# Patient Record
Sex: FEMALE | Race: WHITE | NOT HISPANIC OR LATINO | Employment: OTHER | URBAN - NONMETROPOLITAN AREA
[De-identification: names, ages, dates, MRNs, and addresses within clinical notes are randomized per-mention and may not be internally consistent; named-entity substitution may affect disease eponyms.]

---

## 2018-06-20 ENCOUNTER — OFFICE VISIT (OUTPATIENT)
Dept: OTOLARYNGOLOGY | Facility: CLINIC | Age: 78
End: 2018-06-20

## 2018-06-20 VITALS — WEIGHT: 230 LBS | HEIGHT: 68 IN | BODY MASS INDEX: 34.86 KG/M2 | HEART RATE: 66 BPM

## 2018-06-20 DIAGNOSIS — H60.63 CHRONIC NON-INFECTIVE OTITIS EXTERNA OF BOTH EARS, UNSPECIFIED TYPE: Primary | ICD-10-CM

## 2018-06-20 PROCEDURE — 99203 OFFICE O/P NEW LOW 30 MIN: CPT | Performed by: OTOLARYNGOLOGY

## 2018-06-20 RX ORDER — LISINOPRIL 10 MG/1
10 TABLET ORAL DAILY
COMMUNITY
End: 2022-03-29

## 2018-06-20 RX ORDER — SPIRONOLACTONE 25 MG/1
1 TABLET ORAL DAILY
COMMUNITY
Start: 2022-10-08 | End: 2023-03-22 | Stop reason: HOSPADM

## 2018-06-20 RX ORDER — GUAIFENESIN 600 MG/1
1200 TABLET, EXTENDED RELEASE ORAL 2 TIMES DAILY
COMMUNITY
End: 2023-02-24 | Stop reason: HOSPADM

## 2018-06-20 RX ORDER — ACETAMINOPHEN 500 MG
500 TABLET ORAL 2 TIMES DAILY
COMMUNITY
End: 2023-02-24 | Stop reason: HOSPADM

## 2018-06-20 RX ORDER — CARVEDILOL 3.12 MG/1
3.12 TABLET ORAL 2 TIMES DAILY WITH MEALS
COMMUNITY
End: 2023-01-06

## 2018-06-20 RX ORDER — BUDESONIDE AND FORMOTEROL FUMARATE DIHYDRATE 160; 4.5 UG/1; UG/1
2 AEROSOL RESPIRATORY (INHALATION)
COMMUNITY
Start: 2017-09-06 | End: 2023-02-09 | Stop reason: DRUGHIGH

## 2018-06-20 RX ORDER — LEVOCETIRIZINE DIHYDROCHLORIDE 5 MG/1
1 TABLET, FILM COATED ORAL EVERY EVENING
COMMUNITY

## 2018-06-20 RX ORDER — BUMETANIDE 2 MG/1
2 TABLET ORAL DAILY
COMMUNITY
End: 2022-09-23 | Stop reason: SDUPTHER

## 2018-06-20 RX ORDER — ALBUTEROL SULFATE 90 UG/1
2 AEROSOL, METERED RESPIRATORY (INHALATION) EVERY 4 HOURS PRN
COMMUNITY

## 2018-06-20 RX ORDER — MELATONIN 10 MG
1 CAPSULE ORAL NIGHTLY
Status: ON HOLD | COMMUNITY
End: 2023-02-22

## 2018-06-22 NOTE — PROGRESS NOTES
"Subjective   Alda Constantino is a 77 y.o. female.     History of Present Illness     Patient presents complaining that her left ear or so that her right ear has been itchy and feeling dry.  She says at times it feels like something is \"crawling\" inside her ear like an insect.  She has used over-the-counter swimmer's ear medicine and occludes her ear with a cottonball neither of which seemed to help all that months.  Symptoms been present for 2 months.  Nothing in particular brought this on.  No previous otologic surgery or history of significant ear problems.  Doesn't appear to have subjectively affected her hearing    The following portions of the patient's history were reviewed and updated as appropriate: allergies, current medications, past family history, past medical history, past social history, past surgical history and problem list.      Alda Constantino reports that she has quit smoking. She has never used smokeless tobacco.  Patient is not a tobacco user and has not been counseled for use of tobacco products    Family History   Problem Relation Age of Onset   • Diabetes Mother    • Diabetes Father    • Heart disease Father    • Cancer Sister    • Heart disease Sister    • Thyroid disease Sister    • Diabetes Brother    • Heart disease Brother        Allergies   Allergen Reactions   • Dilaudid [Hydromorphone Hcl] Anaphylaxis   • Keflex [Cephalexin] Shortness Of Breath   • Lomotil [Diphenoxylate] Shortness Of Breath   • Beef-Derived Products GI Intolerance   • Ciprofloxacin Nausea And Vomiting   • Contrast Dye Hives   • Levaquin [Levofloxacin] GI Intolerance   • Milk-Related Compounds GI Intolerance   • Nsaids Hives   • Percocet [Oxycodone-Acetaminophen] Itching         Current Outpatient Prescriptions:   •  acetaminophen (TYLENOL) 500 MG tablet, Take 500 mg by mouth Every 6 (Six) Hours As Needed for Mild Pain ., Disp: , Rfl:   •  albuterol (PROVENTIL HFA;VENTOLIN HFA) 108 (90 Base) MCG/ACT inhaler, " Inhale 2 puffs Every 4 (Four) Hours As Needed for Wheezing., Disp: , Rfl:   •  budesonide-formoterol (SYMBICORT) 160-4.5 MCG/ACT inhaler, Inhale 2 puffs., Disp: , Rfl:   •  bumetanide (BUMEX) 2 MG tablet, Take 2 mg by mouth Daily., Disp: , Rfl:   •  carvedilol (COREG) 3.125 MG tablet, Take 3.125 mg by mouth 2 (Two) Times a Day With Meals., Disp: , Rfl:   •  EPINEPHrine (EPIPEN IJ), Inject  as directed., Disp: , Rfl:   •  Glucose Blood (PRECISION XTRA TEST VI), by In Vitro route., Disp: , Rfl:   •  guaiFENesin (MUCINEX) 600 MG 12 hr tablet, Take 1,200 mg by mouth 2 (Two) Times a Day., Disp: , Rfl:   •  IPRATROPIUM BROMIDE IN, Inhale., Disp: , Rfl:   •  levocetirizine (XYZAL) 5 MG tablet, Take 5 mg by mouth Every Evening., Disp: , Rfl:   •  lisinopril (PRINIVIL,ZESTRIL) 10 MG tablet, Take 10 mg by mouth Daily., Disp: , Rfl:   •  Melatonin 10 MG capsule, Take  by mouth., Disp: , Rfl:   •  METFORMIN HCL PO, Take  by mouth., Disp: , Rfl:   •  spironolactone (ALDACTONE) 25 MG tablet, Take 25 mg by mouth Daily., Disp: , Rfl:     Past Medical History:   Diagnosis Date   • Congenital abnormalities     colon behind liver         Review of Systems   Eyes:        Vision change   Cardiovascular: Positive for palpitations.   Musculoskeletal: Positive for arthralgias.        Leg Pain   Neurological: Positive for numbness.   All other systems reviewed and are negative.          Objective   Physical Exam  General: Well-developed well-nourished female in no acute distress.  Alert and oriented ×3. Head: Normocephalic. Face: Symmetrical strength and appearance. PERRL. EOMI. Voice:Strong. Speech:Fluent  Ears: External ears no deformity, canals show dry skin with mild flaky squamous debris bilaterally, tympanic membranes intact clear and mobile bilaterally.  Nose: Nares show no discharge mass polyp or purulence.  Boggy mucosa is present.  No gross external deformity.  Septum: Midline  Oral cavity: Lips and gums without lesions.  Tongue  and floor of mouth without lesions.  Parotid and submandibular ducts unobstructed.  No mucosal lesions on the buccal mucosa or vestibule of the mouth.  Pharynx: No erythema exudate mass or ulcer  Neck: No lymphadenopathy.  No thyromegaly.  Trachea and larynx midline.  No masses in the parotid or submandibular glands.  TMJs tender to palpation bilaterally        Assessment/Plan   Alda was seen today for ear problem.    Diagnoses and all orders for this visit:    Chronic non-infective otitis externa of both ears, unspecified type        Plan: I suspect the majority of her symptoms are due to chronic noninfectious otitis externa.  I've advised using hydrocortisone liquid 3 drops to each ear twice a day ×7 days then when necessary.  May also use baby oil at bedtime.  No manipulation with Q-tips.  Avoid occluding with cotton.  Return if symptoms have not improved within a month.

## 2019-09-03 ENCOUNTER — HOSPITAL ENCOUNTER (INPATIENT)
Facility: HOSPITAL | Age: 79
LOS: 5 days | Discharge: HOME | End: 2019-09-09
Attending: EMERGENCY MEDICINE | Admitting: INTERNAL MEDICINE

## 2019-09-03 DIAGNOSIS — J42 CHRONIC BRONCHITIS, UNSPECIFIED CHRONIC BRONCHITIS TYPE (HCC): ICD-10-CM

## 2019-09-03 DIAGNOSIS — R50.9 ACUTE FEBRILE ILLNESS: Primary | ICD-10-CM

## 2019-09-03 DIAGNOSIS — Z74.09 IMPAIRED MOBILITY AND ACTIVITIES OF DAILY LIVING: ICD-10-CM

## 2019-09-03 DIAGNOSIS — Z74.09 IMPAIRED FUNCTIONAL MOBILITY, BALANCE, GAIT, AND ENDURANCE: ICD-10-CM

## 2019-09-03 DIAGNOSIS — Z78.9 IMPAIRED MOBILITY AND ACTIVITIES OF DAILY LIVING: ICD-10-CM

## 2019-09-03 PROCEDURE — 99285 EMERGENCY DEPT VISIT HI MDM: CPT

## 2019-09-03 PROCEDURE — 93005 ELECTROCARDIOGRAM TRACING: CPT | Performed by: EMERGENCY MEDICINE

## 2019-09-03 PROCEDURE — 93010 ELECTROCARDIOGRAM REPORT: CPT | Performed by: INTERNAL MEDICINE

## 2019-09-03 PROCEDURE — 93005 ELECTROCARDIOGRAM TRACING: CPT

## 2019-09-04 ENCOUNTER — APPOINTMENT (OUTPATIENT)
Dept: GENERAL RADIOLOGY | Facility: HOSPITAL | Age: 79
End: 2019-09-04

## 2019-09-04 ENCOUNTER — APPOINTMENT (OUTPATIENT)
Dept: CT IMAGING | Facility: HOSPITAL | Age: 79
End: 2019-09-04

## 2019-09-04 PROBLEM — R50.9 ACUTE FEBRILE ILLNESS: Status: ACTIVE | Noted: 2019-09-04

## 2019-09-04 PROBLEM — R11.10 VOMITING: Status: ACTIVE | Noted: 2019-09-04

## 2019-09-04 PROBLEM — J44.9 COPD (CHRONIC OBSTRUCTIVE PULMONARY DISEASE) (HCC): Status: ACTIVE | Noted: 2019-09-04

## 2019-09-04 PROBLEM — A41.9 SEPSIS (HCC): Status: ACTIVE | Noted: 2019-09-04

## 2019-09-04 PROBLEM — D72.829 LEUKOCYTOSIS: Status: ACTIVE | Noted: 2019-09-04

## 2019-09-04 PROBLEM — I10 HYPERTENSION: Status: ACTIVE | Noted: 2019-09-04

## 2019-09-04 LAB
ALBUMIN SERPL-MCNC: 4.5 G/DL (ref 3.5–5.2)
ALBUMIN/GLOB SERPL: 1.5 G/DL
ALP SERPL-CCNC: 69 U/L (ref 39–117)
ALT SERPL W P-5'-P-CCNC: 18 U/L (ref 1–33)
ANION GAP SERPL CALCULATED.3IONS-SCNC: 12 MMOL/L (ref 5–15)
AST SERPL-CCNC: 20 U/L (ref 1–32)
BASOPHILS # BLD AUTO: 0.05 10*3/MM3 (ref 0–0.2)
BASOPHILS NFR BLD AUTO: 0.3 % (ref 0–1.5)
BILIRUB SERPL-MCNC: 0.4 MG/DL (ref 0.2–1.2)
BILIRUB UR QL STRIP: NEGATIVE
BILIRUB UR QL STRIP: NEGATIVE
BUN BLD-MCNC: 25 MG/DL (ref 8–23)
BUN/CREAT SERPL: 20.7 (ref 7–25)
CALCIUM SPEC-SCNC: 9.5 MG/DL (ref 8.6–10.5)
CHLORIDE SERPL-SCNC: 99 MMOL/L (ref 98–107)
CLARITY UR: CLEAR
CLARITY UR: CLEAR
CO2 SERPL-SCNC: 27 MMOL/L (ref 22–29)
COLOR UR: YELLOW
COLOR UR: YELLOW
CREAT BLD-MCNC: 1.21 MG/DL (ref 0.57–1)
D-LACTATE SERPL-SCNC: 0.9 MMOL/L (ref 0.5–2)
D-LACTATE SERPL-SCNC: 1.7 MMOL/L (ref 0.5–2)
DEPRECATED RDW RBC AUTO: 49.8 FL (ref 37–54)
EOSINOPHIL # BLD AUTO: 0.02 10*3/MM3 (ref 0–0.4)
EOSINOPHIL NFR BLD AUTO: 0.1 % (ref 0.3–6.2)
ERYTHROCYTE [DISTWIDTH] IN BLOOD BY AUTOMATED COUNT: 14.2 % (ref 12.3–15.4)
FLUAV AG NPH QL: NEGATIVE
FLUBV AG NPH QL IA: NEGATIVE
GFR SERPL CREATININE-BSD FRML MDRD: 43 ML/MIN/1.73
GLOBULIN UR ELPH-MCNC: 3 GM/DL
GLUCOSE BLD-MCNC: 181 MG/DL (ref 65–99)
GLUCOSE UR STRIP-MCNC: NEGATIVE MG/DL
GLUCOSE UR STRIP-MCNC: NEGATIVE MG/DL
HCT VFR BLD AUTO: 35.6 % (ref 34–46.6)
HGB BLD-MCNC: 11.7 G/DL (ref 12–15.9)
HGB UR QL STRIP.AUTO: NEGATIVE
HGB UR QL STRIP.AUTO: NEGATIVE
HOLD SPECIMEN: NORMAL
IMM GRANULOCYTES # BLD AUTO: 0.1 10*3/MM3 (ref 0–0.05)
IMM GRANULOCYTES NFR BLD AUTO: 0.5 % (ref 0–0.5)
KETONES UR QL STRIP: NEGATIVE
KETONES UR QL STRIP: NEGATIVE
LEUKOCYTE ESTERASE UR QL STRIP.AUTO: NEGATIVE
LEUKOCYTE ESTERASE UR QL STRIP.AUTO: NEGATIVE
LYMPHOCYTES # BLD AUTO: 0.98 10*3/MM3 (ref 0.7–3.1)
LYMPHOCYTES NFR BLD AUTO: 5.2 % (ref 19.6–45.3)
MCH RBC QN AUTO: 31.7 PG (ref 26.6–33)
MCHC RBC AUTO-ENTMCNC: 32.9 G/DL (ref 31.5–35.7)
MCV RBC AUTO: 96.5 FL (ref 79–97)
MONOCYTES # BLD AUTO: 0.74 10*3/MM3 (ref 0.1–0.9)
MONOCYTES NFR BLD AUTO: 3.9 % (ref 5–12)
NEUTROPHILS # BLD AUTO: 16.96 10*3/MM3 (ref 1.7–7)
NEUTROPHILS NFR BLD AUTO: 90 % (ref 42.7–76)
NITRITE UR QL STRIP: NEGATIVE
NITRITE UR QL STRIP: NEGATIVE
NRBC BLD AUTO-RTO: 0 /100 WBC (ref 0–0.2)
PH UR STRIP.AUTO: <=5 [PH] (ref 5–9)
PH UR STRIP.AUTO: <=5 [PH] (ref 5–9)
PLATELET # BLD AUTO: 195 10*3/MM3 (ref 140–450)
PMV BLD AUTO: 11.9 FL (ref 6–12)
POTASSIUM BLD-SCNC: 4.7 MMOL/L (ref 3.5–5.2)
PROT SERPL-MCNC: 7.5 G/DL (ref 6–8.5)
PROT UR QL STRIP: NEGATIVE
PROT UR QL STRIP: NEGATIVE
RBC # BLD AUTO: 3.69 10*6/MM3 (ref 3.77–5.28)
SODIUM BLD-SCNC: 138 MMOL/L (ref 136–145)
SP GR UR STRIP: 1.01 (ref 1–1.03)
SP GR UR STRIP: 1.02 (ref 1–1.03)
TROPONIN T SERPL-MCNC: <0.01 NG/ML (ref 0–0.03)
UROBILINOGEN UR QL STRIP: NORMAL
UROBILINOGEN UR QL STRIP: NORMAL
WBC NRBC COR # BLD: 18.85 10*3/MM3 (ref 3.4–10.8)
WHOLE BLOOD HOLD SPECIMEN: NORMAL

## 2019-09-04 PROCEDURE — 94640 AIRWAY INHALATION TREATMENT: CPT

## 2019-09-04 PROCEDURE — 81003 URINALYSIS AUTO W/O SCOPE: CPT | Performed by: NURSE PRACTITIONER

## 2019-09-04 PROCEDURE — 80053 COMPREHEN METABOLIC PANEL: CPT

## 2019-09-04 PROCEDURE — 83605 ASSAY OF LACTIC ACID: CPT | Performed by: INTERNAL MEDICINE

## 2019-09-04 PROCEDURE — 87040 BLOOD CULTURE FOR BACTERIA: CPT | Performed by: INTERNAL MEDICINE

## 2019-09-04 PROCEDURE — 94799 UNLISTED PULMONARY SVC/PX: CPT

## 2019-09-04 PROCEDURE — 25010000002 LEVOFLOXACIN PER 250 MG: Performed by: EMERGENCY MEDICINE

## 2019-09-04 PROCEDURE — 83605 ASSAY OF LACTIC ACID: CPT

## 2019-09-04 PROCEDURE — 74176 CT ABD & PELVIS W/O CONTRAST: CPT

## 2019-09-04 PROCEDURE — 94760 N-INVAS EAR/PLS OXIMETRY 1: CPT

## 2019-09-04 PROCEDURE — 25010000002 ONDANSETRON PER 1 MG: Performed by: EMERGENCY MEDICINE

## 2019-09-04 PROCEDURE — 87804 INFLUENZA ASSAY W/OPTIC: CPT | Performed by: EMERGENCY MEDICINE

## 2019-09-04 PROCEDURE — 85025 COMPLETE CBC W/AUTO DIFF WBC: CPT

## 2019-09-04 PROCEDURE — 84484 ASSAY OF TROPONIN QUANT: CPT | Performed by: EMERGENCY MEDICINE

## 2019-09-04 PROCEDURE — 71045 X-RAY EXAM CHEST 1 VIEW: CPT

## 2019-09-04 PROCEDURE — 25010000002 VANCOMYCIN 5 G RECONSTITUTED SOLUTION: Performed by: INTERNAL MEDICINE

## 2019-09-04 PROCEDURE — 81003 URINALYSIS AUTO W/O SCOPE: CPT | Performed by: EMERGENCY MEDICINE

## 2019-09-04 RX ORDER — BUDESONIDE AND FORMOTEROL FUMARATE DIHYDRATE 160; 4.5 UG/1; UG/1
2 AEROSOL RESPIRATORY (INHALATION)
Status: DISCONTINUED | OUTPATIENT
Start: 2019-09-04 | End: 2019-09-09 | Stop reason: HOSPADM

## 2019-09-04 RX ORDER — DOCUSATE SODIUM 100 MG/1
100 CAPSULE, LIQUID FILLED ORAL 2 TIMES DAILY
Status: DISCONTINUED | OUTPATIENT
Start: 2019-09-04 | End: 2019-09-09 | Stop reason: HOSPADM

## 2019-09-04 RX ORDER — ONDANSETRON 2 MG/ML
4 INJECTION INTRAMUSCULAR; INTRAVENOUS ONCE
Status: COMPLETED | OUTPATIENT
Start: 2019-09-04 | End: 2019-09-04

## 2019-09-04 RX ORDER — SODIUM CHLORIDE 0.9 % (FLUSH) 0.9 %
10 SYRINGE (ML) INJECTION AS NEEDED
Status: DISCONTINUED | OUTPATIENT
Start: 2019-09-04 | End: 2019-09-09 | Stop reason: HOSPADM

## 2019-09-04 RX ORDER — ACETAMINOPHEN 500 MG
1000 TABLET ORAL ONCE
Status: COMPLETED | OUTPATIENT
Start: 2019-09-04 | End: 2019-09-04

## 2019-09-04 RX ORDER — GUAIFENESIN 600 MG/1
1200 TABLET, EXTENDED RELEASE ORAL EVERY 12 HOURS SCHEDULED
Status: DISCONTINUED | OUTPATIENT
Start: 2019-09-04 | End: 2019-09-09 | Stop reason: HOSPADM

## 2019-09-04 RX ORDER — IPRATROPIUM BROMIDE AND ALBUTEROL SULFATE 2.5; .5 MG/3ML; MG/3ML
3 SOLUTION RESPIRATORY (INHALATION) ONCE
Status: COMPLETED | OUTPATIENT
Start: 2019-09-04 | End: 2019-09-04

## 2019-09-04 RX ORDER — ACETAMINOPHEN 160 MG/5ML
650 SOLUTION ORAL EVERY 4 HOURS PRN
Status: DISCONTINUED | OUTPATIENT
Start: 2019-09-04 | End: 2019-09-09 | Stop reason: HOSPADM

## 2019-09-04 RX ORDER — ACETAMINOPHEN 325 MG/1
650 TABLET ORAL EVERY 4 HOURS PRN
Status: DISCONTINUED | OUTPATIENT
Start: 2019-09-04 | End: 2019-09-09 | Stop reason: HOSPADM

## 2019-09-04 RX ORDER — LEVOFLOXACIN 5 MG/ML
750 INJECTION, SOLUTION INTRAVENOUS ONCE
Status: COMPLETED | OUTPATIENT
Start: 2019-09-04 | End: 2019-09-04

## 2019-09-04 RX ORDER — ACETAMINOPHEN 650 MG/1
650 SUPPOSITORY RECTAL EVERY 4 HOURS PRN
Status: DISCONTINUED | OUTPATIENT
Start: 2019-09-04 | End: 2019-09-09 | Stop reason: HOSPADM

## 2019-09-04 RX ORDER — CARVEDILOL 3.12 MG/1
3.12 TABLET ORAL 2 TIMES DAILY WITH MEALS
Status: DISCONTINUED | OUTPATIENT
Start: 2019-09-04 | End: 2019-09-09 | Stop reason: HOSPADM

## 2019-09-04 RX ORDER — SODIUM CHLORIDE 9 MG/ML
50 INJECTION, SOLUTION INTRAVENOUS CONTINUOUS
Status: DISCONTINUED | OUTPATIENT
Start: 2019-09-04 | End: 2019-09-06

## 2019-09-04 RX ORDER — LISINOPRIL 10 MG/1
10 TABLET ORAL DAILY
Status: DISCONTINUED | OUTPATIENT
Start: 2019-09-04 | End: 2019-09-04

## 2019-09-04 RX ORDER — SODIUM CHLORIDE 0.9 % (FLUSH) 0.9 %
10 SYRINGE (ML) INJECTION EVERY 12 HOURS SCHEDULED
Status: DISCONTINUED | OUTPATIENT
Start: 2019-09-04 | End: 2019-09-09 | Stop reason: HOSPADM

## 2019-09-04 RX ORDER — LANOLIN ALCOHOL/MO/W.PET/CERES
10 CREAM (GRAM) TOPICAL NIGHTLY
Status: DISCONTINUED | OUTPATIENT
Start: 2019-09-04 | End: 2019-09-09 | Stop reason: HOSPADM

## 2019-09-04 RX ORDER — ONDANSETRON 2 MG/ML
4 INJECTION INTRAMUSCULAR; INTRAVENOUS EVERY 6 HOURS PRN
Status: DISCONTINUED | OUTPATIENT
Start: 2019-09-04 | End: 2019-09-09 | Stop reason: HOSPADM

## 2019-09-04 RX ORDER — FAMOTIDINE 40 MG/1
40 TABLET, FILM COATED ORAL DAILY
Status: DISCONTINUED | OUTPATIENT
Start: 2019-09-04 | End: 2019-09-09 | Stop reason: HOSPADM

## 2019-09-04 RX ORDER — BUMETANIDE 1 MG/1
2 TABLET ORAL DAILY
Status: DISCONTINUED | OUTPATIENT
Start: 2019-09-04 | End: 2019-09-04

## 2019-09-04 RX ORDER — DOCUSATE SODIUM 100 MG/1
100 CAPSULE, LIQUID FILLED ORAL 2 TIMES DAILY
Status: DISCONTINUED | OUTPATIENT
Start: 2019-09-04 | End: 2019-09-04

## 2019-09-04 RX ORDER — CETIRIZINE HYDROCHLORIDE 5 MG/1
5 TABLET ORAL DAILY
Status: DISCONTINUED | OUTPATIENT
Start: 2019-09-04 | End: 2019-09-09 | Stop reason: HOSPADM

## 2019-09-04 RX ORDER — IPRATROPIUM BROMIDE AND ALBUTEROL SULFATE 2.5; .5 MG/3ML; MG/3ML
1.5 SOLUTION RESPIRATORY (INHALATION) EVERY 4 HOURS PRN
COMMUNITY

## 2019-09-04 RX ORDER — SPIRONOLACTONE 25 MG/1
25 TABLET ORAL DAILY
Status: DISCONTINUED | OUTPATIENT
Start: 2019-09-04 | End: 2019-09-09 | Stop reason: HOSPADM

## 2019-09-04 RX ADMIN — AZTREONAM 2 G: 2 INJECTION, POWDER, LYOPHILIZED, FOR SOLUTION INTRAMUSCULAR; INTRAVENOUS at 20:35

## 2019-09-04 RX ADMIN — SODIUM CHLORIDE, PRESERVATIVE FREE 10 ML: 5 INJECTION INTRAVENOUS at 12:03

## 2019-09-04 RX ADMIN — BUMETANIDE 2 MG: 1 TABLET ORAL at 12:06

## 2019-09-04 RX ADMIN — DOCUSATE SODIUM 100 MG: 100 CAPSULE, LIQUID FILLED ORAL at 20:32

## 2019-09-04 RX ADMIN — DOCUSATE SODIUM 100 MG: 100 CAPSULE, LIQUID FILLED ORAL at 06:43

## 2019-09-04 RX ADMIN — IPRATROPIUM BROMIDE AND ALBUTEROL SULFATE 3 ML: 2.5; .5 SOLUTION RESPIRATORY (INHALATION) at 01:30

## 2019-09-04 RX ADMIN — SODIUM CHLORIDE 50 ML/HR: 9 INJECTION, SOLUTION INTRAVENOUS at 12:01

## 2019-09-04 RX ADMIN — GUAIFENESIN 1200 MG: 600 TABLET, EXTENDED RELEASE ORAL at 12:06

## 2019-09-04 RX ADMIN — ONDANSETRON 4 MG: 2 INJECTION INTRAMUSCULAR; INTRAVENOUS at 01:38

## 2019-09-04 RX ADMIN — VANCOMYCIN HYDROCHLORIDE 2000 MG: 5 INJECTION, POWDER, LYOPHILIZED, FOR SOLUTION INTRAVENOUS at 10:18

## 2019-09-04 RX ADMIN — CARVEDILOL 3.12 MG: 3.12 TABLET, FILM COATED ORAL at 12:06

## 2019-09-04 RX ADMIN — BUDESONIDE AND FORMOTEROL FUMARATE DIHYDRATE 2 PUFF: 160; 4.5 AEROSOL RESPIRATORY (INHALATION) at 12:05

## 2019-09-04 RX ADMIN — LEVOFLOXACIN 750 MG: 5 INJECTION, SOLUTION INTRAVENOUS at 01:39

## 2019-09-04 RX ADMIN — BUDESONIDE AND FORMOTEROL FUMARATE DIHYDRATE 2 PUFF: 160; 4.5 AEROSOL RESPIRATORY (INHALATION) at 20:04

## 2019-09-04 RX ADMIN — FAMOTIDINE 40 MG: 40 TABLET ORAL at 12:06

## 2019-09-04 RX ADMIN — ACETAMINOPHEN 1000 MG: 500 TABLET ORAL at 01:25

## 2019-09-04 RX ADMIN — SPIRONOLACTONE 25 MG: 25 TABLET ORAL at 12:06

## 2019-09-04 RX ADMIN — LISINOPRIL 10 MG: 10 TABLET ORAL at 12:06

## 2019-09-04 RX ADMIN — CARVEDILOL 3.12 MG: 3.12 TABLET, FILM COATED ORAL at 17:42

## 2019-09-04 RX ADMIN — GUAIFENESIN 1200 MG: 600 TABLET, EXTENDED RELEASE ORAL at 20:32

## 2019-09-04 RX ADMIN — SODIUM CHLORIDE, PRESERVATIVE FREE 10 ML: 5 INJECTION INTRAVENOUS at 20:32

## 2019-09-04 RX ADMIN — CETIRIZINE HYDROCHLORIDE 5 MG: 5 TABLET ORAL at 12:06

## 2019-09-04 RX ADMIN — MELATONIN 9.75 MG: at 21:42

## 2019-09-04 RX ADMIN — AZTREONAM 2 G: 2 INJECTION, POWDER, LYOPHILIZED, FOR SOLUTION INTRAMUSCULAR; INTRAVENOUS at 12:00

## 2019-09-04 NOTE — PROGRESS NOTES
"Pharmacokinetics by Pharmacy - Vancomycin Initial Consult    Alda Constantino is a 78 y.o. female being initiated on vancomycin for sepsis. Patient is also receiving aztreonam.    Objective:     [Ht: 172.7 cm (68\"); Wt: 102 kg (224 lb 9.6 oz)]     Lab Results   Component Value Date    WBC 18.85 (H) 09/04/2019    WBC 9.2 04/30/2019    WBC 8.5 10/29/2018    WBC 9.2 06/25/2018      Lab Results   Component Value Date    LACTATE 0.9 09/04/2019    LACTATE 1.7 09/04/2019      Temp Readings from Last 1 Encounters:   09/04/19 97.9 °F (36.6 °C) (Oral)     Estimated Creatinine Clearance: 48.6 mL/min (A) (by C-G formula based on SCr of 1.21 mg/dL (H)).   Lab Results   Component Value Date    CREATININE 1.21 (H) 09/04/2019    CREATININE 0.9 02/21/2018    CREATININE 0.6 03/08/2016    CREATININE 0.7 09/27/2014    CREATININE 0.7 09/26/2014       Baseline culture results:  Microbiology Results (last 10 days)       Procedure Component Value - Date/Time    Influenza Antigen, Rapid - Swab, Nasopharynx [[id number]]  (Normal) Collected:  09/04/19 0229    Lab Status:  Final result Specimen:  Swab from Nasopharynx Updated:  09/04/19 0246     Influenza A Ag, EIA Negative     Influenza B Ag, EIA Negative               Assessment  WBC above normal limits  SCr above normal limits  Febrile upon admission    Labs in progress:  9/4 BCx2 IP  9/4 influenza negative    One time vanc ordered before before shift started, not given yet, will most likely need to adjust times.    Utilizing AUC dosing   Goal AUC for sepsis: 400 - 600    Plan  1. Give vancomycin 2000mg IV x 1 followed by vancomycin 1500mg IV Q24H  2. Will order vancomycin peak 9/6 1630 and vancomycin trough 9/7 1330  3. Pharmacy will monitor renal function and adjust dose accordingly.    Francisco Javier Ambrocio, [facility]  09/04/19 11:21 AM     "

## 2019-09-04 NOTE — H&P
[facility] Medicine Admission      Date of Admission: 9/3/2019      Primary Care Physician: Provider, No Known      Chief Complaint: vomiting, fever    HPI: 78 year old  female with past medical history of COPD, asthma, HTN who presented on 9-3-19 with multiple complaints including nausea, vomiting, intermittent diarrhea, fever, chills.  She denies chest pain, dyspnea, syncope, palpitations, headache.  She reports having 4-5 episodes of vomiting since 8 pm last night.  She presented to the ED and was found to be febrile with temperature of 101.2 and leukocytosis noted with WBC of 18.85.  She is admitted for further work up to identify source of infection. During today's visit, she denies any vomiting since waking up but nausea continues.     Concurrent Medical History:  has a past medical history of Asthma, Cancer (CMS/HCC), Congenital abnormalities, COPD (chronic obstructive pulmonary disease) (CMS/HCC), and Hypertension.    Past Surgical History:  has a past surgical history that includes Hernia repair; Eye surgery; Skin cancer excision; Tonsillectomy; Adenoidectomy; Adrenal gland surgery (Right); Hysterectomy; Varicose vein surgery; Lung lobectomy (Left); Bladder surgery; Colonoscopy w/ biopsies and polypectomy; Rectal surgery; and Breast biopsy.    Family History: family history includes Cancer in her sister; Diabetes in her brother, father, and mother; Heart disease in her brother, father, and sister; Thyroid disease in her sister.    Social History:  reports that she has quit smoking. She has never used smokeless tobacco.    Allergies:   Allergies   Allergen Reactions   • Dilaudid [Hydromorphone Hcl] Anaphylaxis   • Keflex [Cephalexin] Shortness Of Breath   • Lomotil [Diphenoxylate] Shortness Of Breath   • Beef-Derived Products GI Intolerance   • Ciprofloxacin Nausea And Vomiting   • Contrast Dye Hives   • Levaquin [Levofloxacin] GI Intolerance   •  Milk-Related Compounds GI Intolerance   • Nsaids Hives   • Percocet [Oxycodone-Acetaminophen] Itching   • Pork-Derived Products GI Intolerance       Medications:   Prior to Admission medications    Medication Sig Start Date End Date Taking? Authorizing Provider   acetaminophen (TYLENOL) 500 MG tablet Take 500 mg by mouth Every 6 (Six) Hours As Needed for Mild Pain .   Yes Nicole Cline MD   albuterol (PROVENTIL HFA;VENTOLIN HFA) 108 (90 Base) MCG/ACT inhaler Inhale 2 puffs Every 4 (Four) Hours As Needed for Wheezing.   Yes Nicole Cline MD   budesonide-formoterol (SYMBICORT) 160-4.5 MCG/ACT inhaler Inhale 2 puffs. 9/6/17  Yes Nicole Cline MD   bumetanide (BUMEX) 2 MG tablet Take 2 mg by mouth Daily.   Yes Nicole Cline MD   carvedilol (COREG) 3.125 MG tablet Take 3.125 mg by mouth 2 (Two) Times a Day With Meals.   Yes Nicole Cline MD   EPINEPHrine (EPIPEN IJ) Inject  as directed.   Yes Nicole Cline MD   Glucose Blood (PRECISION XTRA TEST VI) by In Vitro route.   Yes Nicole Cline MD   guaiFENesin (MUCINEX) 600 MG 12 hr tablet Take 1,200 mg by mouth 2 (Two) Times a Day.   Yes Nicole Cline MD   ipratropium-albuterol (DUO-NEB) 0.5-2.5 mg/3 ml nebulizer Take 1.5 mL by nebulization Every 4 (Four) Hours As Needed for Wheezing.   Yes Nicole Cline MD   levocetirizine (XYZAL) 5 MG tablet Take 5 mg by mouth Every Evening.   Yes Nicole Cline MD   lisinopril (PRINIVIL,ZESTRIL) 10 MG tablet Take 10 mg by mouth Daily.   Yes Nicole Cline MD   Melatonin 10 MG capsule Take  by mouth.   Yes Nicole Cline MD   METFORMIN HCL PO Take  by mouth.   Yes Nicole Cline MD   spironolactone (ALDACTONE) 25 MG tablet Take 25 mg by mouth Daily.   Yes Nicole Cline MD   IPRATROPIUM BROMIDE IN Inhale.    ProviderNicole MD       Review of Systems:  Review of Systems   Constitutional: Positive for appetite change, chills and  fever.   HENT: Negative for congestion and sore throat.    Respiratory: Negative for cough, chest tightness, shortness of breath and wheezing.    Cardiovascular: Negative for chest pain, palpitations and leg swelling.   Gastrointestinal: Positive for diarrhea, nausea and vomiting. Negative for abdominal pain.   Musculoskeletal: Negative for back pain and neck pain.   Skin: Negative for pallor.   Neurological: Negative for dizziness, syncope, weakness and headaches.   Psychiatric/Behavioral: Negative for confusion. The patient is not nervous/anxious.             Physical Exam:   Temp:  [97.9 °F (36.6 °C)-101.2 °F (38.4 °C)] 97.9 °F (36.6 °C)  Heart Rate:  [70-96] 71  Resp:  [18-20] 18  BP: (100-128)/(54-95) 122/58  Physical Exam   Constitutional: She is oriented to person, place, and time. She appears well-developed and well-nourished. No distress.   HENT:   Head: Normocephalic and atraumatic.   Right Ear: External ear normal.   Left Ear: External ear normal.   Eyes: Conjunctivae and lids are normal.   Neck: Normal range of motion. Neck supple. No JVD present.   Cardiovascular: Normal rate, regular rhythm, normal heart sounds and intact distal pulses. Exam reveals no gallop and no friction rub.   No murmur heard.  Pulmonary/Chest: Effort normal and breath sounds normal. No stridor. No respiratory distress. She has no wheezes. She has no rales.   Abdominal: Soft. Bowel sounds are normal. She exhibits no distension. There is no tenderness. There is no guarding. No hernia.   Musculoskeletal: Normal range of motion. She exhibits no edema or deformity.   Neurological: She is alert and oriented to person, place, and time.   Skin: Skin is warm and dry. She is not diaphoretic. No erythema. No pallor.   Psychiatric: She has a normal mood and affect. Her behavior is normal.   Nursing note and vitals reviewed.        Results Reviewed:  I have personally reviewed current lab, radiology, and data and agree with results.  Lab  Results (last 24 hours)     Procedure Component Value Units Date/Time    Urinalysis With Culture If Indicated - Urine, Random Void [[id number]] Collected:  09/04/19 1216    Specimen:  Urine, Random Void Updated:  09/04/19 1224    Blood Culture - Blood, Arm, Left [[id number]] Collected:  09/04/19 0949    Specimen:  Blood from Arm, Left Updated:  09/04/19 0959    Lactic Acid, Plasma [[id number]]  (Normal) Collected:  09/04/19 0919    Specimen:  Blood Updated:  09/04/19 0941     Lactate 0.9 mmol/L     Blood Culture - Blood, Hand, Left [[id number]] Collected:  09/04/19 0919    Specimen:  Blood from Hand, Left Updated:  09/04/19 0926    [place] Draw [[id number]] Collected:  09/04/19 0029    Specimen:  Blood Updated:  09/04/19 0622    Narrative:       The following orders were created for panel order [place] Draw.  Procedure                               Abnormality         Status                     ---------                               -----------         ------                     Light Blue Top[[id number]]                                                              Green Top (Gel)[[id number]]                                  Final result               Lavender Top[[id number]]                                     Final result               Gold Top - SST[[id number]]                                   Final result                 Please view results for these tests on the individual orders.    Extra Tubes [[id number]] Collected:  09/04/19 0149    Specimen:  Blood, Venous Line Updated:  09/04/19 0300    Narrative:       The following orders were created for panel order Extra Tubes.  Procedure                               Abnormality         Status                     ---------                               -----------         ------                     [place] Blood Culture Rosalio...[[id number]]                      Final result                 Please view results for these tests on the individual orders.    [place] Blood Culture  Bottle Set [[id number]] Collected:  09/04/19 0149    Specimen:  Blood from Arm, Right Updated:  09/04/19 0300     Extra Tube Hold for add-ons.     Comment: Auto resulted.       Influenza Antigen, Rapid - Swab, Nasopharynx [[id number]]  (Normal) Collected:  09/04/19 0229    Specimen:  Swab from Nasopharynx Updated:  09/04/19 0246     Influenza A Ag, EIA Negative     Influenza B Ag, EIA Negative    Urinalysis With Microscopic If Indicated (No Culture) - Urine, Clean Catch [[id number]]  (Normal) Collected:  09/04/19 0112    Specimen:  Urine, Clean Catch Updated:  09/04/19 0152     Color, UA Yellow     Appearance, UA Clear     pH, UA <=5.0     Specific Gravity, UA 1.013     Glucose, UA Negative     Ketones, UA Negative     Bilirubin, UA Negative     Blood, UA Negative     Protein, UA Negative     Leuk Esterase, UA Negative     Nitrite, UA Negative     Urobilinogen, UA 0.2 E.U./dL    Narrative:       Urine microscopic not indicated.    Troponin [[id number]]  (Normal) Collected:  09/04/19 0029    Specimen:  Blood Updated:  09/04/19 0139     Troponin T <0.010 ng/mL     Narrative:       Troponin T Reference Range:  <= 0.03 ng/mL-   Negative for AMI  >0.03 ng/mL-     Abnormal for myocardial necrosis.  Clinicians would have to utilize clinical acumen, EKG, Troponin and serial changes to determine if it is an Acute Myocardial Infarction or myocardial injury due to an underlying chronic condition.     Green Top (Gel) [[id number]] Collected:  09/04/19 0029    Specimen:  Blood Updated:  09/04/19 0130     Extra Tube Hold for add-ons.     Comment: Auto resulted.       Lavender Top [[id number]] Collected:  09/04/19 0030    Specimen:  Blood Updated:  09/04/19 0130     Extra Tube hold for add-on     Comment: Auto resulted       Gold Top - SST [[id number]] Collected:  09/04/19 0030    Specimen:  Blood Updated:  09/04/19 0130     Extra Tube Hold for add-ons.     Comment: Auto resulted.       CBC & Differential [[id number]] Collected:  09/04/19  0030    Specimen:  Blood Updated:  09/04/19 0052    Narrative:       The following orders were created for panel order CBC & Differential.  Procedure                               Abnormality         Status                     ---------                               -----------         ------                     CBC Auto Differential[[id number]]        Abnormal            Final result                 Please view results for these tests on the individual orders.    CBC Auto Differential [[id number]]  (Abnormal) Collected:  09/04/19 0030    Specimen:  Blood Updated:  09/04/19 0052     WBC 18.85 10*3/mm3      RBC 3.69 10*6/mm3      Hemoglobin 11.7 g/dL      Hematocrit 35.6 %      MCV 96.5 fL      MCH 31.7 pg      MCHC 32.9 g/dL      RDW 14.2 %      RDW-SD 49.8 fl      MPV 11.9 fL      Platelets 195 10*3/mm3      Neutrophil % 90.0 %      Lymphocyte % 5.2 %      Monocyte % 3.9 %      Eosinophil % 0.1 %      Basophil % 0.3 %      Immature Grans % 0.5 %      Neutrophils, Absolute 16.96 10*3/mm3      Lymphocytes, Absolute 0.98 10*3/mm3      Monocytes, Absolute 0.74 10*3/mm3      Eosinophils, Absolute 0.02 10*3/mm3      Basophils, Absolute 0.05 10*3/mm3      Immature Grans, Absolute 0.10 10*3/mm3      nRBC 0.0 /100 WBC     Comprehensive Metabolic Panel [[id number]]  (Abnormal) Collected:  09/04/19 0029    Specimen:  Blood Updated:  09/04/19 0046     Glucose 181 mg/dL      BUN 25 mg/dL      Creatinine 1.21 mg/dL      Sodium 138 mmol/L      Potassium 4.7 mmol/L      Chloride 99 mmol/L      CO2 27.0 mmol/L      Calcium 9.5 mg/dL      Total Protein 7.5 g/dL      Albumin 4.50 g/dL      ALT (SGPT) 18 U/L      AST (SGOT) 20 U/L      Alkaline Phosphatase 69 U/L      Total Bilirubin 0.4 mg/dL      eGFR Non African Amer 43 mL/min/1.73      Globulin 3.0 gm/dL      A/G Ratio 1.5 g/dL      BUN/Creatinine Ratio 20.7     Anion Gap 12.0 mmol/L     Narrative:       GFR Normal >60  Chronic Kidney Disease <60  Kidney Failure <15    Lactic Acid,  Plasma [[id number]]  (Normal) Collected:  09/04/19 0030    Specimen:  Blood Updated:  09/04/19 0041     Lactate 1.7 mmol/L         Imaging Results (last 24 hours)     Procedure Component Value Units Date/Time    XR Chest 1 View [[id number]] Collected:  09/04/19 0150     Updated:  09/04/19 0205    Narrative:       Exam: AP portable chest    INDICATION: Fever, cough    COMPARISON: 9/24/2014    FINDINGS: AP portable chest. The structures are intact. Mild  cardiac enlargement. Lungs are hyperinflated compatible COPD.  Stable mild interstitial prominence. No focal consolidation,  pneumothorax or pleural effusion.      Impression:       No obvious acute cardiopulmonary abnormality    Electronically signed by:  Maurice Banda MD  9/4/2019 2:03 AM CDT  Workstation: [id number]            Assessment:    Active Hospital Problems    Diagnosis   • Acute febrile illness   • Hypertension   • COPD (chronic obstructive pulmonary disease) (CMS/HCC)   • Vomiting   • Leukocytosis   • Sepsis (CMS/HCC)           Plan:  1. Sepsis: Vancomycin, Aztreonam.  CXR negative.  UA negative.  Blood cultures pending.    2. Nausea/vomiting with leukocytosis: awaiting CT of abd/pelvis.    3. Hx HTN: Lisinopril and Bumex held r/t elevated creatinine.  Coreg continued.    4. Hx COPD: no acute exacerbation.  Continue Symbicort.    Further orders will depend upon hospital course.  Plan of care discussed with patient.  Code status confirmed and patient wishes for DNR code status.           This document has been electronically signed by AARTI Martínez on September 4, 2019 12:26 PM

## 2019-09-04 NOTE — PLAN OF CARE
Problem: Patient Care Overview  Goal: Plan of Care Review  Outcome: Ongoing (interventions implemented as appropriate)   09/04/19 0517   Coping/Psychosocial   Plan of Care Reviewed With patient   Plan of Care Review   Progress no change   OTHER   Outcome Summary new admission this AM. No complaints voiced. Will continue to monitor      09/04/19 0517   Coping/Psychosocial   Plan of Care Reviewed With patient   Plan of Care Review   Progress no change   OTHER   Outcome Summary new admission this AM. No complaints voiced. Will continue to monitor       Problem: Fall Risk (Adult)  Goal: Identify Related Risk Factors and Signs and Symptoms  Outcome: Ongoing (interventions implemented as appropriate)    Goal: Absence of Fall  Outcome: Ongoing (interventions implemented as appropriate)      Problem: Pain, Chronic (Adult)  Goal: Identify Related Risk Factors and Signs and Symptoms  Outcome: Ongoing (interventions implemented as appropriate)    Goal: Acceptable Pain/Comfort Level and Functional Ability  Outcome: Ongoing (interventions implemented as appropriate)      Problem: Infection, Risk/Actual (Adult)  Goal: Identify Related Risk Factors and Signs and Symptoms  Outcome: Ongoing (interventions implemented as appropriate)    Goal: Infection Prevention/Resolution  Outcome: Ongoing (interventions implemented as appropriate)

## 2019-09-04 NOTE — ED NOTES
Patient presents to ED with complaint of chest pain, back pain, bladder pain, and cough.      Janet Vasquez RN  09/04/19 0016

## 2019-09-04 NOTE — ED PROVIDER NOTES
Subjective   78-year-old white female presents to the emergency department with multiple somatic complaints.  Patient complains of fever and chills, cough, chest and back pain, suprapubic pressure and vomiting.            Review of Systems   Constitutional: Positive for chills and fever. Negative for diaphoresis.   Respiratory: Positive for cough. Negative for shortness of breath.    Cardiovascular: Positive for chest pain.   Gastrointestinal: Positive for abdominal pain, nausea and vomiting.   Genitourinary: Negative for dysuria and hematuria.   Musculoskeletal: Negative for back pain and neck stiffness.   Skin: Negative for rash.   Neurological: Positive for weakness and light-headedness. Negative for syncope and headaches.   All other systems reviewed and are negative.      Past Medical History:   Diagnosis Date   • Congenital abnormalities     colon behind liver       Allergies   Allergen Reactions   • Dilaudid [Hydromorphone Hcl] Anaphylaxis   • Keflex [Cephalexin] Shortness Of Breath   • Lomotil [Diphenoxylate] Shortness Of Breath   • Beef-Derived Products GI Intolerance   • Ciprofloxacin Nausea And Vomiting   • Contrast Dye Hives   • Levaquin [Levofloxacin] GI Intolerance   • Milk-Related Compounds GI Intolerance   • Nsaids Hives   • Percocet [Oxycodone-Acetaminophen] Itching       Past Surgical History:   Procedure Laterality Date   • ADENOIDECTOMY     • ADRENAL GLAND SURGERY Right    • BLADDER SURGERY     • BREAST BIOPSY     • COLONOSCOPY W/ BIOPSIES AND POLYPECTOMY     • EYE SURGERY     • HERNIA REPAIR     • HYSTERECTOMY     • LUNG LOBECTOMY Left    • RECTAL SURGERY      cyst   • SKIN CANCER EXCISION      legs   • TONSILLECTOMY     • VARICOSE VEIN SURGERY         Family History   Problem Relation Age of Onset   • Diabetes Mother    • Diabetes Father    • Heart disease Father    • Cancer Sister    • Heart disease Sister    • Thyroid disease Sister    • Diabetes Brother    • Heart disease Brother         Social History     Socioeconomic History   • Marital status:      Spouse name: Not on file   • Number of children: Not on file   • Years of education: Not on file   • Highest education level: Not on file   Tobacco Use   • Smoking status: Former Smoker   • Smokeless tobacco: Never Used           Objective   Physical Exam   Constitutional: She is oriented to person, place, and time. She appears well-developed and well-nourished. No distress.   HENT:   Head: Normocephalic and atraumatic.   Right Ear: External ear normal.   Left Ear: External ear normal.   Nose: Nose normal.   Mouth/Throat: Oropharynx is clear and moist.   Eyes: Conjunctivae and EOM are normal. Pupils are equal, round, and reactive to light.   Neck: Normal range of motion. Neck supple.   Cardiovascular: Normal rate, regular rhythm, normal heart sounds and intact distal pulses.   Pulmonary/Chest: Effort normal. No respiratory distress. She has wheezes. She has rales.   Abdominal: Soft. Bowel sounds are normal. She exhibits no distension and no mass. There is no tenderness. There is no guarding.   Musculoskeletal: Normal range of motion. She exhibits no edema or tenderness.   Neurological: She is alert and oriented to person, place, and time. No cranial nerve deficit. She exhibits normal muscle tone.   Skin: Skin is warm and dry. No rash noted. She is not diaphoretic.   Psychiatric: She has a normal mood and affect. Her behavior is normal.   Nursing note and vitals reviewed.      ECG 12 Lead    Date/Time: 9/3/2019 11:56 PM  Performed by: Latrell Morales MD  Authorized by: Latrell Morales MD   Interpreted by physician  Clinical impression: abnormal ECG  Comments: Normal sinus rhythm rate of 91.  Poor R wave progression.  No ST elevation.  Nonspecific findings.                 ED Course  ED Course as of Sep 04 0221   Wed Sep 04, 2019   0217 She relates she feels better.  She is alert and resting comfortably in no acute distress.  Her abdomen is  soft and nontender to palpation in all quadrants.  I reviewed the results of her evaluation with her and recommended admission to the hospital.  Case discussed with Dr. Guerra and he agrees to admit to the medical floor.  [DR]      ED Course User Index  [DR] Latrell Morales MD         Final Diagnosis: as of Sep 04 0221   Acute febrile illness      Labs Reviewed   COMPREHENSIVE METABOLIC PANEL - Abnormal; Notable for the following components:       Result Value    Glucose 181 (*)     BUN 25 (*)     Creatinine 1.21 (*)     eGFR Non  Amer 43 (*)     All other components within normal limits    Narrative:     GFR Normal >60  Chronic Kidney Disease <60  Kidney Failure <15   CBC WITH AUTO DIFFERENTIAL - Abnormal; Notable for the following components:    WBC 18.85 (*)     RBC 3.69 (*)     Hemoglobin 11.7 (*)     Neutrophil % 90.0 (*)     Lymphocyte % 5.2 (*)     Monocyte % 3.9 (*)     Eosinophil % 0.1 (*)     Neutrophils, Absolute 16.96 (*)     Immature Grans, Absolute 0.10 (*)     All other components within normal limits   LACTIC ACID, PLASMA - Normal   URINALYSIS W/ MICROSCOPIC IF INDICATED (NO CULTURE) - Normal    Narrative:     Urine microscopic not indicated.   TROPONIN (IN-HOUSE) - Normal    Narrative:     Troponin T Reference Range:  <= 0.03 ng/mL-   Negative for AMI  >0.03 ng/mL-     Abnormal for myocardial necrosis.  Clinicians would have to utilize clinical acumen, EKG, Troponin and serial changes to determine if it is an Acute Myocardial Infarction or myocardial injury due to an underlying chronic condition.    RAINBOW DRAW    Narrative:     The following orders were created for panel order [place] Draw.  Procedure                               Abnormality         Status                     ---------                               -----------         ------                     Light Blue Top[[id number]]                                                              Green Top (Gel)[[id number]]                                   Final result               Lavender Top[[id number]]                                     Final result               Gold Top - SST[[id number]]                                   Final result                 Please view results for these tests on the individual orders.   CBC AND DIFFERENTIAL    Narrative:     The following orders were created for panel order CBC & Differential.  Procedure                               Abnormality         Status                     ---------                               -----------         ------                     CBC Auto Differential[[id number]]        Abnormal            Final result                 Please view results for these tests on the individual orders.   GREEN TOP   LAVENDER TOP   GOLD TOP - SST   LIGHT BLUE TOP   EXTRA TUBES    Narrative:     The following orders were created for panel order Extra Tubes.  Procedure                               Abnormality         Status                     ---------                               -----------         ------                     [place] Blood Culture Rosalio...[[id number]]                      In process                   Please view results for these tests on the individual orders.   RAINBOW BLOOD CULTURE BOTTLES - 1 SET     Xr Chest 1 View    Result Date: 9/4/2019  Narrative: Exam: AP portable chest INDICATION: Fever, cough COMPARISON: 9/24/2014 FINDINGS: AP portable chest. The structures are intact. Mild cardiac enlargement. Lungs are hyperinflated compatible COPD. Stable mild interstitial prominence. No focal consolidation, pneumothorax or pleural effusion.     Impression: No obvious acute cardiopulmonary abnormality Electronically signed by:  Maurice Banda MD  9/4/2019 2:03 AM CDT Workstation: [id number]              Latrell Sanchez MD  09/04/19 0227

## 2019-09-04 NOTE — PLAN OF CARE
Problem: Patient Care Overview  Goal: Plan of Care Review  Outcome: Ongoing (interventions implemented as appropriate)   09/04/19 2420   Coping/Psychosocial   Plan of Care Reviewed With patient   Plan of Care Review   Progress no change   OTHER   Outcome Summary Patient was started on IV antibiotics. Urine sample sent. Vitals stable. Has been up in the chair most of the day. Will continue to monitor.       Problem: Fall Risk (Adult)  Goal: Absence of Fall  Outcome: Ongoing (interventions implemented as appropriate)      Problem: Pain, Chronic (Adult)  Goal: Acceptable Pain/Comfort Level and Functional Ability  Outcome: Ongoing (interventions implemented as appropriate)      Problem: Infection, Risk/Actual (Adult)  Goal: Infection Prevention/Resolution  Outcome: Ongoing (interventions implemented as appropriate)

## 2019-09-04 NOTE — NURSING NOTE
Notified Dr. Guerra that patient wanted colace to have bowel movement.  Also let Dr. Guerra know that he has not put in any orders

## 2019-09-05 LAB
ANION GAP SERPL CALCULATED.3IONS-SCNC: 11 MMOL/L (ref 5–15)
BASOPHILS # BLD AUTO: 0.03 10*3/MM3 (ref 0–0.2)
BASOPHILS NFR BLD AUTO: 0.4 % (ref 0–1.5)
BUN BLD-MCNC: 36 MG/DL (ref 8–23)
BUN/CREAT SERPL: 25.2 (ref 7–25)
CALCIUM SPEC-SCNC: 8.8 MG/DL (ref 8.6–10.5)
CHLORIDE SERPL-SCNC: 102 MMOL/L (ref 98–107)
CO2 SERPL-SCNC: 25 MMOL/L (ref 22–29)
CREAT BLD-MCNC: 1.43 MG/DL (ref 0.57–1)
D-LACTATE SERPL-SCNC: 0.8 MMOL/L (ref 0.5–2)
DEPRECATED RDW RBC AUTO: 51.2 FL (ref 37–54)
EOSINOPHIL # BLD AUTO: 0.14 10*3/MM3 (ref 0–0.4)
EOSINOPHIL NFR BLD AUTO: 1.7 % (ref 0.3–6.2)
ERYTHROCYTE [DISTWIDTH] IN BLOOD BY AUTOMATED COUNT: 14.5 % (ref 12.3–15.4)
GFR SERPL CREATININE-BSD FRML MDRD: 36 ML/MIN/1.73
GLUCOSE BLD-MCNC: 128 MG/DL (ref 65–99)
HCT VFR BLD AUTO: 30.2 % (ref 34–46.6)
HGB BLD-MCNC: 9.9 G/DL (ref 12–15.9)
IMM GRANULOCYTES # BLD AUTO: 0.04 10*3/MM3 (ref 0–0.05)
IMM GRANULOCYTES NFR BLD AUTO: 0.5 % (ref 0–0.5)
LYMPHOCYTES # BLD AUTO: 1.78 10*3/MM3 (ref 0.7–3.1)
LYMPHOCYTES NFR BLD AUTO: 21.5 % (ref 19.6–45.3)
MCH RBC QN AUTO: 31.8 PG (ref 26.6–33)
MCHC RBC AUTO-ENTMCNC: 32.8 G/DL (ref 31.5–35.7)
MCV RBC AUTO: 97.1 FL (ref 79–97)
MONOCYTES # BLD AUTO: 0.66 10*3/MM3 (ref 0.1–0.9)
MONOCYTES NFR BLD AUTO: 8 % (ref 5–12)
NEUTROPHILS # BLD AUTO: 5.62 10*3/MM3 (ref 1.7–7)
NEUTROPHILS NFR BLD AUTO: 67.9 % (ref 42.7–76)
NRBC BLD AUTO-RTO: 0 /100 WBC (ref 0–0.2)
PHOSPHATE SERPL-MCNC: 3.7 MG/DL (ref 2.5–4.5)
PLATELET # BLD AUTO: 160 10*3/MM3 (ref 140–450)
PMV BLD AUTO: 11.6 FL (ref 6–12)
POTASSIUM BLD-SCNC: 4.4 MMOL/L (ref 3.5–5.2)
RBC # BLD AUTO: 3.11 10*6/MM3 (ref 3.77–5.28)
SODIUM BLD-SCNC: 138 MMOL/L (ref 136–145)
WBC NRBC COR # BLD: 8.27 10*3/MM3 (ref 3.4–10.8)

## 2019-09-05 PROCEDURE — 83605 ASSAY OF LACTIC ACID: CPT | Performed by: INTERNAL MEDICINE

## 2019-09-05 PROCEDURE — 94799 UNLISTED PULMONARY SVC/PX: CPT

## 2019-09-05 PROCEDURE — 80048 BASIC METABOLIC PNL TOTAL CA: CPT | Performed by: NURSE PRACTITIONER

## 2019-09-05 PROCEDURE — 84100 ASSAY OF PHOSPHORUS: CPT | Performed by: INTERNAL MEDICINE

## 2019-09-05 PROCEDURE — 85025 COMPLETE CBC W/AUTO DIFF WBC: CPT | Performed by: NURSE PRACTITIONER

## 2019-09-05 PROCEDURE — 94760 N-INVAS EAR/PLS OXIMETRY 1: CPT

## 2019-09-05 RX ORDER — LACTULOSE 10 G/15ML
20 SOLUTION ORAL DAILY
Status: DISCONTINUED | OUTPATIENT
Start: 2019-09-05 | End: 2019-09-06

## 2019-09-05 RX ADMIN — AZTREONAM 2 G: 2 INJECTION, POWDER, LYOPHILIZED, FOR SOLUTION INTRAMUSCULAR; INTRAVENOUS at 21:00

## 2019-09-05 RX ADMIN — CARVEDILOL 3.12 MG: 3.12 TABLET, FILM COATED ORAL at 08:52

## 2019-09-05 RX ADMIN — AZTREONAM 2 G: 2 INJECTION, POWDER, LYOPHILIZED, FOR SOLUTION INTRAMUSCULAR; INTRAVENOUS at 04:31

## 2019-09-05 RX ADMIN — BUDESONIDE AND FORMOTEROL FUMARATE DIHYDRATE 2 PUFF: 160; 4.5 AEROSOL RESPIRATORY (INHALATION) at 07:46

## 2019-09-05 RX ADMIN — AZTREONAM 2 G: 2 INJECTION, POWDER, LYOPHILIZED, FOR SOLUTION INTRAMUSCULAR; INTRAVENOUS at 12:53

## 2019-09-05 RX ADMIN — GUAIFENESIN 1200 MG: 600 TABLET, EXTENDED RELEASE ORAL at 08:52

## 2019-09-05 RX ADMIN — CETIRIZINE HYDROCHLORIDE 5 MG: 5 TABLET ORAL at 08:51

## 2019-09-05 RX ADMIN — BUDESONIDE AND FORMOTEROL FUMARATE DIHYDRATE 2 PUFF: 160; 4.5 AEROSOL RESPIRATORY (INHALATION) at 21:41

## 2019-09-05 RX ADMIN — DOXYCYCLINE 100 MG: 100 INJECTION, POWDER, LYOPHILIZED, FOR SOLUTION INTRAVENOUS at 14:49

## 2019-09-05 RX ADMIN — SODIUM CHLORIDE, PRESERVATIVE FREE 10 ML: 5 INJECTION INTRAVENOUS at 22:00

## 2019-09-05 RX ADMIN — SPIRONOLACTONE 25 MG: 25 TABLET ORAL at 08:54

## 2019-09-05 RX ADMIN — DOCUSATE SODIUM 100 MG: 100 CAPSULE, LIQUID FILLED ORAL at 08:51

## 2019-09-05 RX ADMIN — GUAIFENESIN 1200 MG: 600 TABLET, EXTENDED RELEASE ORAL at 22:00

## 2019-09-05 RX ADMIN — FAMOTIDINE 40 MG: 40 TABLET ORAL at 08:51

## 2019-09-05 RX ADMIN — SODIUM CHLORIDE 50 ML/HR: 9 INJECTION, SOLUTION INTRAVENOUS at 22:33

## 2019-09-05 RX ADMIN — MELATONIN 9.75 MG: at 22:00

## 2019-09-05 RX ADMIN — CARVEDILOL 3.12 MG: 3.12 TABLET, FILM COATED ORAL at 17:04

## 2019-09-05 NOTE — PLAN OF CARE
Problem: Patient Care Overview  Goal: Plan of Care Review  Outcome: Ongoing (interventions implemented as appropriate)   09/05/19 0725   Coping/Psychosocial   Plan of Care Reviewed With patient   Plan of Care Review   Progress no change   OTHER   Outcome Summary Patient has been sitting up in chair most of the day. Patient is still receiving IV antibiotics. Vitals stable. Will continue to monitor.       Problem: Pain, Chronic (Adult)  Goal: Acceptable Pain/Comfort Level and Functional Ability  Outcome: Ongoing (interventions implemented as appropriate)      Problem: Infection, Risk/Actual (Adult)  Goal: Infection Prevention/Resolution  Outcome: Ongoing (interventions implemented as appropriate)      Problem: Sepsis/Septic Shock (Adult)  Goal: Signs and Symptoms of Listed Potential Problems Will be Absent, Minimized or Managed (Sepsis/Septic Shock)  Outcome: Ongoing (interventions implemented as appropriate)      Problem: Nausea/Vomiting (Adult)  Goal: Symptom Relief  Outcome: Ongoing (interventions implemented as appropriate)    Goal: Adequate Hydration  Outcome: Ongoing (interventions implemented as appropriate)

## 2019-09-05 NOTE — PROGRESS NOTES
"Pharmacokinetics by Pharmacy - Vancomycin    Alda Constantino is a 78 y.o. female receiving vancomycin 1500mg IV Q24H day 2 for sepsis  Patient is also receiving aztreonam    Objective:  [Ht: 172.7 cm (68\"); Wt: 95.7 kg (211 lb)]     Lab Results   Component Value Date    WBC 8.27 09/05/2019    WBC 18.85 (H) 09/04/2019    WBC 9.2 04/30/2019    WBC 8.5 10/29/2018    WBC 9.2 06/25/2018      Lab Results   Component Value Date    LACTATE 0.8 09/05/2019    LACTATE 0.9 09/04/2019    LACTATE 1.7 09/04/2019      Temp Readings from Last 1 Encounters:   09/05/19 97.5 °F (36.4 °C) (Oral)     Estimated Creatinine Clearance: 39.8 mL/min (A) (by C-G formula based on SCr of 1.43 mg/dL (H)).   Lab Results   Component Value Date    CREATININE 1.43 (H) 09/05/2019    CREATININE 1.21 (H) 09/04/2019    CREATININE 0.9 02/21/2018    CREATININE 0.6 03/08/2016    CREATININE 0.7 09/27/2014    CREATININE 0.7 09/26/2014       No results found for: VANCOPEAK, VANCOKSANAOUGH, VANCOSITOOM    Culture Results:  Microbiology Results (last 10 days)       Procedure Component Value - Date/Time    Blood Culture - Blood, Arm, Left [[id number]] Collected:  09/04/19 0949    Lab Status:  Preliminary result Specimen:  Blood from Arm, Left Updated:  09/05/19 1000     Blood Culture No growth at 24 hours    Blood Culture - Blood, Hand, Left [[id number]] Collected:  09/04/19 0919    Lab Status:  Preliminary result Specimen:  Blood from Hand, Left Updated:  09/05/19 0931     Blood Culture No growth at 24 hours    Influenza Antigen, Rapid - Swab, Nasopharynx [[id number]]  (Normal) Collected:  09/04/19 0229    Lab Status:  Final result Specimen:  Swab from Nasopharynx Updated:  09/04/19 0246     Influenza A Ag, EIA Negative     Influenza B Ag, EIA Negative                 Assessment:  WBC trended into normal limits today  SCr trending upwards by 0.2, will monitor, above normal limits  Afebrile today (febrile 9/4)    Labs in progress:  9/4 BCx2 NG24H  9/4 influenza " negative    Most likely keep vancomycin for 48 hours due to empiric febrile illness.  Will monitor notes and SCr.    Utilizing AUC dosing  Goal AUC for sepsis: 400-600    Plan:  1. Continue vancomycin 1500mg IV Q24H  2. Will order vancomycin peak 9/6 1630 and vancomycin trough 9/7 1330  3. Pharmacy will monitor renal function and adjust dose accordingly.      Francisco Javier Ambrocio RPH   09/05/19 12:18 PM

## 2019-09-05 NOTE — NURSING NOTE
Notified Dr. Guerra that patient takes melatonin at home for sleep and he said to resume order as taken at home

## 2019-09-05 NOTE — PROGRESS NOTES
[facility] Medicine Services  INPATIENT PROGRESS NOTE    Length of Stay: 1  Date of Admission: 9/3/2019  Primary Care Physician: Provider, No Known    Subjective   Chief Complaint/HPI: This 78-year-old  female reported to the emergency department with complaints of vomiting and fever.  Patient also had intermittent fever, chills.  Demonstrated a fever of 101.2 as well as leukocytosis with a white blood cell count of 18.85.  CT the abdomen and pelvis revealed a right lower lobe infiltrate as well as a nodular patchy opacity measuring 3.07 x 1.84 x 1.42.  Patient will need a follow-up with pulmonology and a follow-up imaging session, however at this time she will be treated empirically as pneumonia.  Her leukocytosis has resolved.  She states she feels much better other than having complaints of constipation.    Review of Systems   Constitutional: Negative for chills and fever.   Respiratory: Negative for shortness of breath.    Gastrointestinal: Positive for constipation. Negative for abdominal pain, diarrhea, nausea and vomiting.   Genitourinary: Negative for dysuria.   Neurological: Negative for dizziness and light-headedness.      All pertinent negatives and positives are as above. All other systems have been reviewed and are negative unless otherwise stated.     Objective    Temp:  [97.1 °F (36.2 °C)-97.9 °F (36.6 °C)] 97.5 °F (36.4 °C)  Heart Rate:  [59-80] 59  Resp:  [18] 18  BP: (100-128)/(51-64) 128/64    Physical Exam   Constitutional: She is oriented to person, place, and time. She appears well-developed and well-nourished. No distress.   HENT:   Head: Normocephalic and atraumatic.   Cardiovascular: Normal rate and regular rhythm.   Pulmonary/Chest: Effort normal. No respiratory distress. She has no wheezes.   Coarse lung sounds at bases   Abdominal: Soft. Bowel sounds are normal. She exhibits no distension. There is no tenderness.   Neurological: She is  alert and oriented to person, place, and time.   Skin: Skin is warm and dry. She is not diaphoretic.     Results Review:  I have reviewed the labs, radiology results, and diagnostic studies.    Laboratory Data:   Results from last 7 days   Lab Units 09/05/19  0645 09/04/19  0029   SODIUM mmol/L 138 138   POTASSIUM mmol/L 4.4 4.7   CHLORIDE mmol/L 102 99   CO2 mmol/L 25.0 27.0   BUN mg/dL 36* 25*   CREATININE mg/dL 1.43* 1.21*   GLUCOSE mg/dL 128* 181*   CALCIUM mg/dL 8.8 9.5   BILIRUBIN mg/dL  --  0.4   ALK PHOS U/L  --  69   ALT (SGPT) U/L  --  18   AST (SGOT) U/L  --  20   ANION GAP mmol/L 11.0 12.0     Estimated Creatinine Clearance: 39.8 mL/min (A) (by C-G formula based on SCr of 1.43 mg/dL (H)).  Results from last 7 days   Lab Units 09/05/19  0645   PHOSPHORUS mg/dL 3.7         Results from last 7 days   Lab Units 09/05/19  0645 09/04/19  0030   WBC 10*3/mm3 8.27 18.85*   HEMOGLOBIN g/dL 9.9* 11.7*   HEMATOCRIT % 30.2* 35.6   PLATELETS 10*3/mm3 160 195           Culture Data:   Blood Culture   Date Value Ref Range Status   09/04/2019 No growth at 24 hours  Preliminary   09/04/2019 No growth at 24 hours  Preliminary     No results found for: URINECX  No results found for: RESPCX  No results found for: WOUNDCX  No results found for: STOOLCX  No components found for: BODYFLD    Radiology Data:   Imaging Results (last 24 hours)     Procedure Component Value Units Date/Time    CT Abdomen Pelvis Without Contrast [[id number]] Collected:  09/04/19 1309     Updated:  09/04/19 1404    Narrative:         PROCEDURE: Ct abdomen and pelvis without contrast    REASON FOR EXAM: n/v/d, R50.9 Fever, unspecified    FINDINGS: No comparison. Axial computer tomography sequential  imaging was performed from the diaphragms through the symphysis  pubis without IV contrast administration. Sagittal and coronal  reformates was performed. This exam was performed according to  our departmental dose optimization program, which  includes  automated exposure control, adjustment of the mA and/or KV  according to patient size and/or use of iterative reconstruction  technique.     Right lower lobe posterior basilar segment medial inferior  nodular patchy opacity which measures 3.07 x 1.84 x 1.42 cm. Lung  bases are otherwise unremarkable.    The liver is normal. Very small single gallstone is seen within  the gallbladder dependently. The gallbladder is otherwise  unremarkable. The biliary system is normal. Diffuse fatty  infiltrative changes of the pancreas. The pancreas is otherwise  unremarkable. The spleen is normal. The right adrenal gland is  surgically absent. Left adrenal gland hypodense 1.58 cm nodule  which has Hounsfield units of -1.57. The left adrenal gland is  otherwise unremarkable. The right kidney and ureter are normal.  The left kidney and ureter are normal. The bladder is normal. The  uterus is surgically absent. Multiple sigmoid colon diverticula.  Multiple descending colon diverticula. The hollow viscera is  otherwise unremarkable. The appendix is identified appears  normal. No lymphadenopathy in the abdomen or the pelvis.  Atherosclerotic vascular calcifications of the abdominal aorta.  No acute osseous abnormality.      Impression:       1.Right lower lobe posterior basilar segment medial inferior  nodular patchy opacity which measures 3.07 x 1.84 x 1.42 cm.   This may represent a focus of pneumonia versus subsegmental  atelectasis versus neoplastic involvement in this region.  Recommend follow-up CT in 3 months to further evaluate.  2.Cholelithiasis.  3. Diffuse fatty infiltrative changes of the pancreas..  4.Left adrenal gland hypodense 1.58 cm nodule which has  Hounsfield units of -1.57.  This has imaging characteristics most  consistent with benign adrenal adenoma. Recommend clinical  correlation and if clinically indicated follow-up CT in 3-6  months to further evaluate.  5. Colonic diverticulosis.    Electronically  signed by:  Js Arthur MD  9/4/2019 2:03 PM CDT  Workstation: [id number]          I have reviewed the patient's current medications.     Assessment/Plan     Active Hospital Problems    Diagnosis   • **Sepsis (CMS/HCC)   • Acute febrile illness   • Hypertension   • COPD (chronic obstructive pulmonary disease) (CMS/HCC)   • Vomiting   • Leukocytosis   Right lower lobe pneumonia    Plan: Continue aztreonam, discontinue vancomycin given low suspicion for MRSA, patient will need follow-up with pulmonology and follow-up imaging, physical therapy and occupational therapy, bowel regimen, continue as hospital course dictates.              This document has been electronically signed by NOEL Amador on September 5, 2019 1:10 PM

## 2019-09-05 NOTE — PLAN OF CARE
Problem: Patient Care Overview  Goal: Plan of Care Review  Outcome: Ongoing (interventions implemented as appropriate)   09/04/19 1620 09/04/19 1882   Coping/Psychosocial   Plan of Care Reviewed With --  patient   Plan of Care Review   Progress no change --    OTHER   Outcome Summary --  pt resting with no complaints       Problem: Fall Risk (Adult)  Goal: Absence of Fall  Outcome: Outcome(s) achieved Date Met: 09/04/19      Problem: Pain, Chronic (Adult)  Goal: Acceptable Pain/Comfort Level and Functional Ability  Outcome: Ongoing (interventions implemented as appropriate)      Problem: Infection, Risk/Actual (Adult)  Goal: Infection Prevention/Resolution  Outcome: Ongoing (interventions implemented as appropriate)

## 2019-09-06 LAB
ANION GAP SERPL CALCULATED.3IONS-SCNC: 10 MMOL/L (ref 5–15)
BASOPHILS # BLD AUTO: 0.04 10*3/MM3 (ref 0–0.2)
BASOPHILS NFR BLD AUTO: 0.6 % (ref 0–1.5)
BUN BLD-MCNC: 31 MG/DL (ref 8–23)
BUN/CREAT SERPL: 25 (ref 7–25)
CALCIUM SPEC-SCNC: 9.3 MG/DL (ref 8.6–10.5)
CHLORIDE SERPL-SCNC: 106 MMOL/L (ref 98–107)
CO2 SERPL-SCNC: 24 MMOL/L (ref 22–29)
CREAT BLD-MCNC: 1.24 MG/DL (ref 0.57–1)
DEPRECATED RDW RBC AUTO: 51.9 FL (ref 37–54)
EOSINOPHIL # BLD AUTO: 0.21 10*3/MM3 (ref 0–0.4)
EOSINOPHIL NFR BLD AUTO: 3 % (ref 0.3–6.2)
ERYTHROCYTE [DISTWIDTH] IN BLOOD BY AUTOMATED COUNT: 14.4 % (ref 12.3–15.4)
GFR SERPL CREATININE-BSD FRML MDRD: 42 ML/MIN/1.73
GLUCOSE BLD-MCNC: 107 MG/DL (ref 65–99)
HCT VFR BLD AUTO: 29.7 % (ref 34–46.6)
HGB BLD-MCNC: 9.7 G/DL (ref 12–15.9)
IMM GRANULOCYTES # BLD AUTO: 0.03 10*3/MM3 (ref 0–0.05)
IMM GRANULOCYTES NFR BLD AUTO: 0.4 % (ref 0–0.5)
LYMPHOCYTES # BLD AUTO: 1.94 10*3/MM3 (ref 0.7–3.1)
LYMPHOCYTES NFR BLD AUTO: 27.4 % (ref 19.6–45.3)
MCH RBC QN AUTO: 32.1 PG (ref 26.6–33)
MCHC RBC AUTO-ENTMCNC: 32.7 G/DL (ref 31.5–35.7)
MCV RBC AUTO: 98.3 FL (ref 79–97)
MONOCYTES # BLD AUTO: 0.55 10*3/MM3 (ref 0.1–0.9)
MONOCYTES NFR BLD AUTO: 7.8 % (ref 5–12)
NEUTROPHILS # BLD AUTO: 4.31 10*3/MM3 (ref 1.7–7)
NEUTROPHILS NFR BLD AUTO: 60.8 % (ref 42.7–76)
NRBC BLD AUTO-RTO: 0 /100 WBC (ref 0–0.2)
PLATELET # BLD AUTO: 163 10*3/MM3 (ref 140–450)
PMV BLD AUTO: 12 FL (ref 6–12)
POTASSIUM BLD-SCNC: 4.4 MMOL/L (ref 3.5–5.2)
RBC # BLD AUTO: 3.02 10*6/MM3 (ref 3.77–5.28)
SODIUM BLD-SCNC: 140 MMOL/L (ref 136–145)
WBC NRBC COR # BLD: 7.08 10*3/MM3 (ref 3.4–10.8)

## 2019-09-06 PROCEDURE — 97116 GAIT TRAINING THERAPY: CPT

## 2019-09-06 PROCEDURE — 97530 THERAPEUTIC ACTIVITIES: CPT

## 2019-09-06 PROCEDURE — 85025 COMPLETE CBC W/AUTO DIFF WBC: CPT | Performed by: NURSE PRACTITIONER

## 2019-09-06 PROCEDURE — 94799 UNLISTED PULMONARY SVC/PX: CPT

## 2019-09-06 PROCEDURE — 80048 BASIC METABOLIC PNL TOTAL CA: CPT | Performed by: NURSE PRACTITIONER

## 2019-09-06 PROCEDURE — 97162 PT EVAL MOD COMPLEX 30 MIN: CPT

## 2019-09-06 PROCEDURE — 94760 N-INVAS EAR/PLS OXIMETRY 1: CPT

## 2019-09-06 RX ORDER — LACTULOSE 10 G/15ML
20 SOLUTION ORAL DAILY PRN
Status: DISCONTINUED | OUTPATIENT
Start: 2019-09-06 | End: 2019-09-09 | Stop reason: HOSPADM

## 2019-09-06 RX ADMIN — AZTREONAM 2 G: 2 INJECTION, POWDER, LYOPHILIZED, FOR SOLUTION INTRAMUSCULAR; INTRAVENOUS at 11:28

## 2019-09-06 RX ADMIN — MELATONIN 9.75 MG: at 20:39

## 2019-09-06 RX ADMIN — DOCUSATE SODIUM 100 MG: 100 CAPSULE, LIQUID FILLED ORAL at 20:39

## 2019-09-06 RX ADMIN — SPIRONOLACTONE 25 MG: 25 TABLET ORAL at 09:50

## 2019-09-06 RX ADMIN — SODIUM CHLORIDE, PRESERVATIVE FREE 10 ML: 5 INJECTION INTRAVENOUS at 09:51

## 2019-09-06 RX ADMIN — BUDESONIDE AND FORMOTEROL FUMARATE DIHYDRATE 2 PUFF: 160; 4.5 AEROSOL RESPIRATORY (INHALATION) at 08:00

## 2019-09-06 RX ADMIN — FAMOTIDINE 40 MG: 40 TABLET ORAL at 09:51

## 2019-09-06 RX ADMIN — DOXYCYCLINE 100 MG: 100 INJECTION, POWDER, LYOPHILIZED, FOR SOLUTION INTRAVENOUS at 13:14

## 2019-09-06 RX ADMIN — AZTREONAM 2 G: 2 INJECTION, POWDER, LYOPHILIZED, FOR SOLUTION INTRAMUSCULAR; INTRAVENOUS at 20:39

## 2019-09-06 RX ADMIN — DOXYCYCLINE 100 MG: 100 INJECTION, POWDER, LYOPHILIZED, FOR SOLUTION INTRAVENOUS at 03:00

## 2019-09-06 RX ADMIN — CARVEDILOL 3.12 MG: 3.12 TABLET, FILM COATED ORAL at 18:14

## 2019-09-06 RX ADMIN — BUDESONIDE AND FORMOTEROL FUMARATE DIHYDRATE 2 PUFF: 160; 4.5 AEROSOL RESPIRATORY (INHALATION) at 19:18

## 2019-09-06 RX ADMIN — DOCUSATE SODIUM 100 MG: 100 CAPSULE, LIQUID FILLED ORAL at 09:50

## 2019-09-06 RX ADMIN — GUAIFENESIN 1200 MG: 600 TABLET, EXTENDED RELEASE ORAL at 09:50

## 2019-09-06 RX ADMIN — CETIRIZINE HYDROCHLORIDE 5 MG: 5 TABLET ORAL at 09:50

## 2019-09-06 RX ADMIN — GUAIFENESIN 1200 MG: 600 TABLET, EXTENDED RELEASE ORAL at 20:39

## 2019-09-06 RX ADMIN — AZTREONAM 2 G: 2 INJECTION, POWDER, LYOPHILIZED, FOR SOLUTION INTRAMUSCULAR; INTRAVENOUS at 05:00

## 2019-09-06 RX ADMIN — SODIUM CHLORIDE, PRESERVATIVE FREE 10 ML: 5 INJECTION INTRAVENOUS at 20:39

## 2019-09-06 RX ADMIN — CARVEDILOL 3.12 MG: 3.12 TABLET, FILM COATED ORAL at 09:50

## 2019-09-06 NOTE — PROGRESS NOTES
[facility] Medicine Services  INPATIENT PROGRESS NOTE    Length of Stay: 2  Date of Admission: 9/3/2019  Primary Care Physician: Provider, No Known    Subjective   Please note that all previous progress notes, lab findings, radiographical findings, medication changes, and physical exam findings have been noted and updated as appropriate.    9/6/2019: Patient feels much better today.  She has had 3 large bowel movements.  No nausea, abdominal pain, vomiting.  No shortness of breath, chest pain, dizziness, syncope.    Chief Complaint/HPI: This 78-year-old  female reported to the emergency department with complaints of vomiting and fever.  Patient also had intermittent fever, chills.  Demonstrated a fever of 101.2 as well as leukocytosis with a white blood cell count of 18.85.  CT the abdomen and pelvis revealed a right lower lobe infiltrate as well as a nodular patchy opacity measuring 3.07 x 1.84 x 1.42.  Patient will need a follow-up with pulmonology and a follow-up imaging session, however at this time she will be treated empirically as pneumonia.  Her leukocytosis has resolved.  She states she feels much better other than having complaints of constipation.    Review of Systems   Constitutional: Negative for chills and fever.   Respiratory: Negative for shortness of breath.    Gastrointestinal: Positive for constipation. Negative for abdominal pain, diarrhea, nausea and vomiting.   Genitourinary: Negative for dysuria.   Neurological: Negative for dizziness and light-headedness.      All pertinent negatives and positives are as above. All other systems have been reviewed and are negative unless otherwise stated.     Objective    Temp:  [96.3 °F (35.7 °C)-98.6 °F (37 °C)] 96.3 °F (35.7 °C)  Heart Rate:  [61-76] 63  Resp:  [18-20] 18  BP: (108-150)/(56-70) 145/65    Physical Exam   Constitutional: She is oriented to person, place, and time. She appears well-developed  and well-nourished. No distress.   HENT:   Head: Normocephalic and atraumatic.   Cardiovascular: Normal rate and regular rhythm.   Pulmonary/Chest: Effort normal. No respiratory distress. She has no wheezes.   Coarse lung sounds at bases   Abdominal: Soft. Bowel sounds are normal. She exhibits no distension. There is no tenderness.   Neurological: She is alert and oriented to person, place, and time.   Skin: Skin is warm and dry. She is not diaphoretic.     Results Review:  I have reviewed the labs, radiology results, and diagnostic studies.    Laboratory Data:   Results from last 7 days   Lab Units 09/06/19 0517 09/05/19  0645 09/04/19  0029   SODIUM mmol/L 140 138 138   POTASSIUM mmol/L 4.4 4.4 4.7   CHLORIDE mmol/L 106 102 99   CO2 mmol/L 24.0 25.0 27.0   BUN mg/dL 31* 36* 25*   CREATININE mg/dL 1.24* 1.43* 1.21*   GLUCOSE mg/dL 107* 128* 181*   CALCIUM mg/dL 9.3 8.8 9.5   BILIRUBIN mg/dL  --   --  0.4   ALK PHOS U/L  --   --  69   ALT (SGPT) U/L  --   --  18   AST (SGOT) U/L  --   --  20   ANION GAP mmol/L 10.0 11.0 12.0     Estimated Creatinine Clearance: 48.4 mL/min (A) (by C-G formula based on SCr of 1.24 mg/dL (H)).  Results from last 7 days   Lab Units 09/05/19  0645   PHOSPHORUS mg/dL 3.7         Results from last 7 days   Lab Units 09/06/19  0517 09/05/19  0645 09/04/19  0030   WBC 10*3/mm3 7.08 8.27 18.85*   HEMOGLOBIN g/dL 9.7* 9.9* 11.7*   HEMATOCRIT % 29.7* 30.2* 35.6   PLATELETS 10*3/mm3 163 160 195           Culture Data:   Blood Culture   Date Value Ref Range Status   09/04/2019 No growth at 2 days  Preliminary   09/04/2019 No growth at 2 days  Preliminary     No results found for: URINECX  No results found for: RESPCX  No results found for: WOUNDCX  No results found for: STOOLCX  No components found for: BODYFLD    Radiology Data:   Imaging Results (last 24 hours)     ** No results found for the last 24 hours. **          I have reviewed the patient's current medications.     Assessment/Plan      Active Hospital Problems    Diagnosis   • **Sepsis (CMS/HCC)   • Acute febrile illness   • Hypertension   • COPD (chronic obstructive pulmonary disease) (CMS/HCC)   • Vomiting   • Leukocytosis   Right lower lobe pneumonia    Plan: Continue aztreonam and doxycycline, will complete a 5-day course of antibiotics prior to discharge.  Continue physical therapy and Occupational Therapy.              This document has been electronically signed by NOEL Amador on September 6, 2019 1:47 PM

## 2019-09-06 NOTE — PLAN OF CARE
Problem: Patient Care Overview  Goal: Plan of Care Review   09/06/19 2850   Coping/Psychosocial   Plan of Care Reviewed With patient   OTHER   Outcome Summary PT evaluation completed. Pt independent with sit to stand to sit transfers. Pt ambulated 60ft on room air with SBA. O2 sats dropped to 89% and recovered to 94% in 30 seconds. Pt denied lightheadedness or dizziness. Function limited by decreased strength and tolerance for functional mobility and activities. Pt will benefit from PT to regain lost function. Anticipate home with assistance as needed at discharge.

## 2019-09-06 NOTE — THERAPY EVALUATION
Patient Name: Alda Constantino  : 1940    MRN: [id number]                              Today's Date: 2019       Admit Date: 9/3/2019    Visit Dx:     ICD-10-CM ICD-9-CM   1. Acute febrile illness R50.9 780.60   2. Impaired functional mobility, balance, gait, and endurance Z74.09 V49.89     Patient Active Problem List   Diagnosis   • Acute febrile illness   • Hypertension   • COPD (chronic obstructive pulmonary disease) (CMS/HCC)   • Vomiting   • Leukocytosis   • Sepsis (CMS/HCC)     Past Medical History:   Diagnosis Date   • Asthma    • Cancer (CMS/HCC)    • Congenital abnormalities     colon behind liver   • COPD (chronic obstructive pulmonary disease) (CMS/HCC)    • Hypertension      Past Surgical History:   Procedure Laterality Date   • ADENOIDECTOMY     • ADRENAL GLAND SURGERY Right    • BLADDER SURGERY     • BREAST BIOPSY     • COLONOSCOPY W/ BIOPSIES AND POLYPECTOMY     • EYE SURGERY     • HERNIA REPAIR     • HYSTERECTOMY     • LUNG LOBECTOMY Left    • RECTAL SURGERY      cyst   • SKIN CANCER EXCISION      legs   • TONSILLECTOMY     • VARICOSE VEIN SURGERY       General Information     Row Name 19 1040          PT Evaluation Time/Intention    Document Type  evaluation  -BARBARA     Mode of Treatment  individual therapy;physical therapy  -BARBARA     Row Name 19 1040          General Information    Patient Profile Reviewed?  yes  -BARBARA     Prior Level of Function  independent:;gait;transfer;all household mobility;community mobility;ADL's;home management sister lives on same property and assists patient as needed with IADL  -BARBARA     Existing Precautions/Restrictions  fall;oxygen therapy device and L/min  -BARBARA     Barriers to Rehab  none identified  -BARBARA     Row Name 19 1040          Relationship/Environment    Lives With  alone sister lives on same property  -BARBARA     Row Name 19 1040          Resource/Environmental Concerns    Current Living Arrangements  home/apartment/condo 1 level  house  -[facility] Name 09/06/19 1040          Home Main Entrance    Number of Stairs, Main Entrance  none has ramps with rails  -[facility] Name 09/06/19 1040          Cognitive Assessment/Intervention- PT/OT    Orientation Status (Cognition)  oriented x 4  -[facility] Name 09/06/19 1040          Safety Issues, Functional Mobility    Safety Issues Affecting Function (Mobility)  -- good safety awareness  -     Impairments Affecting Function (Mobility)  endurance/activity tolerance;strength  -       User Key  (r) = Recorded By, (t) = Taken By, (c) = Cosigned By    Initials Name Provider Type    Janette Crawford PT Physical Therapist        Mobility     Row Name 09/06/19 1040          Bed Mobility Assessment/Treatment    Comment (Bed Mobility)  not observed;pt sitting in recliner   -[facility] Name 09/06/19 1040          Sit-Stand Transfer    Sit-Stand [facility] (Transfers)  independent  -[facility] Name 09/06/19 1040          Gait/Stairs Assessment/Training    Gait/Stairs Assessment/Training  gait/ambulation independence  -     [facility] Level (Gait)  stand by assist  -     Distance in Feet (Gait)  60ft  -     Pattern (Gait)  step-through  -     Comment (Gait/Stairs)  Pt ambulated without O2. O2 sats dropped to 89% and recovered to 94% in 30 seconds at rest. Pt denied lightheadedness or dizziness  -       User Key  (r) = Recorded By, (t) = Taken By, (c) = Cosigned By    Initials Name Provider Type    Janette Crawford PT Physical Therapist        Obj/Interventions     Row Name 09/06/19 1040          General ROM    GENERAL ROM COMMENTS  AROM WFL all extremities  -[facility] Name 09/06/19 1040          MMT (Manual Muscle Testing)    General MMT Comments  BUE: 4/5 hands, wrists, elbows, shoulders;BLE: 4/5 ankles/feet, knees, hips  -     Row Name 09/06/19 1040          Static Sitting Balance    Level of [facility] (Unsupported Sitting, Static Balance)  independent  -     Sitting Position  (Unsupported Sitting, Static Balance)  sitting in chair  -     Time Able to Maintain Position (Unsupported Sitting, Static Balance)  more than 5 minutes  -[facility] Name 09/06/19 1040          Dynamic Sitting Balance    Level of [place], Reaches Outside Midline (Sitting, Dynamic Balance)  independent  -BARBARA     Sitting Position, Reaches Outside Midline (Sitting, Dynamic Balance)  sitting in chair  -[facility] Name 09/06/19 1040          Static Standing Balance    Level of [place] (Supported Standing, Static Balance)  independent  -     Time Able to Maintain Position (Supported Standing, Static Balance)  more than 5 minutes  -[facility] Name 09/06/19 1040          Dynamic Standing Balance    Level of [place], Reaches Outside Midline (Standing, Dynamic Balance)  standby assist  -     Time Able to Maintain Position, Reaches Outside Midline (Standing, Dynamic Balance)  1 to 2 minutes  -[facility] Name 09/06/19 1040          Sensory Assessment/Intervention    Sensory General Assessment  no sensation deficits identified to light touch  -       User Key  (r) = Recorded By, (t) = Taken By, (c) = Cosigned By    Initials Name Provider Type    BARBARA Janette Serrano, PT Physical Therapist        Goals/Plan     Row Name 09/06/19 1040          Transfer Goal 1 (PT)    Activity/Assistive Device (Transfer Goal 1, PT)  sit-to-stand/stand-to-sit;bed-to-chair/chair-to-bed;toilet  -     [place] Level/Cues Needed (Transfer Goal 1, PT)  independent  -BARBARA     Time Frame (Transfer Goal 1, PT)  by discharge  -     Progress/Outcome (Transfer Goal 1, PT)  goal not met  -BARBARA     Row Name 09/06/19 1040          Gait Training Goal 1 (PT)    Activity/Assistive Device (Gait Training Goal 1, PT)  gait (walking locomotion);increase endurance/gait distance  -BARBARA     [place] Level (Gait Training Goal 1, PT)  independent O2 sats will not drop below 92%  -BARBARA     Distance (Gait Goal 1, PT)  [id number]  -BARBARA     Time Frame (Gait  Training Goal 1, PT)  by discharge  -BARBARA     Progress/Outcome (Gait Training Goal 1, PT)  goal not met  -     Row Name 09/06/19 1040          Stairs Goal 1 (PT)    Activity/Assistive Device (Stairs Goal 1, PT)  ascending stairs;descending stairs;using handrail, right;using handrail, left  -BARBARA     [place] Level/Cues Needed (Stairs Goal 1, PT)  conditional independence  -     Number of Stairs (Stairs Goal 1, PT)  3  -BARBARA     Time Frame (Stairs Goal 1, PT)  by discharge  -BARBARA     Progress/Outcome (Stairs Goal 1, PT)  goal not met  -       User Key  (r) = Recorded By, (t) = Taken By, (c) = Cosigned By    Initials Name Provider Type    Janette Crawford, PT Physical Therapist        Clinical Impression     Row Name 09/06/19 1040          Pain Assessment    Additional Documentation  Pain Scale: Numbers Pre/Post-Treatment (Group)  -BARBARA     Row Name 09/06/19 1040          Pain Scale: Numbers Pre/Post-Treatment    Pain Scale: Numbers, Pretreatment  0/10 - no pain  -     Pain Scale: Numbers, Post-Treatment  0/10 - no pain  -[facility] Name 09/06/19 1040          Plan of Care Review    Plan of Care Reviewed With  patient  -[facility] Name 09/06/19 1040          Physical Therapy Clinical Impression    Patient/Family Goals Statement (PT Clinical Impression)  return home to [facility] [address]  -     Criteria for Skilled Interventions Met (PT Clinical Impression)  yes  -     Rehab Potential (PT Clinical Summary)  good, to achieve stated therapy goals  -     Predicted Duration of Therapy (PT)  until discharge or goals met  -[facility] Name 09/06/19 1040          Vital Signs    Pre Systolic BP Rehab  132  -BARBARA     Pre Treatment Diastolic BP  70  -BARBARA     Post Systolic BP Rehab  140  -BARBARA     Post Treatment Diastolic BP  80  -BARBARA     Pretreatment Heart Rate (beats/min)  60  -BARBARA     Posttreatment Heart Rate (beats/min)  67  -BARBARA     Pre SpO2 (%)  94 2.5lpm  -     O2 Delivery Pre Treatment  supplemental O2  -     Intra SpO2 (%)  89  reported to RN  -BARBARA     O2 Delivery Intra Treatment  room air  -BARBARA     Post SpO2 (%)  94  -BARBARA     O2 Delivery Post Treatment  supplemental O2  -BARBARA     Pre Patient Position  Sitting  -BARBARA     Intra Patient Position  Sitting  -BARBARA     Post Patient Position  Sitting  -BARBARA     Recovery Time  30seconds  -BARBARA     Row Name 09/06/19 1040          Positioning and Restraints    Pre-Treatment Position  sitting in chair/recliner  -BARBARA     Post Treatment Position  chair  -BARBARA     In Chair  call light within reach;encouraged to call for assist;with nsg  -BARBARA       User Key  (r) = Recorded By, (t) = Taken By, (c) = Cosigned By    Initials Name Provider Type    Janette Crawford PT Physical Therapist        Outcome Measures     Row Name 09/06/19 1040          How much help from another person do you currently need...    Moving from lying on back to sitting on the side of a flat bed without bedrails?  4  -BARBARA     Moving to and from a bed to a chair (including a wheelchair)?  4  -BARBARA     Standing up from a chair using your arms (e.g., wheelchair, bedside chair)?  3  -BARBARA     Climbing 3-5 steps with a railing?  3  -BARBARA     To walk in hospital room?  3  -BARBARA     Row Name 09/06/19 1040          Functional Assessment    Outcome Measure Options  AM-PAC 6 Clicks Basic Mobility (PT)  -BARBARA       User Key  (r) = Recorded By, (t) = Taken By, (c) = Cosigned By    Initials Name Provider Type    Janette Crawford PT Physical Therapist        Physical Therapy Education     Title: PT OT SLP Therapies (In Progress)     Topic: Physical Therapy (In Progress)     Point: Mobility training (Done)     Learning Progress Summary           Patient Acceptance, KERRIE MARTINEZ VU by BARBARA at 9/6/2019  1:30 PM                   Point: Body mechanics (Done)     Learning Progress Summary           Patient Acceptance, KERRIE MARTINEZ VU by BARBARA at 9/6/2019  1:30 PM                   Point: Precautions (Done)     Learning Progress Summary           Patient Acceptance, KERRIE MARTINEZ VU by BARBARA at 9/6/2019  1:30  PM                               User Key     Initials Effective Dates Name Provider Type Discipline     04/03/18 -  Janette Serrano PT Physical Therapist PT              PT Recommendation and Plan  Planned Therapy Interventions (PT Eval): balance training, bed mobility training, gait training, home exercise program, patient/family education, stair training, strengthening, transfer training  Outcome Summary/Treatment Plan (PT)  Anticipated Discharge Disposition (PT): home with assist  Plan of Care Reviewed With: patient  Outcome Summary: PT evaluation completed. Pt independent with sit to stand to sit transfers. Pt ambulated 60ft on room air with SBA. O2 sats dropped to 89% and recovered to 94% in 30 seconds. Pt denied lightheadedness or dizziness. Function limited by decreased strength and tolerance for functional mobility and activities. Pt will benefit from PT to regain lost function. Anticipate home with assistance as needed at discharge.     Time Calculation:   PT Charges     Row Name 09/06/19 1337             Time Calculation    Start Time  1040  -      Stop Time  1124  -      Time Calculation (min)  44 min  -      PT Received On  09/06/19  -      PT Goal Re-Cert Due Date  09/19/19  -         Time Calculation- PT    Total Timed Code Minutes- PT  25 minute(s)  -        User Key  (r) = Recorded By, (t) = Taken By, (c) = Cosigned By    Initials Name Provider Type     Janette Serrano PT Physical Therapist        Therapy Charges for Today     Code Description Service Date Service Provider Modifiers Qty    35436583358 HC PT EVAL MOD COMPLEXITY 1 9/6/2019 Janette Serrano, PT GP 1    92782693422 HC GAIT TRAINING EA 15 MIN 9/6/2019 Janette Serraon, PT GP 1    26123466680 HC PT THERAPEUTIC ACT EA 15 MIN 9/6/2019 Janette Serrano, PT GP 1          PT G-Codes  Outcome Measure Options: AM-PAC 6 Clicks Basic Mobility (PT)    Janette Serrano PT  9/6/2019

## 2019-09-06 NOTE — PLAN OF CARE
Problem: Patient Care Overview  Goal: Plan of Care Review  Outcome: Ongoing (interventions implemented as appropriate)   09/05/19 8393   Coping/Psychosocial   Plan of Care Reviewed With patient   Plan of Care Review   Progress no change   OTHER   Outcome Summary Pt resting with no complaints at this time       Problem: Fall Risk (Adult)  Goal: Absence of Fall  Outcome: Outcome(s) achieved Date Met: 09/05/19      Problem: Pain, Chronic (Adult)  Goal: Acceptable Pain/Comfort Level and Functional Ability  Outcome: Ongoing (interventions implemented as appropriate)      Problem: Infection, Risk/Actual (Adult)  Goal: Infection Prevention/Resolution  Outcome: Ongoing (interventions implemented as appropriate)      Problem: Sepsis/Septic Shock (Adult)  Goal: Signs and Symptoms of Listed Potential Problems Will be Absent, Minimized or Managed (Sepsis/Septic Shock)  Outcome: Ongoing (interventions implemented as appropriate)      Problem: Nausea/Vomiting (Adult)  Goal: Symptom Relief  Outcome: Ongoing (interventions implemented as appropriate)

## 2019-09-07 LAB
ANION GAP SERPL CALCULATED.3IONS-SCNC: 12 MMOL/L (ref 5–15)
BASOPHILS # BLD AUTO: 0.04 10*3/MM3 (ref 0–0.2)
BASOPHILS NFR BLD AUTO: 0.7 % (ref 0–1.5)
BUN BLD-MCNC: 23 MG/DL (ref 8–23)
BUN/CREAT SERPL: 24.2 (ref 7–25)
CALCIUM SPEC-SCNC: 9 MG/DL (ref 8.6–10.5)
CHLORIDE SERPL-SCNC: 109 MMOL/L (ref 98–107)
CO2 SERPL-SCNC: 22 MMOL/L (ref 22–29)
CREAT BLD-MCNC: 0.95 MG/DL (ref 0.57–1)
DEPRECATED RDW RBC AUTO: 51.2 FL (ref 37–54)
EOSINOPHIL # BLD AUTO: 0.21 10*3/MM3 (ref 0–0.4)
EOSINOPHIL NFR BLD AUTO: 3.5 % (ref 0.3–6.2)
ERYTHROCYTE [DISTWIDTH] IN BLOOD BY AUTOMATED COUNT: 14.2 % (ref 12.3–15.4)
GFR SERPL CREATININE-BSD FRML MDRD: 57 ML/MIN/1.73
GLUCOSE BLD-MCNC: 107 MG/DL (ref 65–99)
HCT VFR BLD AUTO: 29.8 % (ref 34–46.6)
HGB BLD-MCNC: 9.7 G/DL (ref 12–15.9)
IMM GRANULOCYTES # BLD AUTO: 0.04 10*3/MM3 (ref 0–0.05)
IMM GRANULOCYTES NFR BLD AUTO: 0.7 % (ref 0–0.5)
LYMPHOCYTES # BLD AUTO: 1.93 10*3/MM3 (ref 0.7–3.1)
LYMPHOCYTES NFR BLD AUTO: 32.3 % (ref 19.6–45.3)
MCH RBC QN AUTO: 32.1 PG (ref 26.6–33)
MCHC RBC AUTO-ENTMCNC: 32.6 G/DL (ref 31.5–35.7)
MCV RBC AUTO: 98.7 FL (ref 79–97)
MONOCYTES # BLD AUTO: 0.44 10*3/MM3 (ref 0.1–0.9)
MONOCYTES NFR BLD AUTO: 7.4 % (ref 5–12)
NEUTROPHILS # BLD AUTO: 3.31 10*3/MM3 (ref 1.7–7)
NEUTROPHILS NFR BLD AUTO: 55.4 % (ref 42.7–76)
NRBC BLD AUTO-RTO: 0 /100 WBC (ref 0–0.2)
PLATELET # BLD AUTO: 166 10*3/MM3 (ref 140–450)
PMV BLD AUTO: 11.9 FL (ref 6–12)
POTASSIUM BLD-SCNC: 4.8 MMOL/L (ref 3.5–5.2)
RBC # BLD AUTO: 3.02 10*6/MM3 (ref 3.77–5.28)
SODIUM BLD-SCNC: 143 MMOL/L (ref 136–145)
WBC NRBC COR # BLD: 5.97 10*3/MM3 (ref 3.4–10.8)

## 2019-09-07 PROCEDURE — 94760 N-INVAS EAR/PLS OXIMETRY 1: CPT

## 2019-09-07 PROCEDURE — 85025 COMPLETE CBC W/AUTO DIFF WBC: CPT | Performed by: NURSE PRACTITIONER

## 2019-09-07 PROCEDURE — 94799 UNLISTED PULMONARY SVC/PX: CPT

## 2019-09-07 PROCEDURE — 97165 OT EVAL LOW COMPLEX 30 MIN: CPT

## 2019-09-07 PROCEDURE — 80048 BASIC METABOLIC PNL TOTAL CA: CPT | Performed by: NURSE PRACTITIONER

## 2019-09-07 RX ORDER — METFORMIN HYDROCHLORIDE 750 MG/1
750 TABLET, EXTENDED RELEASE ORAL
COMMUNITY
End: 2022-02-23 | Stop reason: SDUPTHER

## 2019-09-07 RX ADMIN — LACTULOSE 20 G: 20 SOLUTION ORAL at 17:55

## 2019-09-07 RX ADMIN — AZTREONAM 2 G: 2 INJECTION, POWDER, LYOPHILIZED, FOR SOLUTION INTRAMUSCULAR; INTRAVENOUS at 20:55

## 2019-09-07 RX ADMIN — DOXYCYCLINE 100 MG: 100 INJECTION, POWDER, LYOPHILIZED, FOR SOLUTION INTRAVENOUS at 02:10

## 2019-09-07 RX ADMIN — GUAIFENESIN 1200 MG: 600 TABLET, EXTENDED RELEASE ORAL at 20:51

## 2019-09-07 RX ADMIN — SODIUM CHLORIDE, PRESERVATIVE FREE 10 ML: 5 INJECTION INTRAVENOUS at 09:17

## 2019-09-07 RX ADMIN — FAMOTIDINE 40 MG: 40 TABLET ORAL at 09:21

## 2019-09-07 RX ADMIN — SODIUM CHLORIDE, PRESERVATIVE FREE 10 ML: 5 INJECTION INTRAVENOUS at 20:55

## 2019-09-07 RX ADMIN — BUDESONIDE AND FORMOTEROL FUMARATE DIHYDRATE 2 PUFF: 160; 4.5 AEROSOL RESPIRATORY (INHALATION) at 08:26

## 2019-09-07 RX ADMIN — CARVEDILOL 3.12 MG: 3.12 TABLET, FILM COATED ORAL at 17:55

## 2019-09-07 RX ADMIN — MELATONIN 9.75 MG: at 20:51

## 2019-09-07 RX ADMIN — SPIRONOLACTONE 25 MG: 25 TABLET ORAL at 09:18

## 2019-09-07 RX ADMIN — GUAIFENESIN 1200 MG: 600 TABLET, EXTENDED RELEASE ORAL at 09:17

## 2019-09-07 RX ADMIN — DOCUSATE SODIUM 100 MG: 100 CAPSULE, LIQUID FILLED ORAL at 20:54

## 2019-09-07 RX ADMIN — DOCUSATE SODIUM 100 MG: 100 CAPSULE, LIQUID FILLED ORAL at 09:19

## 2019-09-07 RX ADMIN — CETIRIZINE HYDROCHLORIDE 5 MG: 5 TABLET ORAL at 09:20

## 2019-09-07 RX ADMIN — AZTREONAM 2 G: 2 INJECTION, POWDER, LYOPHILIZED, FOR SOLUTION INTRAMUSCULAR; INTRAVENOUS at 04:31

## 2019-09-07 RX ADMIN — AZTREONAM 2 G: 2 INJECTION, POWDER, LYOPHILIZED, FOR SOLUTION INTRAMUSCULAR; INTRAVENOUS at 11:41

## 2019-09-07 RX ADMIN — CARVEDILOL 3.12 MG: 3.12 TABLET, FILM COATED ORAL at 09:00

## 2019-09-07 RX ADMIN — BUDESONIDE AND FORMOTEROL FUMARATE DIHYDRATE 2 PUFF: 160; 4.5 AEROSOL RESPIRATORY (INHALATION) at 19:13

## 2019-09-07 RX ADMIN — DOXYCYCLINE 100 MG: 100 INJECTION, POWDER, LYOPHILIZED, FOR SOLUTION INTRAVENOUS at 14:11

## 2019-09-07 NOTE — THERAPY DISCHARGE NOTE
Acute Care - Occupational Therapy Initial Eval/Discharge  [facility]     Patient Name: Alda Constantino  : 1940  MRN: [id number]  Today's Date: 2019  Onset of Illness/Injury or Date of Surgery: 19  Date of Referral to OT: 19  Referring Physician: TRINA COHEN      Admit Date: 9/3/2019       ICD-10-CM ICD-9-CM   1. Acute febrile illness R50.9 780.60   2. Impaired functional mobility, balance, gait, and endurance Z74.09 V49.89   3. Impaired mobility and activities of daily living Z74.09 799.89     Patient Active Problem List   Diagnosis   • Acute febrile illness   • Hypertension   • COPD (chronic obstructive pulmonary disease) (CMS/HCC)   • Vomiting   • Leukocytosis   • Sepsis (CMS/HCC)     Past Medical History:   Diagnosis Date   • Asthma    • Cancer (CMS/HCC)    • Congenital abnormalities     colon behind liver   • COPD (chronic obstructive pulmonary disease) (CMS/HCC)    • Hypertension      Past Surgical History:   Procedure Laterality Date   • ADENOIDECTOMY     • ADRENAL GLAND SURGERY Right    • BLADDER SURGERY     • BREAST BIOPSY     • COLONOSCOPY W/ BIOPSIES AND POLYPECTOMY     • EYE SURGERY     • HERNIA REPAIR     • HYSTERECTOMY     • LUNG LOBECTOMY Left    • RECTAL SURGERY      cyst   • SKIN CANCER EXCISION      legs   • TONSILLECTOMY     • VARICOSE VEIN SURGERY            OT ASSESSMENT FLOWSHEET (last 12 hours)      Occupational Therapy Evaluation     Row Name 19 1038                   OT Evaluation Time/Intention    Subjective Information  no complaints  -RB        Document Type  evaluation  -RB        Mode of Treatment  individual therapy;occupational therapy  -RB        Patient Effort  excellent  -RB           General Information    Patient Profile Reviewed?  yes  -RB        Onset of Illness/Injury or Date of Surgery  19  -RB        Referring Physician  TRINA COHEN  -RB        Patient Observations  alert;cooperative;agree to therapy  -RB        General  Observations of Patient  Pt walking in room pushing IV pole with room air.    -RB        Prior Level of Function  independent:;all household mobility;community mobility;gait;transfer;ADL's;home management;driving  -RB        Equipment Currently Used at Home  oxygen;wheelchair, motorized;shower chair;raised toilet 02 is PRN.  -RB        Pertinent History of Current Functional Problem  Hospitalized with pneu.  -RB        Existing Precautions/Restrictions  fall;oxygen therapy device and L/min  -RB        Risks Reviewed  patient:;LOB  -RB           Relationship/Environment    Lives With  alone  -RB           Resource/Environmental Concerns    Current Living Arrangements  home/apartment/condo  -RB           Home Main Entrance    Number of Stairs, Main Entrance  none Ramp with rails.  -RB           Cognitive Assessment/Intervention- PT/OT    Orientation Status (Cognition)  oriented x 4  -RB           Bed Mobility Assessment/Treatment    Bed Mobility Assessment/Treatment  supine-sit;sit-supine  -RB        Supine-Sit Sully (Bed Mobility)  conditional independence  -RB        Comment (Bed Mobility)  Pt has adjustable bed at home.  -RB           Functional Mobility    Functional Mobility- Ind. Level  independent  -RB           Sit-Stand Transfer    Sit-Stand Sully (Transfers)  independent  -RB           ADL Assessment/Intervention    BADL Assessment/Intervention  lower body dressing;toileting  -RB           Lower Body Dressing Assessment/Training    Lower Body Dressing Sully Level  lower body dressing skills;don;shoes/slippers;independent  -RB        Lower Body Dressing Position  other (see comments) Sitting in recliner.  -RB           Toileting Assessment/Training    Sully Level (Toileting)  toileting skills;independent;other (see comments) With transfer.  -RB        Assistive Devices (Toileting)  grab bar/safety frame  -RB        Toileting Position  unsupported sitting;unsupported standing  -RB            General ROM    GENERAL ROM COMMENTS  B UE AROM was WNLs.  -RB           MMT (Manual Muscle Testing)    General MMT Comments  B UE strength was 4+/5 grossly.  -RB           Sensory Assessment/Intervention    Sensory General Assessment  no sensation deficits identified to light touch  -RB           Positioning and Restraints    Pre-Treatment Position  sitting in chair/recliner  -RB        Post Treatment Position  chair  -RB        In Chair  call light within reach  -RB           Pain Scale: Numbers Pre/Post-Treatment    Pain Scale: Numbers, Pretreatment  0/10 - no pain  -RB        Pain Scale: Numbers, Post-Treatment  0/10 - no pain  -RB           Plan of Care Review    Plan of Care Reviewed With  patient  -RB           Clinical Impression (OT)    Date of Referral to OT  09/05/19  -RB        OT Diagnosis  ? impaired mob and ADLs.  -RB        Functional Level at Time of Evaluation (OT Eval)  Pt demo independence with LB dress, bed mobility and in room ambulation.  Conditional independence with toilet transfer  -RB        Criteria for Skilled Therapeutic Interventions Met (OT Eval)  no problems identified which require skilled intervention  -RB        Anticipated Discharge Disposition (OT)  home  -RB           Vital Signs    Pre Systolic BP Rehab  138  -RB        Pre Treatment Diastolic BP  75  -RB        Post Systolic BP Rehab  148  -RB        Post Treatment Diastolic BP  68  -RB        Pretreatment Heart Rate (beats/min)  68  -RB        Posttreatment Heart Rate (beats/min)  70  -RB        Pre SpO2 (%)  92  -RB        O2 Delivery Pre Treatment  room air 02 off due to pt walking to bathroom without assist.  -RB        Intra SpO2 (%)  93  -RB        O2 Delivery Intra Treatment  supplemental O2  -RB        Post SpO2 (%)  95  -RB        O2 Delivery Post Treatment  supplemental O2  -RB        Pre Patient Position  Standing  -RB        Intra Patient Position  Standing  -RB        Post Patient Position  Sitting  -RB           User Key  (r) = Recorded By, (t) = Taken By, (c) = Cosigned By    Initials Name Effective Dates    RB Maurizio Garcia OT 07/24/19 -           Occupational Therapy Education     Title: PT OT SLP Therapies (In Progress)     Topic: Occupational Therapy (In Progress)     Point: Precautions (Done)     Description: Instruct learner(s) on prescribed precautions during self-care and functional transfers.    Learning Progress Summary           Patient Acceptance, E, VU by RB at 9/7/2019  1:27 PM    Comment:  Edu pt on use of non skid shoes when OOB.                               User Key     Initials Effective Dates Name Provider Type Discipline     07/24/19 -  Maurizio Garcia OT Occupational Therapist OT                OT Recommendation and Plan  Outcome Summary/Treatment Plan (OT)  Anticipated Discharge Disposition (OT): home  Plan of Care Review  Plan of Care Reviewed With: patient  Plan of Care Reviewed With: patient  Outcome Summary: OT eval on this date.  Pt demo modified independence with bed mob and toilet transfer.  She was independent with ambulation in room.  Pt did not feel she needed OT services at this time.  Mo problems identified which require skilled OT intervention.  Will D/C OT.           Outcome Measures     Row Name 09/07/19 1038             How much help from another is currently needed...    Putting on and taking off regular lower body clothing?  4  -RB      Bathing (including washing, rinsing, and drying)  4  -RB      Toileting (which includes using toilet bed pan or urinal)  4  -RB      Putting on and taking off regular upper body clothing  4  -RB      Taking care of personal grooming (such as brushing teeth)  4  -RB      Eating meals  4  -RB      AM-PAC 6 Clicks Score (OT)  24  -RB         Functional Assessment    Outcome Measure Options  AM-PAC 6 Clicks Daily Activity (OT)  -RB        User Key  (r) = Recorded By, (t) = Taken By, (c) = Cosigned By    Initials Name Provider Type    RB Jose  SENA Steven Occupational Therapist          Time Calculation:   Time Calculation- OT     Row Name 09/07/19 1334             Time Calculation- OT    OT Start Time  1038  -RB      OT Stop Time  1101  -RB      OT Time Calculation (min)  23 min  -RB      OT Received On  09/07/19  -RB        User Key  (r) = Recorded By, (t) = Taken By, (c) = Cosigned By    Initials Name Provider Type    RB Maurizio Garcia OT Occupational Therapist        Therapy Suggested Charges     Code   Minutes Charges    None           Therapy Charges for Today     Code Description Service Date Service Provider Modifiers Qty    [id number]  OT EVAL LOW COMPLEXITY 2 9/7/2019 Maurizio Garcia OT GO 1               OT Discharge Summary  Anticipated Discharge Disposition (OT): home    Maurizio Garcia OT  9/7/2019

## 2019-09-07 NOTE — PLAN OF CARE
Problem: Patient Care Overview  Goal: Individualization and Mutuality  Outcome: Ongoing (interventions implemented as appropriate)  Patient creatinine levels better today; urinary cath removed.  Goal: Discharge Needs Assessment  Outcome: Ongoing (interventions implemented as appropriate)    Goal: Interprofessional Rounds/Family Conf  Outcome: Ongoing (interventions implemented as appropriate)      Problem: Fall Risk (Adult)  Goal: Identify Related Risk Factors and Signs and Symptoms  Outcome: Ongoing (interventions implemented as appropriate)      Problem: Pain, Chronic (Adult)  Goal: Identify Related Risk Factors and Signs and Symptoms  Outcome: Ongoing (interventions implemented as appropriate)    Goal: Acceptable Pain/Comfort Level and Functional Ability  Outcome: Ongoing (interventions implemented as appropriate)      Problem: Infection, Risk/Actual (Adult)  Goal: Identify Related Risk Factors and Signs and Symptoms  Outcome: Ongoing (interventions implemented as appropriate)    Goal: Infection Prevention/Resolution  Outcome: Ongoing (interventions implemented as appropriate)      Problem: Sepsis/Septic Shock (Adult)  Goal: Signs and Symptoms of Listed Potential Problems Will be Absent, Minimized or Managed (Sepsis/Septic Shock)  Outcome: Ongoing (interventions implemented as appropriate)      Problem: Nausea/Vomiting (Adult)  Goal: Identify Related Risk Factors and Signs and Symptoms  Outcome: Ongoing (interventions implemented as appropriate)    Goal: Symptom Relief  Outcome: Ongoing (interventions implemented as appropriate)    Goal: Adequate Hydration  Outcome: Ongoing (interventions implemented as appropriate)      Problem: Pneumonia (Adult)  Goal: Signs and Symptoms of Listed Potential Problems Will be Absent, Minimized or Managed (Pneumonia)  Outcome: Ongoing (interventions implemented as appropriate)      Problem: Skin Injury Risk (Adult)  Goal: Identify Related Risk Factors and Signs and  Symptoms  Outcome: Ongoing (interventions implemented as appropriate)    Goal: Skin Health and Integrity  Outcome: Ongoing (interventions implemented as appropriate)

## 2019-09-07 NOTE — PLAN OF CARE
Problem: Patient Care Overview  Goal: Plan of Care Review  Outcome: Ongoing (interventions implemented as appropriate)   09/07/19 3456   Coping/Psychosocial   Plan of Care Reviewed With patient   OTHER   Outcome Summary OT eval on this date. Pt demo modified independence with bed mob and toilet transfer. She was independent with ambulation in room. Pt did not feel she needed OT services at this time. Mo problems identified which require skilled OT intervention. Will D/C OT.

## 2019-09-07 NOTE — PLAN OF CARE
Problem: Patient Care Overview  Goal: Plan of Care Review  Outcome: Ongoing (interventions implemented as appropriate)   09/07/19 0212   Coping/Psychosocial   Plan of Care Reviewed With patient   Plan of Care Review   Progress improving   OTHER   Outcome Summary pt vs stable , no new events overnight will continue to monitor.     Goal: Individualization and Mutuality  Outcome: Ongoing (interventions implemented as appropriate)    Goal: Discharge Needs Assessment  Outcome: Ongoing (interventions implemented as appropriate)    Goal: Interprofessional Rounds/Family Conf  Outcome: Ongoing (interventions implemented as appropriate)      Problem: Fall Risk (Adult)  Goal: Identify Related Risk Factors and Signs and Symptoms  Outcome: Ongoing (interventions implemented as appropriate)      Problem: Pain, Chronic (Adult)  Goal: Identify Related Risk Factors and Signs and Symptoms  Outcome: Ongoing (interventions implemented as appropriate)    Goal: Acceptable Pain/Comfort Level and Functional Ability  Outcome: Ongoing (interventions implemented as appropriate)      Problem: Infection, Risk/Actual (Adult)  Goal: Identify Related Risk Factors and Signs and Symptoms  Outcome: Ongoing (interventions implemented as appropriate)    Goal: Infection Prevention/Resolution  Outcome: Ongoing (interventions implemented as appropriate)      Problem: Sepsis/Septic Shock (Adult)  Goal: Signs and Symptoms of Listed Potential Problems Will be Absent, Minimized or Managed (Sepsis/Septic Shock)  Outcome: Ongoing (interventions implemented as appropriate)      Problem: Nausea/Vomiting (Adult)  Goal: Identify Related Risk Factors and Signs and Symptoms  Outcome: Ongoing (interventions implemented as appropriate)    Goal: Symptom Relief  Outcome: Ongoing (interventions implemented as appropriate)    Goal: Adequate Hydration  Outcome: Ongoing (interventions implemented as appropriate)      Problem: Pneumonia (Adult)  Goal: Signs and Symptoms of  Listed Potential Problems Will be Absent, Minimized or Managed (Pneumonia)  Outcome: Ongoing (interventions implemented as appropriate)      Problem: Skin Injury Risk (Adult)  Goal: Identify Related Risk Factors and Signs and Symptoms  Outcome: Ongoing (interventions implemented as appropriate)    Goal: Skin Health and Integrity  Outcome: Ongoing (interventions implemented as appropriate)

## 2019-09-07 NOTE — PROGRESS NOTES
[facility] Medicine Services  INPATIENT PROGRESS NOTE    Length of Stay: 3  Date of Admission: 9/3/2019  Primary Care Physician: Provider, No Known    Subjective   Please note that all previous progress notes, lab findings, radiographical findings, medication changes, and physical exam findings have been noted and updated as appropriate.    9/7/2019: Patient continues to improve.  She has no complaints.  Shortness of air, fever, chills, nausea, vomiting, chest pain.  Has been active with therapy.  Will continue for an additional 24 hours of antibiotics and plan for discharge on 9/9/2019.  Patient is not a good candidate for outpatient antibiotic therapy given her multiple allergies and alpha gal allergy.  Please note that during therapy evaluation on 9/6/2019 she did drop to 89% on oxygen with ambulation, however recovered quickly to 94%.    9/6/2019: Patient feels much better today.  She has had 3 large bowel movements.  No nausea, abdominal pain, vomiting.  No shortness of breath, chest pain, dizziness, syncope.    Chief Complaint/HPI: This 78-year-old  female reported to the emergency department with complaints of vomiting and fever.  Patient also had intermittent fever, chills.  Demonstrated a fever of 101.2 as well as leukocytosis with a white blood cell count of 18.85.  CT the abdomen and pelvis revealed a right lower lobe infiltrate as well as a nodular patchy opacity measuring 3.07 x 1.84 x 1.42.  Patient will need a follow-up with pulmonology and a follow-up imaging session, however at this time she will be treated empirically as pneumonia.  Her leukocytosis has resolved.  She states she feels much better other than having complaints of constipation.    Review of Systems   Constitutional: Negative for chills and fever.   Respiratory: Negative for shortness of breath.    Gastrointestinal: Negative for abdominal pain, constipation, diarrhea, nausea and  vomiting.   Genitourinary: Negative for dysuria.   Neurological: Negative for dizziness and light-headedness.      All pertinent negatives and positives are as above. All other systems have been reviewed and are negative unless otherwise stated.     Objective    Temp:  [96.3 °F (35.7 °C)-98.5 °F (36.9 °C)] 96.4 °F (35.8 °C)  Heart Rate:  [58-78] 70  Resp:  [16-18] 18  BP: (130-160)/(60-70) 133/65    Physical Exam   Constitutional: She is oriented to person, place, and time. She appears well-developed and well-nourished. No distress.   HENT:   Head: Normocephalic and atraumatic.   Cardiovascular: Normal rate and regular rhythm.   Pulmonary/Chest: Effort normal. No stridor. No respiratory distress. She has no wheezes. She has no rales.   Abdominal: Soft. Bowel sounds are normal. She exhibits no distension. There is no tenderness.   Neurological: She is alert and oriented to person, place, and time.   Skin: Skin is warm and dry. She is not diaphoretic.     Results Review:  I have reviewed the labs, radiology results, and diagnostic studies.    Laboratory Data:   Results from last 7 days   Lab Units 09/07/19  0546 09/06/19  0517 09/05/19  0645 09/04/19  0029   SODIUM mmol/L 143 140 138 138   POTASSIUM mmol/L 4.8 4.4 4.4 4.7   CHLORIDE mmol/L 109* 106 102 99   CO2 mmol/L 22.0 24.0 25.0 27.0   BUN mg/dL 23 31* 36* 25*   CREATININE mg/dL 0.95 1.24* 1.43* 1.21*   GLUCOSE mg/dL 107* 107* 128* 181*   CALCIUM mg/dL 9.0 9.3 8.8 9.5   BILIRUBIN mg/dL  --   --   --  0.4   ALK PHOS U/L  --   --   --  69   ALT (SGPT) U/L  --   --   --  18   AST (SGOT) U/L  --   --   --  20   ANION GAP mmol/L 12.0 10.0 11.0 12.0     Estimated Creatinine Clearance: 63.2 mL/min (by C-G formula based on SCr of 0.95 mg/dL).  Results from last 7 days   Lab Units 09/05/19  0645   PHOSPHORUS mg/dL 3.7         Results from last 7 days   Lab Units 09/07/19  0546 09/06/19  0517 09/05/19  0645 09/04/19  0030   WBC 10*3/mm3 5.97 7.08 8.27 18.85*   HEMOGLOBIN  g/dL 9.7* 9.7* 9.9* 11.7*   HEMATOCRIT % 29.8* 29.7* 30.2* 35.6   PLATELETS 10*3/mm3 166 163 160 195           Culture Data:   No results found for: BLOODCX  No results found for: URINECX  No results found for: RESPCX  No results found for: WOUNDCX  No results found for: STOOLCX  No components found for: BODYFLD    Radiology Data:   Imaging Results (last 24 hours)     ** No results found for the last 24 hours. **          I have reviewed the patient's current medications.     Assessment/Plan     Active Hospital Problems    Diagnosis   • **Sepsis (CMS/HCC)   • Acute febrile illness   • Hypertension   • COPD (chronic obstructive pulmonary disease) (CMS/HCC)   • Vomiting   • Leukocytosis   Right lower lobe pneumonia    Plan: Continue aztreonam and doxycycline, will complete a 5-day course of antibiotics prior to discharge.  Continue physical therapy and Occupational Therapy.              This document has been electronically signed by NOEL Amador on September 7, 2019 10:03 AM

## 2019-09-07 NOTE — PLAN OF CARE
Problem: Patient Care Overview  Goal: Plan of Care Review  Outcome: Ongoing (interventions implemented as appropriate)    Goal: Individualization and Mutuality  Outcome: Ongoing (interventions implemented as appropriate)    Goal: Discharge Needs Assessment  Outcome: Ongoing (interventions implemented as appropriate)    Goal: Interprofessional Rounds/Family Conf  Outcome: Ongoing (interventions implemented as appropriate)      Problem: Fall Risk (Adult)  Goal: Identify Related Risk Factors and Signs and Symptoms  Outcome: Ongoing (interventions implemented as appropriate)      Problem: Pain, Chronic (Adult)  Goal: Identify Related Risk Factors and Signs and Symptoms  Outcome: Ongoing (interventions implemented as appropriate)    Goal: Acceptable Pain/Comfort Level and Functional Ability  Outcome: Ongoing (interventions implemented as appropriate)      Problem: Infection, Risk/Actual (Adult)  Goal: Identify Related Risk Factors and Signs and Symptoms  Outcome: Ongoing (interventions implemented as appropriate)    Goal: Infection Prevention/Resolution  Outcome: Ongoing (interventions implemented as appropriate)      Problem: Sepsis/Septic Shock (Adult)  Goal: Signs and Symptoms of Listed Potential Problems Will be Absent, Minimized or Managed (Sepsis/Septic Shock)  Outcome: Ongoing (interventions implemented as appropriate)      Problem: Nausea/Vomiting (Adult)  Goal: Identify Related Risk Factors and Signs and Symptoms  Outcome: Ongoing (interventions implemented as appropriate)    Goal: Symptom Relief  Outcome: Ongoing (interventions implemented as appropriate)    Goal: Adequate Hydration  Outcome: Ongoing (interventions implemented as appropriate)      Problem: Pneumonia (Adult)  Goal: Signs and Symptoms of Listed Potential Problems Will be Absent, Minimized or Managed (Pneumonia)  Outcome: Ongoing (interventions implemented as appropriate)      Problem: Skin Injury Risk (Adult)  Goal: Identify Related Risk Factors  and Signs and Symptoms  Outcome: Ongoing (interventions implemented as appropriate)    Goal: Skin Health and Integrity  Outcome: Ongoing (interventions implemented as appropriate)

## 2019-09-08 LAB
ANION GAP SERPL CALCULATED.3IONS-SCNC: 11 MMOL/L (ref 5–15)
BASOPHILS # BLD AUTO: 0.03 10*3/MM3 (ref 0–0.2)
BASOPHILS NFR BLD AUTO: 0.5 % (ref 0–1.5)
BUN BLD-MCNC: 21 MG/DL (ref 8–23)
BUN/CREAT SERPL: 23.9 (ref 7–25)
CALCIUM SPEC-SCNC: 9 MG/DL (ref 8.6–10.5)
CHLORIDE SERPL-SCNC: 106 MMOL/L (ref 98–107)
CO2 SERPL-SCNC: 23 MMOL/L (ref 22–29)
CREAT BLD-MCNC: 0.88 MG/DL (ref 0.57–1)
DEPRECATED RDW RBC AUTO: 50.2 FL (ref 37–54)
EOSINOPHIL # BLD AUTO: 0.21 10*3/MM3 (ref 0–0.4)
EOSINOPHIL NFR BLD AUTO: 3.5 % (ref 0.3–6.2)
ERYTHROCYTE [DISTWIDTH] IN BLOOD BY AUTOMATED COUNT: 13.9 % (ref 12.3–15.4)
GFR SERPL CREATININE-BSD FRML MDRD: 62 ML/MIN/1.73
GLUCOSE BLD-MCNC: 102 MG/DL (ref 65–99)
HCT VFR BLD AUTO: 31.2 % (ref 34–46.6)
HGB BLD-MCNC: 10.1 G/DL (ref 12–15.9)
IMM GRANULOCYTES # BLD AUTO: 0.03 10*3/MM3 (ref 0–0.05)
IMM GRANULOCYTES NFR BLD AUTO: 0.5 % (ref 0–0.5)
LYMPHOCYTES # BLD AUTO: 1.76 10*3/MM3 (ref 0.7–3.1)
LYMPHOCYTES NFR BLD AUTO: 29.7 % (ref 19.6–45.3)
MCH RBC QN AUTO: 31.8 PG (ref 26.6–33)
MCHC RBC AUTO-ENTMCNC: 32.4 G/DL (ref 31.5–35.7)
MCV RBC AUTO: 98.1 FL (ref 79–97)
MONOCYTES # BLD AUTO: 0.41 10*3/MM3 (ref 0.1–0.9)
MONOCYTES NFR BLD AUTO: 6.9 % (ref 5–12)
NEUTROPHILS # BLD AUTO: 3.48 10*3/MM3 (ref 1.7–7)
NEUTROPHILS NFR BLD AUTO: 58.9 % (ref 42.7–76)
NRBC BLD AUTO-RTO: 0 /100 WBC (ref 0–0.2)
PLATELET # BLD AUTO: 193 10*3/MM3 (ref 140–450)
PMV BLD AUTO: 11.6 FL (ref 6–12)
POTASSIUM BLD-SCNC: 4.5 MMOL/L (ref 3.5–5.2)
RBC # BLD AUTO: 3.18 10*6/MM3 (ref 3.77–5.28)
SODIUM BLD-SCNC: 140 MMOL/L (ref 136–145)
WBC NRBC COR # BLD: 5.92 10*3/MM3 (ref 3.4–10.8)

## 2019-09-08 PROCEDURE — 85025 COMPLETE CBC W/AUTO DIFF WBC: CPT | Performed by: NURSE PRACTITIONER

## 2019-09-08 PROCEDURE — 94760 N-INVAS EAR/PLS OXIMETRY 1: CPT

## 2019-09-08 PROCEDURE — 94799 UNLISTED PULMONARY SVC/PX: CPT

## 2019-09-08 PROCEDURE — 97110 THERAPEUTIC EXERCISES: CPT

## 2019-09-08 PROCEDURE — 80048 BASIC METABOLIC PNL TOTAL CA: CPT | Performed by: NURSE PRACTITIONER

## 2019-09-08 PROCEDURE — 97116 GAIT TRAINING THERAPY: CPT

## 2019-09-08 RX ADMIN — SPIRONOLACTONE 25 MG: 25 TABLET ORAL at 08:40

## 2019-09-08 RX ADMIN — GUAIFENESIN 1200 MG: 600 TABLET, EXTENDED RELEASE ORAL at 08:41

## 2019-09-08 RX ADMIN — FAMOTIDINE 40 MG: 40 TABLET ORAL at 08:40

## 2019-09-08 RX ADMIN — CARVEDILOL 3.12 MG: 3.12 TABLET, FILM COATED ORAL at 08:41

## 2019-09-08 RX ADMIN — AZTREONAM 2 G: 2 INJECTION, POWDER, LYOPHILIZED, FOR SOLUTION INTRAMUSCULAR; INTRAVENOUS at 05:33

## 2019-09-08 RX ADMIN — MELATONIN 9.75 MG: at 21:28

## 2019-09-08 RX ADMIN — DOXYCYCLINE 100 MG: 100 INJECTION, POWDER, LYOPHILIZED, FOR SOLUTION INTRAVENOUS at 13:08

## 2019-09-08 RX ADMIN — CETIRIZINE HYDROCHLORIDE 5 MG: 5 TABLET ORAL at 08:40

## 2019-09-08 RX ADMIN — AZTREONAM 2 G: 2 INJECTION, POWDER, LYOPHILIZED, FOR SOLUTION INTRAMUSCULAR; INTRAVENOUS at 21:27

## 2019-09-08 RX ADMIN — DOXYCYCLINE 100 MG: 100 INJECTION, POWDER, LYOPHILIZED, FOR SOLUTION INTRAVENOUS at 01:33

## 2019-09-08 RX ADMIN — SODIUM CHLORIDE, PRESERVATIVE FREE 10 ML: 5 INJECTION INTRAVENOUS at 08:40

## 2019-09-08 RX ADMIN — SODIUM CHLORIDE, PRESERVATIVE FREE 10 ML: 5 INJECTION INTRAVENOUS at 21:28

## 2019-09-08 RX ADMIN — BUDESONIDE AND FORMOTEROL FUMARATE DIHYDRATE 2 PUFF: 160; 4.5 AEROSOL RESPIRATORY (INHALATION) at 07:52

## 2019-09-08 RX ADMIN — GUAIFENESIN 1200 MG: 600 TABLET, EXTENDED RELEASE ORAL at 21:28

## 2019-09-08 RX ADMIN — CARVEDILOL 3.12 MG: 3.12 TABLET, FILM COATED ORAL at 17:01

## 2019-09-08 RX ADMIN — AZTREONAM 2 G: 2 INJECTION, POWDER, LYOPHILIZED, FOR SOLUTION INTRAMUSCULAR; INTRAVENOUS at 11:08

## 2019-09-08 RX ADMIN — DOCUSATE SODIUM 100 MG: 100 CAPSULE, LIQUID FILLED ORAL at 08:40

## 2019-09-08 RX ADMIN — BUDESONIDE AND FORMOTEROL FUMARATE DIHYDRATE 2 PUFF: 160; 4.5 AEROSOL RESPIRATORY (INHALATION) at 19:19

## 2019-09-08 NOTE — PLAN OF CARE
Problem: Patient Care Overview  Goal: Plan of Care Review  Outcome: Ongoing (interventions implemented as appropriate)   09/08/19 0059 09/08/19 1334   Coping/Psychosocial   Plan of Care Reviewed With patient --    Plan of Care Review   Progress improving --    OTHER   Outcome Summary --  Pt david tx well with good participation. When amb with no AD pt O2 sats dropped to 89%. When O2 donned pt remained at 96% with gt. Pt t/f sit-stand-sit with SBAx1. Pt amb 192', 348', and 80' with no AD with SBA/CGAx1 due to unsteadiness noted with no LOB. Pt would benefit of gt with RW vs rollator due to unsteadiness. Pt performed B LE seated therex. No goals met this tx,

## 2019-09-08 NOTE — PLAN OF CARE
Problem: Patient Care Overview  Goal: Plan of Care Review  Outcome: Ongoing (interventions implemented as appropriate)   09/08/19 0059   Coping/Psychosocial   Plan of Care Reviewed With patient   Plan of Care Review   Progress improving   OTHER   Outcome Summary Pt resting. no acute events overnight. v/s stable. will continue to monitor pt      Goal: Discharge Needs Assessment  Outcome: Ongoing (interventions implemented as appropriate)    Goal: Interprofessional Rounds/Family Conf  Outcome: Ongoing (interventions implemented as appropriate)      Problem: Fall Risk (Adult)  Goal: Identify Related Risk Factors and Signs and Symptoms  Outcome: Outcome(s) achieved Date Met: 09/08/19      Problem: Pain, Chronic (Adult)  Goal: Identify Related Risk Factors and Signs and Symptoms  Outcome: Outcome(s) achieved Date Met: 09/08/19    Goal: Acceptable Pain/Comfort Level and Functional Ability  Outcome: Ongoing (interventions implemented as appropriate)      Problem: Infection, Risk/Actual (Adult)  Goal: Identify Related Risk Factors and Signs and Symptoms  Outcome: Outcome(s) achieved Date Met: 09/08/19    Goal: Infection Prevention/Resolution  Outcome: Ongoing (interventions implemented as appropriate)      Problem: Nausea/Vomiting (Adult)  Goal: Identify Related Risk Factors and Signs and Symptoms  Outcome: Outcome(s) achieved Date Met: 09/08/19    Goal: Symptom Relief  Outcome: Ongoing (interventions implemented as appropriate)    Goal: Adequate Hydration  Outcome: Ongoing (interventions implemented as appropriate)      Problem: Pneumonia (Adult)  Goal: Signs and Symptoms of Listed Potential Problems Will be Absent, Minimized or Managed (Pneumonia)  Outcome: Ongoing (interventions implemented as appropriate)      Problem: Skin Injury Risk (Adult)  Goal: Identify Related Risk Factors and Signs and Symptoms  Outcome: Outcome(s) achieved Date Met: 09/08/19    Goal: Skin Health and Integrity  Outcome: Ongoing (interventions  implemented as appropriate)

## 2019-09-08 NOTE — THERAPY TREATMENT NOTE
Acute Care - Physical Therapy Treatment Note  [facility]     Patient Name: Alda Constantino  : 1940  MRN: [id number]  Today's Date: 2019  Onset of Illness/Injury or Date of Surgery: 19     Referring Physician: TRINA COHEN    Admit Date: 9/3/2019    Visit Dx:    ICD-10-CM ICD-9-CM   1. Acute febrile illness R50.9 780.60   2. Impaired functional mobility, balance, gait, and endurance Z74.09 V49.89   3. Impaired mobility and activities of daily living Z74.09 799.89     Patient Active Problem List   Diagnosis   • Acute febrile illness   • Hypertension   • COPD (chronic obstructive pulmonary disease) (CMS/HCC)   • Vomiting   • Leukocytosis   • Sepsis (CMS/HCC)       Therapy Treatment    Rehabilitation Treatment Summary     Row Name 19 1022             Treatment Time/Intention    Discipline  physical therapy assistant  -EM      Document Type  progress note/recertification  -EM      Subjective Information  no complaints  -EM      Mode of Treatment  physical therapy;individual therapy  -EM      Patient Effort  good  -EM      Recorded by [EM] Lonnie Grady, PTA 19 1333      Row Name 19 1022             Vital Signs    Pre Systolic BP Rehab  111  -EM      Pre Treatment Diastolic BP  67  -EM      Post Systolic BP Rehab  159  -EM      Post Treatment Diastolic BP  72  -EM      Pretreatment Heart Rate (beats/min)  75  -EM      Intratreatment Heart Rate (beats/min)  103  -EM      Posttreatment Heart Rate (beats/min)  84  -EM      Pre SpO2 (%)  95  -EM      O2 Delivery Pre Treatment  room air  -EM      Intra SpO2 (%)  89  -EM      O2 Delivery Intra Treatment  room air  -EM      Post SpO2 (%)  96  -EM      O2 Delivery Post Treatment  supplemental O2  -EM      Pre Patient Position  Sitting  -EM      Intra Patient Position  Standing  -EM      Post Patient Position  Sitting  -EM      Recorded by [EM] Lonnie Grady, PTA 19 1333      Row Name 19 1022             Cognitive  Assessment/Intervention- PT/OT    Affect/Mental Status (Cognitive)  WFL  -EM      Orientation Status (Cognition)  oriented x 4  -EM      Follows Commands (Cognition)  WFL  -EM      Personal Safety Interventions  fall prevention program maintained;gait belt;nonskid shoes/slippers when out of bed;supervised activity  -EM      Recorded by [EM] Lonnie Grady, [facility] 09/08/19 1333      Row Name 09/08/19 1022             Sit-Stand Transfer    Sit-Stand [place] (Transfers)  supervision  -EM      Recorded by [EM] Lonnie Grady, [facility] 09/08/19 1333      Row Name 09/08/19 1022             Stand-Sit Transfer    Stand-Sit [place] (Transfers)  supervision  -EM      Recorded by [EM] Lonnie Grady, [facility] 09/08/19 1333      Row Name 09/08/19 1022             Gait/Stairs Assessment/Training    [place] Level (Gait)  supervision;contact guard  -EM      Assistive Device (Gait)  other (see comments) no AD  -EM      Distance in Feet (Gait)  192', 348', 80'  -EM      Pattern (Gait)  step-through  -EM      Deviations/Abnormal Patterns (Gait)  base of support, narrow;gait speed decreased;kae decreased;stride length decreased  -EM      Comment (Gait/Stairs)  Pt unsteady with gt with no LOB noted. PTA recommend gt with RW/rollator  -EM      Recorded by [EM] Lonnie Grady, [facility] 09/08/19 1333      Row Name 09/08/19 1022             Therapeutic Exercise    Lower Extremity (Therapeutic Exercise)  gluteal sets;LAQ (long arc quad), bilateral;marching while seated;other (see comments) AP  -EM      Exercise Type (Therapeutic Exercise)  AROM (active range of motion)  -EM      Position (Therapeutic Exercise)  seated  -EM      Sets/Reps (Therapeutic Exercise)  1x20  -EM      Recorded by [EM] Lonnie Grady, [facility] 09/08/19 1333      Row Name 09/08/19 1022             Positioning and Restraints    Pre-Treatment Position  sitting in chair/recliner  -EM      Post Treatment Position  chair  -EM      In Chair  sitting;call light within  reach;encouraged to call for assist;exit alarm on  -EM      Recorded by [EM] Lonnie Grady, PTA 09/08/19 1333      Row Name 09/08/19 1022             Outcome Summary/Treatment Plan (PT)    Plan for Continued Treatment (PT)  Cont current POC. Gt with FWRW vs rollator due to unsteadiness.  -EM      Anticipated Discharge Disposition (PT)  anticipate therapy at next level of care  -EM      Recorded by [EM] GradyRoderickmadhu MUSA, PTA 09/08/19 1333        User Key  (r) = Recorded By, (t) = Taken By, (c) = Cosigned By    Initials Name Effective Dates Discipline    EM Lonnie Grady, PTA 08/11/15 -  PT               Rehab Goal Summary     Row Name 09/08/19 1022             Transfer Goal 1 (PT)    Activity/Assistive Device (Transfer Goal 1, PT)  sit-to-stand/stand-to-sit;bed-to-chair/chair-to-bed;toilet  -EM      [place] Level/Cues Needed (Transfer Goal 1, PT)  independent  -EM      Time Frame (Transfer Goal 1, PT)  by discharge  -EM      Progress/Outcome (Transfer Goal 1, PT)  goal not met  -EM         Gait Training Goal 1 (PT)    Activity/Assistive Device (Gait Training Goal 1, PT)  gait (walking locomotion);increase endurance/gait distance  -EM      [place] Level (Gait Training Goal 1, PT)  independent O2 sats will not drop below 92%  -EM      Distance (Gait Goal 1, PT)  [id number]  -EM      Time Frame (Gait Training Goal 1, PT)  by discharge  -EM      Progress/Outcome (Gait Training Goal 1, PT)  goal not met  -EM         Stairs Goal 1 (PT)    Activity/Assistive Device (Stairs Goal 1, PT)  ascending stairs;descending stairs;using handrail, right;using handrail, left  -EM      [place] Level/Cues Needed (Stairs Goal 1, PT)  conditional independence  -EM      Number of Stairs (Stairs Goal 1, PT)  3  -EM      Time Frame (Stairs Goal 1, PT)  by discharge  -EM      Progress/Outcome (Stairs Goal 1, PT)  goal not met  -EM        User Key  (r) = Recorded By, (t) = Taken By, (c) = Cosigned By    Initials Name Provider Type  Discipline    EM Lonnie Grady, PTA Physical Therapy Assistant PT          Physical Therapy Education     Title: PT OT SLP Therapies (In Progress)     Topic: Physical Therapy (In Progress)     Point: Mobility training (Done)     Learning Progress Summary           Patient Acceptance, E, DU,VU by  at 9/6/2019  1:30 PM                   Point: Body mechanics (Done)     Learning Progress Summary           Patient Acceptance, E, DU,VU by BARBARA at 9/6/2019  1:30 PM                   Point: Precautions (Resolved)     Learning Progress Summary           Patient Acceptance, E, DU,VU by  at 9/6/2019  1:30 PM                               User Key     Initials Effective Dates Name Provider Type Discipline     04/03/18 -  Janette Serrano, PT Physical Therapist PT                PT Recommendation and Plan  Anticipated Discharge Disposition (PT): anticipate therapy at next level of care  Outcome Summary/Treatment Plan (PT)  Plan for Continued Treatment (PT): Cont current POC. Gt with FWRW vs rollator due to unsteadiness.  Anticipated Discharge Disposition (PT): anticipate therapy at next level of care  Outcome Summary: Pt david tx well with good participation. When amb with no AD pt O2 sats dropped to 89%. When O2 donned pt remained at 96% with gt. Pt t/f sit-stand-sit with SBAx1. Pt amb 192', 348', and 80' with no AD with SBA/CGAx1 due to unsteadiness noted with no LOB. Pt would benefit of gt with RW vs rollator due to unsteadiness. Pt performed B LE seated therex. No goals met this tx,  Outcome Measures     Row Name 09/08/19 1300 09/07/19 1038          How much help from another person do you currently need...    Moving from lying on back to sitting on the side of a flat bed without bedrails?  4  -EM  --     Moving to and from a bed to a chair (including a wheelchair)?  4  -EM  --     Standing up from a chair using your arms (e.g., wheelchair, bedside chair)?  4  -EM  --     Climbing 3-5 steps with a railing?  3  -EM  --      To walk in hospital room?  3  -EM  --        How much help from another is currently needed...    Putting on and taking off regular lower body clothing?  --  4  -RB     Bathing (including washing, rinsing, and drying)  --  4  -RB     Toileting (which includes using toilet bed pan or urinal)  --  4  -RB     Putting on and taking off regular upper body clothing  --  4  -RB     Taking care of personal grooming (such as brushing teeth)  --  4  -RB     Eating meals  --  4  -RB     AM-PAC 6 Clicks Score (OT)  --  24  -RB        Functional Assessment    Outcome Measure Options  AM-PAC 6 Clicks Daily Activity (OT)  -EM  AM-PAC 6 Clicks Daily Activity (OT)  -RB       User Key  (r) = Recorded By, (t) = Taken By, (c) = Cosigned By    Initials Name Provider Type    Lonnie Locke PTA Physical Therapy Assistant    RB Maurizio Garcia, OT Occupational Therapist         Time Calculation:   PT Charges     Row Name 09/08/19 1336             Time Calculation    Start Time  1022  -EM      Stop Time  1056  -EM      Time Calculation (min)  34 min  -EM         Time Calculation- PT    Total Timed Code Minutes- PT  34 minute(s)  -EM        User Key  (r) = Recorded By, (t) = Taken By, (c) = Cosigned By    Initials Name Provider Type    Lonnie Locke PTA Physical Therapy Assistant        Therapy Charges for Today     Code Description Service Date Service Provider Modifiers Qty    07197093806 HC GAIT TRAINING EA 15 MIN 9/8/2019 Lonnie Grady PTA GP 1    56343754034 HC PT THER PROC EA 15 MIN 9/8/2019 Lonnie Grady PTA GP 1          PT G-Codes  Outcome Measure Options: AM-PAC 6 Clicks Daily Activity (OT)  AM-PAC 6 Clicks Score (OT): 24    Lonnie Grady PTA  9/8/2019

## 2019-09-08 NOTE — PROGRESS NOTES
[facility] Medicine Services  INPATIENT PROGRESS NOTE    Length of Stay: 4  Date of Admission: 9/3/2019  Primary Care Physician: Provider, No Known    Subjective   Please note that all previous progress notes, lab findings, radiographical findings, medication changes, and physical exam findings have been noted and updated as appropriate.    9/8/2019: No complaints this a.m.  Will discharge tomorrow morning after full 5-day course of IV antibiotics.    9/7/2019: Patient continues to improve.  She has no complaints.  Shortness of air, fever, chills, nausea, vomiting, chest pain.  Has been active with therapy.  Will continue for an additional 24 hours of antibiotics and plan for discharge on 9/9/2019.  Patient is not a good candidate for outpatient antibiotic therapy given her multiple allergies and alpha gal allergy.  Please note that during therapy evaluation on 9/6/2019 she did drop to 89% on oxygen with ambulation, however recovered quickly to 94%.    9/6/2019: Patient feels much better today.  She has had 3 large bowel movements.  No nausea, abdominal pain, vomiting.  No shortness of breath, chest pain, dizziness, syncope.    Chief Complaint/HPI: This 78-year-old  female reported to the emergency department with complaints of vomiting and fever.  Patient also had intermittent fever, chills.  Demonstrated a fever of 101.2 as well as leukocytosis with a white blood cell count of 18.85.  CT the abdomen and pelvis revealed a right lower lobe infiltrate as well as a nodular patchy opacity measuring 3.07 x 1.84 x 1.42.  Patient will need a follow-up with pulmonology and a follow-up imaging session, however at this time she will be treated empirically as pneumonia.  Her leukocytosis has resolved.  She states she feels much better other than having complaints of constipation.    Review of Systems   Constitutional: Negative for chills and fever.   Respiratory: Negative for  shortness of breath.    Gastrointestinal: Negative for abdominal pain, constipation, diarrhea, nausea and vomiting.   Genitourinary: Negative for dysuria.   Neurological: Negative for dizziness and light-headedness.      All pertinent negatives and positives are as above. All other systems have been reviewed and are negative unless otherwise stated.     Objective    Temp:  [96.6 °F (35.9 °C)-97.8 °F (36.6 °C)] 97.1 °F (36.2 °C)  Heart Rate:  [56-75] 69  Resp:  [18] 18  BP: (138-155)/(47-72) 145/47    Physical Exam   Constitutional: She is oriented to person, place, and time. She appears well-developed and well-nourished. No distress.   HENT:   Head: Normocephalic and atraumatic.   Cardiovascular: Normal rate and regular rhythm.   Pulmonary/Chest: Effort normal. No stridor. No respiratory distress. She has no wheezes. She has no rales.   Abdominal: Soft. Bowel sounds are normal. She exhibits no distension. There is no tenderness.   Neurological: She is alert and oriented to person, place, and time.   Skin: Skin is warm and dry. She is not diaphoretic.     Results Review:  I have reviewed the labs, radiology results, and diagnostic studies.    Laboratory Data:   Results from last 7 days   Lab Units 09/08/19  0553 09/07/19  0546 09/06/19  0517  09/04/19  0029   SODIUM mmol/L 140 143 140   < > 138   POTASSIUM mmol/L 4.5 4.8 4.4   < > 4.7   CHLORIDE mmol/L 106 109* 106   < > 99   CO2 mmol/L 23.0 22.0 24.0   < > 27.0   BUN mg/dL 21 23 31*   < > 25*   CREATININE mg/dL 0.88 0.95 1.24*   < > 1.21*   GLUCOSE mg/dL 102* 107* 107*   < > 181*   CALCIUM mg/dL 9.0 9.0 9.3   < > 9.5   BILIRUBIN mg/dL  --   --   --   --  0.4   ALK PHOS U/L  --   --   --   --  69   ALT (SGPT) U/L  --   --   --   --  18   AST (SGOT) U/L  --   --   --   --  20   ANION GAP mmol/L 11.0 12.0 10.0   < > 12.0    < > = values in this interval not displayed.     Estimated Creatinine Clearance: 68.5 mL/min (by C-G formula based on SCr of 0.88 mg/dL).  Results  from last 7 days   Lab Units 09/05/19  0645   PHOSPHORUS mg/dL 3.7         Results from last 7 days   Lab Units 09/08/19  0553 09/07/19  0546 09/06/19  0517 09/05/19  0645 09/04/19  0030   WBC 10*3/mm3 5.92 5.97 7.08 8.27 18.85*   HEMOGLOBIN g/dL 10.1* 9.7* 9.7* 9.9* 11.7*   HEMATOCRIT % 31.2* 29.8* 29.7* 30.2* 35.6   PLATELETS 10*3/mm3 193 166 163 160 195           Culture Data:   No results found for: BLOODCX  No results found for: URINECX  No results found for: RESPCX  No results found for: WOUNDCX  No results found for: STOOLCX  No components found for: BODYFLD    Radiology Data:   Imaging Results (last 24 hours)     ** No results found for the last 24 hours. **          I have reviewed the patient's current medications.     Assessment/Plan     Active Hospital Problems    Diagnosis   • **Sepsis (CMS/HCC)   • Acute febrile illness   • Hypertension   • COPD (chronic obstructive pulmonary disease) (CMS/HCC)   • Vomiting   • Leukocytosis   Right lower lobe pneumonia    Plan: Continue aztreonam and doxycycline, will complete a 5-day course of antibiotics prior to discharge.  Continue physical therapy and Occupational Therapy.  Discharge tomorrow, 9/9/2019.              This document has been electronically signed by NOEL Amador on September 8, 2019 10:44 AM

## 2019-09-09 VITALS
HEART RATE: 75 BPM | OXYGEN SATURATION: 96 % | BODY MASS INDEX: 35.8 KG/M2 | HEIGHT: 68 IN | TEMPERATURE: 97.2 F | DIASTOLIC BLOOD PRESSURE: 60 MMHG | RESPIRATION RATE: 18 BRPM | SYSTOLIC BLOOD PRESSURE: 128 MMHG | WEIGHT: 236.2 LBS

## 2019-09-09 LAB
ANION GAP SERPL CALCULATED.3IONS-SCNC: 9 MMOL/L (ref 5–15)
BACTERIA SPEC AEROBE CULT: NORMAL
BACTERIA SPEC AEROBE CULT: NORMAL
BASOPHILS # BLD AUTO: 0.04 10*3/MM3 (ref 0–0.2)
BASOPHILS NFR BLD AUTO: 0.6 % (ref 0–1.5)
BUN BLD-MCNC: 21 MG/DL (ref 8–23)
BUN/CREAT SERPL: 23.3 (ref 7–25)
CALCIUM SPEC-SCNC: 9.1 MG/DL (ref 8.6–10.5)
CHLORIDE SERPL-SCNC: 108 MMOL/L (ref 98–107)
CO2 SERPL-SCNC: 23 MMOL/L (ref 22–29)
CREAT BLD-MCNC: 0.9 MG/DL (ref 0.57–1)
DEPRECATED RDW RBC AUTO: 51.6 FL (ref 37–54)
EOSINOPHIL # BLD AUTO: 0.23 10*3/MM3 (ref 0–0.4)
EOSINOPHIL NFR BLD AUTO: 3.7 % (ref 0.3–6.2)
ERYTHROCYTE [DISTWIDTH] IN BLOOD BY AUTOMATED COUNT: 14.2 % (ref 12.3–15.4)
GFR SERPL CREATININE-BSD FRML MDRD: 61 ML/MIN/1.73
GLUCOSE BLD-MCNC: 100 MG/DL (ref 65–99)
HCT VFR BLD AUTO: 30.2 % (ref 34–46.6)
HGB BLD-MCNC: 9.6 G/DL (ref 12–15.9)
IMM GRANULOCYTES # BLD AUTO: 0.03 10*3/MM3 (ref 0–0.05)
IMM GRANULOCYTES NFR BLD AUTO: 0.5 % (ref 0–0.5)
LYMPHOCYTES # BLD AUTO: 1.98 10*3/MM3 (ref 0.7–3.1)
LYMPHOCYTES NFR BLD AUTO: 31.4 % (ref 19.6–45.3)
MCH RBC QN AUTO: 31.3 PG (ref 26.6–33)
MCHC RBC AUTO-ENTMCNC: 31.8 G/DL (ref 31.5–35.7)
MCV RBC AUTO: 98.4 FL (ref 79–97)
MONOCYTES # BLD AUTO: 0.44 10*3/MM3 (ref 0.1–0.9)
MONOCYTES NFR BLD AUTO: 7 % (ref 5–12)
NEUTROPHILS # BLD AUTO: 3.58 10*3/MM3 (ref 1.7–7)
NEUTROPHILS NFR BLD AUTO: 56.8 % (ref 42.7–76)
NRBC BLD AUTO-RTO: 0 /100 WBC (ref 0–0.2)
PLATELET # BLD AUTO: 182 10*3/MM3 (ref 140–450)
PMV BLD AUTO: 11.6 FL (ref 6–12)
POTASSIUM BLD-SCNC: 4.1 MMOL/L (ref 3.5–5.2)
RBC # BLD AUTO: 3.07 10*6/MM3 (ref 3.77–5.28)
SODIUM BLD-SCNC: 140 MMOL/L (ref 136–145)
WBC NRBC COR # BLD: 6.3 10*3/MM3 (ref 3.4–10.8)

## 2019-09-09 PROCEDURE — 85025 COMPLETE CBC W/AUTO DIFF WBC: CPT | Performed by: NURSE PRACTITIONER

## 2019-09-09 PROCEDURE — 94799 UNLISTED PULMONARY SVC/PX: CPT

## 2019-09-09 PROCEDURE — 80048 BASIC METABOLIC PNL TOTAL CA: CPT | Performed by: NURSE PRACTITIONER

## 2019-09-09 RX ORDER — DOXYCYCLINE HYCLATE 100 MG/1
100 TABLET, DELAYED RELEASE ORAL 2 TIMES DAILY
Qty: 8 TABLET | Refills: 0 | OUTPATIENT
Start: 2019-09-09 | End: 2020-02-08 | Stop reason: HOSPADM

## 2019-09-09 RX ADMIN — AZTREONAM 2 G: 2 INJECTION, POWDER, LYOPHILIZED, FOR SOLUTION INTRAMUSCULAR; INTRAVENOUS at 03:58

## 2019-09-09 RX ADMIN — DOXYCYCLINE 100 MG: 100 INJECTION, POWDER, LYOPHILIZED, FOR SOLUTION INTRAVENOUS at 01:01

## 2019-09-09 RX ADMIN — FAMOTIDINE 40 MG: 40 TABLET ORAL at 08:19

## 2019-09-09 RX ADMIN — BUDESONIDE AND FORMOTEROL FUMARATE DIHYDRATE 2 PUFF: 160; 4.5 AEROSOL RESPIRATORY (INHALATION) at 10:23

## 2019-09-09 RX ADMIN — CARVEDILOL 3.12 MG: 3.12 TABLET, FILM COATED ORAL at 08:19

## 2019-09-09 RX ADMIN — GUAIFENESIN 1200 MG: 600 TABLET, EXTENDED RELEASE ORAL at 08:19

## 2019-09-09 NOTE — NURSING NOTE
NEGRA Covarrubias/ CL notified RUFINO Mcclellan about pt's Ipratropium Bromide and ipratropium-albuterol ordered on AVS. No changes was made  to AVS.

## 2019-09-09 NOTE — PLAN OF CARE
Problem: Patient Care Overview  Goal: Plan of Care Review  Outcome: Ongoing (interventions implemented as appropriate)   09/08/19 2495   Coping/Psychosocial   Plan of Care Reviewed With patient   Plan of Care Review   Progress no change       Problem: Pain, Chronic (Adult)  Goal: Acceptable Pain/Comfort Level and Functional Ability  Outcome: Ongoing (interventions implemented as appropriate)      Problem: Infection, Risk/Actual (Adult)  Goal: Infection Prevention/Resolution  Outcome: Ongoing (interventions implemented as appropriate)      Problem: Sepsis/Septic Shock (Adult)  Goal: Signs and Symptoms of Listed Potential Problems Will be Absent, Minimized or Managed (Sepsis/Septic Shock)  Outcome: Ongoing (interventions implemented as appropriate)      Problem: Pneumonia (Adult)  Goal: Signs and Symptoms of Listed Potential Problems Will be Absent, Minimized or Managed (Pneumonia)  Outcome: Ongoing (interventions implemented as appropriate)      Problem: Skin Injury Risk (Adult)  Goal: Skin Health and Integrity  Outcome: Ongoing (interventions implemented as appropriate)

## 2019-09-09 NOTE — PLAN OF CARE
Problem: Patient Care Overview  Goal: Plan of Care Review  Outcome: Ongoing (interventions implemented as appropriate)   09/09/19 0953   Coping/Psychosocial   Plan of Care Reviewed With patient   Plan of Care Review   Progress no change   OTHER   Outcome Summary Pt being dcd per TALHA Mcclellan     Goal: Individualization and Mutuality  Outcome: Ongoing (interventions implemented as appropriate)      Problem: Pain, Chronic (Adult)  Goal: Acceptable Pain/Comfort Level and Functional Ability  Outcome: Ongoing (interventions implemented as appropriate)      Problem: Infection, Risk/Actual (Adult)  Goal: Infection Prevention/Resolution  Outcome: Ongoing (interventions implemented as appropriate)      Problem: Sepsis/Septic Shock (Adult)  Goal: Signs and Symptoms of Listed Potential Problems Will be Absent, Minimized or Managed (Sepsis/Septic Shock)  Outcome: Ongoing (interventions implemented as appropriate)      Problem: Pneumonia (Adult)  Goal: Signs and Symptoms of Listed Potential Problems Will be Absent, Minimized or Managed (Pneumonia)  Outcome: Ongoing (interventions implemented as appropriate)      Problem: Skin Injury Risk (Adult)  Goal: Skin Health and Integrity  Outcome: Ongoing (interventions implemented as appropriate)

## 2019-09-09 NOTE — DISCHARGE SUMMARY
[facility]  DISCHARGE SUMMARY       Date of Admission: 9/3/2019  Date of Discharge:  9/9/2019  Primary Care Physician: Provider, No Known    Presenting Problem/History of Present Illness:  Acute febrile illness [R50.9]  Acute febrile illness [R50.9]     Final Discharge Diagnoses:  Active Hospital Problems    Diagnosis   • **Sepsis (CMS/[facility])   • Acute febrile illness   • Hypertension   • COPD (chronic obstructive pulmonary disease) (CMS/[facility])   • Vomiting   • Leukocytosis     Pertinent Test Results:   Lab Results (most recent)     Procedure Component Value Units Date/Time    Blood Culture - Blood, Arm, Left [[id number]] Collected:  09/04/19 0949    Specimen:  Blood from Arm, Left Updated:  09/09/19 1000     Blood Culture No growth at 5 days    Blood Culture - Blood, Hand, Left [[id number]] Collected:  09/04/19 0919    Specimen:  Blood from Hand, Left Updated:  09/09/19 0930     Blood Culture No growth at 5 days    Basic Metabolic Panel [[id number]]  (Abnormal) Collected:  09/09/19 0525    Specimen:  Blood Updated:  09/09/19 0717     Glucose 100 mg/dL      BUN 21 mg/dL      Creatinine 0.90 mg/dL      Sodium 140 mmol/L      Potassium 4.1 mmol/L      Chloride 108 mmol/L      CO2 23.0 mmol/L      Calcium 9.1 mg/dL      eGFR Non African Amer 61 mL/min/1.73      BUN/Creatinine Ratio 23.3     Anion Gap 9.0 mmol/L     Narrative:       GFR Normal >60  Chronic Kidney Disease <60  Kidney Failure <15    CBC & Differential [[id number]] Collected:  09/09/19 0525    Specimen:  Blood Updated:  09/09/19 0654    Narrative:       The following orders were created for panel order CBC & Differential.  Procedure                               Abnormality         Status                     ---------                               -----------         ------                     CBC Auto Differential[[id number]]        Abnormal            Final result                 Please view results for  these tests on the individual orders.    CBC Auto Differential [[id number]]  (Abnormal) Collected:  09/09/19 0525    Specimen:  Blood Updated:  09/09/19 0654     WBC 6.30 10*3/mm3      RBC 3.07 10*6/mm3      Hemoglobin 9.6 g/dL      Hematocrit 30.2 %      MCV 98.4 fL      MCH 31.3 pg      MCHC 31.8 g/dL      RDW 14.2 %      RDW-SD 51.6 fl      MPV 11.6 fL      Platelets 182 10*3/mm3      Neutrophil % 56.8 %      Lymphocyte % 31.4 %      Monocyte % 7.0 %      Eosinophil % 3.7 %      Basophil % 0.6 %      Immature Grans % 0.5 %      Neutrophils, Absolute 3.58 10*3/mm3      Lymphocytes, Absolute 1.98 10*3/mm3      Monocytes, Absolute 0.44 10*3/mm3      Eosinophils, Absolute 0.23 10*3/mm3      Basophils, Absolute 0.04 10*3/mm3      Immature Grans, Absolute 0.03 10*3/mm3      nRBC 0.0 /100 WBC     Basic Metabolic Panel [[id number]]  (Abnormal) Collected:  09/08/19 0553    Specimen:  Blood Updated:  09/08/19 0657     Glucose 102 mg/dL      BUN 21 mg/dL      Creatinine 0.88 mg/dL      Sodium 140 mmol/L      Potassium 4.5 mmol/L      Chloride 106 mmol/L      CO2 23.0 mmol/L      Calcium 9.0 mg/dL      eGFR Non African Amer 62 mL/min/1.73      BUN/Creatinine Ratio 23.9     Anion Gap 11.0 mmol/L     Narrative:       GFR Normal >60  Chronic Kidney Disease <60  Kidney Failure <15    CBC & Differential [[id number]] Collected:  09/08/19 0553    Specimen:  Blood Updated:  09/08/19 0640    Narrative:       The following orders were created for panel order CBC & Differential.  Procedure                               Abnormality         Status                     ---------                               -----------         ------                     CBC Auto Differential[[id number]]        Abnormal            Final result                 Please view results for these tests on the individual orders.    CBC Auto Differential [[id number]]  (Abnormal) Collected:  09/08/19 0553    Specimen:  Blood Updated:  09/08/19 0640     WBC 5.92 10*3/mm3       RBC 3.18 10*6/mm3      Hemoglobin 10.1 g/dL      Hematocrit 31.2 %      MCV 98.1 fL      MCH 31.8 pg      MCHC 32.4 g/dL      RDW 13.9 %      RDW-SD 50.2 fl      MPV 11.6 fL      Platelets 193 10*3/mm3      Neutrophil % 58.9 %      Lymphocyte % 29.7 %      Monocyte % 6.9 %      Eosinophil % 3.5 %      Basophil % 0.5 %      Immature Grans % 0.5 %      Neutrophils, Absolute 3.48 10*3/mm3      Lymphocytes, Absolute 1.76 10*3/mm3      Monocytes, Absolute 0.41 10*3/mm3      Eosinophils, Absolute 0.21 10*3/mm3      Basophils, Absolute 0.03 10*3/mm3      Immature Grans, Absolute 0.03 10*3/mm3      nRBC 0.0 /100 WBC     Phosphorus [[id number]]  (Normal) Collected:  09/05/19 0645    Specimen:  Blood Updated:  09/05/19 0710     Phosphorus 3.7 mg/dL     Lactic Acid, Plasma [[id number]]  (Normal) Collected:  09/05/19 0645    Specimen:  Blood Updated:  09/05/19 0706     Lactate 0.8 mmol/L     Urinalysis With Culture If Indicated - Urine, Random Void [[id number]]  (Normal) Collected:  09/04/19 1216    Specimen:  Urine, Random Void Updated:  09/04/19 1235     Color, UA Yellow     Appearance, UA Clear     pH, UA <=5.0     Specific Gravity, UA 1.019     Glucose, UA Negative     Ketones, UA Negative     Bilirubin, UA Negative     Blood, UA Negative     Protein, UA Negative     Leuk Esterase, UA Negative     Nitrite, UA Negative     Urobilinogen, UA 0.2 E.U./dL    Narrative:       Urine microscopic not indicated.    Lactic Acid, Plasma [[id number]]  (Normal) Collected:  09/04/19 0919    Specimen:  Blood Updated:  09/04/19 0941     Lactate 0.9 mmol/L     [place] Draw [[id number]] Collected:  09/04/19 0029    Specimen:  Blood Updated:  09/04/19 0622    Narrative:       The following orders were created for panel order [place] Draw.  Procedure                               Abnormality         Status                     ---------                               -----------         ------                     Light Blue Top[[id number]]                                                               Green Top (Gel)[[id number]]                                  Final result               Lavender Top[[id number]]                                     Final result               Gold Top - SST[[id number]]                                   Final result                 Please view results for these tests on the individual orders.    Extra Tubes [[id number]] Collected:  09/04/19 0149    Specimen:  Blood, Venous Line Updated:  09/04/19 0300    Narrative:       The following orders were created for panel order Extra Tubes.  Procedure                               Abnormality         Status                     ---------                               -----------         ------                     [place] Blood Culture Rosalio...[[id number]]                      Final result                 Please view results for these tests on the individual orders.    [facility] Blood Culture Bottle Set [[id number]] Collected:  09/04/19 0149    Specimen:  Blood from Arm, Right Updated:  09/04/19 0300     Extra Tube Hold for add-ons.     Comment: Auto resulted.       Influenza Antigen, Rapid - Swab, Nasopharynx [[id number]]  (Normal) Collected:  09/04/19 0229    Specimen:  Swab from Nasopharynx Updated:  09/04/19 0246     Influenza A Ag, EIA Negative     Influenza B Ag, EIA Negative    Urinalysis With Microscopic If Indicated (No Culture) - Urine, Clean Catch [[id number]]  (Normal) Collected:  09/04/19 0112    Specimen:  Urine, Clean Catch Updated:  09/04/19 0152     Color, UA Yellow     Appearance, UA Clear     pH, UA <=5.0     Specific Gravity, UA 1.013     Glucose, UA Negative     Ketones, UA Negative     Bilirubin, UA Negative     Blood, UA Negative     Protein, UA Negative     Leuk Esterase, UA Negative     Nitrite, UA Negative     Urobilinogen, UA 0.2 E.U./dL    Narrative:       Urine microscopic not indicated.    Troponin [[id number]]  (Normal) Collected:  09/04/19 0029    Specimen:  Blood Updated:   09/04/19 0139     Troponin T <0.010 ng/mL     Narrative:       Troponin T Reference Range:  <= 0.03 ng/mL-   Negative for AMI  >0.03 ng/mL-     Abnormal for myocardial necrosis.  Clinicians would have to utilize clinical acumen, EKG, Troponin and serial changes to determine if it is an Acute Myocardial Infarction or myocardial injury due to an underlying chronic condition.     Green Top (Gel) [[id number]] Collected:  09/04/19 0029    Specimen:  Blood Updated:  09/04/19 0130     Extra Tube Hold for add-ons.     Comment: Auto resulted.       Lavender Top [[id number]] Collected:  09/04/19 0030    Specimen:  Blood Updated:  09/04/19 0130     Extra Tube hold for add-on     Comment: Auto resulted       Gold Top - SST [[id number]] Collected:  09/04/19 0030    Specimen:  Blood Updated:  09/04/19 0130     Extra Tube Hold for add-ons.     Comment: Auto resulted.       Comprehensive Metabolic Panel [[id number]]  (Abnormal) Collected:  09/04/19 0029    Specimen:  Blood Updated:  09/04/19 0046     Glucose 181 mg/dL      BUN 25 mg/dL      Creatinine 1.21 mg/dL      Sodium 138 mmol/L      Potassium 4.7 mmol/L      Chloride 99 mmol/L      CO2 27.0 mmol/L      Calcium 9.5 mg/dL      Total Protein 7.5 g/dL      Albumin 4.50 g/dL      ALT (SGPT) 18 U/L      AST (SGOT) 20 U/L      Alkaline Phosphatase 69 U/L      Total Bilirubin 0.4 mg/dL      eGFR Non African Amer 43 mL/min/1.73      Globulin 3.0 gm/dL      A/G Ratio 1.5 g/dL      BUN/Creatinine Ratio 20.7     Anion Gap 12.0 mmol/L     Narrative:       GFR Normal >60  Chronic Kidney Disease <60  Kidney Failure <15        Imaging Results (most recent)     Procedure Component Value Units Date/Time    CT Abdomen Pelvis Without Contrast [[id number]] Collected:  09/04/19 1309     Updated:  09/04/19 1404    Narrative:         PROCEDURE: Ct abdomen and pelvis without contrast    REASON FOR EXAM: n/v/d, R50.9 Fever, unspecified    FINDINGS: No comparison. Axial computer tomography  sequential  imaging was performed from the diaphragms through the symphysis  pubis without IV contrast administration. Sagittal and coronal  reformates was performed. This exam was performed according to  our departmental dose optimization program, which includes  automated exposure control, adjustment of the mA and/or KV  according to patient size and/or use of iterative reconstruction  technique.     Right lower lobe posterior basilar segment medial inferior  nodular patchy opacity which measures 3.07 x 1.84 x 1.42 cm. Lung  bases are otherwise unremarkable.    The liver is normal. Very small single gallstone is seen within  the gallbladder dependently. The gallbladder is otherwise  unremarkable. The biliary system is normal. Diffuse fatty  infiltrative changes of the pancreas. The pancreas is otherwise  unremarkable. The spleen is normal. The right adrenal gland is  surgically absent. Left adrenal gland hypodense 1.58 cm nodule  which has Hounsfield units of -1.57. The left adrenal gland is  otherwise unremarkable. The right kidney and ureter are normal.  The left kidney and ureter are normal. The bladder is normal. The  uterus is surgically absent. Multiple sigmoid colon diverticula.  Multiple descending colon diverticula. The hollow viscera is  otherwise unremarkable. The appendix is identified appears  normal. No lymphadenopathy in the abdomen or the pelvis.  Atherosclerotic vascular calcifications of the abdominal aorta.  No acute osseous abnormality.      Impression:       1.Right lower lobe posterior basilar segment medial inferior  nodular patchy opacity which measures 3.07 x 1.84 x 1.42 cm.   This may represent a focus of pneumonia versus subsegmental  atelectasis versus neoplastic involvement in this region.  Recommend follow-up CT in 3 months to further evaluate.  2.Cholelithiasis.  3. Diffuse fatty infiltrative changes of the pancreas..  4.Left adrenal gland hypodense 1.58 cm nodule which  has  Hounsfield units of -1.57.  This has imaging characteristics most  consistent with benign adrenal adenoma. Recommend clinical  correlation and if clinically indicated follow-up CT in 3-6  months to further evaluate.  5. Colonic diverticulosis.    Electronically signed by:  Js Arthur MD  9/4/2019 2:03 PM CDT  Workstation: [id number]    XR Chest 1 View [[id number]] Collected:  09/04/19 0150     Updated:  09/04/19 0205    Narrative:       Exam: AP portable chest    INDICATION: Fever, cough    COMPARISON: 9/24/2014    FINDINGS: AP portable chest. The structures are intact. Mild  cardiac enlargement. Lungs are hyperinflated compatible COPD.  Stable mild interstitial prominence. No focal consolidation,  pneumothorax or pleural effusion.      Impression:       No obvious acute cardiopulmonary abnormality    Electronically signed by:  Maurice Banda MD  9/4/2019 2:03 AM CDT  Workstation: [id number]        Hospital Course:  The patient is a 78 y.o. female who presented to [facility] with vomiting and fever.  Patient also had intermittent fever, chills.  Demonstrated a fever of 101.2 as well as leukocytosis with a white blood cell count of 18.85.  CT the abdomen pelvis revealed a right lower lobe infiltrate as well as nodular opacity measuring 3.07 x 1.84 x 1.42.  Should be noted the patient states that she does follow with pulmonology for nodules.  She will be referred back to him for any follow-up imaging or treatment needed.  She was treated empirically for pneumonia.  She was treated with aztreonam and doxycycline given her multiple medication allergies and alpha gal allergy.  Patient received 5 full days of aztreonam and 5 days of doxycycline and will complete a 10-day course of doxycycline as an outpatient.  On the day of discharge she states she had no shortness of breath, feels she is back to her baseline.  No fever chills.  No diarrhea, having regular formed bowel movements.  Patient stated she felt  "safe and ready to go home.  She will follow-up with her pulmonologist, her primary care provider.  She will return with any concerning worsening symptoms.    Condition on Discharge: Stable, improved    Physical Exam on Discharge:  /60 (BP Location: Left arm, Patient Position: Lying)   Pulse 75   Temp 97.2 °F (36.2 °C) (Oral) Comment: Discharge vitals.  Resp 18   Ht 172.7 cm (68\")   Wt 107 kg (236 lb 3.2 oz)   SpO2 96%   BMI 35.91 kg/m²   Physical Exam   Constitutional: She is oriented to person, place, and time. She appears well-developed and well-nourished.   HENT:   Head: Normocephalic and atraumatic.   Cardiovascular: Normal rate and regular rhythm.   Pulmonary/Chest: Effort normal and breath sounds normal. No stridor. No respiratory distress.   Abdominal: Soft. Bowel sounds are normal. She exhibits no distension. There is no tenderness.   Musculoskeletal: She exhibits no edema.   Neurological: She is alert and oriented to person, place, and time.   Skin: Skin is warm and dry. Capillary refill takes less than 2 seconds.   Psychiatric: She has a normal mood and affect. Her behavior is normal.      Discharge Disposition:  Home-Health Care [facility] [place]    Discharge Medications:     Discharge Medications      New Medications      Instructions Start Date   doxycycline 100 MG enteric coated tablet  Commonly known as:  DORYX  Notes to patient:  09/09/2019 at 5PM   100 mg, Oral, 2 Times Daily         Continue These Medications      Instructions Start Date   acetaminophen 500 MG tablet  Commonly known as:  TYLENOL  Notes to patient:  As needed   500 mg, Oral, Every 6 Hours PRN      albuterol sulfate  (90 Base) MCG/ACT inhaler  Commonly known as:  PROVENTIL HFA;VENTOLIN HFA;PROAIR HFA  Notes to patient:  As needed   2 puffs, Inhalation, Every 4 Hours PRN      bumetanide 2 MG tablet  Commonly known as:  BUMEX  Notes to patient:  09/10/2019 at 9AM   2 mg, Oral, Daily      carvedilol 3.125 MG tablet  Commonly known " as:  COREG  Notes to patient:  09/09/2019 at 5PM   3.125 mg, Oral, 2 Times Daily With Meals      EPIPEN IJ   Injection      guaiFENesin 600 MG 12 hr tablet  Commonly known as:  MUCINEX  Notes to patient:  09/09/019 at 9PM   1,200 mg, Oral, 2 Times Daily      IPRATROPIUM BROMIDE IN  Notes to patient:  Don't take until seen by pulmonologist.    Inhalation      ipratropium-albuterol 0.5-2.5 mg/3 ml nebulizer  Commonly known as:  DUO-NEB  Notes to patient:  As needed   1.5 mL, Nebulization, Every 4 Hours PRN      levocetirizine 5 MG tablet  Commonly known as:  XYZAL  Notes to patient:  09/092019 at 4PM   5 mg, Oral, Every Evening      lisinopril 10 MG tablet  Commonly known as:  PRINIVILZESTRIL  Notes to patient:  09/10/2019 at 9AM   10 mg, Oral, Daily      Melatonin 10 MG capsule  Notes to patient:  09/09/2019 at 9PM   Oral      metFORMIN  MG 24 hr tablet  Commonly known as:  GLUCOPHAGE-XR  Notes to patient:  09/09/2019 at 9PM   750 mg, Oral, Every Night at Bedtime      PRECISION XTRA TEST VI   In Vitro      spironolactone 25 MG tablet  Commonly known as:  ALDACTONE  Notes to patient:  09/09/2019 at 9AM   25 mg, Oral, Daily      SYMBICORT 160-4.5 MCG/ACT inhaler  Generic drug:  budesonide-formoterol  Notes to patient:  09/09/2019 at 9AM   2 puffs, Inhalation             Patient's medication reconciliation concerning breathing treatments and inhalers will be deferred to her acting pulmonologist in [place].    Discharge Diet:   Diet Instructions     Diet: Consistent Carbohydrate, Cardiac      Discharge Diet:   Consistent Carbohydrate  Cardiac             Activity at Discharge:   Activity Instructions     Activity as Tolerated            Discharge Care Plan/Instructions: Complete outpatient antibiotic course, follow-up with pulmonology, home health, return with any concerning worsening symptoms.        This document has been electronically signed by NOEL Amador on September 9, 2019 10:49 AM        Time:  Please note the greater than 30 minutes was spent on the discharge planning and summary this patient

## 2019-09-10 ENCOUNTER — READMISSION MANAGEMENT (OUTPATIENT)
Dept: CALL CENTER | Facility: HOSPITAL | Age: 79
End: 2019-09-10

## 2019-09-10 NOTE — OUTREACH NOTE
Prep Survey      Responses   Facility patient discharged from?  [place]   Is patient eligible?  Yes   Discharge diagnosis  **Sepsis    Does the patient have one of the following disease processes/diagnoses(primary or secondary)?  Sepsis   Does the patient have Home health ordered?  No   What is the Home health agency?   declined    Is there a DME ordered?  No   Prep survey completed?  Yes          Erin Cee RN

## 2019-09-11 ENCOUNTER — READMISSION MANAGEMENT (OUTPATIENT)
Dept: CALL CENTER | Facility: HOSPITAL | Age: 79
End: 2019-09-11

## 2019-09-11 NOTE — OUTREACH NOTE
Sepsis Week 1 Survey      Responses   Facility patient discharged from?  [place]   Does the patient have one of the following disease processes/diagnoses(primary or secondary)?  Sepsis   Is there a successful TCM telephone encounter documented?  No   Week 1 attempt successful?  Yes   Call start time  1414   Rescheduled  Rescheduled-pt requested [sister says to call pt]   Call end time  1414   Discharge diagnosis  **Sepsis           Georgina Almonte, RN

## 2019-09-12 ENCOUNTER — READMISSION MANAGEMENT (OUTPATIENT)
Dept: CALL CENTER | Facility: HOSPITAL | Age: 79
End: 2019-09-12

## 2019-09-12 NOTE — OUTREACH NOTE
Sepsis Week 1 Survey      Responses   Facility patient discharged from?  [place]   Does the patient have one of the following disease processes/diagnoses(primary or secondary)?  Sepsis   Is there a successful TCM telephone encounter documented?  No   Week 1 attempt successful?  Yes   Call start time  1046   Call end time  1051   Discharge diagnosis  **Sepsis    Meds reviewed with patient/caregiver?  Yes   Is the patient having any side effects they believe may be caused by any medication additions or changes?  No   Does the patient have all medications related to this admission filled (includes all antibiotics, inhalers, nebulizers,steroids,etc.)  Yes   Is the patient taking all medications as directed (includes completed medication regime)?  Yes   Does the patient have a primary care provider?   Yes   Comments regarding PCP  will see PCP tomorrow 3:30 pm   Does the patient have an appointment with their PCP within 7 days of discharge?  Yes   Has the patient kept scheduled appointments due by today?  N/A   Has home health visited the patient within 72 hours of discharge?  N/A   Psychosocial issues?  No   Did the patient receive a copy of their discharge instructions?  Yes   Nursing interventions  Reviewed instructions with patient   What is the patient's perception of their health status since discharge?  Improving   Nursing interventions  Nurse provided patient education   Is the patient/caregiver able to teach back Sepsis?  S - Shivering,fever or very cold, E - Extreme pain or generalized discomfort (worst ever,especially abdomen), P - Pale or discolored skin, S - Sleepy, difficult to arouse,confused, I -   I feel like I might die-a feeling of hopelessness, S - Short of breath   Is patient/caregiver able to teach back steps to recovery at home?  Set small, achievable goals for return to baseline health, Rest and regain strength, Eat a balanced diet   Is the patient/caregiver able to teach back signs and  symptoms of worsening condition:  Fever, Rapid heart rate (>90), Hyperthermia, Shortness of breath/rapid respiratory rate, Altered mental status(confusion/coma), Edema   Is the patient/caregiver able to teach back the hierarchy of who to call/visit for symptoms/problems? PCP, Specialist, Home health nurse, Urgent Care, ED, 911  Yes   Additional teach back comments  Pt says she is doing really, discussed signs and symptoms of sepsis with patient, no questions or concerns at this time.   Week 1 call completed?  Yes          Germaine Cabrales RN

## 2019-09-19 ENCOUNTER — READMISSION MANAGEMENT (OUTPATIENT)
Dept: CALL CENTER | Facility: HOSPITAL | Age: 79
End: 2019-09-19

## 2019-09-19 NOTE — OUTREACH NOTE
Sepsis Week 2 Survey      Responses   Facility patient discharged from?  [place]   Does the patient have one of the following disease processes/diagnoses(primary or secondary)?  Sepsis   Week 2 attempt successful?  No   Unsuccessful attempts  Attempt 1          April Lloyd RN

## 2019-09-24 ENCOUNTER — READMISSION MANAGEMENT (OUTPATIENT)
Dept: CALL CENTER | Facility: HOSPITAL | Age: 79
End: 2019-09-24

## 2019-09-24 NOTE — OUTREACH NOTE
Sepsis Week 2 Survey      Responses   Facility patient discharged from?  [place]   Does the patient have one of the following disease processes/diagnoses(primary or secondary)?  Sepsis   Week 2 attempt successful?  No   Unsuccessful attempts  Attempt 2          Rosenda Toscano RN

## 2019-09-26 ENCOUNTER — READMISSION MANAGEMENT (OUTPATIENT)
Dept: CALL CENTER | Facility: HOSPITAL | Age: 79
End: 2019-09-26

## 2019-09-26 NOTE — OUTREACH NOTE
Sepsis Week 3 Survey      Responses   Facility patient discharged from?  [place]   Does the patient have one of the following disease processes/diagnoses(primary or secondary)?  Sepsis   Week 3 attempt successful?  No   Unsuccessful attempts  Attempt 1          Raven Adrian RN

## 2019-09-30 ENCOUNTER — READMISSION MANAGEMENT (OUTPATIENT)
Dept: CALL CENTER | Facility: HOSPITAL | Age: 79
End: 2019-09-30

## 2019-09-30 NOTE — OUTREACH NOTE
Sepsis Week 3 Survey      Responses   Facility patient discharged from?  [facility]   Does the patient have one of the following disease processes/diagnoses(primary or secondary)?  Sepsis   Week 3 attempt successful?  Yes   Call start time  1831   Call end time  1836   Discharge diagnosis  **Sepsis    Meds reviewed with patient/caregiver?  Yes   Is the patient having any side effects they believe may be caused by any medication additions or changes?  No   Does the patient have all medications related to this admission filled (includes all antibiotics, inhalers, nebulizers,steroids,etc.)  Yes   Is the patient taking all medications as directed (includes completed medication regime)?  Yes   Does the patient have a primary care provider?   Yes   Does the patient have an appointment with their PCP within 7 days of discharge?  Yes   Has the patient kept scheduled appointments due by today?  Yes   Has home health visited the patient within 72 hours of discharge?  N/A   Psychosocial issues?  No   Did the patient receive a copy of their discharge instructions?  Yes   Nursing interventions  Reviewed instructions with patient   What is the patient's perception of their health status since discharge?  Improving   Nursing interventions  Nurse provided patient education   Is the patient/caregiver able to teach back Sepsis?  S - Shivering,fever or very cold, E - Extreme pain or generalized discomfort (worst ever,especially abdomen), P - Pale or discolored skin, S - Sleepy, difficult to arouse,confused, I -   I feel like I might die-a feeling of hopelessness, S - Short of breath   Nursing interventions  Nurse provided reassurance to patient, Nurse provided patient education   Is patient/caregiver able to teach back steps to recovery at home?  Set small, achievable goals for return to baseline health, Rest and regain strength, Eat a balanced diet   Is the patient/caregiver able to teach back signs and symptoms of worsening  condition:  Fever, Rapid heart rate (>90), Hyperthermia, Shortness of breath/rapid respiratory rate, Altered mental status(confusion/coma), Edema   Is the patient/caregiver able to teach back the hierarchy of who to call/visit for symptoms/problems? PCP, Specialist, Home health nurse, Urgent Care, ED, 911  Yes   Additional teach back comments  Pt says she is doing really, discussed signs and symptoms of sepsis with patient, no questions or concerns at this time.   Week 3 call completed?  Yes          Tramaine Muro RN

## 2020-02-08 ENCOUNTER — HOSPITAL ENCOUNTER (EMERGENCY)
Facility: HOSPITAL | Age: 80
Discharge: HOME | End: 2020-02-08
Attending: EMERGENCY MEDICINE | Admitting: FAMILY MEDICINE

## 2020-02-08 ENCOUNTER — APPOINTMENT (OUTPATIENT)
Dept: GENERAL RADIOLOGY | Facility: HOSPITAL | Age: 80
End: 2020-02-08

## 2020-02-08 VITALS
TEMPERATURE: 99.6 F | HEIGHT: 68 IN | SYSTOLIC BLOOD PRESSURE: 128 MMHG | DIASTOLIC BLOOD PRESSURE: 94 MMHG | HEART RATE: 81 BPM | OXYGEN SATURATION: 91 % | WEIGHT: 225 LBS | BODY MASS INDEX: 34.1 KG/M2 | RESPIRATION RATE: 20 BRPM

## 2020-02-08 DIAGNOSIS — J18.9 PNEUMONIA DUE TO ORGANISM: Primary | ICD-10-CM

## 2020-02-08 LAB
ALBUMIN SERPL-MCNC: 3.8 G/DL (ref 3.5–5.2)
ALBUMIN/GLOB SERPL: 1.2 G/DL
ALP SERPL-CCNC: 78 U/L (ref 39–117)
ALT SERPL W P-5'-P-CCNC: 9 U/L (ref 1–33)
ANION GAP SERPL CALCULATED.3IONS-SCNC: 12 MMOL/L (ref 5–15)
AST SERPL-CCNC: 12 U/L (ref 1–32)
BASOPHILS # BLD AUTO: 0.03 10*3/MM3 (ref 0–0.2)
BASOPHILS NFR BLD AUTO: 0.3 % (ref 0–1.5)
BILIRUB SERPL-MCNC: 0.3 MG/DL (ref 0.2–1.2)
BILIRUB UR QL STRIP: NEGATIVE
BUN BLD-MCNC: 16 MG/DL (ref 8–23)
BUN/CREAT SERPL: 13.9 (ref 7–25)
CALCIUM SPEC-SCNC: 9.1 MG/DL (ref 8.6–10.5)
CHLORIDE SERPL-SCNC: 100 MMOL/L (ref 98–107)
CLARITY UR: CLEAR
CO2 SERPL-SCNC: 26 MMOL/L (ref 22–29)
COLOR UR: YELLOW
CREAT BLD-MCNC: 1.15 MG/DL (ref 0.57–1)
D-LACTATE SERPL-SCNC: 1.5 MMOL/L (ref 0.5–2)
DEPRECATED RDW RBC AUTO: 45.4 FL (ref 37–54)
EOSINOPHIL # BLD AUTO: 0.1 10*3/MM3 (ref 0–0.4)
EOSINOPHIL NFR BLD AUTO: 1 % (ref 0.3–6.2)
ERYTHROCYTE [DISTWIDTH] IN BLOOD BY AUTOMATED COUNT: 13.5 % (ref 12.3–15.4)
FLUAV AG NPH QL: NEGATIVE
FLUBV AG NPH QL IA: NEGATIVE
GFR SERPL CREATININE-BSD FRML MDRD: 46 ML/MIN/1.73
GLOBULIN UR ELPH-MCNC: 3.2 GM/DL
GLUCOSE BLD-MCNC: 147 MG/DL (ref 65–99)
GLUCOSE UR STRIP-MCNC: NEGATIVE MG/DL
HCT VFR BLD AUTO: 32.5 % (ref 34–46.6)
HGB BLD-MCNC: 10.8 G/DL (ref 12–15.9)
HGB UR QL STRIP.AUTO: NEGATIVE
HOLD SPECIMEN: NORMAL
IMM GRANULOCYTES # BLD AUTO: 0.03 10*3/MM3 (ref 0–0.05)
IMM GRANULOCYTES NFR BLD AUTO: 0.3 % (ref 0–0.5)
KETONES UR QL STRIP: NEGATIVE
LEUKOCYTE ESTERASE UR QL STRIP.AUTO: NEGATIVE
LYMPHOCYTES # BLD AUTO: 1.29 10*3/MM3 (ref 0.7–3.1)
LYMPHOCYTES NFR BLD AUTO: 12.5 % (ref 19.6–45.3)
MCH RBC QN AUTO: 30.9 PG (ref 26.6–33)
MCHC RBC AUTO-ENTMCNC: 33.2 G/DL (ref 31.5–35.7)
MCV RBC AUTO: 93.1 FL (ref 79–97)
MONOCYTES # BLD AUTO: 0.62 10*3/MM3 (ref 0.1–0.9)
MONOCYTES NFR BLD AUTO: 6 % (ref 5–12)
NEUTROPHILS # BLD AUTO: 8.21 10*3/MM3 (ref 1.7–7)
NEUTROPHILS NFR BLD AUTO: 79.9 % (ref 42.7–76)
NITRITE UR QL STRIP: NEGATIVE
NRBC BLD AUTO-RTO: 0 /100 WBC (ref 0–0.2)
NT-PROBNP SERPL-MCNC: 407.5 PG/ML (ref 5–1800)
PH UR STRIP.AUTO: 5.5 [PH] (ref 5–9)
PLATELET # BLD AUTO: 191 10*3/MM3 (ref 140–450)
PMV BLD AUTO: 11.5 FL (ref 6–12)
POTASSIUM BLD-SCNC: 4.7 MMOL/L (ref 3.5–5.2)
PROT SERPL-MCNC: 7 G/DL (ref 6–8.5)
PROT UR QL STRIP: NEGATIVE
RBC # BLD AUTO: 3.49 10*6/MM3 (ref 3.77–5.28)
SODIUM BLD-SCNC: 138 MMOL/L (ref 136–145)
SP GR UR STRIP: 1.02 (ref 1–1.03)
TROPONIN T SERPL-MCNC: <0.01 NG/ML (ref 0–0.03)
UROBILINOGEN UR QL STRIP: NORMAL
WBC NRBC COR # BLD: 10.28 10*3/MM3 (ref 3.4–10.8)

## 2020-02-08 PROCEDURE — 93010 ELECTROCARDIOGRAM REPORT: CPT | Performed by: INTERNAL MEDICINE

## 2020-02-08 PROCEDURE — 84484 ASSAY OF TROPONIN QUANT: CPT | Performed by: EMERGENCY MEDICINE

## 2020-02-08 PROCEDURE — 71045 X-RAY EXAM CHEST 1 VIEW: CPT

## 2020-02-08 PROCEDURE — 81003 URINALYSIS AUTO W/O SCOPE: CPT | Performed by: EMERGENCY MEDICINE

## 2020-02-08 PROCEDURE — 83880 ASSAY OF NATRIURETIC PEPTIDE: CPT | Performed by: EMERGENCY MEDICINE

## 2020-02-08 PROCEDURE — 96366 THER/PROPH/DIAG IV INF ADDON: CPT

## 2020-02-08 PROCEDURE — 80053 COMPREHEN METABOLIC PANEL: CPT | Performed by: EMERGENCY MEDICINE

## 2020-02-08 PROCEDURE — 96365 THER/PROPH/DIAG IV INF INIT: CPT

## 2020-02-08 PROCEDURE — 93005 ELECTROCARDIOGRAM TRACING: CPT | Performed by: EMERGENCY MEDICINE

## 2020-02-08 PROCEDURE — 83605 ASSAY OF LACTIC ACID: CPT | Performed by: EMERGENCY MEDICINE

## 2020-02-08 PROCEDURE — 85025 COMPLETE CBC W/AUTO DIFF WBC: CPT | Performed by: EMERGENCY MEDICINE

## 2020-02-08 PROCEDURE — 87040 BLOOD CULTURE FOR BACTERIA: CPT | Performed by: EMERGENCY MEDICINE

## 2020-02-08 PROCEDURE — 87804 INFLUENZA ASSAY W/OPTIC: CPT | Performed by: EMERGENCY MEDICINE

## 2020-02-08 PROCEDURE — 99285 EMERGENCY DEPT VISIT HI MDM: CPT

## 2020-02-08 PROCEDURE — 25010000002 LEVOFLOXACIN PER 250 MG: Performed by: FAMILY MEDICINE

## 2020-02-08 RX ORDER — LEVOFLOXACIN 5 MG/ML
750 INJECTION, SOLUTION INTRAVENOUS ONCE
Status: COMPLETED | OUTPATIENT
Start: 2020-02-08 | End: 2020-02-08

## 2020-02-08 RX ORDER — ACETAMINOPHEN 500 MG
1000 TABLET ORAL ONCE
Status: COMPLETED | OUTPATIENT
Start: 2020-02-08 | End: 2020-02-08

## 2020-02-08 RX ORDER — DOXYCYCLINE HYCLATE 100 MG/1
100 TABLET, DELAYED RELEASE ORAL 2 TIMES DAILY
Qty: 14 TABLET | Refills: 0 | Status: SHIPPED | OUTPATIENT
Start: 2020-02-08 | End: 2022-03-29

## 2020-02-08 RX ORDER — SODIUM CHLORIDE 0.9 % (FLUSH) 0.9 %
10 SYRINGE (ML) INJECTION AS NEEDED
Status: DISCONTINUED | OUTPATIENT
Start: 2020-02-08 | End: 2020-02-08 | Stop reason: HOSPADM

## 2020-02-08 RX ADMIN — LEVOFLOXACIN 750 MG: 5 INJECTION, SOLUTION INTRAVENOUS at 08:17

## 2020-02-08 RX ADMIN — ACETAMINOPHEN 1000 MG: 500 TABLET ORAL at 05:41

## 2020-02-08 RX ADMIN — SODIUM CHLORIDE 1000 ML: 9 INJECTION, SOLUTION INTRAVENOUS at 07:40

## 2020-02-08 NOTE — ED NOTES
Patient assisted to bathroom, offered non-skid socks, patient refused. Steady gate noted. Patient placed on room air for ambulation to bathroom.      Mary Morales RN  02/08/20 0917

## 2020-02-08 NOTE — ED PROVIDER NOTES
Subjective   79-year-old white female presents to the emergency department with chief complaint of chills.  Patient complains of nonproductive cough with chills since last night.  Patient relates she has a headache and generalized body aches.  Patient relates her chest and back hurt when she coughs and she feels short of breath.  Patient relates similar symptoms in the past due to pneumonia.          Review of Systems   Constitutional: Positive for chills and fatigue. Negative for diaphoresis and fever.   Respiratory: Positive for cough and shortness of breath.    Cardiovascular: Positive for chest pain.   Gastrointestinal: Positive for diarrhea and nausea. Negative for abdominal pain and vomiting.   Genitourinary: Negative for dysuria.   Musculoskeletal: Positive for arthralgias, back pain and myalgias. Negative for neck stiffness.   Skin: Negative for rash.   Neurological: Positive for weakness and headaches.   All other systems reviewed and are negative.      Past Medical History:   Diagnosis Date   • Asthma    • Cancer (CMS/HCC)    • Congenital abnormalities     colon behind liver   • COPD (chronic obstructive pulmonary disease) (CMS/HCC)    • Hypertension        Allergies   Allergen Reactions   • Dilaudid [Hydromorphone Hcl] Anaphylaxis   • Diphenoxylate-Atropine Shortness Of Breath     Reaction: shortness of breath     • Keflex [Cephalexin] Shortness Of Breath   • Lomotil [Diphenoxylate] Shortness Of Breath   • Aspirin Hives   • Beef-Derived Products GI Intolerance   • Ciprofloxacin Nausea And Vomiting   • Contrast Dye Hives   • Levaquin [Levofloxacin] GI Intolerance   • Milk-Related Compounds GI Intolerance   • Nsaids Hives   • Percocet [Oxycodone-Acetaminophen] Itching   • Pork-Derived Products GI Intolerance   • Tolmetin Hives       Past Surgical History:   Procedure Laterality Date   • ADENOIDECTOMY     • ADRENAL GLAND SURGERY Right    • BLADDER SURGERY     • BREAST BIOPSY     • COLONOSCOPY W/ BIOPSIES AND  "POLYPECTOMY     • EYE SURGERY     • HERNIA REPAIR     • HYSTERECTOMY     • LUNG LOBECTOMY Left    • RECTAL SURGERY      cyst   • SKIN CANCER EXCISION      legs   • TONSILLECTOMY     • VARICOSE VEIN SURGERY         Family History   Problem Relation Age of Onset   • Diabetes Mother    • Diabetes Father    • Heart disease Father    • Cancer Sister    • Heart disease Sister    • Thyroid disease Sister    • Diabetes Brother    • Heart disease Brother        Social History     Socioeconomic History   • Marital status:      Spouse name: Not on file   • Number of children: Not on file   • Years of education: Not on file   • Highest education level: Not on file   Tobacco Use   • Smoking status: Former Smoker   • Smokeless tobacco: Never Used       /57 (BP Location: Right arm, Patient Position: Lying)   Pulse 74   Temp 99.6 °F (37.6 °C) (Oral)   Resp 19   Ht 172.7 cm (68\")   Wt 102 kg (225 lb)   SpO2 92%   BMI 34.21 kg/m²     Objective   Physical Exam   Constitutional: She is oriented to person, place, and time. She appears well-developed and well-nourished. No distress.   HENT:   Head: Normocephalic and atraumatic.   Right Ear: External ear normal.   Left Ear: External ear normal.   Nose: Nose normal.   Mouth/Throat: Oropharynx is clear and moist.   Eyes: Pupils are equal, round, and reactive to light. Conjunctivae and EOM are normal.   Neck: Normal range of motion. Neck supple.   Cardiovascular: Normal rate, regular rhythm, normal heart sounds and intact distal pulses.   Pulmonary/Chest: Effort normal and breath sounds normal. No respiratory distress.   Abdominal: Soft. Bowel sounds are normal. She exhibits no distension and no mass. There is no tenderness. There is no guarding.   Musculoskeletal: Normal range of motion. She exhibits no edema or tenderness.   Neurological: She is alert and oriented to person, place, and time. She exhibits normal muscle tone.   Skin: Skin is warm and dry. No rash noted. " She is not diaphoretic.   Psychiatric: She has a normal mood and affect. Her behavior is normal.   Nursing note and vitals reviewed.      ECG 12 Lead    Date/Time: 2/8/2020 5:40 AM  Performed by: Latrell Morales MD  Authorized by: Latrell Morales MD   Interpreted by physician  Clinical impression: abnormal ECG  Comments: Normal sinus rhythm rate of 86.  No ST elevation.  Poor R wave progression.  Nonspecific findings.                 ED Course    Patient signed out to Dr. Young at shift change at 7 AM.      Labs Reviewed   COMPREHENSIVE METABOLIC PANEL - Abnormal; Notable for the following components:       Result Value    Glucose 147 (*)     Creatinine 1.15 (*)     eGFR Non  Amer 46 (*)     All other components within normal limits    Narrative:     GFR Normal >60  Chronic Kidney Disease <60  Kidney Failure <15     CBC WITH AUTO DIFFERENTIAL - Abnormal; Notable for the following components:    RBC 3.49 (*)     Hemoglobin 10.8 (*)     Hematocrit 32.5 (*)     Neutrophil % 79.9 (*)     Lymphocyte % 12.5 (*)     Neutrophils, Absolute 8.21 (*)     All other components within normal limits   INFLUENZA ANTIGEN, RAPID - Normal   URINALYSIS W/ MICROSCOPIC IF INDICATED (NO CULTURE) - Normal    Narrative:     Urine microscopic not indicated.   LACTIC ACID, PLASMA - Normal   BNP (IN-HOUSE) - Normal    Narrative:     Among patients with dyspnea, NT-proBNP is highly sensitive for the detection of acute congestive heart failure. In addition NT-proBNP of <300 pg/ml effectively rules out acute congestive heart failure with 99% negative predictive value.    Results may be falsely decreased if patient taking Biotin.     TROPONIN (IN-HOUSE) - Normal    Narrative:     Troponin T Reference Range:  <= 0.03 ng/mL-   Negative for AMI  >0.03 ng/mL-     Abnormal for myocardial necrosis.  Clinicians would have to utilize clinical acumen, EKG, Troponin and serial changes to determine if it is an Acute Myocardial Infarction or myocardial  injury due to an underlying chronic condition.       Results may be falsely decreased if patient taking Biotin.     BLOOD CULTURE   BLOOD CULTURE   CBC AND DIFFERENTIAL    Narrative:     The following orders were created for panel order CBC & Differential.  Procedure                               Abnormality         Status                     ---------                               -----------         ------                     CBC Auto Differential[[id number]]        Abnormal            Final result                 Please view results for these tests on the individual orders.   EXTRA TUBES    Narrative:     The following orders were created for panel order Extra Tubes.  Procedure                               Abnormality         Status                     ---------                               -----------         ------                     Gold Top - SST[[id number]]                                   Final result                 Please view results for these tests on the individual orders.   GOLD TOP - SST     Xr Chest 1 View    Result Date: 2/8/2020  Narrative: Exam: AP portable chest INDICATION: Cough COMPARISON: 9/4/2019 FINDINGS: AP portable chest. Old right surgical neck humerus fracture. Heart is mildly enlarged. Plaque is present in the aorta. Lungs are hyperexpanded compatible with COPD. There is questionable infiltrate atelectasis in the right lower lung. No pneumothorax or pleural effusion.     Impression: 1. Questionable developing infiltrate and/or atelectasis in the right lower lung. Recommend follow-up. 2. COPD Electronically signed by:  Maurice Banda MD  2/8/2020 7:01 AM CST Workstation: [id number]       She was given antibiotic Levaquin in the hospital for possible infiltrates on the right lower lung.  Patient was told to continue the antibiotic on discharge.  Follow-up with primary care in 3 days.  Come back to ER symptoms get worse.                                    MDM    Final diagnoses:    Pneumonia due to organism            Edgard Young MD  02/08/20 0954

## 2020-02-08 NOTE — ED NOTES
Emili with xray stated she would look into the chest xray results, Alma Delia Asencio, RN  02/08/20 0634

## 2020-02-13 LAB
BACTERIA SPEC AEROBE CULT: NORMAL
BACTERIA SPEC AEROBE CULT: NORMAL

## 2020-02-19 NOTE — ED PROVIDER NOTES
Signature for EKG interpretation from ED visit on 2/8/2020     Latrell Morales MD  02/19/20 5846

## 2022-02-21 ENCOUNTER — HOSPITAL ENCOUNTER (EMERGENCY)
Facility: HOSPITAL | Age: 82
Discharge: HOME | End: 2022-02-21
Attending: STUDENT IN AN ORGANIZED HEALTH CARE EDUCATION/TRAINING PROGRAM | Admitting: STUDENT IN AN ORGANIZED HEALTH CARE EDUCATION/TRAINING PROGRAM

## 2022-02-21 ENCOUNTER — APPOINTMENT (OUTPATIENT)
Dept: GENERAL RADIOLOGY | Facility: HOSPITAL | Age: 82
End: 2022-02-21

## 2022-02-21 VITALS
TEMPERATURE: 97.9 F | RESPIRATION RATE: 20 BRPM | SYSTOLIC BLOOD PRESSURE: 152 MMHG | DIASTOLIC BLOOD PRESSURE: 71 MMHG | OXYGEN SATURATION: 94 % | HEART RATE: 79 BPM

## 2022-02-21 DIAGNOSIS — M10.072 ACUTE IDIOPATHIC GOUT OF LEFT FOOT: Primary | ICD-10-CM

## 2022-02-21 LAB
ALBUMIN SERPL-MCNC: 4.2 G/DL (ref 3.5–5.2)
ALBUMIN/GLOB SERPL: 1.5 G/DL
ALP SERPL-CCNC: 90 U/L (ref 39–117)
ALT SERPL W P-5'-P-CCNC: 10 U/L (ref 1–33)
ANION GAP SERPL CALCULATED.3IONS-SCNC: 10 MMOL/L (ref 5–15)
AST SERPL-CCNC: 12 U/L (ref 1–32)
BASOPHILS # BLD AUTO: 0.05 10*3/MM3 (ref 0–0.2)
BASOPHILS NFR BLD AUTO: 0.5 % (ref 0–1.5)
BILIRUB SERPL-MCNC: 0.5 MG/DL (ref 0–1.2)
BUN SERPL-MCNC: 23 MG/DL (ref 8–23)
BUN/CREAT SERPL: 15.6 (ref 7–25)
CALCIUM SPEC-SCNC: 9.5 MG/DL (ref 8.6–10.5)
CHLORIDE SERPL-SCNC: 98 MMOL/L (ref 98–107)
CO2 SERPL-SCNC: 31 MMOL/L (ref 22–29)
CREAT SERPL-MCNC: 1.47 MG/DL (ref 0.57–1)
DEPRECATED RDW RBC AUTO: 50.6 FL (ref 37–54)
EOSINOPHIL # BLD AUTO: 0.14 10*3/MM3 (ref 0–0.4)
EOSINOPHIL NFR BLD AUTO: 1.4 % (ref 0.3–6.2)
ERYTHROCYTE [DISTWIDTH] IN BLOOD BY AUTOMATED COUNT: 14.6 % (ref 12.3–15.4)
GFR SERPL CREATININE-BSD FRML MDRD: 34 ML/MIN/1.73
GLOBULIN UR ELPH-MCNC: 2.8 GM/DL
GLUCOSE SERPL-MCNC: 137 MG/DL (ref 65–99)
HCT VFR BLD AUTO: 37.4 % (ref 34–46.6)
HGB BLD-MCNC: 12.2 G/DL (ref 12–15.9)
IMM GRANULOCYTES # BLD AUTO: 0.08 10*3/MM3 (ref 0–0.05)
IMM GRANULOCYTES NFR BLD AUTO: 0.8 % (ref 0–0.5)
LYMPHOCYTES # BLD AUTO: 1.34 10*3/MM3 (ref 0.7–3.1)
LYMPHOCYTES NFR BLD AUTO: 13.1 % (ref 19.6–45.3)
MCH RBC QN AUTO: 31 PG (ref 26.6–33)
MCHC RBC AUTO-ENTMCNC: 32.6 G/DL (ref 31.5–35.7)
MCV RBC AUTO: 95.2 FL (ref 79–97)
MONOCYTES # BLD AUTO: 0.58 10*3/MM3 (ref 0.1–0.9)
MONOCYTES NFR BLD AUTO: 5.7 % (ref 5–12)
NEUTROPHILS NFR BLD AUTO: 78.5 % (ref 42.7–76)
NEUTROPHILS NFR BLD AUTO: 8.06 10*3/MM3 (ref 1.7–7)
NRBC BLD AUTO-RTO: 0 /100 WBC (ref 0–0.2)
PLATELET # BLD AUTO: 205 10*3/MM3 (ref 140–450)
PMV BLD AUTO: 11.4 FL (ref 6–12)
POTASSIUM SERPL-SCNC: 4.3 MMOL/L (ref 3.5–5.2)
PROT SERPL-MCNC: 7 G/DL (ref 6–8.5)
RBC # BLD AUTO: 3.93 10*6/MM3 (ref 3.77–5.28)
SODIUM SERPL-SCNC: 139 MMOL/L (ref 136–145)
URATE SERPL-MCNC: 10.3 MG/DL (ref 2.4–5.7)
WBC NRBC COR # BLD: 10.25 10*3/MM3 (ref 3.4–10.8)

## 2022-02-21 PROCEDURE — 73630 X-RAY EXAM OF FOOT: CPT

## 2022-02-21 PROCEDURE — 99283 EMERGENCY DEPT VISIT LOW MDM: CPT

## 2022-02-21 PROCEDURE — 80053 COMPREHEN METABOLIC PANEL: CPT | Performed by: STUDENT IN AN ORGANIZED HEALTH CARE EDUCATION/TRAINING PROGRAM

## 2022-02-21 PROCEDURE — 36415 COLL VENOUS BLD VENIPUNCTURE: CPT

## 2022-02-21 PROCEDURE — 85025 COMPLETE CBC W/AUTO DIFF WBC: CPT | Performed by: STUDENT IN AN ORGANIZED HEALTH CARE EDUCATION/TRAINING PROGRAM

## 2022-02-21 PROCEDURE — 63710000001 PREDNISONE PER 1 MG: Performed by: STUDENT IN AN ORGANIZED HEALTH CARE EDUCATION/TRAINING PROGRAM

## 2022-02-21 PROCEDURE — 84550 ASSAY OF BLOOD/URIC ACID: CPT | Performed by: STUDENT IN AN ORGANIZED HEALTH CARE EDUCATION/TRAINING PROGRAM

## 2022-02-21 RX ORDER — ACETAMINOPHEN 500 MG
500 TABLET ORAL ONCE
Status: COMPLETED | OUTPATIENT
Start: 2022-02-21 | End: 2022-02-21

## 2022-02-21 RX ORDER — PREDNISONE 20 MG/1
40 TABLET ORAL ONCE
Status: COMPLETED | OUTPATIENT
Start: 2022-02-21 | End: 2022-02-21

## 2022-02-21 RX ADMIN — ACETAMINOPHEN 500 MG: 500 TABLET, FILM COATED ORAL at 12:42

## 2022-02-21 RX ADMIN — PREDNISONE 40 MG: 20 TABLET ORAL at 13:15

## 2022-02-21 NOTE — ED PROVIDER NOTES
Subjective   81 year old female presents to ED with complaints of pain in left foot. Patient states about two months ago she had similar pain in right foot. She was seen at an urgent care and was diagnosed with gout. States left foot pain is exactly how pain was two months ago. She is unable to put any pressure on her feet and cannot walk on it. Pain started yesterday afternoon and has since been worsening. Rates pain 10/10. No fever. She has not been taking any medicine prophylactically.           Review of Systems   Constitutional: Negative.    Respiratory: Negative.    Cardiovascular: Negative.    Gastrointestinal: Negative.    Endocrine: Negative.    Musculoskeletal: Negative for arthralgias and myalgias.   Skin: Negative.    Neurological: Negative.  Negative for headaches.   Psychiatric/Behavioral: Negative.  Negative for suicidal ideas.       Past Medical History:   Diagnosis Date   • Asthma    • Cancer (HCC)    • Congenital abnormalities     colon behind liver   • COPD (chronic obstructive pulmonary disease) (HCC)    • Hypertension        Allergies   Allergen Reactions   • Dilaudid [Hydromorphone Hcl] Anaphylaxis   • Diphenoxylate-Atropine Shortness Of Breath     Reaction: shortness of breath     • Keflex [Cephalexin] Shortness Of Breath   • Lomotil [Diphenoxylate] Shortness Of Breath   • Aspirin Hives   • Beef-Derived Products GI Intolerance   • Ciprofloxacin Nausea And Vomiting   • Contrast Dye Hives   • Levaquin [Levofloxacin] GI Intolerance   • Milk-Related Compounds GI Intolerance   • Nsaids Hives   • Percocet [Oxycodone-Acetaminophen] Itching   • Pork-Derived Products GI Intolerance   • Tolmetin Hives       Past Surgical History:   Procedure Laterality Date   • ADENOIDECTOMY     • ADRENAL GLAND SURGERY Right    • BLADDER SURGERY     • BREAST BIOPSY     • COLONOSCOPY W/ BIOPSIES AND POLYPECTOMY     • EYE SURGERY     • HERNIA REPAIR     • HYSTERECTOMY     • LUNG LOBECTOMY Left    • RECTAL SURGERY       cyst   • SKIN CANCER EXCISION      legs   • TONSILLECTOMY     • VARICOSE VEIN SURGERY         Family History   Problem Relation Age of Onset   • Diabetes Mother    • Diabetes Father    • Heart disease Father    • Cancer Sister    • Heart disease Sister    • Thyroid disease Sister    • Diabetes Brother    • Heart disease Brother        Social History     Socioeconomic History   • Marital status:    Tobacco Use   • Smoking status: Former Smoker   • Smokeless tobacco: Never Used           Objective   Physical Exam  Constitutional:       Appearance: Normal appearance. She is normal weight.   HENT:      Head: Normocephalic and atraumatic.      Nose: Nose normal.      Mouth/Throat:      Mouth: Mucous membranes are moist.   Cardiovascular:      Rate and Rhythm: Normal rate and regular rhythm.      Pulses: Normal pulses.           Dorsalis pedis pulses are 2+ on the right side and 2+ on the left side.        Posterior tibial pulses are 2+ on the right side and 2+ on the left side.      Heart sounds: Normal heart sounds.   Pulmonary:      Effort: Pulmonary effort is normal.      Breath sounds: Normal breath sounds.   Abdominal:      General: Bowel sounds are normal.      Palpations: Abdomen is soft.   Musculoskeletal:         General: Normal range of motion.      Cervical back: Normal range of motion.   Feet:      Right foot:      Skin integrity: Dry skin present.      Left foot:      Skin integrity: Erythema and dry skin present.   Skin:     General: Skin is warm and dry.      Capillary Refill: Capillary refill takes less than 2 seconds.   Neurological:      General: No focal deficit present.      Mental Status: She is alert.   Psychiatric:         Mood and Affect: Mood normal.         Behavior: Behavior normal.         Procedures           ED Course    XR Foot 3+ View Left    Result Date: 2/21/2022  Impression: Small plantar calcaneal spurs. The bones appear demineralized. No radiographic evidence of an acute  fracture or subluxation. No aggressive appearing osseous lesion. Electronically signed by:  Alphonse Dahl MD  2/21/2022 12:23 PM CST Workstation: [id number]    Lab Results (last 24 hours)     Procedure Component Value Units Date/Time    Comprehensive Metabolic Panel [[id number]]  (Abnormal) Collected: 02/21/22 1220    Specimen: Blood Updated: 02/21/22 1250     Glucose 137 mg/dL      BUN 23 mg/dL      Creatinine 1.47 mg/dL      Sodium 139 mmol/L      Potassium 4.3 mmol/L      Chloride 98 mmol/L      CO2 31.0 mmol/L      Calcium 9.5 mg/dL      Total Protein 7.0 g/dL      Albumin 4.20 g/dL      ALT (SGPT) 10 U/L      AST (SGOT) 12 U/L      Alkaline Phosphatase 90 U/L      Total Bilirubin 0.5 mg/dL      eGFR Non African Amer 34 mL/min/1.73      Globulin 2.8 gm/dL      A/G Ratio 1.5 g/dL      BUN/Creatinine Ratio 15.6     Anion Gap 10.0 mmol/L     Narrative:      GFR Normal >60  Chronic Kidney Disease <60  Kidney Failure <15      Uric Acid [[id number]]  (Abnormal) Collected: 02/21/22 1220    Specimen: Blood Updated: 02/21/22 1250     Uric Acid 10.3 mg/dL     CBC & Differential [[id number]]  (Abnormal) Collected: 02/21/22 1221    Specimen: Blood Updated: 02/21/22 1227    Narrative:      The following orders were created for panel order CBC & Differential.  Procedure                               Abnormality         Status                     ---------                               -----------         ------                     CBC Auto Differential[[id number]]        Abnormal            Final result                 Please view results for these tests on the individual orders.    CBC Auto Differential [[id number]]  (Abnormal) Collected: 02/21/22 1221    Specimen: Blood Updated: 02/21/22 1227     WBC 10.25 10*3/mm3      RBC 3.93 10*6/mm3      Hemoglobin 12.2 g/dL      Hematocrit 37.4 %      MCV 95.2 fL      MCH 31.0 pg      MCHC 32.6 g/dL      RDW 14.6 %      RDW-SD 50.6 fl      MPV 11.4 fL      Platelets 205 10*3/mm3       Neutrophil % 78.5 %      Lymphocyte % 13.1 %      Monocyte % 5.7 %      Eosinophil % 1.4 %      Basophil % 0.5 %      Immature Grans % 0.8 %      Neutrophils, Absolute 8.06 10*3/mm3      Lymphocytes, Absolute 1.34 10*3/mm3      Monocytes, Absolute 0.58 10*3/mm3      Eosinophils, Absolute 0.14 10*3/mm3      Basophils, Absolute 0.05 10*3/mm3      Immature Grans, Absolute 0.08 10*3/mm3      nRBC 0.0 /100 WBC                                                        MDM  Number of Diagnoses or Management Options  Acute idiopathic gout of left foot  Diagnosis management comments: X-ray of foot with small calcaneal spurs and no acute pathology. Uric acid 10.3. No signs of infection. VSS. Patient given  Prednisone 40mg + Tylenol 500mg as she has an allergic reaction to NSAIDS. She was advised to follow up with PCP for prophylactic medicine to prevent gout flares. Advised to use tylenol for pain relief for next 2-3 days.      Final diagnoses:   Acute idiopathic gout of left foot       ED Disposition  ED Disposition     ED Disposition Condition Comment    Discharge Stable           [facility]  [address] [place] [facility] [address] [phone]  In 1 day           Medication List      No changes were made to your prescriptions during this visit.           This document has been electronically signed by Maeve Rae MD on February 21, 2022 13:11 Maeve Church MD  Resident  02/21/22 [id number]

## 2022-02-21 NOTE — DISCHARGE INSTRUCTIONS
Please take tylenol for relief with gout attack  Please follow with PCP in one-two days  Call PCP or return to ED should you develop lightheadedness, confusion, chest pain, shortness of breath or any other concerning symptoms.

## 2022-02-21 NOTE — ED NOTES
"Patient presents to ED with complaint of left foot pain. Patient reports a hx of gout in the right foot and states \"this feels similar to when I had gout\". Patient denies any redness or fever. Patient is unable to put weight on the foot. Patient reports the pain a 10/10 at this time.      Enma Banda LPN  02/21/22 1155    "

## 2022-02-23 ENCOUNTER — OFFICE VISIT (OUTPATIENT)
Dept: FAMILY MEDICINE CLINIC | Facility: CLINIC | Age: 82
End: 2022-02-23

## 2022-02-23 VITALS
HEART RATE: 90 BPM | BODY MASS INDEX: 37.19 KG/M2 | WEIGHT: 245.4 LBS | DIASTOLIC BLOOD PRESSURE: 68 MMHG | OXYGEN SATURATION: 90 % | HEIGHT: 68 IN | SYSTOLIC BLOOD PRESSURE: 102 MMHG | TEMPERATURE: 97.6 F

## 2022-02-23 DIAGNOSIS — F51.01 PRIMARY INSOMNIA: ICD-10-CM

## 2022-02-23 DIAGNOSIS — M10.9 ACUTE GOUT INVOLVING TOE OF LEFT FOOT, UNSPECIFIED CAUSE: Primary | ICD-10-CM

## 2022-02-23 DIAGNOSIS — E11.65 TYPE 2 DIABETES MELLITUS WITH HYPERGLYCEMIA, WITHOUT LONG-TERM CURRENT USE OF INSULIN: ICD-10-CM

## 2022-02-23 PROCEDURE — 99203 OFFICE O/P NEW LOW 30 MIN: CPT | Performed by: CHIROPRACTOR

## 2022-02-23 RX ORDER — PREDNISONE 20 MG/1
20 TABLET ORAL 2 TIMES DAILY
Qty: 12 TABLET | Refills: 0 | Status: SHIPPED | OUTPATIENT
Start: 2022-02-23 | End: 2022-03-29

## 2022-02-23 RX ORDER — PREDNISONE 1 MG/1
5 TABLET ORAL DAILY
Qty: 3 TABLET | Refills: 0 | Status: SHIPPED | OUTPATIENT
Start: 2022-02-23 | End: 2022-03-29

## 2022-02-23 RX ORDER — FEBUXOSTAT 40 MG/1
40 TABLET, FILM COATED ORAL DAILY
Qty: 30 TABLET | Refills: 2 | Status: SHIPPED | OUTPATIENT
Start: 2022-02-23 | End: 2022-02-23

## 2022-02-23 RX ORDER — FEBUXOSTAT 40 MG/1
40 TABLET, FILM COATED ORAL DAILY
Qty: 30 TABLET | Refills: 2 | Status: SHIPPED | OUTPATIENT
Start: 2022-03-05 | End: 2022-03-29

## 2022-02-23 RX ORDER — METFORMIN HYDROCHLORIDE 750 MG/1
750 TABLET, EXTENDED RELEASE ORAL
Qty: 30 TABLET | Refills: 2 | Status: SHIPPED | OUTPATIENT
Start: 2022-02-23 | End: 2022-03-28 | Stop reason: SDUPTHER

## 2022-02-23 RX ORDER — METFORMIN HYDROCHLORIDE 750 MG/1
750 TABLET, EXTENDED RELEASE ORAL
Qty: 30 TABLET | Refills: 2 | Status: SHIPPED | OUTPATIENT
Start: 2022-02-23 | End: 2022-02-23 | Stop reason: SDUPTHER

## 2022-02-23 NOTE — PROGRESS NOTES
Family Medicine Residency  Gus Posada MD    Subjective:     Alda Constantino is a 81 y.o. female who presents for gout, type 2 diabetes, and insomnia.  She comes in today to establish care with a PCP.  She was recently in the ER on 2/21 with a diagnosis of gout in left foot.  Labs at that time were significant for a uric acid 10.3, creatinine of 1.47 and a GFR of 34.  She was treated with prednisone 40 and Tylenol.  About 2 months ago had an episode of gout in the right foot.  At that time she was seen and diagnosed in urgent care facility.  She has a history of type 2 diabetes but admits that she does not take her Metformin on a daily basis.  It seems she has been taking it as she feels she needs it.  Glucose readings at home range from 110-150.  She also suffers with insomnia and in the past was on melatonin however was taken off of it because it became ineffective for her.    The following portions of the patient's history were reviewed and updated as appropriate: allergies, current medications, past family history, past medical history, past social history, past surgical history and problem list.    Past Medical Hx:  Past Medical History:   Diagnosis Date   • Asthma    • Cancer (HCC)    • Congenital abnormalities     colon behind liver   • COPD (chronic obstructive pulmonary disease) (HCC)    • Hypertension        Past Surgical Hx:  Past Surgical History:   Procedure Laterality Date   • ADENOIDECTOMY     • ADRENAL GLAND SURGERY Right    • BLADDER SURGERY     • BREAST BIOPSY     • COLONOSCOPY W/ BIOPSIES AND POLYPECTOMY     • EYE SURGERY     • HERNIA REPAIR     • HYSTERECTOMY     • LUNG LOBECTOMY Left    • RECTAL SURGERY      cyst   • SKIN CANCER EXCISION      legs   • TONSILLECTOMY     • VARICOSE VEIN SURGERY         Current Meds:    Current Outpatient Medications:   •  acetaminophen (TYLENOL) 500 MG tablet, Take 500 mg by mouth Every 6 (Six) Hours As Needed for Mild Pain ., Disp: , Rfl:   •  albuterol  (PROVENTIL HFA;VENTOLIN HFA) 108 (90 Base) MCG/ACT inhaler, Inhale 2 puffs Every 4 (Four) Hours As Needed for Wheezing., Disp: , Rfl:   •  budesonide-formoterol (SYMBICORT) 160-4.5 MCG/ACT inhaler, Inhale 2 puffs., Disp: , Rfl:   •  bumetanide (BUMEX) 2 MG tablet, Take 2 mg by mouth Daily., Disp: , Rfl:   •  carvedilol (COREG) 3.125 MG tablet, Take 3.125 mg by mouth 2 (Two) Times a Day With Meals., Disp: , Rfl:   •  doxycycline (DORYX) 100 MG enteric coated tablet, Take 1 tablet by mouth 2 (Two) Times a Day., Disp: 14 tablet, Rfl: 0  •  EPINEPHrine (EPIPEN IJ), Inject  as directed., Disp: , Rfl:   •  Glucose Blood (PRECISION XTRA TEST VI), by In Vitro route., Disp: , Rfl:   •  guaiFENesin (MUCINEX) 600 MG 12 hr tablet, Take 1,200 mg by mouth 2 (Two) Times a Day., Disp: , Rfl:   •  IPRATROPIUM BROMIDE IN, Inhale., Disp: , Rfl:   •  ipratropium-albuterol (DUO-NEB) 0.5-2.5 mg/3 ml nebulizer, Take 1.5 mL by nebulization Every 4 (Four) Hours As Needed for Wheezing., Disp: , Rfl:   •  levocetirizine (XYZAL) 5 MG tablet, Take 5 mg by mouth Every Evening., Disp: , Rfl:   •  lisinopril (PRINIVIL,ZESTRIL) 10 MG tablet, Take 10 mg by mouth Daily., Disp: , Rfl:   •  spironolactone (ALDACTONE) 25 MG tablet, Take 25 mg by mouth Daily., Disp: , Rfl:   •  [START ON 3/5/2022] febuxostat (Uloric) 40 MG tablet, Take 1 tablet by mouth Daily., Disp: 30 tablet, Rfl: 2  •  Melatonin 10 MG capsule, Take  by mouth., Disp: , Rfl:   •  metFORMIN ER (GLUCOPHAGE-XR) 750 MG 24 hr tablet, Take 1 tablet by mouth every night at bedtime., Disp: 30 tablet, Rfl: 2  •  predniSONE (DELTASONE) 20 MG tablet, Take 1 tablet by mouth 2 (Two) Times a Day. Take the 2 tablets a day for 3 days.  Then take 1 tablet a day for 3 days.  Then take half a tablet a day for 3 days.  After that take the 5 mg tablets for 3 days., Disp: 12 tablet, Rfl: 0  •  predniSONE (DELTASONE) 5 MG tablet, Take 1 tablet by mouth Daily., Disp: 3 tablet, Rfl: 0    Allergies:  Allergies  "  Allergen Reactions   • Dilaudid [Hydromorphone Hcl] Anaphylaxis   • Diphenoxylate-Atropine Shortness Of Breath     Reaction: shortness of breath     • Keflex [Cephalexin] Shortness Of Breath   • Lomotil [Diphenoxylate] Shortness Of Breath   • Aspirin Hives   • Beef-Derived Products GI Intolerance   • Ciprofloxacin Nausea And Vomiting   • Contrast Dye Hives   • Levaquin [Levofloxacin] GI Intolerance   • Milk-Related Compounds GI Intolerance   • Nsaids Hives   • Percocet [Oxycodone-Acetaminophen] Itching   • Pork-Derived Products GI Intolerance   • Tolmetin Hives       Family Hx:  Family History   Problem Relation Age of Onset   • Diabetes Mother    • Diabetes Father    • Heart disease Father    • Cancer Sister    • Heart disease Sister    • Thyroid disease Sister    • Diabetes Brother    • Heart disease Brother         Social History:  Social History     Socioeconomic History   • Marital status:    Tobacco Use   • Smoking status: Former Smoker   • Smokeless tobacco: Never Used   Vaping Use   • Vaping Use: Never used       Review of Systems  Review of Systems   HENT: Negative for congestion, rhinorrhea and trouble swallowing.    Respiratory: Positive for cough and shortness of breath.    Cardiovascular: Positive for leg swelling. Negative for chest pain.   Gastrointestinal: Positive for abdominal pain. Negative for blood in stool.   Genitourinary: Negative for dysuria and hematuria.   Musculoskeletal: Positive for back pain and gait problem.   Skin: Positive for wound.   Psychiatric/Behavioral: Positive for dysphoric mood.       Objective:     /68   Pulse 90   Temp 97.6 °F (36.4 °C)   Ht 172.7 cm (68\")   Wt 111 kg (245 lb 6.4 oz)   SpO2 90%   BMI 37.31 kg/m²   Physical Exam  Vitals reviewed.   Constitutional:       Appearance: She is obese. She is not ill-appearing or diaphoretic.   HENT:      Head: Atraumatic.      Mouth/Throat:      Mouth: Mucous membranes are moist.      Pharynx: Oropharynx is " clear.   Eyes:      General: No scleral icterus.        Right eye: No discharge.         Left eye: No discharge.   Neck:      Vascular: No carotid bruit.   Cardiovascular:      Rate and Rhythm: Normal rate and regular rhythm.      Pulses: Normal pulses.      Heart sounds: No murmur heard.      Pulmonary:      Effort: No respiratory distress.      Breath sounds: No wheezing.      Comments: Diminished breath sounds bilaterally.  Is currently on 2 L of oxygen via oxygen generator.  Abdominal:      General: Bowel sounds are normal.      Palpations: There is no mass.      Tenderness: There is no abdominal tenderness.   Musculoskeletal:      Left lower leg: Edema present.      Left foot: Decreased range of motion.        Feet:    Feet:      Left foot:      Skin integrity: Erythema and warmth present.   Skin:     Capillary Refill: Capillary refill takes less than 2 seconds.      Findings: Lesion present.      Comments: 1 cm in diameter healing lesion.  Superior to the left medial malleolus from cancer biopsy recently.   Neurological:      General: No focal deficit present.      Mental Status: She is oriented to person, place, and time.   Psychiatric:         Mood and Affect: Mood normal.          Assessment/Plan:     Diagnoses and all orders for this visit:    1. Acute gout involving toe of left foot, unspecified cause (Primary)  -Patient in ED on 2/21 with diagnosis of left foot gout.  -2 months ago was in urgent care with diagnosis of right foot gout.  -Arthrocentesis of joints has not been done.  -Uric acid level 10.3 in ED  -Patient most recently treated with 1 dose of prednisone 40 and Tylenol.  -Allergy to NSAIDs.  -Examination positive for swelling and tenderness left foot digit #1.  -Given prescription for prednisone taper.  -Following prednisone taper patient is to begin Uloric 40 mg daily.  -We will follow up with repeat uric acid level in 3 months.  -If no improvement consider arthrocentesis    2. Primary  insomnia  -At some point in her past patient was on melatonin 10 mg nightly.  -Patient endorses difficulty falling and staying asleep most nights.  -Melatonin was discontinued due to reduced effectiveness over time.  -Discussed patient need to take a melatonin holiday consisting of 2 weeks of melatonin use followed by 2 to 3 days of no melatonin before restarting.  -Patient understands concept of melatonin holiday.  -Given that she can get OTC she will require it on her own.    3. Type 2 diabetes mellitus with hyperglycemia, without long-term current use of insulin (HCC)  -Patient previously prescribed Metformin 750 XR.  -She admits that she only takes it as needed and she did not understand she did take it daily.  -Recent blood sugars at home ranging from 110-150  -Patient instructed in need to take Metformin on a daily basis.      · Rx changes: Prednisone taper 40 mg 3 days, 20 mg 3 days, 10 mg 3 days, 5 mg 3 days.  At end of prednisone taper patient is to start Uloric 40 mg daily.  Restart Metformin 750 XR daily.  Restart melatonin 10 mg nightly with melatonin holiday every 2 weeks.  · Patient Education: Need to take medications as prescribed for maximum effectiveness.  · Compliance at present is estimated to be excellent.        Follow-up:     Return in about 4 weeks (around 3/23/2022) for Recheck.    Preventative:  Health Maintenance   Topic Date Due   • DXA SCAN  Never done   • Pneumococcal Vaccine 65+ (1 of 2 - PPSV23) Never done   • ZOSTER VACCINE (1 of 2) Never done   • ANNUAL WELLNESS VISIT  Never done   • DIABETIC EYE EXAM  Never done   • URINE MICROALBUMIN  10/23/2021   • HEMOGLOBIN A1C  04/19/2022   • TDAP/TD VACCINES (2 - Td or Tdap) 08/02/2026   • COVID-19 Vaccine  Completed   • INFLUENZA VACCINE  Completed       Weight  -Class: Obese Class II: 35-39.9kg/m2  -Patient's Body mass index is 37.31 kg/m². indicating that she is morbidly obese (BMI > 40 or > 35 with obesity - related health condition).  Obesity-related health conditions include the following: diabetes mellitus. Obesity is newly identified. BMI is is above average; BMI management plan is completed. We discussed portion control and increasing exercise..   eat more fruits and vegetables, decrease soda or juice intake, increase water intake, increase physical activity and reduce fast food intake    Alcohol use:  has no history on file for alcohol use.  Nicotine status  reports that she has quit smoking. She has never used smokeless tobacco.    Goals    None         RISK SCORE: 3        This document has been electronically signed by Gus Posada MD on February 23, 2022 13:55 CST

## 2022-02-23 NOTE — PROGRESS NOTES
Subjective:     Alda Constantino is a 81 y.o. female who presents for   Chief Complaint   Patient presents with   • Establish Care     Pt with chronic medical conditions including Gout, DM and insomnia presents to establish care. Reports being treated for Gout in ER recently. Still with left great toe warmth and swelling but less pain. Allergies to NSAIDs including hives. Has had more than 1 gout flare despite attempting low purine diet. Not taking DM meds regularly. Not taking anything for insomnia at this time.    For more detailed HPI, see resident note.        Past Medical Hx:  Past Medical History:   Diagnosis Date   • Asthma    • Cancer (HCC)    • Congenital abnormalities     colon behind liver   • COPD (chronic obstructive pulmonary disease) (HCC)    • Hypertension        Past Surgical Hx:  Past Surgical History:   Procedure Laterality Date   • ADENOIDECTOMY     • ADRENAL GLAND SURGERY Right    • BLADDER SURGERY     • BREAST BIOPSY     • COLONOSCOPY W/ BIOPSIES AND POLYPECTOMY     • EYE SURGERY     • HERNIA REPAIR     • HYSTERECTOMY     • LUNG LOBECTOMY Left    • RECTAL SURGERY      cyst   • SKIN CANCER EXCISION      legs   • TONSILLECTOMY     • VARICOSE VEIN SURGERY         Health Maintenance:  Health Maintenance   Topic Date Due   • DXA SCAN  Never done   • Pneumococcal Vaccine 65+ (1 of 2 - PPSV23) Never done   • ZOSTER VACCINE (1 of 2) Never done   • ANNUAL WELLNESS VISIT  Never done   • DIABETIC EYE EXAM  Never done   • URINE MICROALBUMIN  10/23/2021   • HEMOGLOBIN A1C  04/19/2022   • TDAP/TD VACCINES (2 - Td or Tdap) 08/02/2026   • COVID-19 Vaccine  Completed   • INFLUENZA VACCINE  Completed       Current Meds:    Current Outpatient Medications:   •  acetaminophen (TYLENOL) 500 MG tablet, Take 500 mg by mouth Every 6 (Six) Hours As Needed for Mild Pain ., Disp: , Rfl:   •  albuterol (PROVENTIL HFA;VENTOLIN HFA) 108 (90 Base) MCG/ACT inhaler, Inhale 2 puffs Every 4 (Four) Hours As Needed for  Wheezing., Disp: , Rfl:   •  budesonide-formoterol (SYMBICORT) 160-4.5 MCG/ACT inhaler, Inhale 2 puffs., Disp: , Rfl:   •  bumetanide (BUMEX) 2 MG tablet, Take 2 mg by mouth Daily., Disp: , Rfl:   •  carvedilol (COREG) 3.125 MG tablet, Take 3.125 mg by mouth 2 (Two) Times a Day With Meals., Disp: , Rfl:   •  doxycycline (DORYX) 100 MG enteric coated tablet, Take 1 tablet by mouth 2 (Two) Times a Day., Disp: 14 tablet, Rfl: 0  •  EPINEPHrine (EPIPEN IJ), Inject  as directed., Disp: , Rfl:   •  Glucose Blood (PRECISION XTRA TEST VI), by In Vitro route., Disp: , Rfl:   •  guaiFENesin (MUCINEX) 600 MG 12 hr tablet, Take 1,200 mg by mouth 2 (Two) Times a Day., Disp: , Rfl:   •  IPRATROPIUM BROMIDE IN, Inhale., Disp: , Rfl:   •  ipratropium-albuterol (DUO-NEB) 0.5-2.5 mg/3 ml nebulizer, Take 1.5 mL by nebulization Every 4 (Four) Hours As Needed for Wheezing., Disp: , Rfl:   •  levocetirizine (XYZAL) 5 MG tablet, Take 5 mg by mouth Every Evening., Disp: , Rfl:   •  lisinopril (PRINIVIL,ZESTRIL) 10 MG tablet, Take 10 mg by mouth Daily., Disp: , Rfl:   •  spironolactone (ALDACTONE) 25 MG tablet, Take 25 mg by mouth Daily., Disp: , Rfl:   •  [START ON 3/5/2022] febuxostat (Uloric) 40 MG tablet, Take 1 tablet by mouth Daily., Disp: 30 tablet, Rfl: 2  •  Melatonin 10 MG capsule, Take  by mouth., Disp: , Rfl:   •  metFORMIN ER (GLUCOPHAGE-XR) 750 MG 24 hr tablet, Take 1 tablet by mouth every night at bedtime., Disp: 30 tablet, Rfl: 2  •  predniSONE (DELTASONE) 20 MG tablet, Take 1 tablet by mouth 2 (Two) Times a Day. Take the 2 tablets a day for 3 days.  Then take 1 tablet a day for 3 days.  Then take half a tablet a day for 3 days.  After that take the 5 mg tablets for 3 days., Disp: 12 tablet, Rfl: 0  •  predniSONE (DELTASONE) 5 MG tablet, Take 1 tablet by mouth Daily., Disp: 3 tablet, Rfl: 0    Allergies:  Dilaudid [hydromorphone hcl], Diphenoxylate-atropine, Keflex [cephalexin], Lomotil [diphenoxylate], Aspirin, Beef-derived  "products, Ciprofloxacin, Contrast dye, Levaquin [levofloxacin], Milk-related compounds, Nsaids, Percocet [oxycodone-acetaminophen], Pork-derived products, and Tolmetin    Family Hx:  Family History   Problem Relation Age of Onset   • Diabetes Mother    • Diabetes Father    • Heart disease Father    • Cancer Sister    • Heart disease Sister    • Thyroid disease Sister    • Diabetes Brother    • Heart disease Brother         Social History:  Social History     Socioeconomic History   • Marital status:    Tobacco Use   • Smoking status: Former Smoker   • Smokeless tobacco: Never Used   Vaping Use   • Vaping Use: Never used       Review of Systems  See resident note for ROS    Objective:     /68   Pulse 90   Temp 97.6 °F (36.4 °C)   Ht 172.7 cm (68\")   Wt 111 kg (245 lb 6.4 oz)   SpO2 90%   BMI 37.31 kg/m²   See resident note for PE    Lab Review  Results for orders placed or performed during the hospital encounter of 02/21/22   Comprehensive Metabolic Panel    Specimen: Blood   Result Value Ref Range    Glucose 137 (H) 65 - 99 mg/dL    BUN 23 8 - 23 mg/dL    Creatinine 1.47 (H) 0.57 - 1.00 mg/dL    Sodium 139 136 - 145 mmol/L    Potassium 4.3 3.5 - 5.2 mmol/L    Chloride 98 98 - 107 mmol/L    CO2 31.0 (H) 22.0 - 29.0 mmol/L    Calcium 9.5 8.6 - 10.5 mg/dL    Total Protein 7.0 6.0 - 8.5 g/dL    Albumin 4.20 3.50 - 5.20 g/dL    ALT (SGPT) 10 1 - 33 U/L    AST (SGOT) 12 1 - 32 U/L    Alkaline Phosphatase 90 39 - 117 U/L    Total Bilirubin 0.5 0.0 - 1.2 mg/dL    eGFR Non African Amer 34 (L) >60 mL/min/1.73    Globulin 2.8 gm/dL    A/G Ratio 1.5 g/dL    BUN/Creatinine Ratio 15.6 7.0 - 25.0    Anion Gap 10.0 5.0 - 15.0 mmol/L   Uric Acid    Specimen: Blood   Result Value Ref Range    Uric Acid 10.3 (H) 2.4 - 5.7 mg/dL   CBC Auto Differential    Specimen: Blood   Result Value Ref Range    WBC 10.25 3.40 - 10.80 10*3/mm3    RBC 3.93 3.77 - 5.28 10*6/mm3    Hemoglobin 12.2 12.0 - 15.9 g/dL    Hematocrit 37.4 " 34.0 - 46.6 %    MCV 95.2 79.0 - 97.0 fL    MCH 31.0 26.6 - 33.0 pg    MCHC 32.6 31.5 - 35.7 g/dL    RDW 14.6 12.3 - 15.4 %    RDW-SD 50.6 37.0 - 54.0 fl    MPV 11.4 6.0 - 12.0 fL    Platelets 205 140 - 450 10*3/mm3    Neutrophil % 78.5 (H) 42.7 - 76.0 %    Lymphocyte % 13.1 (L) 19.6 - 45.3 %    Monocyte % 5.7 5.0 - 12.0 %    Eosinophil % 1.4 0.3 - 6.2 %    Basophil % 0.5 0.0 - 1.5 %    Immature Grans % 0.8 (H) 0.0 - 0.5 %    Neutrophils, Absolute 8.06 (H) 1.70 - 7.00 10*3/mm3    Lymphocytes, Absolute 1.34 0.70 - 3.10 10*3/mm3    Monocytes, Absolute 0.58 0.10 - 0.90 10*3/mm3    Eosinophils, Absolute 0.14 0.00 - 0.40 10*3/mm3    Basophils, Absolute 0.05 0.00 - 0.20 10*3/mm3    Immature Grans, Absolute 0.08 (H) 0.00 - 0.05 10*3/mm3    nRBC 0.0 0.0 - 0.2 /100 WBC            Assessment:     Diagnoses and all orders for this visit:    1. Acute gout involving toe of left foot, unspecified cause (Primary)  -     predniSONE (DELTASONE) 20 MG tablet; Take 1 tablet by mouth 2 (Two) Times a Day. Take the 2 tablets a day for 3 days.  Then take 1 tablet a day for 3 days.  Then take half a tablet a day for 3 days.  After that take the 5 mg tablets for 3 days.  Dispense: 12 tablet; Refill: 0  -     predniSONE (DELTASONE) 5 MG tablet; Take 1 tablet by mouth Daily.  Dispense: 3 tablet; Refill: 0  -     Discontinue: febuxostat (Uloric) 40 MG tablet; Take 1 tablet by mouth Daily.  Dispense: 30 tablet; Refill: 2  -     febuxostat (Uloric) 40 MG tablet; Take 1 tablet by mouth Daily.  Dispense: 30 tablet; Refill: 2    2. Primary insomnia    3. Type 2 diabetes mellitus with hyperglycemia, without long-term current use of insulin (HCC)    Other orders  -     Discontinue: metFORMIN ER (GLUCOPHAGE-XR) 750 MG 24 hr tablet; Take 1 tablet by mouth every night at bedtime.  Dispense: 30 tablet; Refill: 2  -     metFORMIN ER (GLUCOPHAGE-XR) 750 MG 24 hr tablet; Take 1 tablet by mouth every night at bedtime.  Dispense: 30 tablet; Refill:  2        Plan:     I have seen and examined the patient.  I have reviewed the notes, assessments, and/or procedures performed by Gus Posada MD, I concur with her/his documentation and assessment and plan for Alda Constantino.            This document has been electronically signed by Glenn Blanchard MD on February 23, 2022 14:26 CST

## 2022-03-28 ENCOUNTER — OFFICE VISIT (OUTPATIENT)
Dept: FAMILY MEDICINE CLINIC | Facility: CLINIC | Age: 82
End: 2022-03-28

## 2022-03-28 VITALS
TEMPERATURE: 97.1 F | HEIGHT: 68 IN | HEART RATE: 88 BPM | SYSTOLIC BLOOD PRESSURE: 130 MMHG | OXYGEN SATURATION: 97 % | WEIGHT: 249 LBS | BODY MASS INDEX: 37.74 KG/M2 | DIASTOLIC BLOOD PRESSURE: 82 MMHG

## 2022-03-28 DIAGNOSIS — F51.01 PRIMARY INSOMNIA: ICD-10-CM

## 2022-03-28 DIAGNOSIS — E11.40 TYPE 2 DIABETES MELLITUS WITH DIABETIC NEUROPATHY, WITHOUT LONG-TERM CURRENT USE OF INSULIN: Primary | ICD-10-CM

## 2022-03-28 PROCEDURE — 99213 OFFICE O/P EST LOW 20 MIN: CPT | Performed by: CHIROPRACTOR

## 2022-03-28 RX ORDER — METFORMIN HYDROCHLORIDE 750 MG/1
750 TABLET, EXTENDED RELEASE ORAL
Qty: 30 TABLET | Refills: 2 | Status: SHIPPED | OUTPATIENT
Start: 2022-03-28 | End: 2022-03-29

## 2022-03-28 RX ORDER — GABAPENTIN 100 MG/1
100 CAPSULE ORAL
Status: CANCELLED | OUTPATIENT
Start: 2022-03-28

## 2022-03-28 RX ORDER — GABAPENTIN 100 MG/1
100 CAPSULE ORAL
COMMUNITY
Start: 2022-02-02 | End: 2022-06-27 | Stop reason: SDUPTHER

## 2022-03-29 RX ORDER — GABAPENTIN 100 MG/1
100 CAPSULE ORAL 2 TIMES DAILY
Qty: 180 CAPSULE | Refills: 0 | Status: CANCELLED | OUTPATIENT
Start: 2022-03-29

## 2022-03-29 RX ORDER — METFORMIN HYDROCHLORIDE 750 MG/1
750 TABLET, EXTENDED RELEASE ORAL
Qty: 30 TABLET | Refills: 2 | Status: SHIPPED | OUTPATIENT
Start: 2022-03-29 | End: 2022-07-12

## 2022-03-29 NOTE — PROGRESS NOTES
Family Medicine Residency  Gus Posada MD    Subjective:     Alda Constantino is a 81 y.o. female who presents for type 2 diabetes with peripheral neuropathy and insomnia.  Her diabetes is well controlled on her current dose of Metformin 750 once daily.  She does endorse that she is overweight however at 81 years old and with some degenerative arthritis she is unable to appropriately exercise.  She has some considerable difficulty falling asleep at night and up to this point has been on a medication called Silenor.  Because this is a tricyclic acid we discussed her need to discontinue this medication and take something more appropriate.  She is agreeable to this plan.    The gout that she was previously suffering from has resolved completely at this point.     The following portions of the patient's history were reviewed and updated as appropriate: allergies, current medications, past family history, past medical history, past social history, past surgical history and problem list.    Past Medical Hx:  Past Medical History:   Diagnosis Date   • Asthma    • Cancer (HCC)    • Congenital abnormalities     colon behind liver   • COPD (chronic obstructive pulmonary disease) (HCC)    • Hypertension        Past Surgical Hx:  Past Surgical History:   Procedure Laterality Date   • ADENOIDECTOMY     • ADRENAL GLAND SURGERY Right    • BLADDER SURGERY     • BREAST BIOPSY     • COLONOSCOPY W/ BIOPSIES AND POLYPECTOMY     • EYE SURGERY     • HERNIA REPAIR     • HYSTERECTOMY     • LUNG LOBECTOMY Left    • RECTAL SURGERY      cyst   • SKIN CANCER EXCISION      legs   • TONSILLECTOMY     • VARICOSE VEIN SURGERY         Current Meds:    Current Outpatient Medications:   •  acetaminophen (TYLENOL) 500 MG tablet, Take 500 mg by mouth Every 6 (Six) Hours As Needed for Mild Pain ., Disp: , Rfl:   •  albuterol (PROVENTIL HFA;VENTOLIN HFA) 108 (90 Base) MCG/ACT inhaler, Inhale 2 puffs Every 4 (Four) Hours As Needed for Wheezing.,  Tashia Lemon is a 67 y.o. female evaluated via telephone on 5/1/2020. Consent:  She and/or health care decision maker is aware that that she may receive a bill for this telephone service, depending on her insurance coverage, and has provided verbal consent to proceed: Yes      Documentation:  I communicated with the patient and/or health care decision maker about patient being treated today for a right knee skin/soft tissue infection. Patient has a right knee revision that is infected with superficial redness and erythema. We will begin treatment doxycycline her milligrams p.o. twice daily for the time being. We will follow-up with infectious disease and/or orthopedic surgeon for further recommendations. We will follow-up with laboratory evaluation within the next several days MATEONRekha Deng Details of this discussion including any medical advice provided: see above      I affirm this is a Patient Initiated Episode with a Patient who has not had a related appointment within my department in the past 7 days or scheduled within the next 24 hours.     Patient identification was verified at the start of the visit: Yes    Total Time: minutes: 5-10 minutes    Note: not billable if this call serves to triage the patient into an appointment for the relevant concern      Chrissie Mejia Disp: , Rfl:   •  budesonide-formoterol (SYMBICORT) 160-4.5 MCG/ACT inhaler, Inhale 2 puffs., Disp: , Rfl:   •  bumetanide (BUMEX) 2 MG tablet, Take 2 mg by mouth Daily., Disp: , Rfl:   •  carvedilol (COREG) 3.125 MG tablet, Take 3.125 mg by mouth 2 (Two) Times a Day With Meals., Disp: , Rfl:   •  EPINEPHrine (EPIPEN IJ), Inject  as directed., Disp: , Rfl:   •  gabapentin (NEURONTIN) 100 MG capsule, Take 100 mg by mouth., Disp: , Rfl:   •  Glucose Blood (PRECISION XTRA TEST VI), by In Vitro route., Disp: , Rfl:   •  guaiFENesin (MUCINEX) 600 MG 12 hr tablet, Take 1,200 mg by mouth 2 (Two) Times a Day., Disp: , Rfl:   •  IPRATROPIUM BROMIDE IN, Inhale., Disp: , Rfl:   •  ipratropium-albuterol (DUO-NEB) 0.5-2.5 mg/3 ml nebulizer, Take 1.5 mL by nebulization Every 4 (Four) Hours As Needed for Wheezing., Disp: , Rfl:   •  levocetirizine (XYZAL) 5 MG tablet, Take 5 mg by mouth Every Evening., Disp: , Rfl:   •  metFORMIN ER (GLUCOPHAGE-XR) 750 MG 24 hr tablet, Take 1 tablet by mouth every night at bedtime., Disp: 30 tablet, Rfl: 2  •  spironolactone (ALDACTONE) 25 MG tablet, Take 25 mg by mouth Daily., Disp: , Rfl:   •  doxycycline (DORYX) 100 MG enteric coated tablet, Take 1 tablet by mouth 2 (Two) Times a Day., Disp: 14 tablet, Rfl: 0  •  febuxostat (Uloric) 40 MG tablet, Take 1 tablet by mouth Daily., Disp: 30 tablet, Rfl: 2  •  lisinopril (PRINIVIL,ZESTRIL) 10 MG tablet, Take 10 mg by mouth Daily., Disp: , Rfl:   •  Melatonin 10 MG capsule, Take  by mouth., Disp: , Rfl:   •  predniSONE (DELTASONE) 20 MG tablet, Take 1 tablet by mouth 2 (Two) Times a Day. Take the 2 tablets a day for 3 days.  Then take 1 tablet a day for 3 days.  Then take half a tablet a day for 3 days.  After that take the 5 mg tablets for 3 days., Disp: 12 tablet, Rfl: 0  •  predniSONE (DELTASONE) 5 MG tablet, Take 1 tablet by mouth Daily., Disp: 3 tablet, Rfl: 0    Allergies:  Allergies   Allergen Reactions   • Dilaudid [Hydromorphone Hcl]  Anaphylaxis   • Diphenoxylate-Atropine Shortness Of Breath     Reaction: shortness of breath     • Keflex [Cephalexin] Shortness Of Breath   • Lomotil [Diphenoxylate] Shortness Of Breath   • Aspirin Hives   • Beef-Derived Products GI Intolerance   • Ciprofloxacin Nausea And Vomiting   • Contrast Dye Hives   • Levaquin [Levofloxacin] GI Intolerance   • Milk-Related Compounds GI Intolerance   • Nsaids Hives   • Percocet [Oxycodone-Acetaminophen] Itching   • Pork-Derived Products GI Intolerance   • Tolmetin Hives       Family Hx:  Family History   Problem Relation Age of Onset   • Diabetes Mother    • Diabetes Father    • Heart disease Father    • Cancer Sister    • Heart disease Sister    • Thyroid disease Sister    • Diabetes Brother    • Heart disease Brother         Social History:  Social History     Socioeconomic History   • Marital status:    Tobacco Use   • Smoking status: Former Smoker   • Smokeless tobacco: Never Used   Vaping Use   • Vaping Use: Never used   Substance and Sexual Activity   • Alcohol use: Never   • Drug use: Never   • Sexual activity: Defer       Review of Systems  Review of Systems   Constitutional: Negative for chills, diaphoresis, fatigue, fever and unexpected weight change.   HENT: Positive for trouble swallowing. Negative for congestion.         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   Eyes: Negative for visual disturbance.   Respiratory: Positive for shortness of breath. Negative for cough and choking.         Currently on 2 L of oxygen via nasal cannula.   Cardiovascular: Negative for chest pain and palpitations.   Gastrointestinal: Negative for abdominal pain and blood in stool.   Genitourinary: Negative for dysuria and hematuria.   Musculoskeletal: Positive for gait problem.        Uses a walker for ambulation.   Skin: Negative for rash.   Neurological: Negative for dizziness, syncope and  "light-headedness.   Psychiatric/Behavioral: Positive for sleep disturbance.       Objective:     /82   Pulse 88   Temp 97.1 °F (36.2 °C)   Ht 172.7 cm (68\")   Wt 113 kg (249 lb)   SpO2 97% Comment: on 2L of O2  BMI 37.86 kg/m²   Physical Exam  Vitals reviewed.   Constitutional:       General: She is not in acute distress.     Appearance: Normal appearance. She is obese. She is not ill-appearing or diaphoretic.   HENT:      Head: Normocephalic and atraumatic.      Right Ear: External ear normal.      Left Ear: External ear normal.      Nose: Nose normal.      Mouth/Throat:      Mouth: Mucous membranes are moist.      Pharynx: No posterior oropharyngeal erythema.   Eyes:      General: No scleral icterus.        Right eye: No discharge.         Left eye: No discharge.      Extraocular Movements: Extraocular movements intact.   Neck:      Vascular: No carotid bruit.   Cardiovascular:      Rate and Rhythm: Normal rate and regular rhythm.      Pulses: Normal pulses.      Heart sounds: Normal heart sounds. No murmur heard.  Pulmonary:      Effort: Pulmonary effort is normal.      Breath sounds: No wheezing or rales.      Comments: Currently on oxygen at 2 L via nasal cannula.  Decreased breath sounds in the left lower lung secondary to lobectomy.  Abdominal:      General: There is no distension.      Palpations: Abdomen is soft. There is no mass.      Tenderness: There is no abdominal tenderness.   Musculoskeletal:         General: Tenderness present. No deformity or signs of injury.      Cervical back: No rigidity. No muscular tenderness.      Right lower leg: No edema.      Left lower leg: No edema.      Comments: Patient has an arthritic gait and uses a walker for ambulation.  Tender nodules present in both hands secondary to arthritis.   Lymphadenopathy:      Cervical: No cervical adenopathy.   Skin:     Capillary Refill: Capillary refill takes less than 2 seconds.      Coloration: Skin is not jaundiced or " pale.      Findings: Lesion present. No rash.      Comments: Several scars from prior surgery for skin cancer removal.  Currently has a 1 cm round cancerous lesion just superior to the left medial malleolus.  She has an appointment to have this removed.   Neurological:      General: No focal deficit present.      Mental Status: She is alert and oriented to person, place, and time.      Cranial Nerves: No cranial nerve deficit.      Sensory: No sensory deficit.      Motor: No weakness.   Psychiatric:         Mood and Affect: Mood normal.          Assessment/Plan:     Diagnoses and all orders for this visit:    1. Type 2 diabetes mellitus with diabetic neuropathy, without long-term current use of insulin (HCC) (Primary)  - Currently well controlled on 750 of Metformin ER.  - Neuropathy in distal feet most likely a result of the diabetes.  - Currently on gabapentin for control of neuropathic pain 100 mg every 24.  -     metFORMIN ER (GLUCOPHAGE-XR) 750 MG 24 hr tablet; Take 1 tablet by mouth every night at bedtime.  Dispense: 30 tablet; Refill: 2    2. Primary insomnia        - Patient with current prescription for Silenor        - discussed need to stop this medication secondary to the adverse effects of TCAs in elderly patients.        - patient agreed to restart taking melatonin 5 mg nightly.        - importance of taking a melatonin holiday once a week for 2 days discussed with patient and she is agreeable to this.      · Rx changes: Melatonin 5 mg nightly  · Patient Education: Need to continue on Metformin.  Need to take the melatonin as prescribed for maximum effect.  · Compliance at present is estimated to be excellent.   · Efforts to improve compliance (if necessary) will be directed at None needed.       Follow-up:     Return in about 3 months (around 6/28/2022) for Recheck.    Preventative:  Health Maintenance   Topic Date Due   • DXA SCAN  Never done   • ZOSTER VACCINE (1 of 2) Never done   • ANNUAL WELLNESS  VISIT  Never done   • DIABETIC EYE EXAM  Never done   • URINE MICROALBUMIN  10/23/2021   • HEMOGLOBIN A1C  04/19/2022   • TDAP/TD VACCINES (2 - Td or Tdap) 08/02/2026   • COVID-19 Vaccine  Completed   • INFLUENZA VACCINE  Completed   • Pneumococcal Vaccine 65+  Completed       Weight  -Class: Obese Class II: 35-39.9kg/m2  -Patient's Body mass index is 37.86 kg/m². indicating that she is morbidly obese (BMI > 40 or > 35 with obesity - related health condition). Obesity-related health conditions include the following: diabetes mellitus. Obesity is unchanged. BMI is is above average; BMI management plan is completed. We discussed portion control and increasing exercise..   eat more fruits and vegetables, increase physical activity, reduce portion size and reduce fast food intake    Alcohol use:  reports no history of alcohol use.  Nicotine status  reports that she has quit smoking. She has never used smokeless tobacco.     Goals    None         RISK SCORE: 4        This document has been electronically signed by Gus Posada MD on March 29, 2022 15:05 CDT

## 2022-06-27 DIAGNOSIS — E11.40 TYPE 2 DIABETES MELLITUS WITH DIABETIC NEUROPATHY, WITHOUT LONG-TERM CURRENT USE OF INSULIN: Primary | ICD-10-CM

## 2022-06-27 NOTE — TELEPHONE ENCOUNTER
Incoming Refill Request      Medication requested (name and dose): gabapentin (NEURONTIN) 100 MG capsule    Pharmacy where request should be sent: [facility]    Additional details provided by patient:     Best call back number: [phone]    Does the patient have less than a 3 day supply:  [x] Yes  [] No    Marilu Barth Workman  06/27/22, 13:21 CDT

## 2022-06-28 RX ORDER — GABAPENTIN 100 MG/1
100 CAPSULE ORAL DAILY
Qty: 30 CAPSULE | Refills: 2 | Status: SHIPPED | OUTPATIENT
Start: 2022-06-28 | End: 2022-11-22 | Stop reason: SDUPTHER

## 2022-06-29 ENCOUNTER — TELEPHONE (OUTPATIENT)
Dept: FAMILY MEDICINE CLINIC | Facility: CLINIC | Age: 82
End: 2022-06-29

## 2022-06-29 NOTE — TELEPHONE ENCOUNTER
Called patient to let her know I was refilling her gabapentin.  I ran a Allan #[id number].  It was appropriate and I will scan it into record.    This document has been electronically signed by Gus Posada MD on June 29, 2022 12:40 CDT

## 2022-07-12 DIAGNOSIS — E11.40 TYPE 2 DIABETES MELLITUS WITH DIABETIC NEUROPATHY, WITHOUT LONG-TERM CURRENT USE OF INSULIN: ICD-10-CM

## 2022-07-12 RX ORDER — METFORMIN HYDROCHLORIDE 750 MG/1
TABLET, EXTENDED RELEASE ORAL
Qty: 30 TABLET | Refills: 11 | Status: SHIPPED | OUTPATIENT
Start: 2022-07-12

## 2022-08-29 ENCOUNTER — OFFICE VISIT (OUTPATIENT)
Dept: FAMILY MEDICINE CLINIC | Facility: CLINIC | Age: 82
End: 2022-08-29

## 2022-08-29 VITALS
HEIGHT: 68 IN | SYSTOLIC BLOOD PRESSURE: 130 MMHG | OXYGEN SATURATION: 98 % | DIASTOLIC BLOOD PRESSURE: 62 MMHG | BODY MASS INDEX: 37.74 KG/M2 | HEART RATE: 80 BPM | WEIGHT: 249 LBS

## 2022-08-29 DIAGNOSIS — L03.116 CELLULITIS OF LEFT LOWER EXTREMITY: Primary | ICD-10-CM

## 2022-08-29 DIAGNOSIS — E11.65 TYPE 2 DIABETES MELLITUS WITH HYPERGLYCEMIA, WITHOUT LONG-TERM CURRENT USE OF INSULIN: ICD-10-CM

## 2022-08-29 PROCEDURE — 99213 OFFICE O/P EST LOW 20 MIN: CPT | Performed by: STUDENT IN AN ORGANIZED HEALTH CARE EDUCATION/TRAINING PROGRAM

## 2022-08-29 RX ORDER — DOXYCYCLINE 100 MG/1
100 CAPSULE ORAL 2 TIMES DAILY
Qty: 20 CAPSULE | Refills: 0 | Status: SHIPPED | OUTPATIENT
Start: 2022-08-29 | End: 2022-09-08

## 2022-08-29 RX ORDER — OMEPRAZOLE 40 MG/1
40 CAPSULE, DELAYED RELEASE ORAL DAILY
COMMUNITY
End: 2023-02-09 | Stop reason: SDUPTHER

## 2022-08-29 RX ORDER — AZITHROMYCIN 250 MG/1
250 TABLET, FILM COATED ORAL
COMMUNITY
Start: 2022-05-11 | End: 2022-09-11

## 2022-09-05 NOTE — PROGRESS NOTES
Family Medicine Residency  Carlos Sanchez MD    Subjective:     Alda Constantino is a 81 y.o. female who presents for a swollen foot.    Patient states her foot started swelling up 2 days ago.  No injury. Thinks it might be gout.  Foot is warm and tender.    The following portions of the patient's history were reviewed and updated as appropriate: allergies, current medications, past family history, past medical history, past social history, past surgical history and problem list.    Past Medical Hx:  Past Medical History:   Diagnosis Date   • Asthma    • Cancer (HCC)    • Congenital abnormalities     colon behind liver   • COPD (chronic obstructive pulmonary disease) (HCC)    • Hypertension        Past Surgical Hx:  Past Surgical History:   Procedure Laterality Date   • ADENOIDECTOMY     • ADRENAL GLAND SURGERY Right    • BLADDER SURGERY     • BREAST BIOPSY     • COLONOSCOPY W/ BIOPSIES AND POLYPECTOMY     • EYE SURGERY     • HERNIA REPAIR     • HYSTERECTOMY     • LUNG LOBECTOMY Left    • RECTAL SURGERY      cyst   • SKIN CANCER EXCISION      legs   • TONSILLECTOMY     • VARICOSE VEIN SURGERY         Current Meds:    Current Outpatient Medications:   •  acetaminophen (TYLENOL) 500 MG tablet, Take 500 mg by mouth Every 6 (Six) Hours As Needed for Mild Pain ., Disp: , Rfl:   •  albuterol (PROVENTIL HFA;VENTOLIN HFA) 108 (90 Base) MCG/ACT inhaler, Inhale 2 puffs Every 4 (Four) Hours As Needed for Wheezing., Disp: , Rfl:   •  azithromycin (ZITHROMAX) 250 MG tablet, Take 250 mg by mouth., Disp: , Rfl:   •  budesonide-formoterol (SYMBICORT) 160-4.5 MCG/ACT inhaler, Inhale 2 puffs., Disp: , Rfl:   •  bumetanide (BUMEX) 2 MG tablet, Take 2 mg by mouth Daily., Disp: , Rfl:   •  carvedilol (COREG) 3.125 MG tablet, Take 3.125 mg by mouth 2 (Two) Times a Day With Meals., Disp: , Rfl:   •  EPINEPHrine (EPIPEN IJ), Inject  as directed., Disp: , Rfl:   •  gabapentin (NEURONTIN) 100 MG capsule, Take 1 capsule by mouth Daily.,  Disp: 30 capsule, Rfl: 2  •  Glucose Blood (PRECISION XTRA TEST VI), by In Vitro route., Disp: , Rfl:   •  guaiFENesin (MUCINEX) 600 MG 12 hr tablet, Take 1,200 mg by mouth 2 (Two) Times a Day., Disp: , Rfl:   •  IPRATROPIUM BROMIDE IN, Inhale., Disp: , Rfl:   •  ipratropium-albuterol (DUO-NEB) 0.5-2.5 mg/3 ml nebulizer, Take 1.5 mL by nebulization Every 4 (Four) Hours As Needed for Wheezing., Disp: , Rfl:   •  levocetirizine (XYZAL) 5 MG tablet, Take 5 mg by mouth Every Evening., Disp: , Rfl:   •  Melatonin 10 MG capsule, Take  by mouth., Disp: , Rfl:   •  metFORMIN ER (GLUCOPHAGE-XR) 750 MG 24 hr tablet, TAKE 1 TABLET EVERY NIGHT AT BEDTIME, Disp: 30 tablet, Rfl: 11  •  omeprazole (priLOSEC) 40 MG capsule, Take 40 mg by mouth Daily., Disp: , Rfl:   •  spironolactone (ALDACTONE) 25 MG tablet, Take 25 mg by mouth Daily., Disp: , Rfl:   •  ciprofloxacin 50 mg/mL in Ora-Plus-ora sweet, Take 15 mL by mouth 2 (Two) Times a Day for 10 days., Disp: 300 mL, Rfl: 0  •  doxycycline (MONODOX) 100 MG capsule, Take 1 capsule by mouth 2 (Two) Times a Day for 10 days., Disp: 20 capsule, Rfl: 0    Allergies:  Allergies   Allergen Reactions   • Dilaudid [Hydromorphone Hcl] Anaphylaxis   • Diphenoxylate-Atropine Shortness Of Breath     Reaction: shortness of breath     • Iodinated Diagnostic Agents Hives     Reaction: HIVES   • Keflex [Cephalexin] Shortness Of Breath   • Lomotil [Diphenoxylate] Shortness Of Breath   • Aspirin Hives   • Beef (Diagnostic) Nausea And Vomiting     All mammals  All mammals   • Beef-Derived Products GI Intolerance   • Ciprofloxacin Nausea And Vomiting   • Contrast Dye Hives   • Levaquin [Levofloxacin] GI Intolerance   • Milk-Related Compounds GI Intolerance   • Nsaids Hives   • Pegademase Bovine Other (See Comments)     Other reaction(s): GI Intolerance   • Percocet [Oxycodone-Acetaminophen] Itching   • Pork-Derived Products GI Intolerance   • Tolmetin Hives       Family Hx:  Family History   Problem  "Relation Age of Onset   • Diabetes Mother    • Diabetes Father    • Heart disease Father    • Cancer Sister    • Heart disease Sister    • Thyroid disease Sister    • Diabetes Brother    • Heart disease Brother         Social History:  Social History     Socioeconomic History   • Marital status:    Tobacco Use   • Smoking status: Former Smoker   • Smokeless tobacco: Never Used   Vaping Use   • Vaping Use: Never used   Substance and Sexual Activity   • Alcohol use: Never   • Drug use: Never   • Sexual activity: Defer       Review of Systems  Review of Systems   Constitutional: Negative for chills and fever.   HENT: Negative for congestion and rhinorrhea.    Eyes: Negative for visual disturbance.   Respiratory: Negative for shortness of breath.    Cardiovascular: Negative for chest pain.   Gastrointestinal: Negative for abdominal pain, diarrhea, nausea and vomiting.   Endocrine: Negative for polyuria.   Genitourinary: Negative for difficulty urinating and dyspareunia.   Musculoskeletal: Positive for joint swelling and myalgias. Negative for back pain.   Skin: Negative for rash and wound.   Neurological: Negative for weakness, numbness and headaches.   Psychiatric/Behavioral: Negative for agitation, behavioral problems and confusion.       Objective:     /62   Pulse 80   Ht 172.7 cm (68\")   Wt 113 kg (249 lb)   SpO2 98%   BMI 37.86 kg/m²   Physical Exam  Constitutional:       General: She is not in acute distress.  HENT:      Head: Normocephalic and atraumatic.   Cardiovascular:      Rate and Rhythm: Normal rate and regular rhythm.      Heart sounds: Normal heart sounds.   Pulmonary:      Effort: Pulmonary effort is normal. No respiratory distress.      Breath sounds: Normal breath sounds.   Abdominal:      Palpations: Abdomen is soft.      Tenderness: There is no abdominal tenderness.   Musculoskeletal:         General: Swelling and tenderness present.      Right lower leg: No edema.      Left lower " leg: Edema present.   Skin:     General: Skin is warm and dry.   Neurological:      Mental Status: She is alert.   Psychiatric:         Mood and Affect: Mood normal.         Behavior: Behavior normal.                 Assessment/Plan:     Diagnoses and all orders for this visit:    1. Cellulitis of left lower extremity (Primary)  Likely cellulitis given distribution of swelling.  Hx of uncontrolled DM.  Need to treat for MRSA and psudomonas.  Patient also likely has Alpha-Gal. No capsules.  -     XR Foot 3+ View Left; Future  -     ciprofloxacin 50 mg/mL in Ora-Plus-ora sweet; Take 15 mL by mouth 2 (Two) Times a Day for 10 days.  Dispense: 300 mL; Refill: 0  -     doxycycline (MONODOX) 100 MG capsule; Take 1 capsule by mouth 2 (Two) Times a Day for 10 days.  Dispense: 20 capsule; Refill: 0    2. Type 2 diabetes mellitus with hyperglycemia, without long-term current use of insulin (HCC)  As above.  -     XR Foot 3+ View Left; Future  -     ciprofloxacin 50 mg/mL in Ora-Plus-ora sweet; Take 15 mL by mouth 2 (Two) Times a Day for 10 days.  Dispense: 300 mL; Refill: 0  -     doxycycline (MONODOX) 100 MG capsule; Take 1 capsule by mouth 2 (Two) Times a Day for 10 days.  Dispense: 20 capsule; Refill: 0             Follow-up:     1 week    Preventative:  Health Maintenance   Topic Date Due   • DXA SCAN  Never done   • ZOSTER VACCINE (1 of 2) Never done   • ANNUAL WELLNESS VISIT  Never done   • DIABETIC EYE EXAM  Never done   • URINE MICROALBUMIN  10/23/2021   • HEMOGLOBIN A1C  04/19/2022   • INFLUENZA VACCINE  10/01/2022   • TDAP/TD VACCINES (2 - Td or Tdap) 08/02/2026   • COVID-19 Vaccine  Completed   • Pneumococcal Vaccine 65+  Completed       Alcohol use:  reports no history of alcohol use.  Nicotine status  reports that she has quit smoking. She has never used smokeless tobacco.    RISK SCORE: 2      This document has been electronically signed by Carlos Sanchez MD on September 5, 2022 17:17 CDT

## 2022-09-06 ENCOUNTER — LAB (OUTPATIENT)
Dept: LAB | Facility: HOSPITAL | Age: 82
End: 2022-09-06

## 2022-09-06 ENCOUNTER — OFFICE VISIT (OUTPATIENT)
Dept: FAMILY MEDICINE CLINIC | Facility: CLINIC | Age: 82
End: 2022-09-06

## 2022-09-06 VITALS
BODY MASS INDEX: 36.86 KG/M2 | WEIGHT: 243.2 LBS | HEIGHT: 68 IN | DIASTOLIC BLOOD PRESSURE: 76 MMHG | SYSTOLIC BLOOD PRESSURE: 132 MMHG

## 2022-09-06 DIAGNOSIS — L03.119 CELLULITIS OF FOOT: Primary | ICD-10-CM

## 2022-09-06 DIAGNOSIS — E79.0 ELEVATED URIC ACID IN BLOOD: ICD-10-CM

## 2022-09-06 DIAGNOSIS — R11.2 DRUG-INDUCED NAUSEA AND VOMITING: ICD-10-CM

## 2022-09-06 DIAGNOSIS — R53.83 FATIGUE, UNSPECIFIED TYPE: ICD-10-CM

## 2022-09-06 DIAGNOSIS — T50.905A DRUG-INDUCED NAUSEA AND VOMITING: ICD-10-CM

## 2022-09-06 PROCEDURE — 84550 ASSAY OF BLOOD/URIC ACID: CPT | Performed by: STUDENT IN AN ORGANIZED HEALTH CARE EDUCATION/TRAINING PROGRAM

## 2022-09-06 PROCEDURE — 99214 OFFICE O/P EST MOD 30 MIN: CPT | Performed by: STUDENT IN AN ORGANIZED HEALTH CARE EDUCATION/TRAINING PROGRAM

## 2022-09-06 PROCEDURE — 84443 ASSAY THYROID STIM HORMONE: CPT | Performed by: STUDENT IN AN ORGANIZED HEALTH CARE EDUCATION/TRAINING PROGRAM

## 2022-09-06 PROCEDURE — 36415 COLL VENOUS BLD VENIPUNCTURE: CPT | Performed by: STUDENT IN AN ORGANIZED HEALTH CARE EDUCATION/TRAINING PROGRAM

## 2022-09-06 RX ORDER — ONDANSETRON 4 MG/1
4 TABLET, ORALLY DISINTEGRATING ORAL EVERY 8 HOURS PRN
Qty: 30 TABLET | Refills: 0 | Status: SHIPPED | OUTPATIENT
Start: 2022-09-06 | End: 2023-01-03

## 2022-09-06 RX ORDER — NYSTATIN 100000 U/G
CREAM TOPICAL
COMMUNITY
Start: 2022-06-30 | End: 2023-01-17

## 2022-09-06 RX ORDER — AMOXICILLIN AND CLAVULANATE POTASSIUM 875; 125 MG/1; MG/1
TABLET, FILM COATED ORAL
COMMUNITY
Start: 2022-07-17 | End: 2022-09-06

## 2022-09-06 NOTE — PROGRESS NOTES
Family Medicine Residency  Sinan Manley MD    Subjective:     Alda Constantino is a 81 y.o. female who presents for recheck of cellulitis of the left foot.  Patient reports that pain is better and swelling is better but still having redness.  She reports she took doxycycline as prescribed for 2 to 3 days but became nauseous and is now just been taking it 1 time per day.  Also continues to report chills and her back and a headache.  Denies any vomiting.  Reports eating when she takes her doxycycline.  Reports some abdominal pain/pressure due to gas.  Additionally complains of drainage causing her headache but reports that it is Xyzal is helping with this.     The following portions of the patient's history were reviewed and updated as appropriate: allergies, current medications, past family history, past medical history, past social history, past surgical history and problem list.    Past Medical Hx:  Past Medical History:   Diagnosis Date   • Asthma    • Cancer (HCC)    • Congenital abnormalities     colon behind liver   • COPD (chronic obstructive pulmonary disease) (HCC)    • Hypertension        Past Surgical Hx:  Past Surgical History:   Procedure Laterality Date   • ADENOIDECTOMY     • ADRENAL GLAND SURGERY Right    • BLADDER SURGERY     • BREAST BIOPSY     • COLONOSCOPY W/ BIOPSIES AND POLYPECTOMY     • EYE SURGERY     • HERNIA REPAIR     • HYSTERECTOMY     • LUNG LOBECTOMY Left    • RECTAL SURGERY      cyst   • SKIN CANCER EXCISION      legs   • TONSILLECTOMY     • VARICOSE VEIN SURGERY         Current Meds:    Current Outpatient Medications:   •  acetaminophen (TYLENOL) 500 MG tablet, Take 500 mg by mouth Every 6 (Six) Hours As Needed for Mild Pain ., Disp: , Rfl:   •  albuterol (PROVENTIL HFA;VENTOLIN HFA) 108 (90 Base) MCG/ACT inhaler, Inhale 2 puffs Every 4 (Four) Hours As Needed for Wheezing., Disp: , Rfl:   •  azithromycin (ZITHROMAX) 250 MG tablet, Take 250 mg by mouth., Disp: , Rfl:   •   budesonide-formoterol (SYMBICORT) 160-4.5 MCG/ACT inhaler, Inhale 2 puffs., Disp: , Rfl:   •  bumetanide (BUMEX) 2 MG tablet, Take 2 mg by mouth Daily., Disp: , Rfl:   •  carvedilol (COREG) 3.125 MG tablet, Take 3.125 mg by mouth 2 (Two) Times a Day With Meals., Disp: , Rfl:   •  doxycycline (MONODOX) 100 MG capsule, Take 1 capsule by mouth 2 (Two) Times a Day for 10 days., Disp: 20 capsule, Rfl: 0  •  EPINEPHrine (EPIPEN IJ), Inject  as directed., Disp: , Rfl:   •  gabapentin (NEURONTIN) 100 MG capsule, Take 1 capsule by mouth Daily., Disp: 30 capsule, Rfl: 2  •  Glucose Blood (PRECISION XTRA TEST VI), by In Vitro route., Disp: , Rfl:   •  guaiFENesin (MUCINEX) 600 MG 12 hr tablet, Take 1,200 mg by mouth 2 (Two) Times a Day., Disp: , Rfl:   •  hydrocortisone 2.5 % cream, , Disp: , Rfl:   •  IPRATROPIUM BROMIDE IN, Inhale., Disp: , Rfl:   •  ipratropium-albuterol (DUO-NEB) 0.5-2.5 mg/3 ml nebulizer, Take 1.5 mL by nebulization Every 4 (Four) Hours As Needed for Wheezing., Disp: , Rfl:   •  levocetirizine (XYZAL) 5 MG tablet, Take 5 mg by mouth Every Evening., Disp: , Rfl:   •  Melatonin 10 MG capsule, Take  by mouth., Disp: , Rfl:   •  metFORMIN ER (GLUCOPHAGE-XR) 750 MG 24 hr tablet, TAKE 1 TABLET EVERY NIGHT AT BEDTIME, Disp: 30 tablet, Rfl: 11  •  nystatin (MYCOSTATIN) 598115 UNIT/GM cream, , Disp: , Rfl:   •  omeprazole (priLOSEC) 40 MG capsule, Take 40 mg by mouth Daily., Disp: , Rfl:   •  spironolactone (ALDACTONE) 25 MG tablet, Take 25 mg by mouth Daily., Disp: , Rfl:   •  ondansetron ODT (Zofran ODT) 4 MG disintegrating tablet, Place 1 tablet on the tongue Every 8 (Eight) Hours As Needed for Nausea or Vomiting., Disp: 30 tablet, Rfl: 0    Allergies:  Allergies   Allergen Reactions   • Dilaudid [Hydromorphone Hcl] Anaphylaxis   • Diphenoxylate-Atropine Shortness Of Breath     Reaction: shortness of breath     • Iodinated Diagnostic Agents Hives     Reaction: HIVES   • Keflex [Cephalexin] Shortness Of Breath  "  • Lomotil [Diphenoxylate] Shortness Of Breath   • Aspirin Hives   • Beef (Diagnostic) Nausea And Vomiting     All mammals  All mammals   • Beef-Derived Products GI Intolerance   • Ciprofloxacin Nausea And Vomiting   • Contrast Dye Hives   • Levaquin [Levofloxacin] GI Intolerance   • Milk-Related Compounds GI Intolerance   • Nsaids Hives   • Pegademase Bovine Other (See Comments)     Other reaction(s): GI Intolerance   • Percocet [Oxycodone-Acetaminophen] Itching   • Pork-Derived Products GI Intolerance   • Tolmetin Hives       Family Hx:  Family History   Problem Relation Age of Onset   • Diabetes Mother    • Diabetes Father    • Heart disease Father    • Cancer Sister    • Heart disease Sister    • Thyroid disease Sister    • Diabetes Brother    • Heart disease Brother         Social History:  Social History     Socioeconomic History   • Marital status:    Tobacco Use   • Smoking status: Former Smoker   • Smokeless tobacco: Never Used   Vaping Use   • Vaping Use: Never used   Substance and Sexual Activity   • Alcohol use: Never   • Drug use: Never   • Sexual activity: Defer       Review of Systems  Review of Systems   Constitutional: Positive for chills and fatigue.   HENT: Positive for congestion, postnasal drip and rhinorrhea.    Gastrointestinal: Positive for abdominal pain and nausea. Negative for vomiting.   Skin: Positive for color change.   Neurological: Positive for headaches.       Objective:     /76   Ht 172.7 cm (68\")   Wt 110 kg (243 lb 3.2 oz)   BMI 36.98 kg/m²   Physical Exam  Constitutional:       General: She is not in acute distress.     Appearance: She is obese.      Interventions: Nasal cannula in place.   HENT:      Head: Normocephalic and atraumatic.   Pulmonary:      Effort: Pulmonary effort is normal. No respiratory distress.   Skin:     General: Skin is warm.      Findings: Erythema present.      Comments: Some erythema and tenderness to palpation of left lateral foot and " left medial foot  (appearance similar to previous office visit)   Neurological:      Mental Status: She is alert.          Assessment/Plan:     Diagnoses and all orders for this visit:    1. Cellulitis of foot (Primary)    2. Drug-induced nausea and vomiting  -     ondansetron ODT (Zofran ODT) 4 MG disintegrating tablet; Place 1 tablet on the tongue Every 8 (Eight) Hours As Needed for Nausea or Vomiting.  Dispense: 30 tablet; Refill: 0    3. Fatigue, unspecified type  -     TSH    4. Elevated uric acid in blood  -     Uric Acid      - Cellulitis appears to be improving.  We will start Zofran to help with patient's nausea and she was counseled to finish doxycycline course and start taking 2 times per day as prescribed. Continue to eat or drink milk when taking doxycycline. Patient expressed understanding.  Patient had history of elevated uric acid level so we will repeat this at this time.     Follow-up:     Return in about 1 week (around 9/13/2022) for Recheck.    Preventative:  Health Maintenance   Topic Date Due   • DXA SCAN  Never done   • ZOSTER VACCINE (1 of 2) Never done   • ANNUAL WELLNESS VISIT  Never done   • DIABETIC EYE EXAM  Never done   • URINE MICROALBUMIN  10/23/2021   • HEMOGLOBIN A1C  04/19/2022   • INFLUENZA VACCINE  10/01/2022   • TDAP/TD VACCINES (2 - Td or Tdap) 08/02/2026   • COVID-19 Vaccine  Completed   • Pneumococcal Vaccine 65+  Completed     Alcohol use:  reports no history of alcohol use.  Nicotine status  reports that she has quit smoking. She has never used smokeless tobacco.     Goals    None         RISK SCORE: 4      This document has been electronically signed by Sinan Manley MD on September 7, 2022 18:25 CDT

## 2022-09-07 LAB
TSH SERPL DL<=0.05 MIU/L-ACNC: 0.83 UIU/ML (ref 0.27–4.2)
URATE SERPL-MCNC: 10.2 MG/DL (ref 2.4–5.7)

## 2022-09-09 ENCOUNTER — HOSPITAL ENCOUNTER (EMERGENCY)
Facility: HOSPITAL | Age: 82
Discharge: HOME | End: 2022-09-09
Attending: FAMILY MEDICINE | Admitting: EMERGENCY MEDICINE

## 2022-09-09 ENCOUNTER — APPOINTMENT (OUTPATIENT)
Dept: CT IMAGING | Facility: HOSPITAL | Age: 82
End: 2022-09-09

## 2022-09-09 VITALS
TEMPERATURE: 98.5 F | WEIGHT: 243 LBS | RESPIRATION RATE: 18 BRPM | OXYGEN SATURATION: 98 % | HEIGHT: 68 IN | DIASTOLIC BLOOD PRESSURE: 78 MMHG | BODY MASS INDEX: 36.83 KG/M2 | HEART RATE: 97 BPM | SYSTOLIC BLOOD PRESSURE: 166 MMHG

## 2022-09-09 DIAGNOSIS — E27.8 ADRENAL NODULE: ICD-10-CM

## 2022-09-09 DIAGNOSIS — K80.20 CALCULUS OF GALLBLADDER WITHOUT CHOLECYSTITIS WITHOUT OBSTRUCTION: ICD-10-CM

## 2022-09-09 DIAGNOSIS — M43.17 SPONDYLOLISTHESIS AT L5-S1 LEVEL: ICD-10-CM

## 2022-09-09 DIAGNOSIS — R10.9 ABDOMINAL PAIN, UNSPECIFIED ABDOMINAL LOCATION: Primary | ICD-10-CM

## 2022-09-09 LAB
ALBUMIN SERPL-MCNC: 3.6 G/DL (ref 3.5–5.2)
ALBUMIN/GLOB SERPL: 1.1 G/DL
ALP SERPL-CCNC: 100 U/L (ref 39–117)
ALT SERPL W P-5'-P-CCNC: 11 U/L (ref 1–33)
ANION GAP SERPL CALCULATED.3IONS-SCNC: 11 MMOL/L (ref 5–15)
AST SERPL-CCNC: 13 U/L (ref 1–32)
BASOPHILS # BLD AUTO: 0.08 10*3/MM3 (ref 0–0.2)
BASOPHILS NFR BLD AUTO: 0.8 % (ref 0–1.5)
BILIRUB SERPL-MCNC: 0.3 MG/DL (ref 0–1.2)
BILIRUB UR QL STRIP: NEGATIVE
BUN SERPL-MCNC: 47 MG/DL (ref 8–23)
BUN/CREAT SERPL: 33.8 (ref 7–25)
CALCIUM SPEC-SCNC: 9.2 MG/DL (ref 8.6–10.5)
CHLORIDE SERPL-SCNC: 103 MMOL/L (ref 98–107)
CLARITY UR: CLEAR
CLUMPED PLATELETS: NORMAL
CO2 SERPL-SCNC: 26 MMOL/L (ref 22–29)
COLOR UR: YELLOW
CREAT SERPL-MCNC: 1.39 MG/DL (ref 0.57–1)
D-LACTATE SERPL-SCNC: 1 MMOL/L (ref 0.5–2)
DEPRECATED RDW RBC AUTO: 46.8 FL (ref 37–54)
EGFRCR SERPLBLD CKD-EPI 2021: 38.4 ML/MIN/1.73
EOSINOPHIL # BLD AUTO: 0.15 10*3/MM3 (ref 0–0.4)
EOSINOPHIL NFR BLD AUTO: 1.5 % (ref 0.3–6.2)
ERYTHROCYTE [DISTWIDTH] IN BLOOD BY AUTOMATED COUNT: 13.9 % (ref 12.3–15.4)
GLOBULIN UR ELPH-MCNC: 3.4 GM/DL
GLUCOSE SERPL-MCNC: 126 MG/DL (ref 65–99)
GLUCOSE UR STRIP-MCNC: NEGATIVE MG/DL
HCT VFR BLD AUTO: 36.8 % (ref 34–46.6)
HGB BLD-MCNC: 12.6 G/DL (ref 12–15.9)
HGB UR QL STRIP.AUTO: NEGATIVE
HOLD SPECIMEN: NORMAL
HOLD SPECIMEN: NORMAL
IMM GRANULOCYTES # BLD AUTO: 0.07 10*3/MM3 (ref 0–0.05)
IMM GRANULOCYTES NFR BLD AUTO: 0.7 % (ref 0–0.5)
KETONES UR QL STRIP: NEGATIVE
LEUKOCYTE ESTERASE UR QL STRIP.AUTO: NEGATIVE
LIPASE SERPL-CCNC: 6 U/L (ref 13–60)
LYMPHOCYTES # BLD AUTO: 1.94 10*3/MM3 (ref 0.7–3.1)
LYMPHOCYTES NFR BLD AUTO: 20 % (ref 19.6–45.3)
MCH RBC QN AUTO: 32.3 PG (ref 26.6–33)
MCHC RBC AUTO-ENTMCNC: 34.2 G/DL (ref 31.5–35.7)
MCV RBC AUTO: 94.4 FL (ref 79–97)
MONOCYTES # BLD AUTO: 0.6 10*3/MM3 (ref 0.1–0.9)
MONOCYTES NFR BLD AUTO: 6.2 % (ref 5–12)
NEUTROPHILS NFR BLD AUTO: 6.84 10*3/MM3 (ref 1.7–7)
NEUTROPHILS NFR BLD AUTO: 70.8 % (ref 42.7–76)
NITRITE UR QL STRIP: NEGATIVE
NRBC BLD AUTO-RTO: 0 /100 WBC (ref 0–0.2)
PH UR STRIP.AUTO: <=5 [PH] (ref 5–9)
PLATELET # BLD AUTO: 161 10*3/MM3 (ref 140–450)
PMV BLD AUTO: 12.7 FL (ref 6–12)
POTASSIUM SERPL-SCNC: 3.9 MMOL/L (ref 3.5–5.2)
PROT SERPL-MCNC: 7 G/DL (ref 6–8.5)
PROT UR QL STRIP: NEGATIVE
RBC # BLD AUTO: 3.9 10*6/MM3 (ref 3.77–5.28)
RBC MORPH BLD: NORMAL
SMALL PLATELETS BLD QL SMEAR: ADEQUATE
SODIUM SERPL-SCNC: 140 MMOL/L (ref 136–145)
SP GR UR STRIP: 1.02 (ref 1–1.03)
UROBILINOGEN UR QL STRIP: NORMAL
WBC MORPH BLD: NORMAL
WBC NRBC COR # BLD: 9.68 10*3/MM3 (ref 3.4–10.8)
WHOLE BLOOD HOLD COAG: NORMAL
WHOLE BLOOD HOLD SPECIMEN: NORMAL

## 2022-09-09 PROCEDURE — 83605 ASSAY OF LACTIC ACID: CPT | Performed by: FAMILY MEDICINE

## 2022-09-09 PROCEDURE — 99283 EMERGENCY DEPT VISIT LOW MDM: CPT

## 2022-09-09 PROCEDURE — 81003 URINALYSIS AUTO W/O SCOPE: CPT | Performed by: FAMILY MEDICINE

## 2022-09-09 PROCEDURE — 74176 CT ABD & PELVIS W/O CONTRAST: CPT

## 2022-09-09 PROCEDURE — 83690 ASSAY OF LIPASE: CPT | Performed by: FAMILY MEDICINE

## 2022-09-09 PROCEDURE — 85007 BL SMEAR W/DIFF WBC COUNT: CPT | Performed by: FAMILY MEDICINE

## 2022-09-09 PROCEDURE — 25010000002 ONDANSETRON PER 1 MG: Performed by: FAMILY MEDICINE

## 2022-09-09 PROCEDURE — 96374 THER/PROPH/DIAG INJ IV PUSH: CPT

## 2022-09-09 PROCEDURE — 85025 COMPLETE CBC W/AUTO DIFF WBC: CPT | Performed by: FAMILY MEDICINE

## 2022-09-09 PROCEDURE — 80053 COMPREHEN METABOLIC PANEL: CPT | Performed by: FAMILY MEDICINE

## 2022-09-09 RX ORDER — ONDANSETRON 2 MG/ML
4 INJECTION INTRAMUSCULAR; INTRAVENOUS ONCE
Status: COMPLETED | OUTPATIENT
Start: 2022-09-09 | End: 2022-09-09

## 2022-09-09 RX ORDER — SIMETHICONE 80 MG
80 TABLET,CHEWABLE ORAL EVERY 6 HOURS PRN
Qty: 10 TABLET | Refills: 0 | Status: SHIPPED | OUTPATIENT
Start: 2022-09-09 | End: 2022-12-07

## 2022-09-09 RX ORDER — SODIUM CHLORIDE 0.9 % (FLUSH) 0.9 %
10 SYRINGE (ML) INJECTION AS NEEDED
Status: DISCONTINUED | OUTPATIENT
Start: 2022-09-09 | End: 2022-09-09 | Stop reason: HOSPADM

## 2022-09-09 RX ORDER — SUCRALFATE 1 G/1
1 TABLET ORAL 4 TIMES DAILY
Qty: 20 TABLET | Refills: 0 | Status: ON HOLD | OUTPATIENT
Start: 2022-09-09 | End: 2022-09-11

## 2022-09-09 RX ADMIN — SODIUM CHLORIDE 1000 ML: 9 INJECTION, SOLUTION INTRAVENOUS at 18:08

## 2022-09-09 RX ADMIN — ONDANSETRON 4 MG: 2 INJECTION INTRAMUSCULAR; INTRAVENOUS at 18:09

## 2022-09-09 NOTE — ED PROVIDER NOTES
Subjective   PIT      History provided by:  Patient   used: No    She is an 81 years old female with past medical history of COPD, hypertension who presented here today because of nausea and vomiting and abdominal pain for the past 1 week.  Patient also said that she has some diarrhea and gas in the abdomen.  Denies any fever chills or sweating.  She says she thought that she was constipated she took some Colace.  It did not help her.    Review of Systems   Gastrointestinal: Positive for abdominal pain and nausea.   All other systems reviewed and are negative.      Past Medical History:   Diagnosis Date   • Asthma    • Cancer (HCC)    • Congenital abnormalities     colon behind liver   • COPD (chronic obstructive pulmonary disease) (HCC)    • Hypertension        Allergies   Allergen Reactions   • Dilaudid [Hydromorphone Hcl] Anaphylaxis   • Diphenoxylate-Atropine Shortness Of Breath     Reaction: shortness of breath     • Iodinated Diagnostic Agents Hives     Reaction: HIVES   • Keflex [Cephalexin] Shortness Of Breath   • Lomotil [Diphenoxylate] Shortness Of Breath   • Aspirin Hives   • Beef (Diagnostic) Nausea And Vomiting     All mammals  All mammals   • Beef-Derived Products GI Intolerance   • Ciprofloxacin Nausea And Vomiting   • Contrast Dye Hives   • Levaquin [Levofloxacin] GI Intolerance   • Milk-Related Compounds GI Intolerance   • Nsaids Hives   • Pegademase Bovine Other (See Comments)     Other reaction(s): GI Intolerance   • Percocet [Oxycodone-Acetaminophen] Itching   • Pork-Derived Products GI Intolerance   • Tolmetin Hives       Past Surgical History:   Procedure Laterality Date   • ADENOIDECTOMY     • ADRENAL GLAND SURGERY Right    • BLADDER SURGERY     • BREAST BIOPSY     • COLONOSCOPY W/ BIOPSIES AND POLYPECTOMY     • EYE SURGERY     • HERNIA REPAIR     • HYSTERECTOMY     • LUNG LOBECTOMY Left    • RECTAL SURGERY      cyst   • SKIN CANCER EXCISION      legs   • TONSILLECTOMY      • VARICOSE VEIN SURGERY         Family History   Problem Relation Age of Onset   • Diabetes Mother    • Diabetes Father    • Heart disease Father    • Cancer Sister    • Heart disease Sister    • Thyroid disease Sister    • Diabetes Brother    • Heart disease Brother        Social History     Socioeconomic History   • Marital status:    Tobacco Use   • Smoking status: Former Smoker   • Smokeless tobacco: Never Used   Vaping Use   • Vaping Use: Never used   Substance and Sexual Activity   • Alcohol use: Never   • Drug use: Never   • Sexual activity: Defer           Objective   Physical Exam  Vitals and nursing note reviewed.   Constitutional:       Appearance: She is well-developed. She is obese.   HENT:      Head: Normocephalic and atraumatic.   Cardiovascular:      Rate and Rhythm: Normal rate and regular rhythm.      Heart sounds: Normal heart sounds.   Pulmonary:      Effort: Pulmonary effort is normal.      Breath sounds: Normal breath sounds.   Abdominal:      General: Bowel sounds are normal. There is distension and abdominal bruit.      Palpations: Abdomen is rigid.      Tenderness: There is generalized abdominal tenderness.   Skin:     General: Skin is warm.      Capillary Refill: Capillary refill takes less than 2 seconds.   Neurological:      General: No focal deficit present.      Mental Status: She is alert and oriented to person, place, and time.         Procedures           ED Course      Labs Reviewed   COMPREHENSIVE METABOLIC PANEL - Abnormal; Notable for the following components:       Result Value    Glucose 126 (*)     BUN 47 (*)     Creatinine 1.39 (*)     BUN/Creatinine Ratio 33.8 (*)     eGFR 38.4 (*)     All other components within normal limits    Narrative:     GFR Normal >60  Chronic Kidney Disease <60  Kidney Failure <15     LIPASE - Abnormal; Notable for the following components:    Lipase 6 (*)     All other components within normal limits   CBC WITH AUTO DIFFERENTIAL -  Abnormal; Notable for the following components:    MPV 12.7 (*)     Immature Grans % 0.7 (*)     Immature Grans, Absolute 0.07 (*)     All other components within normal limits   URINALYSIS W/ MICROSCOPIC IF INDICATED (NO CULTURE) - Normal    Narrative:     Urine microscopic not indicated.   LACTIC ACID, PLASMA - Normal   RAINBOW DRAW    Narrative:     The following orders were created for panel order [place] Draw.  Procedure                               Abnormality         Status                     ---------                               -----------         ------                     Green Top (Gel)[[id number]]                                  Final result               Lavender Top[[id number]]                                     Final result               Gold Top - SST[[id number]]                                   Final result               Light Blue Top[[id number]]                                   Final result                 Please view results for these tests on the individual orders.   SCAN SLIDE   CBC AND DIFFERENTIAL    Narrative:     The following orders were created for panel order CBC & Differential.  Procedure                               Abnormality         Status                     ---------                               -----------         ------                     CBC Auto Differential[[id number]]        Abnormal            Final result               Scan Slide[[id number]]                                       Final result                 Please view results for these tests on the individual orders.   GREEN TOP   LAVENDER TOP   GOLD TOP - SST   LIGHT BLUE TOP     CT Abdomen Pelvis Without Contrast    Result Date: 9/9/2022  Narrative: EXAM: CT ABDOMEN PELVIS WITHOUT IV CONTRAST ORDERING PROVIDER: SOPHIA CUEVA CLINICAL HISTORY: Nausea, vomiting and diarrhea with gas for one week COMPARISON: 9/4/2019 CT scan TECHNIQUE: CT abdomen and pelvis performed without IV or oral contrast, reformatted in  the sagittal and coronal planes. This examination was performed according to our departmental dose optimization program which includes automated exposure control, adjustment of the MA and kV according to patient size, and/or use of iterative reconstruction technique. FINDINGS: BASILAR CHEST: Nonspecific interstitial prominence bilaterally in the lung bases. LIVER: No mass, enlargement or abnormal density. BILIARY TRACT: Small gallstones in the gallbladder.. SPLEEN: No mass or enlargement. PANCREAS: Diffuse fatty replacement of the pancreatic parenchyma. No mass or inflammatory process. Normal pancreatic duct ADRENAL GLANDS: Prior right adrenal gland surgery. Two left adrenal nodules measuring 2 x 2 CM and 1.2 x 1 cm. Follow-up with CT scan or MRI without and with contrast utilizing an adrenal protocol is recommended. URINARY SYSTEM: Kidneys are mildly atrophic in size with bilateral cortical thinning. No obstructing stone, hydronephrosis, or mass. Normal ureters.  Bladder is normal without mass or stone.  GI TRACT: No mass, dilation, or wall thickening.  Uncomplicated colonic diverticulosis.  No hernia.  Appendix is within normal.  REPRODUCTIVE SYSTEM: Prior hysterectomy PERITONEAL SPACE:No free air, free fluid, mass or adenopathy. RETROPERITONEAL SPACE:  No adenopathy, mass, aneurysm or significant vascular abnormality. BONES AND EXTRA-ABDOMINAL SOFT TISSUES: Grade 1 spondylolisthesis of L5 on S1.  No inguinal adenopathy or hernia.     Impression: Prior right adrenal gland surgery. Two left adrenal nodules measuring 2 x 2 CM and 1.2 x 1 cm. Follow-up with CT scan or MRI without and with contrast utilizing an adrenal protocol is recommended. Nonspecific interstitial prominence bilaterally in the lung bases. Small gallstones in the gallbladder. Diffuse fatty replacement of the pancreatic parenchyma. Uncomplicated colonic diverticulosis. Appendix is within normal. Grade 1 spondylolisthesis of L5 on S1.  No evidence of  obstructive uropathy on either side. No evidence of bowel obstruction or perienteric inflammation. Electronically signed by:  Jas Cerda MD  9/9/2022 7:42 PM CDT Workstation: [id number]    XR Foot 3+ View Left    Result Date: 9/1/2022  Narrative: PROCEDURE: XR FOOT 3+ VW LEFT VIEWS: 3 INDICATION: Pain COMPARISON: 2/21/2022 FINDINGS:   - fracture: None. No destructive lesions are visualized   - alignment: Within normal limits   - misc: Mildly diffusely decreased osseous mineralization. Calcaneal enthesophytes at the origin of the plantar aponeurosis. Mild soft tissue swelling overlies the metatarsals.     Impression: No acute osseous abnormality identified. No obvious osseous destruction comment there is mild soft tissue swelling. Calcaneal enthesopathy at the origin of the plantar aponeurosis Note:  if pain or symptoms persist beyond reasonable expectations and follow-up imaging is anticipated,  cross sectional imaging  (CT and/or MRI) is suggested, as is deemed clinically appropriate. Electronically signed by:  Hayley Hannon MD  9/1/2022 12:09 PM CDT Workstation: [id number]                                         MDM  Number of Diagnoses or Management Options  Abdominal pain, unspecified abdominal location  Adrenal nodule (HCC)  Calculus of gallbladder without cholecystitis without obstruction  Spondylolisthesis at L5-S1 level  Diagnosis management comments: Labs/radiographic studies reviewed.  CT abd/pelvis negative acute abnormality.  Pt instructed followup primary MD regarding further imaging evaluation incidental findings of left adrenal mass as recommended.  Repeat abdominal exam at time of discharge: abd soft nontender, neg r/r/g.  No evidence peritonitis/hemorrhage/obstruction.  Stable discharge.  Plan rx simethicone 80mg qid prn/carafate 1g qid x5d.  DC precautions provided.       Amount and/or Complexity of Data Reviewed  Clinical lab tests: reviewed  Tests in the radiology section of CPT®: reviewed  Decide  to obtain previous medical records or to obtain history from someone other than the patient: yes        Final diagnoses:   Abdominal pain, unspecified abdominal location   Adrenal nodule (HCC)   Calculus of gallbladder without cholecystitis without obstruction   Spondylolisthesis at L5-S1 level       ED Disposition  ED Disposition     ED Disposition   Discharge    Condition   Good    Comment   --             Gus Posada MD  [facility] [address] [place] [address] [phone]    In 2 days           Medication List      New Prescriptions    simethicone 80 MG chewable tablet  Commonly known as: Gas-X  Chew 1 tablet Every 6 (Six) Hours As Needed (abdominal bloating).     sucralfate 1 g tablet  Commonly known as: CARAFATE  Take 1 tablet by mouth 4 (Four) Times a Day.        Stop    doxycycline 100 MG capsule  Commonly known as: MONODOX           Where to Get Your Medications      These medications were sent to [id number] - [place] [id number] [facility] AT [facility] - [phone]  - [phone]   [address] [facility] [address]    Phone: [phone]   · simethicone 80 MG chewable tablet  · sucralfate 1 g tablet          Tucker Garcia MD  09/09/22 [id number]

## 2022-09-10 NOTE — ED NOTES
Patient ambulatory to restroom with assist of rollator and standby assist. Instructed to provide CCMS urine.

## 2022-09-10 NOTE — DISCHARGE INSTRUCTIONS
Clear liquid diet x24hrs  Return ED fever, vomiting, bleeding, abdominal pain, worse condition, any other concerns  Followup primary MD regarding further definitive imaging evaluation left adrenal mass as directed

## 2022-09-11 ENCOUNTER — HOSPITAL ENCOUNTER (INPATIENT)
Facility: HOSPITAL | Age: 82
LOS: 4 days | Discharge: HOME | End: 2022-09-15
Attending: STUDENT IN AN ORGANIZED HEALTH CARE EDUCATION/TRAINING PROGRAM | Admitting: INTERNAL MEDICINE

## 2022-09-11 ENCOUNTER — APPOINTMENT (OUTPATIENT)
Dept: GENERAL RADIOLOGY | Facility: HOSPITAL | Age: 82
End: 2022-09-11

## 2022-09-11 DIAGNOSIS — Z74.09 IMPAIRED FUNCTIONAL MOBILITY, BALANCE, GAIT, AND ENDURANCE: ICD-10-CM

## 2022-09-11 DIAGNOSIS — Z74.09 IMPAIRED MOBILITY AND ADLS: ICD-10-CM

## 2022-09-11 DIAGNOSIS — I42.8 NICM (NONISCHEMIC CARDIOMYOPATHY): ICD-10-CM

## 2022-09-11 DIAGNOSIS — R91.8 LUNG INFILTRATE: Primary | ICD-10-CM

## 2022-09-11 DIAGNOSIS — Z78.9 IMPAIRED MOBILITY AND ADLS: ICD-10-CM

## 2022-09-11 DIAGNOSIS — R06.00 DYSPNEA, UNSPECIFIED TYPE: ICD-10-CM

## 2022-09-11 PROBLEM — J18.9 CAP (COMMUNITY ACQUIRED PNEUMONIA): Status: ACTIVE | Noted: 2022-09-11

## 2022-09-11 LAB
ALBUMIN SERPL-MCNC: 3.6 G/DL (ref 3.5–5.2)
ALBUMIN/GLOB SERPL: 1.2 G/DL
ALP SERPL-CCNC: 95 U/L (ref 39–117)
ALT SERPL W P-5'-P-CCNC: 11 U/L (ref 1–33)
ANION GAP SERPL CALCULATED.3IONS-SCNC: 8 MMOL/L (ref 5–15)
AST SERPL-CCNC: 14 U/L (ref 1–32)
BASOPHILS # BLD AUTO: 0.05 10*3/MM3 (ref 0–0.2)
BASOPHILS NFR BLD AUTO: 0.6 % (ref 0–1.5)
BILIRUB SERPL-MCNC: 0.3 MG/DL (ref 0–1.2)
BUN SERPL-MCNC: 34 MG/DL (ref 8–23)
BUN/CREAT SERPL: 25.6 (ref 7–25)
CALCIUM SPEC-SCNC: 8.8 MG/DL (ref 8.6–10.5)
CHLORIDE SERPL-SCNC: 102 MMOL/L (ref 98–107)
CO2 SERPL-SCNC: 28 MMOL/L (ref 22–29)
CREAT SERPL-MCNC: 1.33 MG/DL (ref 0.57–1)
D-LACTATE SERPL-SCNC: 0.9 MMOL/L (ref 0.5–2)
DEPRECATED RDW RBC AUTO: 50.3 FL (ref 37–54)
EGFRCR SERPLBLD CKD-EPI 2021: 40.5 ML/MIN/1.73
EOSINOPHIL # BLD AUTO: 0.12 10*3/MM3 (ref 0–0.4)
EOSINOPHIL NFR BLD AUTO: 1.3 % (ref 0.3–6.2)
ERYTHROCYTE [DISTWIDTH] IN BLOOD BY AUTOMATED COUNT: 13.6 % (ref 12.3–15.4)
FLUAV RNA RESP QL NAA+PROBE: NOT DETECTED
FLUBV RNA RESP QL NAA+PROBE: NOT DETECTED
GLOBULIN UR ELPH-MCNC: 3.1 GM/DL
GLUCOSE BLDC GLUCOMTR-MCNC: 145 MG/DL (ref 70–130)
GLUCOSE SERPL-MCNC: 111 MG/DL (ref 65–99)
HCT VFR BLD AUTO: 36 % (ref 34–46.6)
HGB BLD-MCNC: 11.6 G/DL (ref 12–15.9)
HOLD SPECIMEN: NORMAL
IMM GRANULOCYTES # BLD AUTO: 0.05 10*3/MM3 (ref 0–0.05)
IMM GRANULOCYTES NFR BLD AUTO: 0.6 % (ref 0–0.5)
LYMPHOCYTES # BLD AUTO: 1.91 10*3/MM3 (ref 0.7–3.1)
LYMPHOCYTES NFR BLD AUTO: 21.5 % (ref 19.6–45.3)
MCH RBC QN AUTO: 32.4 PG (ref 26.6–33)
MCHC RBC AUTO-ENTMCNC: 32.2 G/DL (ref 31.5–35.7)
MCV RBC AUTO: 100.6 FL (ref 79–97)
MONOCYTES # BLD AUTO: 0.5 10*3/MM3 (ref 0.1–0.9)
MONOCYTES NFR BLD AUTO: 5.6 % (ref 5–12)
NEUTROPHILS NFR BLD AUTO: 6.26 10*3/MM3 (ref 1.7–7)
NEUTROPHILS NFR BLD AUTO: 70.4 % (ref 42.7–76)
NRBC BLD AUTO-RTO: 0 /100 WBC (ref 0–0.2)
NT-PROBNP SERPL-MCNC: 672.2 PG/ML (ref 0–1800)
PLATELET # BLD AUTO: 188 10*3/MM3 (ref 140–450)
PMV BLD AUTO: 11.7 FL (ref 6–12)
POTASSIUM SERPL-SCNC: 4.1 MMOL/L (ref 3.5–5.2)
PROT SERPL-MCNC: 6.7 G/DL (ref 6–8.5)
RBC # BLD AUTO: 3.58 10*6/MM3 (ref 3.77–5.28)
SARS-COV-2 RNA RESP QL NAA+PROBE: NOT DETECTED
SODIUM SERPL-SCNC: 138 MMOL/L (ref 136–145)
TROPONIN T SERPL-MCNC: <0.01 NG/ML (ref 0–0.03)
WBC NRBC COR # BLD: 8.89 10*3/MM3 (ref 3.4–10.8)
WHOLE BLOOD HOLD COAG: NORMAL
WHOLE BLOOD HOLD SPECIMEN: NORMAL
WHOLE BLOOD HOLD SPECIMEN: NORMAL

## 2022-09-11 PROCEDURE — 93005 ELECTROCARDIOGRAM TRACING: CPT

## 2022-09-11 PROCEDURE — 87636 SARSCOV2 & INF A&B AMP PRB: CPT | Performed by: STUDENT IN AN ORGANIZED HEALTH CARE EDUCATION/TRAINING PROGRAM

## 2022-09-11 PROCEDURE — 85025 COMPLETE CBC W/AUTO DIFF WBC: CPT

## 2022-09-11 PROCEDURE — 84484 ASSAY OF TROPONIN QUANT: CPT

## 2022-09-11 PROCEDURE — 94640 AIRWAY INHALATION TREATMENT: CPT

## 2022-09-11 PROCEDURE — 99284 EMERGENCY DEPT VISIT MOD MDM: CPT

## 2022-09-11 PROCEDURE — 82607 VITAMIN B-12: CPT | Performed by: STUDENT IN AN ORGANIZED HEALTH CARE EDUCATION/TRAINING PROGRAM

## 2022-09-11 PROCEDURE — 93010 ELECTROCARDIOGRAM REPORT: CPT | Performed by: INTERNAL MEDICINE

## 2022-09-11 PROCEDURE — 80053 COMPREHEN METABOLIC PANEL: CPT

## 2022-09-11 PROCEDURE — 94664 DEMO&/EVAL PT USE INHALER: CPT

## 2022-09-11 PROCEDURE — 83880 ASSAY OF NATRIURETIC PEPTIDE: CPT

## 2022-09-11 PROCEDURE — 82746 ASSAY OF FOLIC ACID SERUM: CPT | Performed by: STUDENT IN AN ORGANIZED HEALTH CARE EDUCATION/TRAINING PROGRAM

## 2022-09-11 PROCEDURE — 36415 COLL VENOUS BLD VENIPUNCTURE: CPT | Performed by: STUDENT IN AN ORGANIZED HEALTH CARE EDUCATION/TRAINING PROGRAM

## 2022-09-11 PROCEDURE — 25010000002 LEVOFLOXACIN PER 250 MG: Performed by: STUDENT IN AN ORGANIZED HEALTH CARE EDUCATION/TRAINING PROGRAM

## 2022-09-11 PROCEDURE — 82962 GLUCOSE BLOOD TEST: CPT

## 2022-09-11 PROCEDURE — 25010000002 ENOXAPARIN PER 10 MG: Performed by: PHYSICIAN ASSISTANT

## 2022-09-11 PROCEDURE — 71045 X-RAY EXAM CHEST 1 VIEW: CPT

## 2022-09-11 PROCEDURE — 87040 BLOOD CULTURE FOR BACTERIA: CPT | Performed by: STUDENT IN AN ORGANIZED HEALTH CARE EDUCATION/TRAINING PROGRAM

## 2022-09-11 PROCEDURE — 36415 COLL VENOUS BLD VENIPUNCTURE: CPT

## 2022-09-11 PROCEDURE — 94799 UNLISTED PULMONARY SVC/PX: CPT

## 2022-09-11 PROCEDURE — 83605 ASSAY OF LACTIC ACID: CPT | Performed by: STUDENT IN AN ORGANIZED HEALTH CARE EDUCATION/TRAINING PROGRAM

## 2022-09-11 PROCEDURE — 94760 N-INVAS EAR/PLS OXIMETRY 1: CPT

## 2022-09-11 RX ORDER — ENOXAPARIN SODIUM 100 MG/ML
40 INJECTION SUBCUTANEOUS DAILY
Status: DISCONTINUED | OUTPATIENT
Start: 2022-09-11 | End: 2022-09-15 | Stop reason: HOSPADM

## 2022-09-11 RX ORDER — LANOLIN ALCOHOL/MO/W.PET/CERES
5 CREAM (GRAM) TOPICAL NIGHTLY PRN
Status: DISCONTINUED | OUTPATIENT
Start: 2022-09-11 | End: 2022-09-15 | Stop reason: HOSPADM

## 2022-09-11 RX ORDER — SODIUM CHLORIDE 0.9 % (FLUSH) 0.9 %
10 SYRINGE (ML) INJECTION AS NEEDED
Status: DISCONTINUED | OUTPATIENT
Start: 2022-09-11 | End: 2022-09-15 | Stop reason: HOSPADM

## 2022-09-11 RX ORDER — ACETAMINOPHEN 500 MG
500 TABLET ORAL EVERY 6 HOURS PRN
Status: DISCONTINUED | OUTPATIENT
Start: 2022-09-11 | End: 2022-09-15 | Stop reason: HOSPADM

## 2022-09-11 RX ORDER — ERYTHROMYCIN 250 MG/1
250 TABLET, COATED ORAL EVERY OTHER DAY
COMMUNITY
End: 2022-10-14

## 2022-09-11 RX ORDER — GABAPENTIN 100 MG/1
100 CAPSULE ORAL DAILY
Status: DISCONTINUED | OUTPATIENT
Start: 2022-09-12 | End: 2022-09-12

## 2022-09-11 RX ORDER — CARVEDILOL 3.12 MG/1
3.12 TABLET ORAL 2 TIMES DAILY WITH MEALS
Status: DISCONTINUED | OUTPATIENT
Start: 2022-09-11 | End: 2022-09-15 | Stop reason: HOSPADM

## 2022-09-11 RX ORDER — SPIRONOLACTONE 25 MG/1
25 TABLET ORAL DAILY
Status: DISCONTINUED | OUTPATIENT
Start: 2022-09-12 | End: 2022-09-15 | Stop reason: HOSPADM

## 2022-09-11 RX ORDER — ONDANSETRON 4 MG/1
4 TABLET, FILM COATED ORAL EVERY 6 HOURS PRN
Status: DISCONTINUED | OUTPATIENT
Start: 2022-09-11 | End: 2022-09-15 | Stop reason: HOSPADM

## 2022-09-11 RX ORDER — SODIUM CHLORIDE 0.9 % (FLUSH) 0.9 %
10 SYRINGE (ML) INJECTION EVERY 12 HOURS SCHEDULED
Status: DISCONTINUED | OUTPATIENT
Start: 2022-09-11 | End: 2022-09-15 | Stop reason: HOSPADM

## 2022-09-11 RX ORDER — LEVOFLOXACIN 5 MG/ML
750 INJECTION, SOLUTION INTRAVENOUS
Status: DISCONTINUED | OUTPATIENT
Start: 2022-09-13 | End: 2022-09-15

## 2022-09-11 RX ORDER — LEVOFLOXACIN 5 MG/ML
750 INJECTION, SOLUTION INTRAVENOUS ONCE
Status: DISCONTINUED | OUTPATIENT
Start: 2022-09-11 | End: 2022-09-11

## 2022-09-11 RX ORDER — DEXTROSE MONOHYDRATE 25 G/50ML
25 INJECTION, SOLUTION INTRAVENOUS
Status: DISCONTINUED | OUTPATIENT
Start: 2022-09-11 | End: 2022-09-15 | Stop reason: HOSPADM

## 2022-09-11 RX ORDER — INSULIN ASPART 100 [IU]/ML
0-7 INJECTION, SOLUTION INTRAVENOUS; SUBCUTANEOUS
Status: DISCONTINUED | OUTPATIENT
Start: 2022-09-11 | End: 2022-09-15 | Stop reason: HOSPADM

## 2022-09-11 RX ORDER — PANTOPRAZOLE SODIUM 40 MG/1
40 TABLET, DELAYED RELEASE ORAL EVERY MORNING
Status: DISCONTINUED | OUTPATIENT
Start: 2022-09-12 | End: 2022-09-15 | Stop reason: HOSPADM

## 2022-09-11 RX ORDER — LEVOFLOXACIN 5 MG/ML
750 INJECTION, SOLUTION INTRAVENOUS ONCE
Status: COMPLETED | OUTPATIENT
Start: 2022-09-11 | End: 2022-09-11

## 2022-09-11 RX ORDER — SUCRALFATE 1 G/1
1 TABLET ORAL 4 TIMES DAILY
Status: DISCONTINUED | OUTPATIENT
Start: 2022-09-11 | End: 2022-09-12

## 2022-09-11 RX ORDER — IPRATROPIUM BROMIDE AND ALBUTEROL SULFATE 2.5; .5 MG/3ML; MG/3ML
1.5 SOLUTION RESPIRATORY (INHALATION) EVERY 4 HOURS PRN
Status: DISCONTINUED | OUTPATIENT
Start: 2022-09-11 | End: 2022-09-15 | Stop reason: HOSPADM

## 2022-09-11 RX ORDER — ONDANSETRON 2 MG/ML
4 INJECTION INTRAMUSCULAR; INTRAVENOUS EVERY 6 HOURS PRN
Status: DISCONTINUED | OUTPATIENT
Start: 2022-09-11 | End: 2022-09-15 | Stop reason: HOSPADM

## 2022-09-11 RX ORDER — BUMETANIDE 1 MG/1
2 TABLET ORAL DAILY
Status: DISCONTINUED | OUTPATIENT
Start: 2022-09-12 | End: 2022-09-15 | Stop reason: HOSPADM

## 2022-09-11 RX ORDER — NICOTINE POLACRILEX 4 MG
15 LOZENGE BUCCAL
Status: DISCONTINUED | OUTPATIENT
Start: 2022-09-11 | End: 2022-09-15 | Stop reason: HOSPADM

## 2022-09-11 RX ADMIN — ENOXAPARIN SODIUM 40 MG: 40 INJECTION SUBCUTANEOUS at 21:34

## 2022-09-11 RX ADMIN — MELATONIN 5.25 MG: 3 TAB ORAL at 21:34

## 2022-09-11 RX ADMIN — IPRATROPIUM BROMIDE AND ALBUTEROL SULFATE 1.5 ML: 2.5; .5 SOLUTION RESPIRATORY (INHALATION) at 21:49

## 2022-09-11 RX ADMIN — LEVOFLOXACIN 750 MG: 5 INJECTION, SOLUTION INTRAVENOUS at 22:05

## 2022-09-11 RX ADMIN — CARVEDILOL 3.12 MG: 3.12 TABLET, FILM COATED ORAL at 21:34

## 2022-09-11 NOTE — ED PROVIDER NOTES
Subjective   PIT    81-year-old female with a history of lung problems comes to the ER with a few day history of difficulty breathing with a cough.  She endorses coughing up green stuff.  History of COPD and asthma.  No fevers that she can recall.  She has not taken anything.  Exertion makes her shortness of breath worse.  Rest makes it better.      History provided by:  Patient   used: No        Review of Systems   Constitutional: Positive for activity change, appetite change and fatigue. Negative for chills, diaphoresis and fever.   HENT: Negative for drooling.    Eyes: Negative for redness.   Respiratory: Positive for cough and shortness of breath. Negative for choking, chest tightness and wheezing.    Cardiovascular: Negative for chest pain and palpitations.   Gastrointestinal: Negative for abdominal pain, nausea and vomiting.   Genitourinary: Negative for flank pain.   Skin: Negative for color change.   Neurological: Negative for dizziness, seizures, weakness, light-headedness and numbness.   Psychiatric/Behavioral: Negative for confusion.       Past Medical History:   Diagnosis Date   • Asthma    • Cancer (HCC)    • Congenital abnormalities     colon behind liver   • COPD (chronic obstructive pulmonary disease) (HCC)    • Hypertension        Allergies   Allergen Reactions   • Dilaudid [Hydromorphone Hcl] Anaphylaxis   • Diphenoxylate-Atropine Shortness Of Breath     Reaction: shortness of breath     • Iodinated Diagnostic Agents Hives     Reaction: HIVES   • Keflex [Cephalexin] Shortness Of Breath   • Lomotil [Diphenoxylate] Shortness Of Breath   • Aspirin Hives   • Beef (Diagnostic) Nausea And Vomiting     All mammals  All mammals   • Beef-Derived Products GI Intolerance   • Ciprofloxacin Nausea And Vomiting   • Contrast Dye Hives   • Levaquin [Levofloxacin] GI Intolerance   • Milk-Related Compounds GI Intolerance   • Nsaids Hives   • Pegademase Bovine Other (See Comments)     Other  "reaction(s): GI Intolerance   • Percocet [Oxycodone-Acetaminophen] Itching   • Pork-Derived Products GI Intolerance   • Tolmetin Hives       Past Surgical History:   Procedure Laterality Date   • ADENOIDECTOMY     • ADRENAL GLAND SURGERY Right    • BLADDER SURGERY     • BREAST BIOPSY     • COLONOSCOPY W/ BIOPSIES AND POLYPECTOMY     • EYE SURGERY     • HERNIA REPAIR     • HYSTERECTOMY     • LUNG LOBECTOMY Left    • RECTAL SURGERY      cyst   • SKIN CANCER EXCISION      legs   • TONSILLECTOMY     • VARICOSE VEIN SURGERY         Family History   Problem Relation Age of Onset   • Diabetes Mother    • Diabetes Father    • Heart disease Father    • Cancer Sister    • Heart disease Sister    • Thyroid disease Sister    • Diabetes Brother    • Heart disease Brother        Social History     Socioeconomic History   • Marital status:    Tobacco Use   • Smoking status: Former Smoker   • Smokeless tobacco: Never Used   Vaping Use   • Vaping Use: Never used   Substance and Sexual Activity   • Alcohol use: Never   • Drug use: Never   • Sexual activity: Defer           Objective    Vitals:    09/11/22 1534 09/11/22 1559 09/11/22 1700   BP:  154/97 138/66   BP Location:  Right arm    Patient Position:  Sitting    Pulse: 74  68   Resp: 20     Temp: 97.6 °F (36.4 °C)     TempSrc: Oral     SpO2: 98%  97%   Weight:  109 kg (241 lb)    Height:  172.7 cm (67.99\")        Physical Exam  Vitals and nursing note reviewed.   Constitutional:       General: She is not in acute distress.     Appearance: She is well-developed. She is obese. She is ill-appearing. She is not toxic-appearing or diaphoretic.   HENT:      Head: Normocephalic.      Right Ear: External ear normal.      Left Ear: External ear normal.   Eyes:      General: No scleral icterus.     Conjunctiva/sclera: Conjunctivae normal.   Pulmonary:      Effort: Pulmonary effort is normal. No accessory muscle usage or respiratory distress.      Breath sounds: No wheezing.   Chest: "      Chest wall: No tenderness.   Abdominal:      General: Bowel sounds are normal.      Palpations: Abdomen is soft.      Tenderness: There is no abdominal tenderness (deep palpation).   Skin:     General: Skin is warm and dry.      Capillary Refill: Capillary refill takes less than 2 seconds.   Neurological:      Mental Status: She is alert and oriented to person, place, and time.   Psychiatric:         Behavior: Behavior normal.         ECG 12 Lead      Date/Time: 9/11/2022 5:26 PM  Performed by: Desmond Chun MD  Authorized by: Desmond Chun MD   Interpreted by physician  Rhythm: sinus rhythm  Rate: normal  BPM: 68  QRS axis: normal  ST Segments: ST segments normal  T Waves: T waves normal  Clinical impression: normal ECG and low voltage                 ED Course      Results for orders placed or performed during the hospital encounter of 09/11/22   COVID-19 and FLU A/B PCR - Swab, Nasopharynx    Specimen: Nasopharynx; Swab   Result Value Ref Range    COVID19 Not Detected Not Detected - Ref. Range    Influenza A PCR Not Detected Not Detected    Influenza B PCR Not Detected Not Detected   Comprehensive Metabolic Panel    Specimen: Blood   Result Value Ref Range    Glucose 111 (H) 65 - 99 mg/dL    BUN 34 (H) 8 - 23 mg/dL    Creatinine 1.33 (H) 0.57 - 1.00 mg/dL    Sodium 138 136 - 145 mmol/L    Potassium 4.1 3.5 - 5.2 mmol/L    Chloride 102 98 - 107 mmol/L    CO2 28.0 22.0 - 29.0 mmol/L    Calcium 8.8 8.6 - 10.5 mg/dL    Total Protein 6.7 6.0 - 8.5 g/dL    Albumin 3.60 3.50 - 5.20 g/dL    ALT (SGPT) 11 1 - 33 U/L    AST (SGOT) 14 1 - 32 U/L    Alkaline Phosphatase 95 39 - 117 U/L    Total Bilirubin 0.3 0.0 - 1.2 mg/dL    Globulin 3.1 gm/dL    A/G Ratio 1.2 g/dL    BUN/Creatinine Ratio 25.6 (H) 7.0 - 25.0    Anion Gap 8.0 5.0 - 15.0 mmol/L    eGFR 40.5 (L) >60.0 mL/min/1.73   BNP    Specimen: Blood   Result Value Ref Range    proBNP 672.2 0.0 - 1,800.0 pg/mL   Troponin    Specimen: Blood   Result Value Ref  Range    Troponin T <0.010 0.000 - 0.030 ng/mL   CBC Auto Differential    Specimen: Blood   Result Value Ref Range    WBC 8.89 3.40 - 10.80 10*3/mm3    RBC 3.58 (L) 3.77 - 5.28 10*6/mm3    Hemoglobin 11.6 (L) 12.0 - 15.9 g/dL    Hematocrit 36.0 34.0 - 46.6 %    .6 (H) 79.0 - 97.0 fL    MCH 32.4 26.6 - 33.0 pg    MCHC 32.2 31.5 - 35.7 g/dL    RDW 13.6 12.3 - 15.4 %    RDW-SD 50.3 37.0 - 54.0 fl    MPV 11.7 6.0 - 12.0 fL    Platelets 188 140 - 450 10*3/mm3    Neutrophil % 70.4 42.7 - 76.0 %    Lymphocyte % 21.5 19.6 - 45.3 %    Monocyte % 5.6 5.0 - 12.0 %    Eosinophil % 1.3 0.3 - 6.2 %    Basophil % 0.6 0.0 - 1.5 %    Immature Grans % 0.6 (H) 0.0 - 0.5 %    Neutrophils, Absolute 6.26 1.70 - 7.00 10*3/mm3    Lymphocytes, Absolute 1.91 0.70 - 3.10 10*3/mm3    Monocytes, Absolute 0.50 0.10 - 0.90 10*3/mm3    Eosinophils, Absolute 0.12 0.00 - 0.40 10*3/mm3    Basophils, Absolute 0.05 0.00 - 0.20 10*3/mm3    Immature Grans, Absolute 0.05 0.00 - 0.05 10*3/mm3    nRBC 0.0 0.0 - 0.2 /100 WBC   Lactic Acid, Plasma    Specimen: Blood   Result Value Ref Range    Lactate 0.9 0.5 - 2.0 mmol/L   ECG 12 Lead   Result Value Ref Range    QT Interval 412 ms    QTC Interval 438 ms   Green Top (Gel)   Result Value Ref Range    Extra Tube Hold for add-ons.    Lavender Top   Result Value Ref Range    Extra Tube hold for add-on    Gold Top - SST   Result Value Ref Range    Extra Tube Hold for add-ons.    Light Blue Top   Result Value Ref Range    Extra Tube Hold for add-ons.      XR Chest 1 View   Final Result   Bilateral diffuse coarse interstitial prominence, also visualized   on prior study.   Coexisting multifocal patchy airspace process on both sides.                  Electronically signed by:  Jas Cerda MD  9/11/2022 5:05 PM CDT   Workstation: [id number]                                   CURB-65 Score for Pneumonia Severity - MDCalc  2 points -> Moderate risk group: 6.8% 30-day mortality. Consider inpatient treatment or  outpatient with close followup.          MDM  Number of Diagnoses or Management Options  Lung infiltrate: new and requires workup  Diagnosis management comments: Vital signs are stable, afebrile.  Labs are remarkable for normal WBC.  Lactic acid is normal.  Blood cultures pending.  Chest x-ray shows bilateral opacities.  Antibiotics ordered.  Curb 65 score is 2.  Spoke with the on-call hospitalist agrees to admit.      Final diagnoses:   Lung infiltrate       ED Disposition  ED Disposition     ED Disposition   Decision to Admit    Condition   --    Comment   Level of Care: Telemetry [5]   Diagnosis: Lung infiltrate [[id number]]   Admitting Physician: CLIFTON MERIDA [[id number]]   Attending Physician: CLIFTON MERIDA [[id number]]               No follow-up provider specified.       Medication List      No changes were made to your prescriptions during this visit.          Desmond Chun MD  09/11/22 [id number]

## 2022-09-11 NOTE — H&P
[facility] Medicine  HISTORY AND PHYSICAL      Date of Admission: 9/11/2022  Primary Care Physician: Gus Posada MD    Subjective     Chief Complaint: Short of breath, cough, chills    History of Present Illness  This patient is an 81-year-old female (history of lung cancer, COPD, diabetes, CHF, hypertension) who presents to the ER today with chief complaint of cough, shortness of breath, and chills.  She states that her symptoms started this past week.  She has been measuring her temperature at home, but has not measured any fevers.  She states has had a lot of runny nose and congestion.  Her cough is productive of a yellow-colored phlegm.  She has noticed that her shortness of breath is worse with walking, and she has had very little energy.  The patient is oxygen dependent at 2 L, and has not had to increase her level.  She denies any known sick contacts.    ED evaluation found chest x-ray demonstrating bilateral pneumonia with diffuse, coarse interstitial prominences.  CBC and CMP unremarkable at this time.  There is no elevation of cardiac enzymes and EKG is unremarkable.  Given underlying lung disease, ED provider contacted hospitalist service for admission.      Review of Systems   Constitutional: Positive for chills and fatigue. Negative for appetite change, diaphoresis and fever.   HENT: Positive for congestion and rhinorrhea. Negative for ear pain, postnasal drip, sinus pressure, sinus pain, sneezing, sore throat and trouble swallowing.    Eyes: Negative for photophobia, pain and discharge.   Respiratory: Positive for cough (with yellow sputum) and shortness of breath. Negative for chest tightness and wheezing.    Cardiovascular: Negative for chest pain, palpitations and leg swelling.   Gastrointestinal: Negative for abdominal pain, blood in stool, constipation, diarrhea, nausea and vomiting.   Endocrine: Negative for polydipsia, polyphagia and  polyuria.   Genitourinary: Negative for difficulty urinating, dysuria, flank pain, frequency, hematuria and urgency.   Musculoskeletal: Negative for arthralgias, back pain, myalgias and neck pain.   Skin: Negative for color change, rash and wound.   Neurological: Negative for dizziness, seizures, syncope, weakness and headaches.   Hematological: Does not bruise/bleed easily.   Psychiatric/Behavioral: Negative for behavioral problems, confusion, hallucinations, sleep disturbance and suicidal ideas.        Otherwise complete ROS reviewed and negative except as mentioned in the HPI.    Past Medical History:   Past Medical History:   Diagnosis Date   • Asthma    • Cancer (HCC)    • Congenital abnormalities     colon behind liver   • COPD (chronic obstructive pulmonary disease) (HCC)    • Hypertension      Past Surgical History:  Past Surgical History:   Procedure Laterality Date   • ADENOIDECTOMY     • ADRENAL GLAND SURGERY Right    • BLADDER SURGERY     • BREAST BIOPSY     • COLONOSCOPY W/ BIOPSIES AND POLYPECTOMY     • EYE SURGERY     • HERNIA REPAIR     • HYSTERECTOMY     • LUNG LOBECTOMY Left    • RECTAL SURGERY      cyst   • SKIN CANCER EXCISION      legs   • TONSILLECTOMY     • VARICOSE VEIN SURGERY       Social History:  reports that she has quit smoking. She has never used smokeless tobacco. She reports that she does not drink alcohol and does not use drugs.    Family History: family history includes Cancer in her sister; Diabetes in her brother, father, and mother; Heart disease in her brother, father, and sister; Thyroid disease in her sister.       Allergies:  Allergies   Allergen Reactions   • Dilaudid [Hydromorphone Hcl] Anaphylaxis   • Diphenoxylate-Atropine Shortness Of Breath     Reaction: shortness of breath     • Iodinated Diagnostic Agents Hives     Reaction: HIVES   • Keflex [Cephalexin] Shortness Of Breath   • Lomotil [Diphenoxylate] Shortness Of Breath   • Aspirin Hives   • Beef (Diagnostic) Nausea  And Vomiting     All mammals  All mammals   • Beef-Derived Products GI Intolerance   • Ciprofloxacin Nausea And Vomiting   • Contrast Dye Hives   • Levaquin [Levofloxacin] GI Intolerance   • Milk-Related Compounds GI Intolerance   • Nsaids Hives   • Pegademase Bovine Other (See Comments)     Other reaction(s): GI Intolerance   • Percocet [Oxycodone-Acetaminophen] Itching   • Pork-Derived Products GI Intolerance   • Tolmetin Hives       Medications:  Prior to Admission medications    Medication Sig Start Date End Date Taking? Authorizing Provider   acetaminophen (TYLENOL) 500 MG tablet Take 500 mg by mouth Every 6 (Six) Hours As Needed for Mild Pain .   Yes Nicole Cline MD   albuterol (PROVENTIL HFA;VENTOLIN HFA) 108 (90 Base) MCG/ACT inhaler Inhale 2 puffs Every 4 (Four) Hours As Needed for Wheezing.   Yes Nicole Cline MD   budesonide-formoterol (SYMBICORT) 160-4.5 MCG/ACT inhaler Inhale 2 puffs. 9/6/17  Yes Nicole Cline MD   bumetanide (BUMEX) 2 MG tablet Take 2 mg by mouth Daily.   Yes ProviderNicole MD   carvedilol (COREG) 3.125 MG tablet Take 3.125 mg by mouth 2 (Two) Times a Day With Meals.   Yes Nicole Cline MD   EPINEPHrine (EPIPEN IJ) Inject  as directed.   Yes ProviderNicole MD   gabapentin (NEURONTIN) 100 MG capsule Take 1 capsule by mouth Daily. 6/28/22  Yes Gus Posada MD   Glucose Blood (PRECISION XTRA TEST VI) by In Vitro route.   Yes Nicole Cline MD   guaiFENesin (MUCINEX) 600 MG 12 hr tablet Take 1,200 mg by mouth 2 (Two) Times a Day.   Yes Nicole Cline MD   ipratropium-albuterol (DUO-NEB) 0.5-2.5 mg/3 ml nebulizer Take 1.5 mL by nebulization Every 4 (Four) Hours As Needed for Wheezing.   Yes Nicole Cline MD   levocetirizine (XYZAL) 5 MG tablet Take 5 mg by mouth Every Evening.   Yes Nicole Cline MD   Melatonin 10 MG capsule Take  by mouth.   Yes Nicole Cline MD   metFORMIN ER (GLUCOPHAGE-XR) 750 MG 24  "hr tablet TAKE 1 TABLET EVERY NIGHT AT BEDTIME 7/12/22  Yes Gus Posada MD   omeprazole (priLOSEC) 40 MG capsule Take 40 mg by mouth Daily.   Yes Nicole Cline MD   ondansetron ODT (Zofran ODT) 4 MG disintegrating tablet Place 1 tablet on the tongue Every 8 (Eight) Hours As Needed for Nausea or Vomiting. 9/6/22  Yes Sinan Manley MD   simethicone (Gas-X) 80 MG chewable tablet Chew 1 tablet Every 6 (Six) Hours As Needed (abdominal bloating). 9/9/22  Yes Tucker Garcia MD   spironolactone (ALDACTONE) 25 MG tablet Take 25 mg by mouth Daily.   Yes Nicole Cline MD   sucralfate (CARAFATE) 1 g tablet Take 1 tablet by mouth 4 (Four) Times a Day. 9/9/22  Yes Tucker Garcia MD   hydrocortisone 2.5 % cream  6/30/22   Nicole Cline MD   IPRATROPIUM BROMIDE IN Inhale.    ProviderNicole MD   nystatin (MYCOSTATIN) 865027 UNIT/GM cream  6/30/22   Nicole Cline MD   azithromycin (ZITHROMAX) 250 MG tablet Take 250 mg by mouth. 5/11/22 9/11/22  Nicole Cline MD     I have utilized all available immediate resources to obtain, update, and review the patient's current medications.    Objective     Vital Signs: /66   Pulse 68   Temp 97.6 °F (36.4 °C) (Oral)   Resp 20   Ht 172.7 cm (67.99\")   Wt 109 kg (241 lb)   SpO2 97%   BMI 36.65 kg/m²   Physical Exam  Vitals and nursing note reviewed.   Constitutional:       Appearance: Normal appearance. She is morbidly obese.   HENT:      Head: Normocephalic and atraumatic.      Right Ear: External ear normal.      Left Ear: External ear normal.      Nose: Nose normal. No congestion or rhinorrhea.      Mouth/Throat:      Mouth: Mucous membranes are moist.      Pharynx: Oropharynx is clear.   Eyes:      General: No scleral icterus.     Extraocular Movements: Extraocular movements intact.      Conjunctiva/sclera: Conjunctivae normal.      Pupils: Pupils are equal, round, and reactive to light.   Neck:      Vascular: No carotid " bruit.   Cardiovascular:      Rate and Rhythm: Normal rate and regular rhythm.      Pulses: Normal pulses.      Heart sounds: Normal heart sounds. No murmur heard.  Pulmonary:      Effort: Pulmonary effort is normal.      Breath sounds: Examination of the right-lower field reveals decreased breath sounds. Examination of the left-lower field reveals decreased breath sounds. Decreased breath sounds present. No wheezing, rhonchi or rales.      Comments: Coarse lung sounds midfield bilaterally  Abdominal:      General: Abdomen is flat. Bowel sounds are normal.      Palpations: Abdomen is soft.      Tenderness: There is no abdominal tenderness. There is no guarding.   Musculoskeletal:         General: No swelling or deformity. Normal range of motion.      Cervical back: Normal range of motion and neck supple. No tenderness.      Right lower leg: Edema present.      Left lower leg: Edema present.   Skin:     General: Skin is warm and dry.      Capillary Refill: Capillary refill takes less than 2 seconds.      Findings: No rash.   Neurological:      General: No focal deficit present.      Mental Status: She is alert and oriented to person, place, and time.      Motor: No weakness.   Psychiatric:         Mood and Affect: Mood normal.         Behavior: Behavior normal.         Thought Content: Thought content normal.         Judgment: Judgment normal.              Results Reviewed:  Lab Results (last 24 hours)     Procedure Component Value Units Date/Time    Lactic Acid, Plasma [[id number]]  (Normal) Collected: 09/11/22 1715    Specimen: Blood Updated: 09/11/22 1731     Lactate 0.9 mmol/L     [place] Draw [[id number]] Collected: 09/11/22 1608    Specimen: Blood Updated: 09/11/22 1718    Narrative:      The following orders were created for panel order [place] Draw.  Procedure                               Abnormality         Status                     ---------                               -----------         ------                      Green Top (Gel)[[id number]]                                  Final result               Lavender Top[[id number]]                                     Final result               Gold Top - SST[[id number]]                                   Final result               Light Blue Top[[id number]]                                   Final result                 Please view results for these tests on the individual orders.    Green Top (Gel) [[id number]] Collected: 09/11/22 1608    Specimen: Blood Updated: 09/11/22 1718     Extra Tube Hold for add-ons.     Comment: Auto resulted.       Lavender Top [[id number]] Collected: 09/11/22 1608    Specimen: Blood Updated: 09/11/22 1718     Extra Tube hold for add-on     Comment: Auto resulted       Light Blue Top [[id number]] Collected: 09/11/22 1608    Specimen: Blood Updated: 09/11/22 1718     Extra Tube Hold for add-ons.     Comment: Auto resulted       Gold Top - SST [[id number]] Collected: 09/11/22 1608    Specimen: Blood Updated: 09/11/22 1718     Extra Tube Hold for add-ons.     Comment: Auto resulted.       Extra Tubes [[id number]] Collected: 09/11/22 1715    Specimen: Blood Updated: 09/11/22 1715    Narrative:      The following orders were created for panel order Extra Tubes.  Procedure                               Abnormality         Status                     ---------                               -----------         ------                     Santos Top[[id number]]                                         In process                   Please view results for these tests on the individual orders.    Santos Top [[id number]] Collected: 09/11/22 1715    Specimen: Blood Updated: 09/11/22 1715    Comprehensive Metabolic Panel [[id number]]  (Abnormal) Collected: 09/11/22 1608    Specimen: Blood Updated: 09/11/22 1634     Glucose 111 mg/dL      BUN 34 mg/dL      Creatinine 1.33 mg/dL      Sodium 138 mmol/L      Potassium 4.1 mmol/L      Chloride 102 mmol/L      CO2 28.0 mmol/L       Calcium 8.8 mg/dL      Total Protein 6.7 g/dL      Albumin 3.60 g/dL      ALT (SGPT) 11 U/L      AST (SGOT) 14 U/L      Alkaline Phosphatase 95 U/L      Total Bilirubin 0.3 mg/dL      Globulin 3.1 gm/dL      A/G Ratio 1.2 g/dL      BUN/Creatinine Ratio 25.6     Anion Gap 8.0 mmol/L      eGFR 40.5 mL/min/1.73      Comment: National Kidney Foundation and American Society of Nephrology (ASN) Task Force recommended calculation based on the Chronic Kidney Disease Epidemiology Collaboration (CKD-EPI) equation refit without adjustment for race.       Narrative:      GFR Normal >60  Chronic Kidney Disease <60  Kidney Failure <15      Troponin [[id number]]  (Normal) Collected: 09/11/22 1608    Specimen: Blood Updated: 09/11/22 1634     Troponin T <0.010 ng/mL     Narrative:      Troponin T Reference Range:  <= 0.03 ng/mL-   Negative for AMI  >0.03 ng/mL-     Abnormal for myocardial necrosis.  Clinicians would have to utilize clinical acumen, EKG, Troponin and serial changes to determine if it is an Acute Myocardial Infarction or myocardial injury due to an underlying chronic condition.       Results may be falsely decreased if patient taking Biotin.      BNP [[id number]]  (Normal) Collected: 09/11/22 1608    Specimen: Blood Updated: 09/11/22 1632     proBNP 672.2 pg/mL     Narrative:      Among patients with dyspnea, NT-proBNP is highly sensitive for the detection of acute congestive heart failure. In addition NT-proBNP of <300 pg/ml effectively rules out acute congestive heart failure with 99% negative predictive value.    Results may be falsely decreased if patient taking Biotin.      CBC & Differential [[id number]]  (Abnormal) Collected: 09/11/22 1608    Specimen: Blood Updated: 09/11/22 1615    Narrative:      The following orders were created for panel order CBC & Differential.  Procedure                               Abnormality         Status                     ---------                               -----------          ------                     CBC Auto Differential[[id number]]        Abnormal            Final result                 Please view results for these tests on the individual orders.    CBC Auto Differential [[id number]]  (Abnormal) Collected: 09/11/22 1608    Specimen: Blood Updated: 09/11/22 1615     WBC 8.89 10*3/mm3      RBC 3.58 10*6/mm3      Hemoglobin 11.6 g/dL      Hematocrit 36.0 %      .6 fL      MCH 32.4 pg      MCHC 32.2 g/dL      RDW 13.6 %      RDW-SD 50.3 fl      MPV 11.7 fL      Platelets 188 10*3/mm3      Neutrophil % 70.4 %      Lymphocyte % 21.5 %      Monocyte % 5.6 %      Eosinophil % 1.3 %      Basophil % 0.6 %      Immature Grans % 0.6 %      Neutrophils, Absolute 6.26 10*3/mm3      Lymphocytes, Absolute 1.91 10*3/mm3      Monocytes, Absolute 0.50 10*3/mm3      Eosinophils, Absolute 0.12 10*3/mm3      Basophils, Absolute 0.05 10*3/mm3      Immature Grans, Absolute 0.05 10*3/mm3      nRBC 0.0 /100 WBC     COVID-19 and FLU A/B PCR - Swab, Nasopharynx [[id number]]  (Normal) Collected: 09/11/22 1534    Specimen: Swab from Nasopharynx Updated: 09/11/22 1604     COVID19 Not Detected     Influenza A PCR Not Detected     Influenza B PCR Not Detected    Narrative:      Fact sheet for providers: https://www.fda.gov/media/241439/download    Fact sheet for patients: https://www.fda.gov/media/402695/download    Test performed by PCR.        Imaging Results (Last 24 Hours)     Procedure Component Value Units Date/Time    XR Chest 1 View [[id number]] Collected: 09/11/22 1629     Updated: 09/11/22 1707    Narrative:      EXAM: XR CHEST 1 VIEW    HISTORY: [facility] Triage Protocol    COMPARISON: 2/8/2020    TECHNIQUE:   Portable view of the chest.    FINDINGS:   Bilateral diffuse coarse interstitial prominence, also visualized  on prior study.  Stable cardiomegaly. Coexisting multifocal patchy airspace  process on both sides.  A small left pleural effusion. The mediastinal contours are  within normal limits. .  No  evidence of mediastinal shift or pneumothorax.   Unremarkable visualized osseous structures.      Impression:      Bilateral diffuse coarse interstitial prominence, also visualized  on prior study.  Coexisting multifocal patchy airspace process on both sides.            Electronically signed by:  Jas Cerda MD  9/11/2022 5:05 PM CDT  Workstation: [id number]        I have personally reviewed and interpreted the radiology studies and ECG obtained at time of admission.         Assessment / Plan     Assessment:   Active Hospital Problems    Diagnosis    • CAP (community acquired pneumonia)      Plan:    CAP (community acquired pneumonia): IV Levaquin w/ renal dosing (CrCl 43).  Nebulizer treatments. Oxygen via NC @ 2L; this is home level.  Continue home Bumex. Blood cultures pending.     HTN: Continue home meds     DM: Consistent carbohydrate diet, Acchecks with sliding scale insulin      VTE Prophylaxis: Lovenox      Code Status: FULL CODE    I confirmed that the patient's Advance Care Plan is present, code status is documented, or surrogate decision maker is listed in the patient's medical record.       I have utilized all available immediate resources to obtain, update, or review the patient's current medications.     Estimated length of stay is 1-2 days.     The patient was seen and examined by me on 09/11/22  at 1735.        Electronically signed by Camille Taylor PA-C, 09/11/22, 17:59 CDT.

## 2022-09-11 NOTE — ED NOTES
"Nursing report ED to floor  Alda Constantino  81 y.o.  female    HPI:   Chief Complaint   Patient presents with   • Nausea   • Cough   • Shortness of Breath   • Fatigue   • Dizziness       Admitting doctor:   Keaton Santos MD    Consulting provider(s):  Consults     No orders found from 8/13/2022 to 9/12/2022.           Admitting diagnosis:   The encounter diagnosis was Lung infiltrate.    Code status:   Current Code Status     Date Active Code Status Order ID Comments User Context       Prior    Advance Care Planning Activity          Allergies:   Dilaudid [hydromorphone hcl], Diphenoxylate-atropine, Iodinated diagnostic agents, Keflex [cephalexin], Lomotil [diphenoxylate], Aspirin, Beef (diagnostic), Beef-derived products, Ciprofloxacin, Contrast dye, Levaquin [levofloxacin], Milk-related compounds, Nsaids, Pegademase bovine, Percocet [oxycodone-acetaminophen], Pork-derived products, and Tolmetin    Intake and Output  No intake or output data in the 24 hours ending 09/11/22 1746    Weight:       09/11/22  1559   Weight: 109 kg (241 lb)       Most recent vitals:   Vitals:    09/11/22 1534 09/11/22 1559 09/11/22 1700   BP:  154/97 138/66   BP Location:  Right arm    Patient Position:  Sitting    Pulse: 74  68   Resp: 20     Temp: 97.6 °F (36.4 °C)     TempSrc: Oral     SpO2: 98%  97%   Weight:  109 kg (241 lb)    Height:  172.7 cm (67.99\")        Active LDAs/IV Access:   Lines, Drains & Airways     Active LDAs     None                Labs (abnormal labs have a star):   Labs Reviewed   COMPREHENSIVE METABOLIC PANEL - Abnormal; Notable for the following components:       Result Value    Glucose 111 (*)     BUN 34 (*)     Creatinine 1.33 (*)     BUN/Creatinine Ratio 25.6 (*)     eGFR 40.5 (*)     All other components within normal limits    Narrative:     GFR Normal >60  Chronic Kidney Disease <60  Kidney Failure <15     CBC WITH AUTO DIFFERENTIAL - Abnormal; Notable for the following components:    RBC 3.58 (*)     " Hemoglobin 11.6 (*)     .6 (*)     Immature Grans % 0.6 (*)     All other components within normal limits   COVID-19 AND FLU A/B, NP SWAB IN TRANSPORT MEDIA 8-12 HR TAT - Normal    Narrative:     Fact sheet for providers: https://www.fda.gov/media/355321/download    Fact sheet for patients: https://www.fda.gov/media/063034/download    Test performed by PCR.   BNP (IN-HOUSE) - Normal    Narrative:     Among patients with dyspnea, NT-proBNP is highly sensitive for the detection of acute congestive heart failure. In addition NT-proBNP of <300 pg/ml effectively rules out acute congestive heart failure with 99% negative predictive value.    Results may be falsely decreased if patient taking Biotin.     TROPONIN (IN-HOUSE) - Normal    Narrative:     Troponin T Reference Range:  <= 0.03 ng/mL-   Negative for AMI  >0.03 ng/mL-     Abnormal for myocardial necrosis.  Clinicians would have to utilize clinical acumen, EKG, Troponin and serial changes to determine if it is an Acute Myocardial Infarction or myocardial injury due to an underlying chronic condition.       Results may be falsely decreased if patient taking Biotin.     LACTIC ACID, PLASMA - Normal   BLOOD CULTURE   BLOOD CULTURE   RAINBOW DRAW    Narrative:     The following orders were created for panel order [place] Draw.  Procedure                               Abnormality         Status                     ---------                               -----------         ------                     Green Top (Gel)[[id number]]                                  Final result               Lavender Top[[id number]]                                     Final result               Gold Top - SST[[id number]]                                   Final result               Light Blue Top[[id number]]                                   Final result                 Please view results for these tests on the individual orders.   CBC AND DIFFERENTIAL    Narrative:     The following orders  were created for panel order CBC & Differential.  Procedure                               Abnormality         Status                     ---------                               -----------         ------                     CBC Auto Differential[[id number]]        Abnormal            Final result                 Please view results for these tests on the individual orders.   GREEN TOP   LAVENDER TOP   GOLD TOP - SST   LIGHT BLUE TOP   EXTRA TUBES    Narrative:     The following orders were created for panel order Extra Tubes.  Procedure                               Abnormality         Status                     ---------                               -----------         ------                     Santos Top[[id number]]                                         In process                   Please view results for these tests on the individual orders.   GRAY TOP       Meds given in ED:   Medications   sodium chloride 0.9 % flush 10 mL (has no administration in time range)   levoFLOXacin (LEVAQUIN) 750 mg/150 mL D5W (premix) (LEVAQUIN) 750 mg (has no administration in time range)           NIH Stroke Scale:       Isolation/Infection(s):  No active isolations   COVID (rule out)     COVID Testing  Collected YES  Resulted Negative    Nursing report ED to floor:  Mental status: A&O  Ambulatory status: Assist x2  Precautions: Fall    ED nurse phone extentsion- [phone]

## 2022-09-12 LAB
ANION GAP SERPL CALCULATED.3IONS-SCNC: 8 MMOL/L (ref 5–15)
BASOPHILS # BLD AUTO: 0.04 10*3/MM3 (ref 0–0.2)
BASOPHILS NFR BLD AUTO: 0.6 % (ref 0–1.5)
BUN SERPL-MCNC: 29 MG/DL (ref 8–23)
BUN/CREAT SERPL: 23.2 (ref 7–25)
CALCIUM SPEC-SCNC: 8.6 MG/DL (ref 8.6–10.5)
CHLORIDE SERPL-SCNC: 106 MMOL/L (ref 98–107)
CO2 SERPL-SCNC: 25 MMOL/L (ref 22–29)
CREAT SERPL-MCNC: 1.25 MG/DL (ref 0.57–1)
DEPRECATED RDW RBC AUTO: 52.4 FL (ref 37–54)
EGFRCR SERPLBLD CKD-EPI 2021: 43.7 ML/MIN/1.73
EOSINOPHIL # BLD AUTO: 0.13 10*3/MM3 (ref 0–0.4)
EOSINOPHIL NFR BLD AUTO: 1.8 % (ref 0.3–6.2)
ERYTHROCYTE [DISTWIDTH] IN BLOOD BY AUTOMATED COUNT: 13.8 % (ref 12.3–15.4)
GLUCOSE BLDC GLUCOMTR-MCNC: 103 MG/DL (ref 70–130)
GLUCOSE BLDC GLUCOMTR-MCNC: 110 MG/DL (ref 70–130)
GLUCOSE BLDC GLUCOMTR-MCNC: 114 MG/DL (ref 70–130)
GLUCOSE BLDC GLUCOMTR-MCNC: 99 MG/DL (ref 70–130)
GLUCOSE SERPL-MCNC: 162 MG/DL (ref 65–99)
HCT VFR BLD AUTO: 33 % (ref 34–46.6)
HGB BLD-MCNC: 10.5 G/DL (ref 12–15.9)
IMM GRANULOCYTES # BLD AUTO: 0.06 10*3/MM3 (ref 0–0.05)
IMM GRANULOCYTES NFR BLD AUTO: 0.9 % (ref 0–0.5)
LYMPHOCYTES # BLD AUTO: 1.65 10*3/MM3 (ref 0.7–3.1)
LYMPHOCYTES NFR BLD AUTO: 23.5 % (ref 19.6–45.3)
MCH RBC QN AUTO: 32.7 PG (ref 26.6–33)
MCHC RBC AUTO-ENTMCNC: 31.8 G/DL (ref 31.5–35.7)
MCV RBC AUTO: 102.8 FL (ref 79–97)
MONOCYTES # BLD AUTO: 0.47 10*3/MM3 (ref 0.1–0.9)
MONOCYTES NFR BLD AUTO: 6.7 % (ref 5–12)
NEUTROPHILS NFR BLD AUTO: 4.68 10*3/MM3 (ref 1.7–7)
NEUTROPHILS NFR BLD AUTO: 66.5 % (ref 42.7–76)
NRBC BLD AUTO-RTO: 0 /100 WBC (ref 0–0.2)
PLATELET # BLD AUTO: 166 10*3/MM3 (ref 140–450)
PMV BLD AUTO: 12.7 FL (ref 6–12)
POTASSIUM SERPL-SCNC: 4.2 MMOL/L (ref 3.5–5.2)
RBC # BLD AUTO: 3.21 10*6/MM3 (ref 3.77–5.28)
SODIUM SERPL-SCNC: 139 MMOL/L (ref 136–145)
WBC NRBC COR # BLD: 7.03 10*3/MM3 (ref 3.4–10.8)
WHOLE BLOOD HOLD SPECIMEN: NORMAL

## 2022-09-12 PROCEDURE — 97166 OT EVAL MOD COMPLEX 45 MIN: CPT

## 2022-09-12 PROCEDURE — 85025 COMPLETE CBC W/AUTO DIFF WBC: CPT | Performed by: STUDENT IN AN ORGANIZED HEALTH CARE EDUCATION/TRAINING PROGRAM

## 2022-09-12 PROCEDURE — 97162 PT EVAL MOD COMPLEX 30 MIN: CPT

## 2022-09-12 PROCEDURE — 94799 UNLISTED PULMONARY SVC/PX: CPT

## 2022-09-12 PROCEDURE — 94760 N-INVAS EAR/PLS OXIMETRY 1: CPT

## 2022-09-12 PROCEDURE — 80048 BASIC METABOLIC PNL TOTAL CA: CPT | Performed by: PHYSICIAN ASSISTANT

## 2022-09-12 PROCEDURE — 25010000002 ENOXAPARIN PER 10 MG: Performed by: PHYSICIAN ASSISTANT

## 2022-09-12 PROCEDURE — 25010000002 ONDANSETRON PER 1 MG: Performed by: PHYSICIAN ASSISTANT

## 2022-09-12 PROCEDURE — 82962 GLUCOSE BLOOD TEST: CPT

## 2022-09-12 RX ORDER — GABAPENTIN 100 MG/1
100 CAPSULE ORAL NIGHTLY
Status: DISCONTINUED | OUTPATIENT
Start: 2022-09-12 | End: 2022-09-15 | Stop reason: HOSPADM

## 2022-09-12 RX ORDER — GABAPENTIN 100 MG/1
100 CAPSULE ORAL DAILY
Status: DISCONTINUED | OUTPATIENT
Start: 2022-09-12 | End: 2022-09-12 | Stop reason: SDUPTHER

## 2022-09-12 RX ORDER — GABAPENTIN 100 MG/1
100 CAPSULE ORAL DAILY
Status: DISCONTINUED | OUTPATIENT
Start: 2022-09-12 | End: 2022-09-12

## 2022-09-12 RX ORDER — IPRATROPIUM BROMIDE AND ALBUTEROL SULFATE 2.5; .5 MG/3ML; MG/3ML
SOLUTION RESPIRATORY (INHALATION)
Status: COMPLETED
Start: 2022-09-12 | End: 2022-09-12

## 2022-09-12 RX ADMIN — PANTOPRAZOLE SODIUM 40 MG: 40 TABLET, DELAYED RELEASE ORAL at 05:31

## 2022-09-12 RX ADMIN — ONDANSETRON 4 MG: 2 INJECTION INTRAMUSCULAR; INTRAVENOUS at 05:42

## 2022-09-12 RX ADMIN — IPRATROPIUM BROMIDE AND ALBUTEROL SULFATE 1.5 ML: 2.5; .5 SOLUTION RESPIRATORY (INHALATION) at 19:39

## 2022-09-12 RX ADMIN — CARVEDILOL 3.12 MG: 3.12 TABLET, FILM COATED ORAL at 17:18

## 2022-09-12 RX ADMIN — CARVEDILOL 3.12 MG: 3.12 TABLET, FILM COATED ORAL at 08:16

## 2022-09-12 RX ADMIN — Medication 10 ML: at 20:12

## 2022-09-12 RX ADMIN — ACETAMINOPHEN 500 MG: 500 TABLET, FILM COATED ORAL at 20:11

## 2022-09-12 RX ADMIN — SPIRONOLACTONE 25 MG: 25 TABLET ORAL at 08:16

## 2022-09-12 RX ADMIN — BUMETANIDE 2 MG: 1 TABLET ORAL at 08:16

## 2022-09-12 RX ADMIN — GABAPENTIN 100 MG: 100 CAPSULE ORAL at 20:11

## 2022-09-12 RX ADMIN — ENOXAPARIN SODIUM 40 MG: 40 INJECTION SUBCUTANEOUS at 20:11

## 2022-09-12 RX ADMIN — Medication 10 ML: at 08:17

## 2022-09-12 RX ADMIN — MELATONIN 5.25 MG: 3 TAB ORAL at 20:47

## 2022-09-12 NOTE — PROGRESS NOTES
"    [facility] Medicine Services  INPATIENT PROGRESS NOTE    Length of Stay: 1  Date of Admission: 9/11/2022  Primary Care Physician: Gus Posada MD    Subjective   Chief Complaint: Shortness of breath, chills    HPI: Patient presented to the ED yesterday with complaints of shortness of breath, chills, and fatigue. Patient denied any specific pain but rather complained of feeling \"sick all over\".  Via chest X-ray patient was diagnosed with bilateral pneumonia and started on appropriate treatment. Patient has history of lung cancer (which as resected), COPD, T2DM, CHF, and hypertension. Patient shared she had a similar symptoms also involving a hospital stay roughly 3 years prior which also required a hospital stay.     Patient shared that today she feels\" just as bad as yesterday\" and was concerned about knowing what was the source of her feelings of malaise.       Review of Systems  Constitutional: Positive for activity change, chills and fatigue. Negative for fever.   HENT: Positive for sneezing.    Eyes: Negative for pain and visual disturbance.   Respiratory: Positive for productive cough and shortness of breath. Negative for chest tightness.    Cardiovascular: Positive for leg swelling. Negative for chest pain and palpitations.   Gastrointestinal: Positive for constipation and nausea. Negative for vomiting.   Genitourinary: Positive for urgency. Negative for difficulty urinating and dysuria.   Musculoskeletal: Positive for back pain.   Skin: Positive for wound (On lower left limb, due to treatments for skin cancer). Negative for color change.   Neurological: Positive for dizziness and headaches.     All pertinent negatives and positives are as above. All other systems have been reviewed and are negative unless otherwise stated.     Objective    Temp:  [96.2 °F (35.7 °C)-97.6 °F (36.4 °C)] 96.2 °F (35.7 °C)  Heart Rate:  [68-99] 74  Resp:  [18-20] 18  BP: " (125-167)/(58-97) 125/58    Physical Exam  Constitutional:       Appearance: Normal appearance. She is obese.   HENT:      Head: Normocephalic.   Cardiovascular:      Rate and Rhythm: Normal rate and regular rhythm.      Pulses: Normal pulses.      Heart sounds: Normal heart sounds.   Musculoskeletal:         General: Swelling present.   Neurological:      Mental Status: She is alert and oriented to person, place, and time.   Psychiatric:         Mood and Affect: Mood normal.         Behavior: Behavior normal.   All pertinent negatives and positives are as above. All other systems have been reviewed and are negative unless otherwise stated.       Results Review:  I have reviewed the labs, radiology results, and diagnostic studies.    Laboratory Data:   Results from last 7 days   Lab Units 09/12/22  0516 09/11/22  1608 09/09/22  1735   SODIUM mmol/L 139 138 140   POTASSIUM mmol/L 4.2 4.1 3.9   CHLORIDE mmol/L 106 102 103   CO2 mmol/L 25.0 28.0 26.0   BUN mg/dL 29* 34* 47*   CREATININE mg/dL 1.25* 1.33* 1.39*   GLUCOSE mg/dL 162* 111* 126*   CALCIUM mg/dL 8.6 8.8 9.2   BILIRUBIN mg/dL  --  0.3 0.3   ALK PHOS U/L  --  95 100   ALT (SGPT) U/L  --  11 11   AST (SGOT) U/L  --  14 13   ANION GAP mmol/L 8.0 8.0 11.0     Estimated Creatinine Clearance: 47.1 mL/min (A) (by C-G formula based on SCr of 1.25 mg/dL (H)).      Results from last 7 days   Lab Units 09/06/22  1614   URIC ACID mg/dL 10.2*     Results from last 7 days   Lab Units 09/11/22  1608 09/09/22  1735   WBC 10*3/mm3 8.89 9.68   HEMOGLOBIN g/dL 11.6* 12.6   HEMATOCRIT % 36.0 36.8   PLATELETS 10*3/mm3 188 161           Culture Data:   No results found for: BLOODCX  No results found for: URINECX  No results found for: RESPCX  No results found for: WOUNDCX  No results found for: STOOLCX  No components found for: BODYFLD    Radiology Data:   Imaging Results (Last 24 Hours)       Procedure Component Value Units Date/Time    XR Chest 1 View [[id number]] Collected:  09/11/22 1629     Updated: 09/11/22 1707    Narrative:      EXAM: XR CHEST 1 VIEW    HISTORY: SOA Triage Protocol    COMPARISON: 2/8/2020    TECHNIQUE:   Portable view of the chest.    FINDINGS:   Bilateral diffuse coarse interstitial prominence, also visualized  on prior study.  Stable cardiomegaly. Coexisting multifocal patchy airspace  process on both sides.  A small left pleural effusion. The mediastinal contours are  within normal limits. .  No evidence of mediastinal shift or pneumothorax.   Unremarkable visualized osseous structures.      Impression:      Bilateral diffuse coarse interstitial prominence, also visualized  on prior study.  Coexisting multifocal patchy airspace process on both sides.            Electronically signed by:  Jas Cerda MD  9/11/2022 5:05 PM CDT  Workstation: [id number]            I have reviewed the patient's current medications.     Assessment/Plan     Active Problems:    CAP (community acquired pneumonia)    Plan:    1. CAP: Patient only given a dose of Levaquin 750 mg in ED yesterday, so continuing antibiotics would be good to help treat the pneumonia    2. CHF: Continue treatment for congestive heart failure, as this could also be contributing to feelings of shortness of breath and fatigue.     3. Restless Leg Syndrome: Patient seemed very uncomfortable with just sitting and shared complaint of RLS. Continue patient's home dose of gabapentin to help with symptoms of restlessness to allow patient to relax more.     4. CT of chest to chest if lung cancer that was previously resected has potentially reemerged.     5. Monitoring of kidneys as abdominal CT found them to be slightly atrophic and she has elevated creatinine and BUN levels.     6. T2DM: Patient has had elevated blood glucose consistently for last few days, so consider increasing her diabetes medication.        I confirmed that the patient's code status is documented in the patient's medical record.    Discharge  Planning: I expect patient to be discharged to 2 in 3 days.    Rolo Adames, Medical Student      Patient seen and examined in conjunction with the medical student, please refer to my note for any pertinent details.     Darren Olsen MD

## 2022-09-12 NOTE — THERAPY EVALUATION
Patient Name: Alda Constantino  : 1940    MRN: [id number]                              Today's Date: 2022       Admit Date: 2022    Visit Dx:     ICD-10-CM ICD-9-CM   1. Lung infiltrate  R91.8 793.19   2. Impaired mobility and ADLs  Z74.09 V49.89    Z78.9    3. Impaired functional mobility, balance, gait, and endurance  Z74.09 V49.89     Patient Active Problem List   Diagnosis   • Acute febrile illness   • Hypertension   • COPD (chronic obstructive pulmonary disease) (HCC)   • Vomiting   • Leukocytosis   • Sepsis (HCC)   • CAP (community acquired pneumonia)     Past Medical History:   Diagnosis Date   • Asthma    • Cancer (HCC)    • Congenital abnormalities     colon behind liver   • COPD (chronic obstructive pulmonary disease) (HCC)    • Hypertension      Past Surgical History:   Procedure Laterality Date   • ADENOIDECTOMY     • ADRENAL GLAND SURGERY Right    • BLADDER SURGERY     • BREAST BIOPSY     • COLONOSCOPY W/ BIOPSIES AND POLYPECTOMY     • EYE SURGERY     • HERNIA REPAIR     • HYSTERECTOMY     • LUNG LOBECTOMY Left    • RECTAL SURGERY      cyst   • SKIN CANCER EXCISION      legs   • TONSILLECTOMY     • VARICOSE VEIN SURGERY        General Information     Row Name 22 1513          Physical Therapy Time and Intention    Document Type evaluation  -KW     Row Name 22 1513          General Information    Patient Profile Reviewed yes  -KW     Prior Level of Function independent:;gait;transfer;ADL's  -KW     Existing Precautions/Restrictions fall;oxygen therapy device and L/min  -KW     Row Name 22 1513          Living Environment    People in Home alone  -KW     Row Name 22 1513          Home Main Entrance    Number of Stairs, Main Entrance none  -KW     Row Name 22 1513          Stairs Within Home, Primary    Stairs, Within Home, Primary uses rollator, on O2 at home, walk in tub with built in seat, ramp to enter home, BSC  -KW     Number of Stairs, Within Home,  Primary none  -KW     Row Name 09/12/22 1513          Cognition    Orientation Status (Cognition) oriented x 4  -KW     Row Name 09/12/22 1513          Safety Issues, Functional Mobility    Impairments Affecting Function (Mobility) balance;endurance/activity tolerance;shortness of breath;strength  -KW           User Key  (r) = Recorded By, (t) = Taken By, (c) = Cosigned By    Initials Name Provider Type    KW Malena Kutrz PT Physical Therapist               Mobility     Row Name 09/12/22 1542          Bed Mobility    Comment, (Bed Mobility) Not tested; pt up in chair upon arrival to room  -KW     Row Name 09/12/22 1542          Sit-Stand Transfer    Sit-Stand [place] (Transfers) standby assist  -KW     Row Name 09/12/22 1542          Gait/Stairs (Locomotion)    [place] Level (Gait) contact guard  -KW     Distance in Feet (Gait) 20ft  -KW     Deviations/Abnormal Patterns (Gait) gait speed decreased;stride length decreased  -KW           User Key  (r) = Recorded By, (t) = Taken By, (c) = Cosigned By    Initials Name Provider Type    KW Malena Kurtz PT Physical Therapist               Obj/Interventions     Row Name 09/12/22 1544          Range of Motion Comprehensive    Comment, General Range of Motion BLE AROM WFL  -KW     Row Name 09/12/22 1544          Strength Comprehensive (MMT)    Comment, General Manual Muscle Testing (MMT) Assessment BLE grossly 4-/5  -KW     Row Name 09/12/22 1544          Balance    Balance Assessment sitting static balance  -KW     Static Sitting Balance independent  -KW     Position, Sitting Balance sitting in chair  -KW     Row Name 09/12/22 1544          Sensory Assessment (Somatosensory)    Sensory Assessment (Somatosensory) LE sensation intact  BLE light touch  -KW           User Key  (r) = Recorded By, (t) = Taken By, (c) = Cosigned By    Initials Name Provider Type    KW Malena Kurtz PT Physical Therapist               Goals/Plan     Row Name 09/12/22 1545          Bed  Mobility Goal 1 (PT)    Activity/Assistive Device (Bed Mobility Goal 1, PT) sit to supine;supine to sit  -KW     [place] Level/Cues Needed (Bed Mobility Goal 1, PT) independent  -KW     Time Frame (Bed Mobility Goal 1, PT) 3 days  -KW     Progress/Outcomes (Bed Mobility Goal 1, PT) goal not met  -KW     Row Name 09/12/22 1545          Transfer Goal 1 (PT)    Activity/Assistive Device (Transfer Goal 1, PT) sit-to-stand/stand-to-sit;bed-to-chair/chair-to-bed  LRAD PRN  -KW     [place] Level/Cues Needed (Transfer Goal 1, PT) modified independence  -KW     Time Frame (Transfer Goal 1, PT) 3 days  -KW     Progress/Outcome (Transfer Goal 1, PT) goal not met  -KW     Row Name 09/12/22 1547          Gait Training Goal 1 (PT)    Activity/Assistive Device (Gait Training Goal 1, PT) gait (walking locomotion);assistive device use;decrease fall risk;increase endurance/gait distance  LRAD PRN  -KW     [place] Level (Gait Training Goal 1, PT) modified independence  -KW     Distance (Gait Training Goal 1, PT) 50ft or more  -KW     Time Frame (Gait Training Goal 1, PT) by discharge  -KW     Progress/Outcome (Gait Training Goal 1, PT) goal not met  -KW     Row Name 09/12/22 1548          Therapy Assessment/Plan (PT)    Planned Therapy Interventions (PT) balance training;bed mobility training;gait training;home exercise program;transfer training;patient/family education;strengthening;stair training;stretching  -KW           User Key  (r) = Recorded By, (t) = Taken By, (c) = Cosigned By    Initials Name Provider Type    KW Malena Kurtz, PT Physical Therapist               Clinical Impression     Row Name 09/12/22 1516          Pain    Pretreatment Pain Rating 0/10 - no pain  -KW     Posttreatment Pain Rating 0/10 - no pain  -KW     Row Name 09/12/22 1516          Plan of Care Review    Plan of Care Reviewed With patient  -KW     Row Name 09/12/22 1516          Therapy Assessment/Plan (PT)    Rehab Potential (PT) good,  to achieve stated therapy goals  -KW     Criteria for Skilled Interventions Met (PT) yes;meets criteria  -KW     Therapy Frequency (PT) other (see comments)  3-6x/wk  -KW     Predicted Duration of Therapy Intervention (PT) until goals met or d/c from acute care  -KW     Row Name 09/12/22 1516 09/12/22 1512       Vital Signs    Pre Systolic BP Rehab -- 121  -KW    Pre Treatment Diastolic BP -- 67  -KW    Pretreatment Heart Rate (beats/min) -- 76  -KW    Posttreatment Heart Rate (beats/min) 83  -KW --    Pre SpO2 (%) -- 98  -KW    O2 Delivery Pre Treatment -- supplemental O2  -KW    Post SpO2 (%) 99  -KW --    O2 Delivery Post Treatment supplemental O2  -KW --    Row Name 09/12/22 1516          Positioning and Restraints    Pre-Treatment Position sitting in chair/recliner  -KW     Post Treatment Position chair  -KW     In Chair sitting;call light within reach;encouraged to call for assist  -KW           User Key  (r) = Recorded By, (t) = Taken By, (c) = Cosigned By    Initials Name Provider Type    KW Malena Kurtz, PT Physical Therapist               Outcome Measures     Row Name 09/12/22 1546          How much help from another person do you currently need...    Turning from your back to your side while in flat bed without using bedrails? 3  -KW     Moving from lying on back to sitting on the side of a flat bed without bedrails? 3  -KW     Moving to and from a bed to a chair (including a wheelchair)? 3  -KW     Standing up from a chair using your arms (e.g., wheelchair, bedside chair)? 4  -KW     Climbing 3-5 steps with a railing? 3  -KW     To walk in hospital room? 3  -KW     AM-PAC 6 Clicks Score (PT) 19  -KW     Highest level of mobility 6 --> Walked 10 steps or more  -KW     Row Name 09/12/22 1546 09/12/22 1508       Functional Assessment    Outcome Measure Options AM-PAC 6 Clicks Basic Mobility (PT)  -KW AM-PAC 6 Clicks Daily Activity (OT)  -KH          User Key  (r) = Recorded By, (t) = Taken By, (c) =  Cosigned By    Initials Name Provider Type    KW Malena Kurtz, PT Physical Therapist    Charity Canchola, OT Occupational Therapist                             Physical Therapy Education                 Title: PT OT SLP Therapies (In Progress)     Topic: Physical Therapy (In Progress)     Point: Mobility training (Done)     Learning Progress Summary           Patient Acceptance, E, VU by FRANCISCO JAVIER at 9/12/2022 1546    Comment: Role of PT, POC, use of gait belt                   Point: Home exercise program (Not Started)     Learner Progress:  Not documented in this visit.          Point: Body mechanics (Not Started)     Learner Progress:  Not documented in this visit.          Point: Precautions (Done)     Learning Progress Summary           Patient Acceptance, E, VU by FRANCISCO JAVIER at 9/12/2022 1546    Comment: Role of PT, POC, use of gait belt                               User Key     Initials Effective Dates Name Provider Type Discipline    FRANCISCO JAVIER 06/16/21 -  Malena Kurtz, PT Physical Therapist PT              PT Recommendation and Plan  Planned Therapy Interventions (PT): balance training, bed mobility training, gait training, home exercise program, transfer training, patient/family education, strengthening, stair training, stretching  Plan of Care Reviewed With: patient  Outcome Evaluation: PT evaluation completed this date as co-eval with OT. Pt pleasant and agreeable to eval. Pt performing sit<>stand with CGA and ambulating 20ft with CGA. No AD used, however seeking furniture for balance at times. Pt would benefit from further skilled PT to increase functional mobility, endurance, strength, balance, and to progress towards PLOF. Upon d/c from acute care, recommend home with assist PRN.     Time Calculation:    PT Charges     Row Name 09/12/22 1548             Time Calculation    Start Time 1508  -KW      Stop Time 1531  -KW      Time Calculation (min) 23 min  -KW      PT Received On 09/12/22  -KW      PT Goal Re-Cert Due  Date 09/25/22  -FRANCISCO JAVIER            User Key  (r) = Recorded By, (t) = Taken By, (c) = Cosigned By    Initials Name Provider Type    Malena Ferrera, PT Physical Therapist              Therapy Charges for Today     Code Description Service Date Service Provider Modifiers Qty    27822105351 HC PT EVAL MOD COMPLEXITY 2 9/12/2022 Malena Kurtz, HAYDE GP 1          PT G-Codes  Outcome Measure Options: AM-PAC 6 Clicks Basic Mobility (PT)  AM-PAC 6 Clicks Score (PT): 19  AM-PAC 6 Clicks Score (OT): 21    Malena Kurtz PT  9/12/2022

## 2022-09-12 NOTE — PROGRESS NOTES
I have reviewed the notes, assessments, and/or procedures performed by Dr. Carlos Sanchez, I concur with his  documentation and assessment and plan for Alda Constantino        This document has been electronically signed by Sd Mackey MD on September 12, 2022 13:33 CDT

## 2022-09-12 NOTE — PLAN OF CARE
Goal Outcome Evaluation:  Plan of Care Reviewed With: patient        Progress: no change  Outcome Evaluation: new admission for pneumonia, patient very restless this shift and up most of night, vss

## 2022-09-12 NOTE — PROGRESS NOTES
[facility] Medicine Services  INPATIENT PROGRESS NOTE    Length of Stay: 1  Date of Admission: 9/11/2022  Primary Care Physician: Gus Posada MD    Subjective   Chief Complaint: Shortness of breath  HPI: Patient seen and examined earlier in the day, she notes ongoing shortness of breath with minimal improvement since being in the hospital.  She also notes feeling nauseated still but somewhat better.  No other current new concerns.    Review of Systems   Constitutional: Negative for activity change, chills and fever.   HENT: Negative for congestion.    Respiratory: Positive for shortness of breath.    Cardiovascular: Negative for chest pain.   Gastrointestinal: Positive for abdominal pain and nausea. Negative for constipation, diarrhea and vomiting.   Genitourinary: Negative.    Skin: Negative.    Neurological: Negative.    All other systems reviewed and are negative.     All pertinent negatives and positives are as above. All other systems have been reviewed and are negative unless otherwise stated.     Objective    Temp:  [96.2 °F (35.7 °C)-97.6 °F (36.4 °C)] 96.2 °F (35.7 °C)  Heart Rate:  [68-99] 74  Resp:  [18-20] 18  BP: (125-167)/(58-97) 125/58    Physical Exam  Constitutional:       General: She is not in acute distress.     Appearance: She is not toxic-appearing.   HENT:      Head: Normocephalic and atraumatic.      Right Ear: External ear normal.      Left Ear: External ear normal.      Nose: Nose normal.      Mouth/Throat:      Mouth: Mucous membranes are moist.      Pharynx: Oropharynx is clear.   Eyes:      Conjunctiva/sclera: Conjunctivae normal.   Cardiovascular:      Rate and Rhythm: Normal rate and regular rhythm.      Pulses: Normal pulses.      Heart sounds: Normal heart sounds.   Pulmonary:      Effort: Pulmonary effort is normal. No respiratory distress.      Breath sounds: Normal breath sounds.   Abdominal:      General: Bowel sounds are normal.       Palpations: Abdomen is soft.      Tenderness: There is no abdominal tenderness.   Musculoskeletal:         General: No deformity.   Skin:     General: Skin is warm and dry.      Capillary Refill: Capillary refill takes less than 2 seconds.   Neurological:      General: No focal deficit present.      Mental Status: She is alert and oriented to person, place, and time. Mental status is at baseline.   Psychiatric:         Behavior: Behavior normal.         Thought Content: Thought content normal.         Results Review:  I have reviewed the labs, radiology results, and diagnostic studies.    Laboratory Data:   Results from last 7 days   Lab Units 09/12/22  0516 09/11/22  1608 09/09/22  1735   SODIUM mmol/L 139 138 140   POTASSIUM mmol/L 4.2 4.1 3.9   CHLORIDE mmol/L 106 102 103   CO2 mmol/L 25.0 28.0 26.0   BUN mg/dL 29* 34* 47*   CREATININE mg/dL 1.25* 1.33* 1.39*   GLUCOSE mg/dL 162* 111* 126*   CALCIUM mg/dL 8.6 8.8 9.2   BILIRUBIN mg/dL  --  0.3 0.3   ALK PHOS U/L  --  95 100   ALT (SGPT) U/L  --  11 11   AST (SGOT) U/L  --  14 13   ANION GAP mmol/L 8.0 8.0 11.0     Estimated Creatinine Clearance: 47.1 mL/min (A) (by C-G formula based on SCr of 1.25 mg/dL (H)).      Results from last 7 days   Lab Units 09/06/22  1614   URIC ACID mg/dL 10.2*     Results from last 7 days   Lab Units 09/11/22  1608 09/09/22  1735   WBC 10*3/mm3 8.89 9.68   HEMOGLOBIN g/dL 11.6* 12.6   HEMATOCRIT % 36.0 36.8   PLATELETS 10*3/mm3 188 161           Culture Data:   No results found for: BLOODCX  No results found for: URINECX  No results found for: RESPCX  No results found for: WOUNDCX  No results found for: STOOLCX  No components found for: BODYFLD    Radiology Data:   Imaging Results (Last 24 Hours)     Procedure Component Value Units Date/Time    XR Chest 1 View [[id number]] Collected: 09/11/22 1629     Updated: 09/11/22 1707    Narrative:      EXAM: XR CHEST 1 VIEW    HISTORY: SOA Triage Protocol    COMPARISON: 2/8/2020    TECHNIQUE:    Portable view of the chest.    FINDINGS:   Bilateral diffuse coarse interstitial prominence, also visualized  on prior study.  Stable cardiomegaly. Coexisting multifocal patchy airspace  process on both sides.  A small left pleural effusion. The mediastinal contours are  within normal limits. .  No evidence of mediastinal shift or pneumothorax.   Unremarkable visualized osseous structures.      Impression:      Bilateral diffuse coarse interstitial prominence, also visualized  on prior study.  Coexisting multifocal patchy airspace process on both sides.            Electronically signed by:  Jas Cerda MD  9/11/2022 5:05 PM CDT  Workstation: [id number]          I have reviewed the patient's current medications.     Assessment/Plan     Active Problems:    CAP (community acquired pneumonia)    Plan:  - Continue IV antibiotics with Levaquin  - Continue supplemental oxygen,  currently stable on home settings  - Bronchodilators available if needed  - Continue home medications as appropriate  - She would prefer not to take the Carafate so this has been discontinued  - PT and OT have been ordered  - DVT prophylaxis with Lovenox  - CODE STATUS: Full    I confirmed that the patient's Advance Care Plan is present, code status is documented, or surrogate decision maker is listed in the patient's medical record.     Discharge Planning: [facility] the [facility] so care has been handed over to their inpatient service.    Darren Olsen MD

## 2022-09-12 NOTE — PLAN OF CARE
Goal Outcome Evaluation:  Plan of Care Reviewed With: patient           Outcome Evaluation: PT evaluation completed this date as co-eval with OT. Pt pleasant and agreeable to eval. Pt performing sit<>stand with CGA and ambulating 20ft with CGA. No AD used, however seeking furniture for balance at times. Pt would benefit from further skilled PT to increase functional mobility, endurance, strength, balance, and to progress towards PLOF. Upon d/c from acute care, recommend home with assist PRN.

## 2022-09-12 NOTE — PLAN OF CARE
Problem: Adult Inpatient Plan of Care  Goal: Plan of Care Review  Flowsheets  Taken 9/12/2022 0846  Plan of Care Reviewed With: patient  Outcome Evaluation: RN okay'd OT/PT evals this date. Pt in the bed upon arrival and agreeable. Ox4. Reports she lives alone in a 1 level home with ramp to enter. Reports IND with all ADLs and mobility at baseline. Has walk-in tub with bench. Sit<>stand CG. Pt amb in room with CG. Pt donned slippers with SBA. Pt in the chair end of assessment. Needs met. Pt would benefit from continued skilled OT services to improve IND/safety with ADLs and to improve functional act tolerance. Rec home with assist as needed and home therapy at d/c.

## 2022-09-12 NOTE — PROGRESS NOTES
FAMILY MEDICINE RESIDENCY  TRANSFER OF CARE/ACCEPTANCE NOTE    PATIENT NAME: Alda Constantino  : 1940  MRN: [id number]     Active Hospital Problems    Diagnosis  POA   • CAP (community acquired pneumonia) [J18.9]  Unknown      Resolved Hospital Problems   No resolved problems to display.       Patient seen and examined by me on 22 at 1645.  Interim History:  Patient reports continued shortness of breath. She remains on her home O2 level of 2L. Worked with PT/OT today without issue. Patient has not had a BM in 2 days. Concerned about sleep and RLS. Gabapentin taken at home for this, restarted for tonight. Melatonin also ordered.    I have noted the following changes since admission: none    I have reviewed the H&P, diagnostic data and plan of care for Alda Constantino, case discussed with the admitting provider and/or FM service team.  I will be taking over care of this patient during the current hospitalization.      Marcelo Pierce MD  22  16:59 CDT

## 2022-09-12 NOTE — THERAPY EVALUATION
Acute Care - Occupational Therapy Initial Evaluation  [facility]     Patient Name: Alda Constantino  : 1940  MRN: [id number]  Today's Date: 2022  Onset of Illness/Injury or Date of Surgery: 22  Date of Referral to OT: 22  Referring Physician: Dr. Olsen    Admit Date: 2022       ICD-10-CM ICD-9-CM   1. Lung infiltrate  R91.8 793.19   2. Impaired mobility and ADLs  Z74.09 V49.89    Z78.9      Patient Active Problem List   Diagnosis   • Acute febrile illness   • Hypertension   • COPD (chronic obstructive pulmonary disease) (HCC)   • Vomiting   • Leukocytosis   • Sepsis (HCC)   • CAP (community acquired pneumonia)     Past Medical History:   Diagnosis Date   • Asthma    • Cancer (HCC)    • Congenital abnormalities     colon behind liver   • COPD (chronic obstructive pulmonary disease) (HCC)    • Hypertension      Past Surgical History:   Procedure Laterality Date   • ADENOIDECTOMY     • ADRENAL GLAND SURGERY Right    • BLADDER SURGERY     • BREAST BIOPSY     • COLONOSCOPY W/ BIOPSIES AND POLYPECTOMY     • EYE SURGERY     • HERNIA REPAIR     • HYSTERECTOMY     • LUNG LOBECTOMY Left    • RECTAL SURGERY      cyst   • SKIN CANCER EXCISION      legs   • TONSILLECTOMY     • VARICOSE VEIN SURGERY           OT ASSESSMENT FLOWSHEET (last 12 hours)     OT Evaluation and Treatment     Row Name 22 1508                   OT Time and Intention    Document Type evaluation  -KH        Mode of Treatment co-treatment;occupational therapy;physical therapy  -KH        Patient Effort good  -KH                  General Information    Patient Profile Reviewed yes  -KH        Onset of Illness/Injury or Date of Surgery 22  -KH        Referring Physician Dr. Olsen  -TUSHAR        Patient/Family/Caregiver Comments/Observations no family present  -KH        General Observations of Patient IV, O2  -KH        Prior Level of Function independent:;gait;ADL's  -KH        Equipment Currently Used at Home  oxygen;wheelchair, motorized;shower chair;raised toilet  -        Existing Precautions/Restrictions fall;oxygen therapy device and L/min  -        Equipment Issued to Patient gait belt  -        Risks Reviewed patient:  -        Benefits Reviewed patient:  -                  Living Environment    Current Living Arrangements home  -        Home Accessibility wheelchair accessible  -        People in Home alone  -                  Home Main Entrance    Number of Stairs, Main Entrance none  -                  Stairs Within Home, Primary    Number of Stairs, Within Home, Primary none  -KH                  Home Use of Assistive/Adaptive Equipment    Equipment Currently Used at Home oxygen;rollator;wheelchair;walker, rolling;grab bar;commode;hospital bed  -                  Pain Assessment    Pretreatment Pain Rating 0/10 - no pain  -        Posttreatment Pain Rating 0/10 - no pain  -                  Cognition    Orientation Status (Cognition) oriented x 4  -                  Range of Motion Comprehensive    General Range of Motion bilateral upper extremity ROM WFL  -                  Strength Comprehensive (MMT)    Comment, General Manual Muscle Testing (MMT) Assessment BUE grossly 4/5  -                  Activities of Daily Living    BADL Assessment/Intervention lower body dressing  -                  Lower Body Dressing Assessment/Training    [place] Level (Lower Body Dressing) shoes/slippers;don;set up  -                  BADL Safety/Performance    Impairments, BADL Safety/Performance endurance/activity tolerance;pain;shortness of breath;strength  -                  Bed Mobility    Comment, (Bed Mobility) [facility]  -                  Functional Mobility    Functional Mobility- Ind. Level contact guard assist  -                  Transfer Assessment/Treatment    Transfers sit-stand transfer;stand-sit transfer  -                  Transfers    Sit-Stand [place] (Transfers)  standby assist  -        Stand-Sit [place] (Transfers) standby assist  -                  Plan of Care Review    Plan of Care Reviewed With patient  -        Progress improving  -                  Vital Signs    Pre Systolic BP Rehab 121  -KH        Pre Treatment Diastolic BP 67  -KH        Pretreatment Heart Rate (beats/min) 76  -KH        Posttreatment Heart Rate (beats/min) 83  -KH        Pre SpO2 (%) 98  -KH        O2 Delivery Pre Treatment supplemental O2  -KH        Post SpO2 (%) 99  -KH        O2 Delivery Post Treatment supplemental O2  -KH                  Positioning and Restraints    Pre-Treatment Position sitting in chair/recliner  -KH        Post Treatment Position chair  -KH        In Chair sitting;call light within reach;encouraged to call for assist  -                  Therapy Assessment/Plan (OT)    Date of Referral to OT 09/12/22  -        Patient/Family Therapy Goal Statement (OT) return home  -        OT Diagnosis impaired mobility and ADLs  -        Rehab Potential (OT) good, to achieve stated therapy goals  -        Criteria for Skilled Therapeutic Interventions Met (OT) yes;meets criteria;skilled treatment is necessary  -        Therapy Frequency (OT) other (see comments)  3-7x/wk  -        Predicted Duration of Therapy Intervention (OT) until OT goals met or d/c facility  -        Problem List (OT) balance;mobility;strength;pain  -        Activity Limitations Related to Problem List (OT) unable to ambulate safely;unable to transfer safely;BADLs not performed adequately or safely;IADLs not performed adequately or safely  -        Planned Therapy Interventions (OT) activity tolerance training;adaptive equipment training;BADL retraining;functional balance retraining;IADL retraining;patient/caregiver education/training;ROM/therapeutic exercise;strengthening exercise;transfer/mobility retraining  -                  Evaluation Complexity (OT)    Review Occupational  Profile/Medical/Therapy History Complexity expanded/moderate complexity  -        Assessment, Occupational Performance/Identification of Deficit Complexity 3-5 performance deficits  -        Clinical Decision Making Complexity (OT) detailed assessment/moderate complexity  -        Overall Complexity of Evaluation (OT) moderate complexity  -KH                  Therapy Plan Review/Discharge Plan (OT)    Therapy Plan Review (OT) evaluation/treatment results reviewed;care plan/treatment goals reviewed;risks/benefits reviewed;current/potential barriers reviewed;participants voiced agreement with care plan;participants included;patient  -KH        Anticipated Discharge Disposition (OT) home with assist;home with home health  -                  OT Goals    Transfer Goal Selection (OT) transfer, OT goal 1  -KH        Bathing Goal Selection (OT) bathing, OT goal 1  -KH        Dressing Goal Selection (OT) dressing, OT goal 1  -KH        Toileting Goal Selection (OT) toileting, OT goal 1  -KH        Activity Tolerance Goal Selection (OT) activity tolerance, OT goal 1  -KH                  Transfer Goal 1 (OT)    Activity/Assistive Device (Transfer Goal 1, OT) sit-to-stand/stand-to-sit;bed-to-chair/chair-to-bed;toilet  -KH        [place] Level/Cues Needed (Transfer Goal 1, OT) independent  -KH        Time Frame (Transfer Goal 1, OT) long term goal (LTG);by discharge  -KH        Progress/Outcome (Transfer Goal 1, OT) goal not met  -KH                  Bathing Goal 1 (OT)    Activity/Device (Bathing Goal 1, OT) lower body bathing  -KH        [place] Level/Cues Needed (Bathing Goal 1, OT) modified independence  -KH        Time Frame (Bathing Goal 1, OT) long term goal (LTG);by discharge  -        Progress/Outcomes (Bathing Goal 1, OT) goal not met  -KH                  Dressing Goal 1 (OT)    Activity/Device (Dressing Goal 1, OT) lower body dressing  -KH        [place]/Cues Needed (Dressing Goal 1, OT)  modified independence  -KH        Time Frame (Dressing Goal 1, OT) long term goal (LTG);by discharge  -KH        Progress/Outcome (Dressing Goal 1, OT) goal not met  -KH                  Toileting Goal 1 (OT)    Activity/Device (Toileting Goal 1, OT) toileting skills, all  -KH        [place] Level/Cues Needed (Toileting Goal 1, OT) independent  -KH        Time Frame (Toileting Goal 1, OT) long term goal (LTG);by discharge  -KH        Progress/Outcome (Toileting Goal 1, OT) goal not met  -KH                   Activity Tolerance Goal 1 (OT)    Activity Level (Endurance Goal 1, OT) 15 min activity;O2 sat >/ equal to 88%  -KH        Time Frame (Activity Tolerance Goal 1, OT) long term goal (LTG);by discharge  -KH        Progress/Outcome (Activity Tolerance Goal 1, OT) goal not met  -KH              User Key  (r) = Recorded By, (t) = Taken By, (c) = Cosigned By    Initials Name Effective Dates    Charity Canchola, OT 06/16/21 -                  Occupational Therapy Education                 Title: PT OT SLP Therapies (In Progress)     Topic: Occupational Therapy (In Progress)     Point: ADL training (Done)     Description:   Instruct learner(s) on proper safety adaptation and remediation techniques during self care or transfers.   Instruct in proper use of assistive devices.              Learning Progress Summary           Patient Acceptance, E, VU by TUSHAR at 9/12/2022 1536    Comment: Educated pt on fall prevention and safety with ADls                   Point: Home exercise program (Not Started)     Description:   Instruct learner(s) on appropriate technique for monitoring, assisting and/or progressing therapeutic exercises/activities.              Learner Progress:  Not documented in this visit.          Point: Precautions (Done)     Description:   Instruct learner(s) on prescribed precautions during self-care and functional transfers.              Learning Progress Summary           Patient Acceptance, E, VU by  TUSHAR at 9/12/2022 1536    Comment: Educated pt on fall prevention and safety with ADls                   Point: Body mechanics (Not Started)     Description:   Instruct learner(s) on proper positioning and spine alignment during self-care, functional mobility activities and/or exercises.              Learner Progress:  Not documented in this visit.                      User Key     Initials Effective Dates Name Provider Type Discipline     06/16/21 -  Charity Cervantes, OT Occupational Therapist OT                  OT Recommendation and Plan  Planned Therapy Interventions (OT): activity tolerance training, adaptive equipment training, BADL retraining, functional balance retraining, IADL retraining, patient/caregiver education/training, ROM/therapeutic exercise, strengthening exercise, transfer/mobility retraining  Therapy Frequency (OT): other (see comments) (3-7x/wk)  Plan of Care Review  Plan of Care Reviewed With: patient  Progress: improving  Outcome Evaluation: RN okay'd OT/PT evals this date. Pt in the bed upon arrival and agreeable. Ox4. Reports she lives alone in a 1 level home with ramp to enter. Reports IND with all ADLs and mobility at baseline. Has walk-in tub with bench. Sit<>stand CG. Pt amb in room with CG. Pt donned slippers with SBA. Pt in the chair end of assessment. Needs met. Pt would benefit from continued skilled OT services to improve IND/safety with ADLs and to improve functional act tolerance. Rec home with assist as needed and home therapy at d/c.  Plan of Care Reviewed With: patient  Outcome Evaluation: RN okay'd OT/PT evals this date. Pt in the bed upon arrival and agreeable. Ox4. Reports she lives alone in a 1 level home with ramp to enter. Reports IND with all ADLs and mobility at baseline. Has walk-in tub with bench. Sit<>stand CG. Pt amb in room with CG. Pt donned slippers with SBA. Pt in the chair end of assessment. Needs met. Pt would benefit from continued skilled OT services to  improve IND/safety with ADLs and to improve functional act tolerance. Rec home with assist as needed and home therapy at d/c.     Outcome Measures     Row Name 09/12/22 1508             How much help from another is currently needed...    Putting on and taking off regular lower body clothing? 3  -KH      Bathing (including washing, rinsing, and drying) 3  -KH      Toileting (which includes using toilet bed pan or urinal) 3  -KH      Putting on and taking off regular upper body clothing 4  -KH      Taking care of personal grooming (such as brushing teeth) 4  -KH      Eating meals 4  -KH      AM-PAC 6 Clicks Score (OT) 21  -KH              Functional Assessment    Outcome Measure Options AM-PAC 6 Clicks Daily Activity (OT)  -            User Key  (r) = Recorded By, (t) = Taken By, (c) = Cosigned By    Initials Name Provider Type    Charity Canchola OT Occupational Therapist                Time Calculation:    Time Calculation- OT     Row Name 09/12/22 1539             Time Calculation- OT    OT Start Time 1508  -      OT Stop Time 1531  -      OT Time Calculation (min) 23 min  -      OT Received On 09/12/22  -      OT Goal Re-Cert Due Date 09/25/22  -            User Key  (r) = Recorded By, (t) = Taken By, (c) = Cosigned By    Initials Name Provider Type    Charity Canchola OT Occupational Therapist              Therapy Charges for Today     Code Description Service Date Service Provider Modifiers Qty    79851130750 HC OT EVAL MOD COMPLEXITY 2 9/12/2022 Charity Cervantes OT GO 1               Charity Cervantes OT  9/12/2022

## 2022-09-13 LAB
ANION GAP SERPL CALCULATED.3IONS-SCNC: 9 MMOL/L (ref 5–15)
BASOPHILS # BLD AUTO: 0.03 10*3/MM3 (ref 0–0.2)
BASOPHILS NFR BLD AUTO: 0.4 % (ref 0–1.5)
BUN SERPL-MCNC: 39 MG/DL (ref 8–23)
BUN/CREAT SERPL: 27.1 (ref 7–25)
CALCIUM SPEC-SCNC: 9.4 MG/DL (ref 8.6–10.5)
CHLORIDE SERPL-SCNC: 101 MMOL/L (ref 98–107)
CO2 SERPL-SCNC: 30 MMOL/L (ref 22–29)
CREAT SERPL-MCNC: 1.44 MG/DL (ref 0.57–1)
DEPRECATED RDW RBC AUTO: 50.2 FL (ref 37–54)
EGFRCR SERPLBLD CKD-EPI 2021: 36.8 ML/MIN/1.73
EOSINOPHIL # BLD AUTO: 0.15 10*3/MM3 (ref 0–0.4)
EOSINOPHIL NFR BLD AUTO: 2.1 % (ref 0.3–6.2)
ERYTHROCYTE [DISTWIDTH] IN BLOOD BY AUTOMATED COUNT: 13.7 % (ref 12.3–15.4)
FOLATE SERPL-MCNC: 9.2 NG/ML (ref 4.78–24.2)
GLUCOSE BLDC GLUCOMTR-MCNC: 112 MG/DL (ref 70–130)
GLUCOSE BLDC GLUCOMTR-MCNC: 123 MG/DL (ref 70–130)
GLUCOSE BLDC GLUCOMTR-MCNC: 124 MG/DL (ref 70–130)
GLUCOSE BLDC GLUCOMTR-MCNC: 176 MG/DL (ref 70–130)
GLUCOSE SERPL-MCNC: 126 MG/DL (ref 65–99)
HCT VFR BLD AUTO: 35.7 % (ref 34–46.6)
HGB BLD-MCNC: 11.5 G/DL (ref 12–15.9)
IMM GRANULOCYTES # BLD AUTO: 0.05 10*3/MM3 (ref 0–0.05)
IMM GRANULOCYTES NFR BLD AUTO: 0.7 % (ref 0–0.5)
L PNEUMO1 AG UR QL IA: NEGATIVE
LYMPHOCYTES # BLD AUTO: 2.06 10*3/MM3 (ref 0.7–3.1)
LYMPHOCYTES NFR BLD AUTO: 28.8 % (ref 19.6–45.3)
MCH RBC QN AUTO: 32.4 PG (ref 26.6–33)
MCHC RBC AUTO-ENTMCNC: 32.2 G/DL (ref 31.5–35.7)
MCV RBC AUTO: 100.6 FL (ref 79–97)
MONOCYTES # BLD AUTO: 0.45 10*3/MM3 (ref 0.1–0.9)
MONOCYTES NFR BLD AUTO: 6.3 % (ref 5–12)
MYCOPLASMAE PNEUMONIAE BY PCR: NEGATIVE
NEUTROPHILS NFR BLD AUTO: 4.42 10*3/MM3 (ref 1.7–7)
NEUTROPHILS NFR BLD AUTO: 61.7 % (ref 42.7–76)
NRBC BLD AUTO-RTO: 0 /100 WBC (ref 0–0.2)
PLATELET # BLD AUTO: 182 10*3/MM3 (ref 140–450)
PMV BLD AUTO: 12.1 FL (ref 6–12)
POTASSIUM SERPL-SCNC: 4.5 MMOL/L (ref 3.5–5.2)
PROCALCITONIN SERPL-MCNC: 0.05 NG/ML (ref 0–0.25)
RBC # BLD AUTO: 3.55 10*6/MM3 (ref 3.77–5.28)
S PNEUM AG SPEC QL LA: NEGATIVE
SODIUM SERPL-SCNC: 140 MMOL/L (ref 136–145)
VIT B12 BLD-MCNC: 249 PG/ML (ref 211–946)
WBC NRBC COR # BLD: 7.16 10*3/MM3 (ref 3.4–10.8)

## 2022-09-13 PROCEDURE — 97110 THERAPEUTIC EXERCISES: CPT

## 2022-09-13 PROCEDURE — 87449 NOS EACH ORGANISM AG IA: CPT | Performed by: STUDENT IN AN ORGANIZED HEALTH CARE EDUCATION/TRAINING PROGRAM

## 2022-09-13 PROCEDURE — 84145 PROCALCITONIN (PCT): CPT

## 2022-09-13 PROCEDURE — 63710000001 INSULIN ASPART PER 5 UNITS: Performed by: PHYSICIAN ASSISTANT

## 2022-09-13 PROCEDURE — 25010000002 LEVOFLOXACIN PER 250 MG: Performed by: PHYSICIAN ASSISTANT

## 2022-09-13 PROCEDURE — 82962 GLUCOSE BLOOD TEST: CPT

## 2022-09-13 PROCEDURE — 97535 SELF CARE MNGMENT TRAINING: CPT

## 2022-09-13 PROCEDURE — 85025 COMPLETE CBC W/AUTO DIFF WBC: CPT | Performed by: STUDENT IN AN ORGANIZED HEALTH CARE EDUCATION/TRAINING PROGRAM

## 2022-09-13 PROCEDURE — 97116 GAIT TRAINING THERAPY: CPT

## 2022-09-13 PROCEDURE — 87899 AGENT NOS ASSAY W/OPTIC: CPT | Performed by: STUDENT IN AN ORGANIZED HEALTH CARE EDUCATION/TRAINING PROGRAM

## 2022-09-13 PROCEDURE — 87581 M.PNEUMON DNA AMP PROBE: CPT | Performed by: STUDENT IN AN ORGANIZED HEALTH CARE EDUCATION/TRAINING PROGRAM

## 2022-09-13 PROCEDURE — 80048 BASIC METABOLIC PNL TOTAL CA: CPT | Performed by: PHYSICIAN ASSISTANT

## 2022-09-13 PROCEDURE — 25010000002 ONDANSETRON PER 1 MG: Performed by: PHYSICIAN ASSISTANT

## 2022-09-13 PROCEDURE — 25010000002 ENOXAPARIN PER 10 MG: Performed by: PHYSICIAN ASSISTANT

## 2022-09-13 RX ORDER — AMOXICILLIN 250 MG
2 CAPSULE ORAL 2 TIMES DAILY
Status: DISCONTINUED | OUTPATIENT
Start: 2022-09-13 | End: 2022-09-14

## 2022-09-13 RX ORDER — BISACODYL 5 MG/1
5 TABLET, DELAYED RELEASE ORAL DAILY PRN
Status: DISCONTINUED | OUTPATIENT
Start: 2022-09-13 | End: 2022-09-15 | Stop reason: HOSPADM

## 2022-09-13 RX ORDER — BISACODYL 10 MG
10 SUPPOSITORY, RECTAL RECTAL DAILY PRN
Status: DISCONTINUED | OUTPATIENT
Start: 2022-09-13 | End: 2022-09-15 | Stop reason: HOSPADM

## 2022-09-13 RX ADMIN — INSULIN ASPART 2 UNITS: 100 INJECTION, SOLUTION INTRAVENOUS; SUBCUTANEOUS at 11:25

## 2022-09-13 RX ADMIN — ACETAMINOPHEN 500 MG: 500 TABLET, FILM COATED ORAL at 13:44

## 2022-09-13 RX ADMIN — SPIRONOLACTONE 25 MG: 25 TABLET ORAL at 08:13

## 2022-09-13 RX ADMIN — ONDANSETRON 4 MG: 2 INJECTION INTRAMUSCULAR; INTRAVENOUS at 00:08

## 2022-09-13 RX ADMIN — BUMETANIDE 2 MG: 1 TABLET ORAL at 08:13

## 2022-09-13 RX ADMIN — DOCUSATE SODIUM 50 MG AND SENNOSIDES 8.6 MG 2 TABLET: 8.6; 5 TABLET, FILM COATED ORAL at 13:43

## 2022-09-13 RX ADMIN — BISACODYL 5 MG: 5 TABLET, COATED ORAL at 21:09

## 2022-09-13 RX ADMIN — MELATONIN 5.25 MG: 3 TAB ORAL at 21:53

## 2022-09-13 RX ADMIN — Medication 10 ML: at 21:11

## 2022-09-13 RX ADMIN — DOCUSATE SODIUM 50 MG AND SENNOSIDES 8.6 MG 2 TABLET: 8.6; 5 TABLET, FILM COATED ORAL at 21:09

## 2022-09-13 RX ADMIN — CARVEDILOL 3.12 MG: 3.12 TABLET, FILM COATED ORAL at 17:02

## 2022-09-13 RX ADMIN — CARVEDILOL 3.12 MG: 3.12 TABLET, FILM COATED ORAL at 08:18

## 2022-09-13 RX ADMIN — ENOXAPARIN SODIUM 40 MG: 40 INJECTION SUBCUTANEOUS at 21:09

## 2022-09-13 RX ADMIN — GABAPENTIN 100 MG: 100 CAPSULE ORAL at 21:09

## 2022-09-13 RX ADMIN — LEVOFLOXACIN 750 MG: 5 INJECTION, SOLUTION INTRAVENOUS at 21:04

## 2022-09-13 RX ADMIN — PANTOPRAZOLE SODIUM 40 MG: 40 TABLET, DELAYED RELEASE ORAL at 04:50

## 2022-09-13 RX ADMIN — Medication 10 ML: at 08:13

## 2022-09-13 NOTE — PROGRESS NOTES
FAMILY MEDICINE RESIDENCY SERVICE  DAILY PROGRESS NOTE    NAME: Alda Constantino  : 1940  MRN: [id number]      LOS: 2 days     PROVIDER OF SERVICE: Henry Moser MD    Chief Complaint: <principal problem not specified>    Subjective:     Interval History:  History taken from: patient  Patient Complaints: chills , nausea  Patient Denies: SOB, chest pain, abdominal pain, or vomiting    Review of Systems:   Review of Systems   Constitutional: Positive for chills. Negative for fever.   HENT: Negative.    Eyes: Negative.    Respiratory: Positive for cough. Negative for chest tightness and shortness of breath.    Cardiovascular: Negative for chest pain.   Gastrointestinal: Negative for abdominal pain.   Genitourinary: Negative.    Musculoskeletal: Negative.    Skin: Negative.    Neurological: Negative.    Hematological: Negative.    Psychiatric/Behavioral: Negative.        Objective:     Vital Signs  Temp:  [96.2 °F (35.7 °C)-97.7 °F (36.5 °C)] 97.5 °F (36.4 °C)  Heart Rate:  [64-90] 64  Resp:  [18] 18  BP: (121-139)/(66-83) 139/66  Flow (L/min):  [2] 2   Body mass index is 37.11 kg/m².    Physical Exam  Physical Exam  Constitutional:       Appearance: Normal appearance.   HENT:      Head: Normocephalic and atraumatic.      Nose: Nose normal. No congestion.      Mouth/Throat:      Mouth: Mucous membranes are moist.   Eyes:      Extraocular Movements: Extraocular movements intact.   Cardiovascular:      Rate and Rhythm: Normal rate and regular rhythm.      Pulses: Normal pulses.      Heart sounds: Normal heart sounds.   Pulmonary:      Effort: No respiratory distress.      Breath sounds: Normal breath sounds. No wheezing.   Abdominal:      General: There is no distension.      Palpations: Abdomen is soft.      Tenderness: There is no abdominal tenderness.   Musculoskeletal:         General: Normal range of motion.      Cervical back: Normal range of motion.   Skin:     General: Skin is warm and dry.    Neurological:      Mental Status: She is alert and oriented to person, place, and time.   Psychiatric:         Behavior: Behavior normal.         Scheduled Meds   bumetanide, 2 mg, Oral, Daily  carvedilol, 3.125 mg, Oral, BID With Meals  enoxaparin, 40 mg, Subcutaneous, Daily  gabapentin, 100 mg, Oral, Nightly  Insulin Aspart, 0-7 Units, Subcutaneous, TID AC  levoFLOXacin, 750 mg, Intravenous, Q48H  pantoprazole, 40 mg, Oral, QAM  sodium chloride, 10 mL, Intravenous, Q12H  spironolactone, 25 mg, Oral, Daily       PRN Meds   •  acetaminophen  •  dextrose  •  dextrose  •  glucagon (human recombinant)  •  ipratropium-albuterol  •  melatonin  •  ondansetron **OR** ondansetron  •  sodium chloride  •  sodium chloride      Diagnostic Data    Results from last 7 days   Lab Units 09/13/22  0538 09/13/22  0522 09/12/22  0516 09/11/22  1608   WBC 10*3/mm3  --  7.16   < > 8.89   HEMOGLOBIN g/dL  --  11.5*   < > 11.6*   HEMATOCRIT %  --  35.7   < > 36.0   PLATELETS 10*3/mm3  --  182   < > 188   GLUCOSE mg/dL 126*  --    < > 111*   CREATININE mg/dL 1.44*  --    < > 1.33*   BUN mg/dL 39*  --    < > 34*   SODIUM mmol/L 140  --    < > 138   POTASSIUM mmol/L 4.5  --    < > 4.1   AST (SGOT) U/L  --   --   --  14   ALT (SGPT) U/L  --   --   --  11   ALK PHOS U/L  --   --   --  95   BILIRUBIN mg/dL  --   --   --  0.3   ANION GAP mmol/L 9.0  --    < > 8.0    < > = values in this interval not displayed.       XR Chest 1 View    Result Date: 9/11/2022  Bilateral diffuse coarse interstitial prominence, also visualized on prior study. Coexisting multifocal patchy airspace process on both sides. Electronically signed by:  Jas Cerda MD  9/11/2022 5:05 PM CDT Workstation: [id number]        I reviewed the patient's new clinical results.    Assessment/Plan:     81-year-old female with:    Active and Resolved Problems  Active Hospital Problems    Diagnosis  POA   • CAP (community acquired pneumonia) [J18.9]  Unknown   • Hypertension [I10]  Yes    • COPD (chronic obstructive pulmonary disease) ([facility]) [J44.9]  Yes      Resolved Hospital Problems   No resolved problems to display.       #CAP   -Chest x-ray w/ patchy airspace process  -Levaquin Q 48 h       #Diabetes Mellitus  -Controlled on current regimen      #  -2L at home, on baseline      #CHF  -Bumex 2 mg    #Anemia: macrocytic  -Pending folate , B12          DVT prophylaxis:  Medical DVT prophylaxis orders are present.     Code status is   Code Status and Medical Interventions:   Ordered at: 09/11/22 1213     Level Of Support Discussed With:    Patient     Code Status (Patient has no pulse and is not breathing):    CPR (Attempt to Resuscitate)     Medical Interventions (Patient has pulse or is breathing):    Full Support       Plan for disposition: in 1-2 days    Time: 30 minutes      This document has been electronically signed by Henry Moser MD on September 13, 2022 06:54 CDT

## 2022-09-13 NOTE — PLAN OF CARE
Goal Outcome Evaluation:  Plan of Care Reviewed With: patient        Progress: improving  Outcome Evaluation: pt responded well to PT w/ increased gait to 96 ft SBA w/o AD but tends to cruise furniture. education provided on benefits of using AD but pt declines. pt participated in seated LE ther ex. no new goals met at this time. pt would continue to benefit from PT services.

## 2022-09-13 NOTE — PLAN OF CARE
Goal Outcome Evaluation:  Plan of Care Reviewed With: patient        Progress: improving  Outcome Evaluation: Pt was sitting up in chair upon arrival. Pt deferred any ADLs this am. Pt was SBA with sit-stand-sit. Pt gave good effort with UE ther ex. Discussed home safety and AE/DME. No goals met this tx. Continue OT POC.

## 2022-09-13 NOTE — PLAN OF CARE
Goal Outcome Evaluation:  Plan of Care Reviewed With: patient        Progress: improving  Outcome Evaluation: pt resting. vss. no signs of distress at this time.

## 2022-09-13 NOTE — THERAPY TREATMENT NOTE
Acute Care - Physical Therapy Treatment Note  [facility]     Patient Name: Alda Constantino  : 1940  MRN: [id number]  Today's Date: 2022   Onset of Illness/Injury or Date of Surgery: 22  Visit Dx:     ICD-10-CM ICD-9-CM   1. Lung infiltrate  R91.8 793.19   2. Impaired mobility and ADLs  Z74.09 V49.89    Z78.9    3. Impaired functional mobility, balance, gait, and endurance  Z74.09 V49.89     Patient Active Problem List   Diagnosis   • Acute febrile illness   • Hypertension   • COPD (chronic obstructive pulmonary disease) (HCC)   • Vomiting   • Leukocytosis   • Sepsis (HCC)   • CAP (community acquired pneumonia)     Past Medical History:   Diagnosis Date   • Asthma    • Cancer (HCC)    • Congenital abnormalities     colon behind liver   • COPD (chronic obstructive pulmonary disease) (HCC)    • Hypertension      Past Surgical History:   Procedure Laterality Date   • ADENOIDECTOMY     • ADRENAL GLAND SURGERY Right    • BLADDER SURGERY     • BREAST BIOPSY     • COLONOSCOPY W/ BIOPSIES AND POLYPECTOMY     • EYE SURGERY     • HERNIA REPAIR     • HYSTERECTOMY     • LUNG LOBECTOMY Left    • RECTAL SURGERY      cyst   • SKIN CANCER EXCISION      legs   • TONSILLECTOMY     • VARICOSE VEIN SURGERY       PT Assessment (last 12 hours)     PT Evaluation and Treatment     Row Name 22 1345          Physical Therapy Time and Intention    Document Type therapy note (daily note)  -BARBARA     Patient Effort good  -     Row Name 22 1345          General Information    Patient Profile Reviewed yes  -BARBARA     Existing Precautions/Restrictions fall;oxygen therapy device and L/min  -     Row Name 22 1345          Pain    Pretreatment Pain Rating 10/10  -BARBARA     Posttreatment Pain Rating 10/10  -     Pain Location lower  -BARBARA     Pre/Posttreatment Pain Comment back, right ankle, right hand.  -     Row Name 22 1345          Cognition    Affect/Mental Status (Cognition) WFL  -      Orientation Status (Cognition) oriented x 4  -     Personal Safety Interventions fall prevention program maintained;gait belt;muscle strengthening facilitated;nonskid shoes/slippers when out of bed;supervised activity  -BARBARA     Row Name 09/13/22 1345          Transfers    Transfers sit-stand transfer;stand-sit transfer  -     Sit-Stand [place] (Transfers) standby assist  -     Stand-Sit [place] (Transfers) standby assist  -[facility] 09/13/22 1345          Sit-Stand Transfer    Assistive Device (Sit-Stand Transfers) walker, front-wheeled  -BARBARA     Row Name 09/13/22 1345          Stand-Sit Transfer    Assistive Device (Stand-Sit Transfers) walker, front-wheeled  -BARBARA     Row Name 09/13/22 1345          Gait/Stairs (Locomotion)    Gait/Stairs Locomotion gait/ambulation independence;gait/ambulation assistive device;distance ambulated;gait pattern;gait deviations  -     [place] Level (Gait) standby assist  -     Assistive Device (Gait) --  no AD. pt cruises furniture. pt educated that this is a sign she should use AD. pt declines.  -     Distance in Feet (Gait) 96  -     Pattern (Gait) step-through  -     Deviations/Abnormal Patterns (Gait) gait speed decreased;stride length decreased  -BARBARA     Row Name 09/13/22 1345          Safety Issues, Functional Mobility    Impairments Affecting Function (Mobility) balance;endurance/activity tolerance;shortness of breath;strength  -BARBARA     Row Name 09/13/22 1345          Motor Skills    Therapeutic Exercise --  ankle DF/PF, hip Ab/Ad, LAQ, hip flexion- 15x1 pamella AROM.  -[facility] 09/13/22 1345          Vital Signs    Pre Systolic BP Rehab 113  -BARBARA     Pre Treatment Diastolic BP 62  -BARBARA     Post Systolic BP Rehab 131  -BARBARA     Post Treatment Diastolic BP 77  -BARBARA     Pretreatment Heart Rate (beats/min) 89  -BARBARA     Intratreatment Heart Rate (beats/min) 84  -BARBARA     Posttreatment Heart Rate (beats/min) 86  -BARBARA     Pre SpO2 (%) 97  -BARBARA     O2 Delivery  Pre Treatment supplemental O2  -BARBARA     Intra SpO2 (%) 98  -BARBARA     O2 Delivery Intra Treatment supplemental O2  -BARBARA     Post SpO2 (%) 96  -BARBARA     O2 Delivery Post Treatment supplemental O2  -BARBARA     Pre Patient Position Sitting  -BARBARA     Post Patient Position Sitting  -BARBARA     Row Name 09/13/22 1345          Positioning and Restraints    Pre-Treatment Position sitting in chair/recliner  -BARBARA     Post Treatment Position chair  -BARBARA     In Chair reclined;call light within reach;encouraged to call for assist;exit alarm on  all needs met  -           User Key  (r) = Recorded By, (t) = Taken By, (c) = Cosigned By    Initials Name Provider Type    BARBARA Asif King PTA Physical Therapist Assistant                Physical Therapy Education                 Title: PT OT SLP Therapies (In Progress)     Topic: Physical Therapy (In Progress)     Point: Mobility training (In Progress)     Learning Progress Summary           Patient Acceptance, E, NR by BARBARA at 9/13/2022 1700    Acceptance, E, VU by FRANCISCO JAVIER at 9/12/2022 1546    Comment: Role of PT, POC, use of gait belt                   Point: Home exercise program (Not Started)     Learner Progress:  Not documented in this visit.          Point: Body mechanics (Not Started)     Learner Progress:  Not documented in this visit.          Point: Precautions (Done)     Learning Progress Summary           Patient Acceptance, E, VU by FRANCISCO JAVIER at 9/12/2022 1546    Comment: Role of PT, POC, use of gait belt                               User Key     Initials Effective Dates Name Provider Type Discipline     06/16/21 -  Asif King PTA Physical Therapist Assistant PT    FRANCISCO JAVIER 06/16/21 -  Malena Kurtz PT Physical Therapist PT              PT Recommendation and Plan     Plan of Care Reviewed With: patient  Progress: improving  Outcome Evaluation: pt responded well to PT w/ increased gait to 96 ft SBA w/o AD but tends to cruise furniture. education provided on benefits of using AD but pt  declines. pt participated in seated LE ther ex. no new goals met at this time. pt would continue to benefit from PT services.   Outcome Measures     Row Name 09/13/22 1345 09/12/22 1508          How much help from another person do you currently need...    Turning from your back to your side while in flat bed without using bedrails? 4  -BARBARA --     Moving from lying on back to sitting on the side of a flat bed without bedrails? 4  -BARBARA --     Moving to and from a bed to a chair (including a wheelchair)? 3  -BARBARA --     Standing up from a chair using your arms (e.g., wheelchair, bedside chair)? 3  -BARBARA --     Climbing 3-5 steps with a railing? 3  -BARBARA --     To walk in hospital room? 3  -BARBARA --     AM-PAC 6 Clicks Score (PT) 20  -BARBARA --            How much help from another is currently needed...    Putting on and taking off regular lower body clothing? -- 3  -KH     Bathing (including washing, rinsing, and drying) -- 3  -KH     Toileting (which includes using toilet bed pan or urinal) -- 3  -KH     Putting on and taking off regular upper body clothing -- 4  -KH     Taking care of personal grooming (such as brushing teeth) -- 4  -KH     Eating meals -- 4  -KH     AM-PAC 6 Clicks Score (OT) -- 21  -KH            Functional Assessment    Outcome Measure Options AM-PAC 6 Clicks Basic Mobility (PT)  - AM-PAC 6 Clicks Daily Activity (OT)  -           User Key  (r) = Recorded By, (t) = Taken By, (c) = Cosigned By    Initials Name Provider Type    Asif Schroeder PTA Physical Therapist Assistant    Charity Canchola, SENA Occupational Therapist                 Time Calculation:    PT Charges     Row Name 09/13/22 1706             Time Calculation    Start Time 1345  -      Stop Time 1409  -      Time Calculation (min) 24 min  -              Time Calculation- PT    Total Timed Code Minutes- PT 24 minute(s)  -              Timed Charges    26478 - PT Therapeutic Exercise Minutes 9  -      68870 - Gait Training  Minutes  15  -BARBARA              Total Minutes    Timed Charges Total Minutes 24  -BARBARA       Total Minutes 24  -BARBARA            User Key  (r) = Recorded By, (t) = Taken By, (c) = Cosigned By    Initials Name Provider Type    Asif Schroeder PTA Physical Therapist Assistant              Therapy Charges for Today     Code Description Service Date Service Provider Modifiers Qty    52350418686 HC PT THER PROC EA 15 MIN 9/13/2022 Asif King PTA GP 1    91110307636 HC GAIT TRAINING EA 15 MIN 9/13/2022 Asif King PTA GP 1          PT G-Codes  Outcome Measure Options: AM-PAC 6 Clicks Basic Mobility (PT)  AM-PAC 6 Clicks Score (PT): 20  AM-PAC 6 Clicks Score (OT): 21    Asif King PTA  9/13/2022

## 2022-09-13 NOTE — THERAPY TREATMENT NOTE
Patient Name: Alda Constantino  : 1940    MRN: [id number]                              Today's Date: 2022       Admit Date: 2022    Visit Dx:     ICD-10-CM ICD-9-CM   1. Lung infiltrate  R91.8 793.19   2. Impaired mobility and ADLs  Z74.09 V49.89    Z78.9    3. Impaired functional mobility, balance, gait, and endurance  Z74.09 V49.89     Patient Active Problem List   Diagnosis   • Acute febrile illness   • Hypertension   • COPD (chronic obstructive pulmonary disease) (HCC)   • Vomiting   • Leukocytosis   • Sepsis (HCC)   • CAP (community acquired pneumonia)     Past Medical History:   Diagnosis Date   • Asthma    • Cancer (HCC)    • Congenital abnormalities     colon behind liver   • COPD (chronic obstructive pulmonary disease) (HCC)    • Hypertension      Past Surgical History:   Procedure Laterality Date   • ADENOIDECTOMY     • ADRENAL GLAND SURGERY Right    • BLADDER SURGERY     • BREAST BIOPSY     • COLONOSCOPY W/ BIOPSIES AND POLYPECTOMY     • EYE SURGERY     • HERNIA REPAIR     • HYSTERECTOMY     • LUNG LOBECTOMY Left    • RECTAL SURGERY      cyst   • SKIN CANCER EXCISION      legs   • TONSILLECTOMY     • VARICOSE VEIN SURGERY        General Information     Row Name 22 1050          OT Time and Intention    Document Type therapy note (daily note)  -CS     Mode of Treatment occupational therapy  -CS     Row Name 22 1050          General Information    Patient Profile Reviewed yes  -CS     Existing Precautions/Restrictions fall;oxygen therapy device and L/min  -CS     Row Name 22 1050          Cognition    Orientation Status (Cognition) oriented x 4  -CS     Row Name 22 1050          Safety Issues, Functional Mobility    Impairments Affecting Function (Mobility) balance;endurance/activity tolerance;shortness of breath;strength  -CS           User Key  (r) = Recorded By, (t) = Taken By, (c) = Cosigned By    Initials Name Provider Type    CS Paola Silveira COTA  Occupational Therapist Assistant                 Mobility/ADL's     Row Name 09/13/22 1050          Transfers    Sit-Stand [place] (Transfers) standby assist  -     Stand-Sit [place] (Transfers) standby assist  -           User Key  (r) = Recorded By, (t) = Taken By, (c) = Cosigned By    Initials Name Provider Type     Paola Silveira COTA Occupational Therapist Assistant               Obj/Interventions     [facility] Name 09/13/22 1050          Shoulder (Therapeutic Exercise)    Shoulder (Therapeutic Exercise) AROM (active range of motion)  -     Shoulder AROM (Therapeutic Exercise) bilateral;flexion;extension;external rotation;internal rotation  -[facility] Name 09/13/22 1050          Elbow/Forearm (Therapeutic Exercise)    Elbow/Forearm (Therapeutic Exercise) AROM (active range of motion)  -     Elbow/Forearm AROM (Therapeutic Exercise) bilateral;flexion;extension;supination;pronation  -[facility] Name 09/13/22 1050          Wrist (Therapeutic Exercise)    Wrist (Therapeutic Exercise) AROM (active range of motion)  -     Wrist AROM (Therapeutic Exercise) bilateral;flexion;extension  -CS     Row Name 09/13/22 1050          Hand (Therapeutic Exercise)    Hand (Therapeutic Exercise) AROM (active range of motion)  -     Hand AROM/AAROM (Therapeutic Exercise) bilateral;finger flexion;finger extension  -[facility] Name 09/13/22 1050          Motor Skills    Therapeutic Exercise shoulder;elbow/forearm;wrist;hand  -           User Key  (r) = Recorded By, (t) = Taken By, (c) = Cosigned By    Initials Name Provider Type     Paola Silveira COTA Occupational Therapist Assistant               Goals/Plan     [facility] Name 09/13/22 1050          Transfer Goal 1 (OT)    Activity/Assistive Device (Transfer Goal 1, OT) sit-to-stand/stand-to-sit;bed-to-chair/chair-to-bed;toilet  -     [place] Level/Cues Needed (Transfer Goal 1, OT) independent  -     Time Frame (Transfer Goal 1, OT) long term goal  (LTG);by discharge  -CS     Progress/Outcome (Transfer Goal 1, OT) goal not met  -CS     Row Name 09/13/22 1050          Bathing Goal 1 (OT)    Activity/Device (Bathing Goal 1, OT) lower body bathing  -CS     [place] Level/Cues Needed (Bathing Goal 1, OT) modified independence  -CS     Time Frame (Bathing Goal 1, OT) long term goal (LTG);by discharge  -CS     Progress/Outcomes (Bathing Goal 1, OT) goal not met  -CS     Row Name 09/13/22 1050          Dressing Goal 1 (OT)    Activity/Device (Dressing Goal 1, OT) lower body dressing  -CS     [place]/Cues Needed (Dressing Goal 1, OT) modified independence  -CS     Time Frame (Dressing Goal 1, OT) long term goal (LTG);by discharge  -CS     Progress/Outcome (Dressing Goal 1, OT) goal not met  -CS     Row Name 09/13/22 1050          Toileting Goal 1 (OT)    Activity/Device (Toileting Goal 1, OT) toileting skills, all  -CS     [place] Level/Cues Needed (Toileting Goal 1, OT) independent  -CS     Time Frame (Toileting Goal 1, OT) long term goal (LTG);by discharge  -CS     Progress/Outcome (Toileting Goal 1, OT) goal not met  -CS     Row Name 09/13/22 1050           Activity Tolerance Goal 1 (OT)    Activity Level (Endurance Goal 1, OT) 15 min activity;O2 sat >/ equal to 88%  -CS     Progress/Outcome (Activity Tolerance Goal 1, OT) goal not met  -CS           User Key  (r) = Recorded By, (t) = Taken By, (c) = Cosigned By    Initials Name Provider Type    CS Paola Silveira COTA Occupational Therapist Assistant               Clinical Impression     Row Name 09/13/22 1050          Pain Assessment    Pretreatment Pain Rating 0/10 - no pain  -CS     Posttreatment Pain Rating 0/10 - no pain  -CS     Row Name 09/13/22 1050          Plan of Care Review    Plan of Care Reviewed With patient  -CS     Progress improving  -CS     Outcome Evaluation Pt was sitting up in chair upon arrival. Pt deferred any ADLs this am. Pt was SBA with sit-stand-sit. Pt gave good effort  with UE ther ex. Discussed home safety and AE/DME. No goals met this tx. Continue OT POC.  -CS     Row Name 09/13/22 1050          Therapy Assessment/Plan (OT)    Rehab Potential (OT) good, to achieve stated therapy goals  -CS     Criteria for Skilled Therapeutic Interventions Met (OT) yes;meets criteria;skilled treatment is necessary  -CS     Row Name 09/13/22 1050          Therapy Plan Review/Discharge Plan (OT)    Anticipated Discharge Disposition (OT) home with assist;home with home health  -CS     Row Name 09/13/22 1050          Vital Signs    Pre Patient Position Sitting  -CS     Post Patient Position Sitting  -CS     Row Name 09/13/22 1050          Positioning and Restraints    Post Treatment Position chair  -CS     In Chair sitting;call light within reach;encouraged to call for assist;exit alarm on  -CS           User Key  (r) = Recorded By, (t) = Taken By, (c) = Cosigned By    Initials Name Provider Type    Paola Ruano COTA Occupational Therapist Assistant               Outcome Measures     Row Name 09/13/22 1002          How much help from another person do you currently need...    Turning from your back to your side while in flat bed without using bedrails? 4  -MS     Moving from lying on back to sitting on the side of a flat bed without bedrails? 4  -MS     Moving to and from a bed to a chair (including a wheelchair)? 4  -MS     Standing up from a chair using your arms (e.g., wheelchair, bedside chair)? 4  -MS     Climbing 3-5 steps with a railing? 3  -MS     To walk in hospital room? 3  -MS     AM-PAC 6 Clicks Score (PT) 22  -MS     Highest level of mobility 7 --> Walked 25 feet or more  -MS           User Key  (r) = Recorded By, (t) = Taken By, (c) = Cosigned By    Initials Name Provider Type    Jo Caruso, RN Registered Nurse                Occupational Therapy Education                 Title: PT OT SLP Therapies (In Progress)     Topic: Occupational Therapy (In Progress)     Point:  ADL training (Done)     Description:   Instruct learner(s) on proper safety adaptation and remediation techniques during self care or transfers.   Instruct in proper use of assistive devices.              Learning Progress Summary           Patient Acceptance, E, VU by  at 9/12/2022 1536    Comment: Educated pt on fall prevention and safety with ADls                   Point: Home exercise program (Not Started)     Description:   Instruct learner(s) on appropriate technique for monitoring, assisting and/or progressing therapeutic exercises/activities.              Learner Progress:  Not documented in this visit.          Point: Precautions (Done)     Description:   Instruct learner(s) on prescribed precautions during self-care and functional transfers.              Learning Progress Summary           Patient Acceptance, E, VU by  at 9/12/2022 1536    Comment: Educated pt on fall prevention and safety with ADls                   Point: Body mechanics (Not Started)     Description:   Instruct learner(s) on proper positioning and spine alignment during self-care, functional mobility activities and/or exercises.              Learner Progress:  Not documented in this visit.                      User Key     Initials Effective Dates Name Provider Type Discipline     06/16/21 -  Charity Cervantes OT Occupational Therapist OT              OT Recommendation and Plan     Plan of Care Review  Plan of Care Reviewed With: patient  Progress: improving  Outcome Evaluation: Pt was sitting up in chair upon arrival. Pt deferred any ADLs this am. Pt was SBA with sit-stand-sit. Pt gave good effort with UE ther ex. Discussed home safety and AE/DME. No goals met this tx. Continue OT POC.     Time Calculation:    Time Calculation- OT     Row Name 09/13/22 1313             Time Calculation- OT    OT Start Time 1050  -CS      OT Stop Time 1145  -CS      OT Time Calculation (min) 55 min  -CS      Total Timed Code Minutes- OT 50  minute(s)  -CS      OT Received On 09/13/22  -CS              Timed Charges    00732 - OT Therapeutic Exercise Minutes 40  -CS      27492 - OT Self Care/Mgmt Minutes 15  -CS              Total Minutes    Timed Charges Total Minutes 55  -CS       Total Minutes 55  -CS            User Key  (r) = Recorded By, (t) = Taken By, (c) = Cosigned By    Initials Name Provider Type    CS Paola Silveira COTA Occupational Therapist Assistant              Therapy Charges for Today     Code Description Service Date Service Provider Modifiers Qty    71288684764 HC OT THER PROC EA 15 MIN 9/13/2022 Paola Silveira COTA GO 3    87697966364 HC OT SELF CARE/MGMT/TRAIN EA 15 MIN 9/13/2022 Paola Silveira COTA GO 1               LIUDMILA Luo  9/13/2022

## 2022-09-14 ENCOUNTER — APPOINTMENT (OUTPATIENT)
Dept: CARDIOLOGY | Facility: HOSPITAL | Age: 82
End: 2022-09-14

## 2022-09-14 LAB
ANION GAP SERPL CALCULATED.3IONS-SCNC: 10 MMOL/L (ref 5–15)
BUN SERPL-MCNC: 37 MG/DL (ref 8–23)
BUN/CREAT SERPL: 25.7 (ref 7–25)
CALCIUM SPEC-SCNC: 8.8 MG/DL (ref 8.6–10.5)
CHLORIDE SERPL-SCNC: 99 MMOL/L (ref 98–107)
CO2 SERPL-SCNC: 27 MMOL/L (ref 22–29)
CREAT SERPL-MCNC: 1.44 MG/DL (ref 0.57–1)
EGFRCR SERPLBLD CKD-EPI 2021: 36.8 ML/MIN/1.73
GLUCOSE BLDC GLUCOMTR-MCNC: 117 MG/DL (ref 70–130)
GLUCOSE BLDC GLUCOMTR-MCNC: 136 MG/DL (ref 70–130)
GLUCOSE BLDC GLUCOMTR-MCNC: 157 MG/DL (ref 70–130)
GLUCOSE BLDC GLUCOMTR-MCNC: 204 MG/DL (ref 70–130)
GLUCOSE SERPL-MCNC: 131 MG/DL (ref 65–99)
POTASSIUM SERPL-SCNC: 4 MMOL/L (ref 3.5–5.2)
SODIUM SERPL-SCNC: 136 MMOL/L (ref 136–145)
WHOLE BLOOD HOLD SPECIMEN: NORMAL

## 2022-09-14 PROCEDURE — 25010000002 METHYLPREDNISOLONE PER 125 MG: Performed by: STUDENT IN AN ORGANIZED HEALTH CARE EDUCATION/TRAINING PROGRAM

## 2022-09-14 PROCEDURE — 25010000002 ENOXAPARIN PER 10 MG: Performed by: PHYSICIAN ASSISTANT

## 2022-09-14 PROCEDURE — 80048 BASIC METABOLIC PNL TOTAL CA: CPT | Performed by: PHYSICIAN ASSISTANT

## 2022-09-14 PROCEDURE — 63710000001 INSULIN ASPART PER 5 UNITS: Performed by: PHYSICIAN ASSISTANT

## 2022-09-14 PROCEDURE — 97116 GAIT TRAINING THERAPY: CPT

## 2022-09-14 PROCEDURE — 93306 TTE W/DOPPLER COMPLETE: CPT

## 2022-09-14 PROCEDURE — 63710000001 ONDANSETRON PER 8 MG: Performed by: PHYSICIAN ASSISTANT

## 2022-09-14 PROCEDURE — 82962 GLUCOSE BLOOD TEST: CPT

## 2022-09-14 RX ORDER — SENNA PLUS 8.6 MG/1
1 TABLET ORAL ONCE
Status: COMPLETED | OUTPATIENT
Start: 2022-09-14 | End: 2022-09-14

## 2022-09-14 RX ORDER — METHYLPREDNISOLONE SODIUM SUCCINATE 125 MG/2ML
80 INJECTION, POWDER, LYOPHILIZED, FOR SOLUTION INTRAMUSCULAR; INTRAVENOUS ONCE
Status: COMPLETED | OUTPATIENT
Start: 2022-09-14 | End: 2022-09-14

## 2022-09-14 RX ADMIN — ONDANSETRON HYDROCHLORIDE 4 MG: 4 TABLET, FILM COATED ORAL at 01:34

## 2022-09-14 RX ADMIN — BISACODYL 10 MG: 10 SUPPOSITORY RECTAL at 21:18

## 2022-09-14 RX ADMIN — BISACODYL 5 MG: 5 TABLET, COATED ORAL at 22:04

## 2022-09-14 RX ADMIN — ACETAMINOPHEN 500 MG: 500 TABLET, FILM COATED ORAL at 21:18

## 2022-09-14 RX ADMIN — GABAPENTIN 100 MG: 100 CAPSULE ORAL at 21:18

## 2022-09-14 RX ADMIN — MELATONIN 5.25 MG: 3 TAB ORAL at 21:18

## 2022-09-14 RX ADMIN — SPIRONOLACTONE 25 MG: 25 TABLET ORAL at 09:14

## 2022-09-14 RX ADMIN — SENNOSIDES 1 TABLET: 8.6 TABLET, FILM COATED ORAL at 15:07

## 2022-09-14 RX ADMIN — Medication 10 ML: at 21:18

## 2022-09-14 RX ADMIN — DOCUSATE SODIUM 50 MG AND SENNOSIDES 8.6 MG 2 TABLET: 8.6; 5 TABLET, FILM COATED ORAL at 09:14

## 2022-09-14 RX ADMIN — CARVEDILOL 3.12 MG: 3.12 TABLET, FILM COATED ORAL at 17:46

## 2022-09-14 RX ADMIN — ENOXAPARIN SODIUM 40 MG: 40 INJECTION SUBCUTANEOUS at 21:18

## 2022-09-14 RX ADMIN — PANTOPRAZOLE SODIUM 40 MG: 40 TABLET, DELAYED RELEASE ORAL at 06:12

## 2022-09-14 RX ADMIN — INSULIN ASPART 3 UNITS: 100 INJECTION, SOLUTION INTRAVENOUS; SUBCUTANEOUS at 17:46

## 2022-09-14 RX ADMIN — CARVEDILOL 3.12 MG: 3.12 TABLET, FILM COATED ORAL at 09:13

## 2022-09-14 RX ADMIN — Medication 10 ML: at 09:19

## 2022-09-14 RX ADMIN — BUMETANIDE 2 MG: 1 TABLET ORAL at 09:14

## 2022-09-14 RX ADMIN — METHYLPREDNISOLONE SODIUM SUCCINATE 80 MG: 125 INJECTION, POWDER, FOR SOLUTION INTRAMUSCULAR; INTRAVENOUS at 09:14

## 2022-09-14 RX ADMIN — ACETAMINOPHEN 500 MG: 500 TABLET, FILM COATED ORAL at 01:21

## 2022-09-14 NOTE — PLAN OF CARE
Goal Outcome Evaluation:      Pt receiving IV ATB for CAP.  Worked with PT/OT today.  C/O constipation.  PO meds and enema ordered and given to patient with no results.  Echo completed today.  Pt continues on home settings of 2L NC.

## 2022-09-14 NOTE — PLAN OF CARE
Goal Outcome Evaluation:  Plan of Care Reviewed With: patient           Outcome Evaluation: Rested in chair in reclined position sporadically throughout night. Up frequently to urinate. Standby assist with ambulation. Pt declines walker and places hands on furniture when moving around room. Utilized PRN Tylenol and Zofran x 1. Received PRN Dulcolax at HS along with stool softene, no results at this time.

## 2022-09-14 NOTE — SIGNIFICANT NOTE
09/14/22 1533   OTHER   Discipline occupational therapy assistant   Rehab Time/Intention   Session Not Performed other (see comments);patient unavailable for treatment  (Pt deferred OT this am; Pt was off the floor this pm(echo). OT will check back tomorrow.)

## 2022-09-14 NOTE — PROGRESS NOTES
FAMILY MEDICINE RESIDENCY SERVICE  DAILY PROGRESS NOTE    NAME: Alda Constantino  : 1940  MRN: [id number]      LOS: 3 days     PROVIDER OF SERVICE: Henry Moser MD    Chief Complaint: CAP (community acquired pneumonia)    Subjective:     Interval History:  History taken from: patient  Patient Complaints: mild SOB with ambulation  Patient Denies:  Fevers, chest pain, or abdomnial pain    Review of Systems:   Review of Systems   Constitutional: Negative for fever.   HENT: Negative.    Respiratory: Positive for shortness of breath.    Cardiovascular: Negative for chest pain.   Gastrointestinal: Negative for abdominal pain.   Genitourinary: Negative.    Musculoskeletal: Negative.    Skin: Negative.    Neurological: Negative.  Negative for dizziness and light-headedness.   Psychiatric/Behavioral: Negative.        Objective:     Vital Signs  Temp:  [97.2 °F (36.2 °C)-97.8 °F (36.6 °C)] 97.2 °F (36.2 °C)  Heart Rate:  [63-79] 74  Resp:  [18] 18  BP: (112-133)/(53-60) 112/59  Flow (L/min):  [2] 2   Body mass index is 37.23 kg/m².    Physical Exam  Physical Exam  Constitutional:       Appearance: Normal appearance.   HENT:      Head: Normocephalic and atraumatic.      Mouth/Throat:      Mouth: Mucous membranes are moist.   Eyes:      Extraocular Movements: Extraocular movements intact.   Cardiovascular:      Rate and Rhythm: Normal rate and regular rhythm.      Pulses: Normal pulses.      Heart sounds: Normal heart sounds.   Pulmonary:      Breath sounds: Wheezing present.   Abdominal:      Palpations: Abdomen is soft. There is no mass.      Tenderness: There is no abdominal tenderness.   Musculoskeletal:         General: Normal range of motion.      Cervical back: Normal range of motion.   Skin:     General: Skin is warm and dry.   Neurological:      Mental Status: She is alert and oriented to person, place, and time.   Psychiatric:         Behavior: Behavior normal.         Scheduled Meds   bumetanide,  2 mg, Oral, Daily  carvedilol, 3.125 mg, Oral, BID With Meals  enoxaparin, 40 mg, Subcutaneous, Daily  gabapentin, 100 mg, Oral, Nightly  Insulin Aspart, 0-7 Units, Subcutaneous, TID AC  levoFLOXacin, 750 mg, Intravenous, Q48H  methylPREDNISolone sodium succinate, 80 mg, Intravenous, Once  pantoprazole, 40 mg, Oral, QAM  senna-docusate sodium, 2 tablet, Oral, BID  sodium chloride, 10 mL, Intravenous, Q12H  spironolactone, 25 mg, Oral, Daily       PRN Meds   •  acetaminophen  •  senna-docusate sodium **AND** bisacodyl **AND** bisacodyl  •  dextrose  •  dextrose  •  glucagon (human recombinant)  •  ipratropium-albuterol  •  melatonin  •  ondansetron **OR** ondansetron  •  sodium chloride  •  sodium chloride      Diagnostic Data    Results from last 7 days   Lab Units 09/14/22  0523 09/13/22  0538 09/13/22  0522 09/12/22  0516 09/11/22  1608   WBC 10*3/mm3  --   --  7.16   < > 8.89   HEMOGLOBIN g/dL  --   --  11.5*   < > 11.6*   HEMATOCRIT %  --   --  35.7   < > 36.0   PLATELETS 10*3/mm3  --   --  182   < > 188   GLUCOSE mg/dL 131*   < >  --    < > 111*   CREATININE mg/dL 1.44*   < >  --    < > 1.33*   BUN mg/dL 37*   < >  --    < > 34*   SODIUM mmol/L 136   < >  --    < > 138   POTASSIUM mmol/L 4.0   < >  --    < > 4.1   AST (SGOT) U/L  --   --   --   --  14   ALT (SGPT) U/L  --   --   --   --  11   ALK PHOS U/L  --   --   --   --  95   BILIRUBIN mg/dL  --   --   --   --  0.3   ANION GAP mmol/L 10.0   < >  --    < > 8.0    < > = values in this interval not displayed.       No radiology results for the last day      I reviewed the patient's new clinical results.    Assessment/Plan:     Active and Resolved Problems  Active Hospital Problems    Diagnosis  POA   • **CAP (community acquired pneumonia) [J18.9]  Yes   • Hypertension [I10]  Yes   • COPD (chronic obstructive pulmonary disease) (HCC) [J44.9]  Yes      Resolved Hospital Problems   No resolved problems to display.     Pt continues to report SOB on exertion. No  change in chest x-ray noted upon comparison with previous one. Given hx of COPD,  more likely   exacerbation    # excarbation  -Start Solumedrol 80 mg IV X 1, then 60 mg PO x 4 days starting tomorrow  -NICHOLE ordered  -2L at home, on baseline       #CAP   -Chest x-ray w/ patchy airspace process  -Levaquin Q 48 h         #Diabetes Mellitus  -Controlled on current regimen           #CHF  -Bumex 2 mg     #Anemia: macrocytic  -Pending folate , B12             DVT prophylaxis:  Medical DVT prophylaxis orders are present.     Code status is   Code Status and Medical Interventions:   Ordered at: 09/11/22 7670     Level Of Support Discussed With:    Patient     Code Status (Patient has no pulse and is not breathing):    CPR (Attempt to Resuscitate)     Medical Interventions (Patient has pulse or is breathing):    Full Support       Plan for disposition:tomorrow     Time: 30 minutes      This document has been electronically signed by Henry Moser MD on September 14, 2022 07:22 CDT

## 2022-09-14 NOTE — THERAPY TREATMENT NOTE
Acute Care - Physical Therapy Treatment Note  [facility]     Patient Name: Alda Constantino  : 1940  MRN: [id number]  Today's Date: 2022   Onset of Illness/Injury or Date of Surgery: 22  Visit Dx:     ICD-10-CM ICD-9-CM   1. Lung infiltrate  R91.8 793.19   2. Impaired mobility and ADLs  Z74.09 V49.89    Z78.9    3. Impaired functional mobility, balance, gait, and endurance  Z74.09 V49.89     Patient Active Problem List   Diagnosis   • Acute febrile illness   • Hypertension   • COPD (chronic obstructive pulmonary disease) (HCC)   • Vomiting   • Leukocytosis   • Sepsis (HCC)   • CAP (community acquired pneumonia)     Past Medical History:   Diagnosis Date   • Asthma    • Cancer (HCC)    • Congenital abnormalities     colon behind liver   • COPD (chronic obstructive pulmonary disease) (HCC)    • Hypertension      Past Surgical History:   Procedure Laterality Date   • ADENOIDECTOMY     • ADRENAL GLAND SURGERY Right    • BLADDER SURGERY     • BREAST BIOPSY     • COLONOSCOPY W/ BIOPSIES AND POLYPECTOMY     • EYE SURGERY     • HERNIA REPAIR     • HYSTERECTOMY     • LUNG LOBECTOMY Left    • RECTAL SURGERY      cyst   • SKIN CANCER EXCISION      legs   • TONSILLECTOMY     • VARICOSE VEIN SURGERY       PT Assessment (last 12 hours)     PT Evaluation and Treatment     Row Name 22 0835          Physical Therapy Time and Intention    Subjective Information no complaints  -CA     Document Type therapy note (daily note)  -CA     Mode of Treatment individual therapy;physical therapy  -CA     Row Name 22 0835          Pain    Pretreatment Pain Rating 10/10  -CA     Posttreatment Pain Rating 10/10  -CA     Pain Location - Side/Orientation Right  -CA     Pain Location - hand  -CA     Row Name 22 0835          Cognition    Affect/Mental Status (Cognition) WFL  -CA     Orientation Status (Cognition) oriented x 4  -CA     Row Name 22 0835          Transfers    Transfers sit-stand  transfer;stand-sit transfer  -CA     Sit-Stand Gulf (Transfers) contact guard  -CA     Stand-Sit Gulf (Transfers) contact guard  -CA     Row Name 09/14/22 0835          Gait/Stairs (Locomotion)    [place] Level (Gait) contact guard  -CA     Assistive Device (Gait) other (see comments)  HHA  -CA     Distance in Feet (Gait) 40  -CA     Pattern (Gait) step-through  -CA     Deviations/Abnormal Patterns (Gait) gait speed decreased;stride length decreased  -CA     Row Name 09/14/22 0835          Motor Skills    Therapeutic Exercise hip;knee;ankle  -CA     Row Name 09/14/22 0835          Hip (Therapeutic Exercise)    Hip (Therapeutic Exercise) AROM (active range of motion)  -CA     Hip AROM (Therapeutic Exercise) bilateral;flexion;extension  -CA     Row Name 09/14/22 0835          Knee (Therapeutic Exercise)    Knee (Therapeutic Exercise) AROM (active range of motion)  -CA     Knee AROM (Therapeutic Exercise) bilateral;LAQ (long arc quad)  -CA     Row Name 09/14/22 0835          Ankle (Therapeutic Exercise)    Ankle (Therapeutic Exercise) AROM (active range of motion)  -CA     Ankle AROM (Therapeutic Exercise) bilateral;dorsiflexion;plantarflexion  -CA     Row Name 09/14/22 0835          Positioning and Restraints    Pre-Treatment Position sitting in chair/recliner  -CA     Post Treatment Position chair  -CA     In Chair sitting;call light within reach;encouraged to call for assist  -CA           User Key  (r) = Recorded By, (t) = Taken By, (c) = Cosigned By    Initials Name Provider Type    Nuno Allen, PTA Physical Therapist Assistant                Physical Therapy Education                 Title: PT OT SLP Therapies (In Progress)     Topic: Physical Therapy (In Progress)     Point: Mobility training (In Progress)     Learning Progress Summary           Patient Acceptance, E, NR by BARBARA at 9/13/2022 1700    Acceptance, E, VU by FRANCISCO JAVIER at 9/12/2022 1546    Comment: Role of PT, POC, use of gait belt                    Point: Home exercise program (Not Started)     Learner Progress:  Not documented in this visit.          Point: Body mechanics (Not Started)     Learner Progress:  Not documented in this visit.          Point: Precautions (Done)     Learning Progress Summary           Patient Acceptance, E, VU by FRANCISCO JAVIER at 9/12/2022 1999    Comment: Role of PT, POC, use of gait belt                               User Key     Initials Effective Dates Name Provider Type Discipline     06/16/21 -  Asif King PTA Physical Therapist Assistant PT    FRANCISCO JAVIER 06/16/21 -  Malena Kurtz, HAYDE Physical Therapist PT              PT Recommendation and Plan     Plan of Care Reviewed With: patient  Progress: improving  Outcome Evaluation: Pt. was sitting up in chair upon entry and agreeable to work with PT.  Pt. was cga for transfers and with gait this tx. Pt. amb. 40' with HHA and no AD this tx and decreased gait speed and wide PUSHPA.  Pt. also performed seated LE therex in chair post gait.  No new goals met and pt. would benefit from continued PT.   Outcome Measures     Row Name 09/14/22 0835 09/13/22 1345 09/12/22 1508       How much help from another person do you currently need...    Turning from your back to your side while in flat bed without using bedrails? 4  -CA 4  -BARBARA --    Moving from lying on back to sitting on the side of a flat bed without bedrails? 4  -CA 4  -BARBARA --    Moving to and from a bed to a chair (including a wheelchair)? 3  -CA 3  -BARBARA --    Standing up from a chair using your arms (e.g., wheelchair, bedside chair)? 3  -CA 3  -BARBARA --    Climbing 3-5 steps with a railing? 3  -CA 3  -BARBARA --    To walk in hospital room? 3  -CA 3  -BARBARA --    AM-PAC 6 Clicks Score (PT) 20  -CA 20  -BARBARA --       How much help from another is currently needed...    Putting on and taking off regular lower body clothing? -- -- 3  -KH    Bathing (including washing, rinsing, and drying) -- -- 3  -KH    Toileting (which includes using toilet bed  pan or urinal) -- -- 3  -KH    Putting on and taking off regular upper body clothing -- -- 4  -KH    Taking care of personal grooming (such as brushing teeth) -- -- 4  -KH    Eating meals -- -- 4  -KH    AM-PAC 6 Clicks Score (OT) -- -- 21  -KH       Functional Assessment    Outcome Measure Options AM-PAC 6 Clicks Basic Mobility (PT)  -CA AM-PAC 6 Clicks Basic Mobility (PT)  -BARBARA AM-PAC 6 Clicks Daily Activity (OT)  -          User Key  (r) = Recorded By, (t) = Taken By, (c) = Cosigned By    Initials Name Provider Type    Nuno Allen PTA Physical Therapist Assistant    Asif Schroeder PTA Physical Therapist Assistant    Charity Canchola, OT Occupational Therapist                 Time Calculation:    PT Charges     Row Name 09/14/22 1532             Time Calculation    Start Time 0835  -CA      Stop Time 0854  -CA      Time Calculation (min) 19 min  -CA      PT Received On 09/14/22  -CA              Time Calculation- PT    Total Timed Code Minutes- PT 19 minute(s)  -CA            User Key  (r) = Recorded By, (t) = Taken By, (c) = Cosigned By    Initials Name Provider Type    Nuno Allen PTA Physical Therapist Assistant              Therapy Charges for Today     Code Description Service Date Service Provider Modifiers Qty    98439972361 HC GAIT TRAINING EA 15 MIN 9/14/2022 Nuno Costa PTA GP 1          PT G-Codes  Outcome Measure Options: AM-PAC 6 Clicks Basic Mobility (PT)  AM-PAC 6 Clicks Score (PT): 20  AM-PAC 6 Clicks Score (OT): 21    Nuno Costa PTA  9/14/2022

## 2022-09-14 NOTE — PLAN OF CARE
Goal Outcome Evaluation:  Plan of Care Reviewed With: patient        Progress: improving  Outcome Evaluation: Pt. was sitting up in chair upon entry and agreeable to work with PT.  Pt. was cga for transfers and with gait this tx. Pt. amb. 40' with HHA and no AD this tx and decreased gait speed and wide PUSHPA.  Pt. also performed seated LE therex in chair post gait.  No new goals met and pt. would benefit from continued PT.

## 2022-09-15 ENCOUNTER — READMISSION MANAGEMENT (OUTPATIENT)
Dept: CALL CENTER | Facility: HOSPITAL | Age: 82
End: 2022-09-15

## 2022-09-15 VITALS
BODY MASS INDEX: 37.17 KG/M2 | WEIGHT: 245.25 LBS | SYSTOLIC BLOOD PRESSURE: 130 MMHG | RESPIRATION RATE: 18 BRPM | HEART RATE: 67 BPM | OXYGEN SATURATION: 100 % | HEIGHT: 68 IN | TEMPERATURE: 96.6 F | DIASTOLIC BLOOD PRESSURE: 61 MMHG

## 2022-09-15 PROBLEM — M81.0 OSTEOPOROSIS: Status: ACTIVE | Noted: 2020-11-25

## 2022-09-15 PROBLEM — M54.50 LOW BACK PAIN: Status: ACTIVE | Noted: 2020-11-25

## 2022-09-15 PROBLEM — L21.9 SEBORRHEIC DERMATITIS OF SCALP: Status: ACTIVE | Noted: 2017-11-07

## 2022-09-15 PROBLEM — E53.8 COBALAMIN DEFICIENCY: Status: ACTIVE | Noted: 2019-05-01

## 2022-09-15 PROBLEM — I49.3 PVCS (PREMATURE VENTRICULAR CONTRACTIONS): Status: ACTIVE | Noted: 2018-01-24

## 2022-09-15 PROBLEM — I50.30 (HFPEF) HEART FAILURE WITH PRESERVED EJECTION FRACTION: Status: ACTIVE | Noted: 2018-07-26

## 2022-09-15 PROBLEM — D69.6 THROMBOCYTOPENIA: Status: ACTIVE | Noted: 2017-06-13

## 2022-09-15 PROBLEM — H53.9 VISUAL DISTURBANCE: Status: ACTIVE | Noted: 2020-11-25

## 2022-09-15 PROBLEM — Z96.1 PSEUDOPHAKIA: Status: ACTIVE | Noted: 2018-05-16

## 2022-09-15 PROBLEM — Z85.118 HISTORY OF LUNG CANCER: Status: ACTIVE | Noted: 2020-11-25

## 2022-09-15 PROBLEM — I42.8 NONISCHEMIC CARDIOMYOPATHY: Status: ACTIVE | Noted: 2018-01-24

## 2022-09-15 PROBLEM — H04.123 DRY EYES: Status: ACTIVE | Noted: 2018-05-16

## 2022-09-15 PROBLEM — Z86.010 HISTORY OF COLONIC POLYPS: Status: ACTIVE | Noted: 2018-03-02

## 2022-09-15 PROBLEM — B37.2 CANDIDAL INTERTRIGO: Status: ACTIVE | Noted: 2017-11-07

## 2022-09-15 PROBLEM — Z99.81 REQUIRES SUPPLEMENTAL OXYGEN: Status: ACTIVE | Noted: 2020-11-25

## 2022-09-15 PROBLEM — I50.32 CHRONIC DIASTOLIC HEART FAILURE: Status: ACTIVE | Noted: 2020-11-25

## 2022-09-15 PROBLEM — R06.00 DYSPNEA: Status: ACTIVE | Noted: 2020-11-25

## 2022-09-15 PROBLEM — H02.30 EXCESS SKIN OF EYELID: Status: ACTIVE | Noted: 2020-11-25

## 2022-09-15 PROBLEM — G89.29 CHRONIC MIDLINE THORACIC BACK PAIN: Status: ACTIVE | Noted: 2017-05-05

## 2022-09-15 PROBLEM — H52.4 ASTIGMATISM OF BOTH EYES WITH PRESBYOPIA: Status: ACTIVE | Noted: 2018-05-16

## 2022-09-15 PROBLEM — H52.203 ASTIGMATISM OF BOTH EYES WITH PRESBYOPIA: Status: ACTIVE | Noted: 2018-05-16

## 2022-09-15 PROBLEM — M54.6 CHRONIC MIDLINE THORACIC BACK PAIN: Status: ACTIVE | Noted: 2017-05-05

## 2022-09-15 PROBLEM — Z00.01 ENCOUNTER FOR GENERAL ADULT MEDICAL EXAMINATION WITH ABNORMAL FINDINGS: Status: ACTIVE | Noted: 2017-11-07

## 2022-09-15 PROBLEM — H43.819 POSTERIOR VITREOUS DETACHMENT: Status: ACTIVE | Noted: 2020-11-25

## 2022-09-15 PROBLEM — J96.11 CHRONIC HYPOXEMIC RESPIRATORY FAILURE (HCC): Status: ACTIVE | Noted: 2022-05-11

## 2022-09-15 LAB
BASOPHILS # BLD AUTO: 0.02 10*3/MM3 (ref 0–0.2)
BASOPHILS NFR BLD AUTO: 0.2 % (ref 0–1.5)
BH CV ECHO MEAS - ACS: 1.78 CM
BH CV ECHO MEAS - AO MAX PG: 12.5 MMHG
BH CV ECHO MEAS - AO MEAN PG: 6.8 MMHG
BH CV ECHO MEAS - AO ROOT DIAM: 3.3 CM
BH CV ECHO MEAS - AO V2 MAX: 177.1 CM/SEC
BH CV ECHO MEAS - AO V2 VTI: 24.9 CM
BH CV ECHO MEAS - AVA(I,D): 1.85 CM2
BH CV ECHO MEAS - EDV(CUBED): 86.2 ML
BH CV ECHO MEAS - EDV(MOD-SP2): 74.4 ML
BH CV ECHO MEAS - EDV(MOD-SP4): 90.8 ML
BH CV ECHO MEAS - EF(MOD-BP): 56.2 %
BH CV ECHO MEAS - EF(MOD-SP2): 55.5 %
BH CV ECHO MEAS - EF(MOD-SP4): 56.9 %
BH CV ECHO MEAS - ESV(CUBED): 38.7 ML
BH CV ECHO MEAS - ESV(MOD-SP2): 33.1 ML
BH CV ECHO MEAS - ESV(MOD-SP4): 39.1 ML
BH CV ECHO MEAS - FS: 23.4 %
BH CV ECHO MEAS - IVS/LVPW: 1.05 CM
BH CV ECHO MEAS - IVSD: 1.37 CM
BH CV ECHO MEAS - LA DIMENSION: 5.4 CM
BH CV ECHO MEAS - LAT PEAK E' VEL: 8.4 CM/SEC
BH CV ECHO MEAS - LV DIASTOLIC VOL/BSA (35-75): 41.3 CM2
BH CV ECHO MEAS - LV MASS(C)D: 225 GRAMS
BH CV ECHO MEAS - LV MAX PG: 3.3 MMHG
BH CV ECHO MEAS - LV MEAN PG: 1.53 MMHG
BH CV ECHO MEAS - LV SYSTOLIC VOL/BSA (12-30): 17.8 CM2
BH CV ECHO MEAS - LV V1 MAX: 90.3 CM/SEC
BH CV ECHO MEAS - LV V1 VTI: 13.4 CM
BH CV ECHO MEAS - LVIDD: 4.4 CM
BH CV ECHO MEAS - LVIDS: 3.4 CM
BH CV ECHO MEAS - LVOT AREA: 3.4 CM2
BH CV ECHO MEAS - LVOT DIAM: 2.09 CM
BH CV ECHO MEAS - LVPWD: 1.3 CM
BH CV ECHO MEAS - MED PEAK E' VEL: 5.2 CM/SEC
BH CV ECHO MEAS - MR MAX PG: 39.5 MMHG
BH CV ECHO MEAS - MR MAX VEL: 314.1 CM/SEC
BH CV ECHO MEAS - MV A MAX VEL: 90.8 CM/SEC
BH CV ECHO MEAS - MV DEC SLOPE: 333.2 CM/SEC2
BH CV ECHO MEAS - MV E MAX VEL: 59.4 CM/SEC
BH CV ECHO MEAS - MV E/A: 0.65
BH CV ECHO MEAS - MV MAX PG: 4.6 MMHG
BH CV ECHO MEAS - MV MEAN PG: 1.84 MMHG
BH CV ECHO MEAS - MV P1/2T: 53.9 MSEC
BH CV ECHO MEAS - MV V2 VTI: 10 CM
BH CV ECHO MEAS - MVA(P1/2T): 4.1 CM2
BH CV ECHO MEAS - MVA(VTI): 4.6 CM2
BH CV ECHO MEAS - PA V2 MAX: 115.4 CM/SEC
BH CV ECHO MEAS - RAP SYSTOLE: 10 MMHG
BH CV ECHO MEAS - RVDD: 2.7 CM
BH CV ECHO MEAS - RVSP: 38.2 MMHG
BH CV ECHO MEAS - SI(MOD-SP2): 18.8 ML/M2
BH CV ECHO MEAS - SI(MOD-SP4): 23.5 ML/M2
BH CV ECHO MEAS - SV(LVOT): 45.9 ML
BH CV ECHO MEAS - SV(MOD-SP2): 41.3 ML
BH CV ECHO MEAS - SV(MOD-SP4): 51.7 ML
BH CV ECHO MEAS - TR MAX PG: 28.2 MMHG
BH CV ECHO MEAS - TR MAX VEL: 265.6 CM/SEC
BH CV ECHO MEASUREMENTS AVERAGE E/E' RATIO: 8.74
DEPRECATED RDW RBC AUTO: 48.5 FL (ref 37–54)
EOSINOPHIL # BLD AUTO: 0.01 10*3/MM3 (ref 0–0.4)
EOSINOPHIL NFR BLD AUTO: 0.1 % (ref 0.3–6.2)
ERYTHROCYTE [DISTWIDTH] IN BLOOD BY AUTOMATED COUNT: 13.6 % (ref 12.3–15.4)
GLUCOSE BLDC GLUCOMTR-MCNC: 118 MG/DL (ref 70–130)
GLUCOSE BLDC GLUCOMTR-MCNC: 130 MG/DL (ref 70–130)
HCT VFR BLD AUTO: 32.9 % (ref 34–46.6)
HGB BLD-MCNC: 10.9 G/DL (ref 12–15.9)
IMM GRANULOCYTES # BLD AUTO: 0.07 10*3/MM3 (ref 0–0.05)
IMM GRANULOCYTES NFR BLD AUTO: 0.6 % (ref 0–0.5)
LEFT ATRIUM VOLUME INDEX: 14.3 ML/M2
LYMPHOCYTES # BLD AUTO: 1.71 10*3/MM3 (ref 0.7–3.1)
LYMPHOCYTES NFR BLD AUTO: 15 % (ref 19.6–45.3)
MAXIMAL PREDICTED HEART RATE: 140 BPM
MCH RBC QN AUTO: 32.2 PG (ref 26.6–33)
MCHC RBC AUTO-ENTMCNC: 33.1 G/DL (ref 31.5–35.7)
MCV RBC AUTO: 97.3 FL (ref 79–97)
MONOCYTES # BLD AUTO: 0.59 10*3/MM3 (ref 0.1–0.9)
MONOCYTES NFR BLD AUTO: 5.2 % (ref 5–12)
NEUTROPHILS NFR BLD AUTO: 78.9 % (ref 42.7–76)
NEUTROPHILS NFR BLD AUTO: 8.98 10*3/MM3 (ref 1.7–7)
NRBC BLD AUTO-RTO: 0 /100 WBC (ref 0–0.2)
PLATELET # BLD AUTO: 170 10*3/MM3 (ref 140–450)
PMV BLD AUTO: 12.5 FL (ref 6–12)
RBC # BLD AUTO: 3.38 10*6/MM3 (ref 3.77–5.28)
STJ: 3 CM
STRESS TARGET HR: 119 BPM
WBC NRBC COR # BLD: 11.38 10*3/MM3 (ref 3.4–10.8)

## 2022-09-15 PROCEDURE — 82962 GLUCOSE BLOOD TEST: CPT

## 2022-09-15 PROCEDURE — 63710000001 PREDNISONE PER 1 MG: Performed by: STUDENT IN AN ORGANIZED HEALTH CARE EDUCATION/TRAINING PROGRAM

## 2022-09-15 PROCEDURE — 97535 SELF CARE MNGMENT TRAINING: CPT

## 2022-09-15 PROCEDURE — 25010000002 LEVOFLOXACIN PER 250 MG: Performed by: STUDENT IN AN ORGANIZED HEALTH CARE EDUCATION/TRAINING PROGRAM

## 2022-09-15 PROCEDURE — 85025 COMPLETE CBC W/AUTO DIFF WBC: CPT | Performed by: STUDENT IN AN ORGANIZED HEALTH CARE EDUCATION/TRAINING PROGRAM

## 2022-09-15 RX ORDER — PREDNISONE 20 MG/1
40 TABLET ORAL
Qty: 6 TABLET | Refills: 0 | Status: SHIPPED | OUTPATIENT
Start: 2022-09-16 | End: 2022-09-19

## 2022-09-15 RX ORDER — PREDNISONE 20 MG/1
40 TABLET ORAL
Status: DISCONTINUED | OUTPATIENT
Start: 2022-09-15 | End: 2022-09-15 | Stop reason: HOSPADM

## 2022-09-15 RX ORDER — LEVOFLOXACIN 5 MG/ML
750 INJECTION, SOLUTION INTRAVENOUS
Status: COMPLETED | OUTPATIENT
Start: 2022-09-15 | End: 2022-09-15

## 2022-09-15 RX ADMIN — CARVEDILOL 3.12 MG: 3.12 TABLET, FILM COATED ORAL at 08:43

## 2022-09-15 RX ADMIN — Medication 10 ML: at 08:43

## 2022-09-15 RX ADMIN — LEVOFLOXACIN 750 MG: 5 INJECTION, SOLUTION INTRAVENOUS at 14:38

## 2022-09-15 RX ADMIN — PREDNISONE 40 MG: 20 TABLET ORAL at 08:45

## 2022-09-15 RX ADMIN — SPIRONOLACTONE 25 MG: 25 TABLET ORAL at 08:43

## 2022-09-15 RX ADMIN — PANTOPRAZOLE SODIUM 40 MG: 40 TABLET, DELAYED RELEASE ORAL at 05:53

## 2022-09-15 RX ADMIN — BUMETANIDE 2 MG: 1 TABLET ORAL at 08:43

## 2022-09-15 NOTE — SIGNIFICANT NOTE
09/15/22 0962   OTHER   Discipline physical therapy assistant   Rehab Time/Intention   Session Not Performed patient/family declined treatment  (pt up Ind in room performing BR to b/s chair t/viji, pt states she is going home and defers PT tx, offered HEP, pt states she knows her exercises and has HEP at home from previous therapy txs)

## 2022-09-15 NOTE — DISCHARGE SUMMARY
"    DISCHARGE SUMMARY    PATIENT NAME: Alda Constantino   PHYSICIAN: Monae Best MD  : 1940  MRN: [id number]    ADMITTED: 2022     DISCHARGED: 9/15/2022      ADMITTING DIAGNOSIS:  Lung infiltrate [R91.8]      DISCHARGE, AND RESOLVED DIAGNOSES:  Active Hospital Problems    Diagnosis  POA   • **CAP (community acquired pneumonia) [J18.9]  Yes   • (HFpEF) heart failure with preserved ejection fraction (HCC) [I50.30]  Yes   • PVD (peripheral vascular disease) (HCC) [I73.9]  Yes   • Chronic obstructive pulmonary disease (HCC) [J44.9]  Yes      Resolved Hospital Problems   No resolved problems to display.         SERVICE: Family Medicine Residency  Attending: Dr. Reilly Orourke  Resident: Monae Best MD    CONSULTS:   Consult Orders (all) (From admission, onward)    None          PROCEDURES:   None    HISTORY OF PRESENT ILLNESS:   Per the H&P written by Camille Taylor PA-C, dated 22:  \"This patient is an 81-year-old female (history of lung cancer, COPD, diabetes, CHF, hypertension) who presents to the ER today with chief complaint of cough, shortness of breath, and chills.  She states that her symptoms started this past week.  She has been measuring her temperature at home, but has not measured any fevers.  She states has had a lot of runny nose and congestion.  Her cough is productive of a yellow-colored phlegm.  She has noticed that her shortness of breath is worse with walking, and she has had very little energy.  The patient is oxygen dependent at 2 L, and has not had to increase her level.  She denies any known sick contacts.     ED evaluation found chest x-ray demonstrating bilateral pneumonia with diffuse, coarse interstitial prominences.  CBC and CMP unremarkable at this time.  There is no elevation of cardiac enzymes and EKG is unremarkable.  Given underlying lung disease, ED provider contacted hospitalist service for admission.\"    DIAGNOSTIC DATA:   Lab Results (last 72 hours)     " Procedure Component Value Units Date/Time    POC Glucose Once [[id number]]  (Normal) Collected: 09/15/22 0615    Specimen: Blood Updated: 09/15/22 0628     Glucose 130 mg/dL      Comment: RN NotifiedOperator: [id number] HAJA REYESSEYMeter ID: [id number]       Blood Culture - Blood, Blood, Venous Line [[id number]]  (Normal) Collected: 09/11/22 2207    Specimen: Blood, Venous Line Updated: 09/14/22 2216     Blood Culture No growth at 3 days    POC Glucose Once [[id number]]  (Abnormal) Collected: 09/14/22 1937    Specimen: Blood Updated: 09/14/22 2018     Glucose 157 mg/dL      Comment: RN NotifiedOperator: [id number] KADI MCADAMSeter ID: [id number]       Blood Culture - Blood, Arm, Left [[id number]]  (Normal) Collected: 09/11/22 1909    Specimen: Blood from Arm, Left Updated: 09/14/22 1917     Blood Culture No growth at 3 days    POC Glucose Once [[id number]]  (Abnormal) Collected: 09/14/22 1607    Specimen: Blood Updated: 09/14/22 1629     Glucose 204 mg/dL      Comment: RN NotifiedOperator: [id number] AALIYAH CRYSTALMeter ID: [id number]       POC Glucose Once [[id number]]  (Abnormal) Collected: 09/14/22 1106    Specimen: Blood Updated: 09/14/22 1120     Glucose 136 mg/dL      Comment: RN NotifiedOperator: [id number] JESUS RUBALCAVAFERMeter ID: [id number]       Extra Tubes [[id number]] Collected: 09/14/22 0523    Specimen: Blood, Venous Line Updated: 09/14/22 0633    Narrative:      The following orders were created for panel order Extra Tubes.  Procedure                               Abnormality         Status                     ---------                               -----------         ------                     Lavender Top[[id number]]                                     Final result                 Please view results for these tests on the individual orders.    Lavender Top [[id number]] Collected: 09/14/22 0523    Specimen: Blood Updated: 09/14/22 0633     Extra Tube hold for add-on     Comment: Auto resulted        POC Glucose Once [[id number]]  (Normal) Collected: 09/14/22 0602    Specimen: Blood Updated: 09/14/22 0620     Glucose 117 mg/dL      Comment: RN NotifiedOperator: [id number] KADI Parrish ID: [id number]       Basic Metabolic Panel [[id number]]  (Abnormal) Collected: 09/14/22 0523    Specimen: Blood Updated: 09/14/22 0601     Glucose 131 mg/dL      BUN 37 mg/dL      Creatinine 1.44 mg/dL      Sodium 136 mmol/L      Potassium 4.0 mmol/L      Chloride 99 mmol/L      CO2 27.0 mmol/L      Calcium 8.8 mg/dL      BUN/Creatinine Ratio 25.7     Anion Gap 10.0 mmol/L      eGFR 36.8 mL/min/1.73      Comment: National Kidney Foundation and American Society of Nephrology (ASN) Task Force recommended calculation based on the Chronic Kidney Disease Epidemiology Collaboration (CKD-EPI) equation refit without adjustment for race.       Narrative:      GFR Normal >60  Chronic Kidney Disease <60  Kidney Failure <15      POC Glucose Once [[id number]]  (Normal) Collected: 09/13/22 1932    Specimen: Blood Updated: 09/13/22 1956     Glucose 124 mg/dL      Comment: Result Not ConfirmedOperator: [id number] KADI Parrish ID: [id number]       POC Glucose Once [[id number]]  (Normal) Collected: 09/13/22 1607    Specimen: Blood Updated: 09/13/22 1631     Glucose 112 mg/dL      Comment: RN NotifiedOperator: [id number] Gary CRYSTALMeter ID: [id number]       Folate [[id number]]  (Normal) Collected: 09/11/22 1915    Specimen: Blood Updated: 09/13/22 1310     Folate 9.20 ng/mL     Narrative:      Results may be falsely increased if patient taking Biotin.      Vitamin B12 [[id number]]  (Normal) Collected: 09/11/22 1915    Specimen: Blood Updated: 09/13/22 1310     Vitamin B-12 249 pg/mL     Narrative:      Results may be falsely increased if patient taking Biotin.      Procalcitonin [[id number]]  (Normal) Collected: 09/13/22 1013    Specimen: Blood Updated: 09/13/22 1115     Procalcitonin 0.05 ng/mL     Narrative:      As a Marker for  "Sepsis (Non-Neonates):    1. <0.5 ng/mL represents a low risk of severe sepsis and/or septic shock.  2. >2 ng/mL represents a high risk of severe sepsis and/or septic shock.    As a Marker for Lower Respiratory Tract Infections that require antibiotic therapy:    PCT on Admission    Antibiotic Therapy       6-12 Hrs later    >0.5                Strongly Recommended  >0.25 - <0.5        Recommended   0.1 - 0.25          Discouraged              Remeasure/reassess PCT  <0.1                Strongly Discouraged     Remeasure/reassess PCT    As 28 day mortality risk marker: \"Change in Procalcitonin Result\" (>80% or <=80%) if Day 0 (or Day 1) and Day 4 values are available. Refer to http://www.[employer] TehchnosolutionsMary [facility] – CoalgateVarian Semiconductor Equipment Associatespct-calculator.com    Change in PCT <=80%  A decrease of PCT levels below or equal to 80% defines a positive change in PCT test result representing a higher risk for 28-day all-cause mortality of patients diagnosed with severe sepsis for septic shock.    Change in PCT >80%  A decrease of PCT levels of more than 80% defines a negative change in PCT result representing a lower risk for 28-day all-cause mortality of patients diagnosed with severe sepsis or septic shock.       POC Glucose Once [[id number]]  (Abnormal) Collected: 09/13/22 1036    Specimen: Blood Updated: 09/13/22 1051     Glucose 176 mg/dL      Comment: RN NotifiedOperator: [id number] Sherrard CRYSTALMeter ID: [id number]       Mycoplasma Pneumoniae PCR - Swab, Throat [[id number]] Collected: 09/13/22 0012    Specimen: Swab from Throat Updated: 09/13/22 0927     MYCOPLASMAE PNEUMONIAE BY PCR Negative    Narrative:      This test is performed by utilizing real time polymerace chain recation (PCR).     Basic Metabolic Panel [[id number]]  (Abnormal) Collected: 09/13/22 0538    Specimen: Blood Updated: 09/13/22 0628     Glucose 126 mg/dL      BUN 39 mg/dL      Creatinine 1.44 mg/dL      Sodium 140 mmol/L      Potassium 4.5 mmol/L      Chloride 101 mmol/L      CO2 30.0 " mmol/L      Calcium 9.4 mg/dL      BUN/Creatinine Ratio 27.1     Anion Gap 9.0 mmol/L      eGFR 36.8 mL/min/1.73      Comment: National Kidney Foundation and American Society of Nephrology (ASN) Task Force recommended calculation based on the Chronic Kidney Disease Epidemiology Collaboration (CKD-EPI) equation refit without adjustment for race.       Narrative:      GFR Normal >60  Chronic Kidney Disease <60  Kidney Failure <15      POC Glucose Once [[id number]]  (Normal) Collected: 09/13/22 0541    Specimen: Blood Updated: 09/13/22 0627     Glucose 123 mg/dL      Comment: RN NotifiedOperator: [id number] KADI Parrish ID: [id number]       CBC & Differential [[id number]]  (Abnormal) Collected: 09/13/22 0522    Specimen: Blood Updated: 09/13/22 0604    Narrative:      The following orders were created for panel order CBC & Differential.  Procedure                               Abnormality         Status                     ---------                               -----------         ------                     CBC Auto Differential[[id number]]        Abnormal            Final result                 Please view results for these tests on the individual orders.    CBC Auto Differential [[id number]]  (Abnormal) Collected: 09/13/22 0522    Specimen: Blood Updated: 09/13/22 0604     WBC 7.16 10*3/mm3      RBC 3.55 10*6/mm3      Hemoglobin 11.5 g/dL      Hematocrit 35.7 %      .6 fL      MCH 32.4 pg      MCHC 32.2 g/dL      RDW 13.7 %      RDW-SD 50.2 fl      MPV 12.1 fL      Platelets 182 10*3/mm3      Neutrophil % 61.7 %      Lymphocyte % 28.8 %      Monocyte % 6.3 %      Eosinophil % 2.1 %      Basophil % 0.4 %      Immature Grans % 0.7 %      Neutrophils, Absolute 4.42 10*3/mm3      Lymphocytes, Absolute 2.06 10*3/mm3      Monocytes, Absolute 0.45 10*3/mm3      Eosinophils, Absolute 0.15 10*3/mm3      Basophils, Absolute 0.03 10*3/mm3      Immature Grans, Absolute 0.05 10*3/mm3      nRBC 0.0 /100 WBC     S.  Pneumo Ag Urine or CSF - Urine, Urine, Clean Catch [[id number]]  (Normal) Collected: 09/13/22 0011    Specimen: Urine, Clean Catch Updated: 09/13/22 0047     Strep Pneumo Ag Negative    Legionella Antigen, Urine - Urine, Urine, Clean Catch [[id number]]  (Normal) Collected: 09/13/22 0011    Specimen: Urine, Clean Catch Updated: 09/13/22 0047     LEGIONELLA ANTIGEN, URINE Negative    POC Glucose Once [[id number]]  (Normal) Collected: 09/12/22 1938    Specimen: Blood Updated: 09/12/22 2024     Glucose 110 mg/dL      Comment: RN NotifiedOperator: [id number] KADI Parrish ID: [id number]       CBC & Differential [[id number]]  (Abnormal) Collected: 09/12/22 0516    Specimen: Blood Updated: 09/12/22 1732    Narrative:      The following orders were created for panel order CBC & Differential.  Procedure                               Abnormality         Status                     ---------                               -----------         ------                     CBC Auto Differential[[id number]]        Abnormal            Final result                 Please view results for these tests on the individual orders.    CBC Auto Differential [[id number]]  (Abnormal) Collected: 09/12/22 0516    Specimen: Blood Updated: 09/12/22 1732     WBC 7.03 10*3/mm3      RBC 3.21 10*6/mm3      Hemoglobin 10.5 g/dL      Hematocrit 33.0 %      .8 fL      MCH 32.7 pg      MCHC 31.8 g/dL      RDW 13.8 %      RDW-SD 52.4 fl      MPV 12.7 fL      Platelets 166 10*3/mm3      Neutrophil % 66.5 %      Lymphocyte % 23.5 %      Monocyte % 6.7 %      Eosinophil % 1.8 %      Basophil % 0.6 %      Immature Grans % 0.9 %      Neutrophils, Absolute 4.68 10*3/mm3      Lymphocytes, Absolute 1.65 10*3/mm3      Monocytes, Absolute 0.47 10*3/mm3      Eosinophils, Absolute 0.13 10*3/mm3      Basophils, Absolute 0.04 10*3/mm3      Immature Grans, Absolute 0.06 10*3/mm3      nRBC 0.0 /100 WBC     POC Glucose Once [[id number]]  (Normal) Collected: 09/12/22  1611    Specimen: Blood Updated: 09/12/22 1701     Glucose 99 mg/dL      Comment: : [id number] OBI LAGOS ANNMeter ID: [id number]       POC Glucose Once [[id number]]  (Normal) Collected: 09/12/22 1059    Specimen: Blood Updated: 09/12/22 1220     Glucose 114 mg/dL      Comment: RN NotifiedOperator: [id number] MICAH REBAMeter ID: [id number]              CT Abdomen Pelvis Without Contrast    Result Date: 9/9/2022  Prior right adrenal gland surgery. Two left adrenal nodules measuring 2 x 2 CM and 1.2 x 1 cm. Follow-up with CT scan or MRI without and with contrast utilizing an adrenal protocol is recommended. Nonspecific interstitial prominence bilaterally in the lung bases. Small gallstones in the gallbladder. Diffuse fatty replacement of the pancreatic parenchyma. Uncomplicated colonic diverticulosis. Appendix is within normal. Grade 1 spondylolisthesis of L5 on S1.  No evidence of obstructive uropathy on either side. No evidence of bowel obstruction or perienteric inflammation. Electronically signed by:  Jas Cerda MD  9/9/2022 7:42 PM CDT Workstation: [id number]    XR Chest 1 View    Result Date: 9/11/2022  Bilateral diffuse coarse interstitial prominence, also visualized on prior study. Coexisting multifocal patchy airspace process on both sides. Electronically signed by:  Jas Cerda MD  9/11/2022 5:05 PM CDT Workstation: [id number]       HOSPITAL COURSE:  Patient was on 2 L NC during her stay. She reported that at the beginning she had some shortness of breath with ambulation, which later resolved throughout her stay. An echocardiogram was obtained, due to concern of CHF exacerbation. Echo showed HFpEF with EF of 51-55%. Patient received a total of 3 doses of Levaquin prior discharge. She also was started in a 5 day course of prednisone, that is to be completed outpatient.    DISCHARGE CONDITION:   Good    DISPOSITION:      DISCHARGE MEDICATIONS     Discharge Medications      New Medications       Instructions Start Date   predniSONE 20 MG tablet  Commonly known as: DELTASONE   40 mg, Oral, Daily With Breakfast   Start Date: September 16, 2022        Continue These Medications      Instructions Start Date   acetaminophen 500 MG tablet  Commonly known as: TYLENOL   500 mg, Oral, Every 6 Hours PRN      albuterol sulfate  (90 Base) MCG/ACT inhaler  Commonly known as: PROVENTIL HFA;VENTOLIN HFA;PROAIR HFA   2 puffs, Inhalation, Every 4 Hours PRN      budesonide-formoterol 160-4.5 MCG/ACT inhaler  Commonly known as: SYMBICORT   2 puffs, Inhalation      bumetanide 2 MG tablet  Commonly known as: BUMEX   2 mg, Oral, Daily      carvedilol 3.125 MG tablet  Commonly known as: COREG   3.125 mg, Oral, 2 Times Daily With Meals      EPIPEN IJ   Injection      erythromycin base 250 MG tablet  Commonly known as: E-MYCIN   250 mg, Oral, Every Other Day      gabapentin 100 MG capsule  Commonly known as: NEURONTIN   100 mg, Oral, Daily      guaiFENesin 600 MG 12 hr tablet  Commonly known as: MUCINEX   1,200 mg, Oral, 2 Times Daily      hydrocortisone 2.5 % cream   No dose, route, or frequency recorded.      IPRATROPIUM BROMIDE IN   Inhalation      ipratropium-albuterol 0.5-2.5 mg/3 ml nebulizer  Commonly known as: DUO-NEB   1.5 mL, Nebulization, Every 4 Hours PRN      levocetirizine 5 MG tablet  Commonly known as: XYZAL   5 mg, Oral, Every Evening      Melatonin 10 MG capsule   1 each, Oral, Nightly      metFORMIN  MG 24 hr tablet  Commonly known as: GLUCOPHAGE-XR   TAKE 1 TABLET EVERY NIGHT AT BEDTIME      nystatin 942438 UNIT/GM cream  Commonly known as: MYCOSTATIN   No dose, route, or frequency recorded.      omeprazole 40 MG capsule  Commonly known as: priLOSEC   40 mg, Oral, Daily      ondansetron ODT 4 MG disintegrating tablet  Commonly known as: Zofran ODT   4 mg, Translingual, Every 8 Hours PRN      PRECISION XTRA TEST VI   In Vitro      simethicone 80 MG chewable tablet  Commonly known as: Gas-X   80 mg,  Oral, Every 6 Hours PRN      spironolactone 25 MG tablet  Commonly known as: ALDACTONE   25 mg, Oral, Daily             INSTRUCTIONS:  Activity:   Activity Instructions     Activity as Tolerated          Diet:   Diet Instructions     Diet: Consistent Carbohydrate      Discharge Diet: Consistent Carbohydrate          FOLLOW UP:   Additional Instructions for the Follow-ups that You Need to Schedule     Call MD With Problems / Concerns   As directed      Instructions: If symptoms worsen or do not improve    Order Comments: Instructions: If symptoms worsen or do not improve          Discharge Follow-up with PCP   As directed       Currently Documented PCP:    Gus Posada MD    PCP Phone Number:    [phone]     Follow Up Details: 1 week            Follow-up Information     Gus Posada MD .    Specialty: Family Medicine  Why: 1 week - Friday 9/23 2:15pm  Contact information:  [address] [place], [place] [id number] [phone]                         PENDING TEST RESULTS AT DISCHARGE  Pending Labs     Order Current Status    Blood Culture - Blood, Arm, Left Preliminary result    Blood Culture - Blood, Blood, Venous Line Preliminary result          Time: >30 minutes were spent in discharge planning, medication reconciliation and coordination of care for this patient.    Dr. Reilly Orourke is the attending at time of discharge, He is aware of the patient's status and agrees with the above discharge summary.    Signature        Monae Best. MD PGY2  [facility]  [address], [place] [address]  Office: [phone]    This document has been electronically signed by Monae Best MD on September 15, 2022 16:39 CDT

## 2022-09-15 NOTE — PROGRESS NOTES
Subjective:     Alda Constantino is a 81 y.o. female who presents for follow-up of L foot cellulitis. She was prescribed Doxycyline BID but has only been taking it QD due to nausea. Reports her pain and swelling have moderately improved. Denies fever, chills, vomiting, drainage from wound.      Past Medical Hx:  Past Medical History:   Diagnosis Date   • Asthma    • Cancer (HCC)    • Congenital abnormalities     colon behind liver   • COPD (chronic obstructive pulmonary disease) (HCC)    • Hypertension            Past Surgical Hx:  Past Surgical History:   Procedure Laterality Date   • ADENOIDECTOMY     • ADRENAL GLAND SURGERY Right    • BLADDER SURGERY     • BREAST BIOPSY     • COLONOSCOPY W/ BIOPSIES AND POLYPECTOMY     • EYE SURGERY     • HERNIA REPAIR     • HYSTERECTOMY     • LUNG LOBECTOMY Left    • RECTAL SURGERY      cyst   • SKIN CANCER EXCISION      legs   • TONSILLECTOMY     • VARICOSE VEIN SURGERY         Health Maintenance:  Health Maintenance   Topic Date Due   • DXA SCAN  Never done   • ZOSTER VACCINE (1 of 2) Never done   • ANNUAL WELLNESS VISIT  Never done   • DIABETIC EYE EXAM  Never done   • URINE MICROALBUMIN  10/23/2021   • HEMOGLOBIN A1C  04/19/2022   • INFLUENZA VACCINE  10/01/2022   • TDAP/TD VACCINES (2 - Td or Tdap) 08/02/2026   • COVID-19 Vaccine  Completed   • Pneumococcal Vaccine 65+  Completed       Current Meds:  No current facility-administered medications for this visit.  No current outpatient medications on file.    Facility-Administered Medications Ordered in Other Visits:   •  acetaminophen (TYLENOL) tablet 500 mg, 500 mg, Oral, Q6H PRN, Camille Taylor, PA-C, 500 mg at 09/14/22 2118  •  [DISCONTINUED] sennosides-docusate (PERICOLACE) 8.6-50 MG per tablet 2 tablet, 2 tablet, Oral, BID, 2 tablet at 09/14/22 0914 **AND**  bisacodyl (DULCOLAX) EC tablet 5 mg, 5 mg, Oral, Daily PRN, 5 mg at 09/14/22 2204 **AND** bisacodyl (DULCOLAX) suppository 10 mg, 10 mg, Rectal, Daily PRN, Henry Moreno MD, 10 mg at 09/14/22 2118  •  bumetanide (BUMEX) tablet 2 mg, 2 mg, Oral, Daily, Camille Taylor PA-C, 2 mg at 09/15/22 0843  •  carvedilol (COREG) tablet 3.125 mg, 3.125 mg, Oral, BID With Meals, Camille Taylor PA-C, 3.125 mg at 09/15/22 0843  •  dextrose (D50W) (25 g/50 mL) IV injection 25 g, 25 g, Intravenous, Q15 Min PRN, Camille Taylor, PA-C  •  dextrose (GLUTOSE) oral gel 15 g, 15 g, Oral, Q15 Min PRN, Camille Taylor PA-C  •  Enoxaparin Sodium (LOVENOX) syringe 40 mg, 40 mg, Subcutaneous, Daily, Camille Taylor PA-C, 40 mg at 09/14/22 2118  •  gabapentin (NEURONTIN) capsule 100 mg, 100 mg, Oral, Nightly, SantosKeaton MD, 100 mg at 09/14/22 2118  •  glucagon (human recombinant) (GLUCAGEN DIAGNOSTIC) injection 1 mg, 1 mg, Intramuscular, Q15 Min PRN, Camille Taylor PA-C  •  Insulin Aspart (novoLOG) injection 0-7 Units, 0-7 Units, Subcutaneous, TID AC, Camille Taylor PA-C, 3 Units at 09/14/22 1746  •  ipratropium-albuterol (DUO-NEB) nebulizer solution 1.5 mL, 1.5 mL, Nebulization, Q4H PRN, Camille Taylor PA-C, 1.5 mL at 09/12/22 1939  •  levoFLOXacin (LEVAQUIN) 750 mg/150 mL D5W (premix) (LEVAQUIN) 750 mg, 750 mg, Intravenous, Q48H, Camille Taylor PA-C, 750 mg at 09/13/22 2104  •  melatonin tablet 5.25 mg, 5.25 mg, Oral, Nightly PRN, Camille Taylor, PA-C, 5.25 mg at 09/14/22 2118  •  ondansetron (ZOFRAN) tablet 4 mg, 4 mg, Oral, Q6H PRN, 4 mg at 09/14/22 0134 **OR** ondansetron (ZOFRAN) injection 4 mg, 4 mg, Intravenous, Q6H PRN, Camille Taylor, PA-C, 4 mg at 09/13/22 0008  •  pantoprazole (PROTONIX) EC tablet 40 mg, 40 mg, Oral, QAM, Camille Taylor PA-C, 40 mg at 09/15/22 0553  •  predniSONE (DELTASONE) tablet 40 mg, 40 mg, Oral, Daily With Breakfast, Monae Best MD, 40 mg at 09/15/22  "0845  •  sodium chloride 0.9 % flush 10 mL, 10 mL, Intravenous, PRN, Emergency, Triage Protocol, MD  •  sodium chloride 0.9 % flush 10 mL, 10 mL, Intravenous, Q12H, Camille Taylor PA-C, 10 mL at 09/15/22 0843  •  sodium chloride 0.9 % flush 10 mL, 10 mL, Intravenous, PRN, Camille Taylor PA-C  •  spironolactone (ALDACTONE) tablet 25 mg, 25 mg, Oral, Daily, Camille Taylor PA-SATYA, 25 mg at 09/15/22 0843    Allergies:  Dilaudid [hydromorphone hcl], Diphenoxylate-atropine, Iodinated diagnostic agents, Keflex [cephalexin], Lomotil [diphenoxylate], Aspirin, Beef (diagnostic), Beef-derived products, Ciprofloxacin, Contrast dye, Levaquin [levofloxacin], Milk-related compounds, Nsaids, Pegademase bovine, Percocet [oxycodone-acetaminophen], Pork-derived products, and Tolmetin    Family Hx:  Family History   Problem Relation Age of Onset   • Diabetes Mother    • Diabetes Father    • Heart disease Father    • Cancer Sister    • Heart disease Sister    • Thyroid disease Sister    • Diabetes Brother    • Heart disease Brother         Social History:  Social History     Socioeconomic History   • Marital status:    Tobacco Use   • Smoking status: Former Smoker   • Smokeless tobacco: Never Used   Vaping Use   • Vaping Use: Never used   Substance and Sexual Activity   • Alcohol use: Never   • Drug use: Never   • Sexual activity: Defer       Review of Systems  Review of Systems   Constitutional: Negative for chills and fever.   Gastrointestinal: Positive for nausea.   Skin: Positive for color change.       Objective:     /76   Ht 172.7 cm (68\")   Wt 110 kg (243 lb 3.2 oz)   BMI 36.98 kg/m²   Physical Exam  Vitals and nursing note reviewed.   Constitutional:       General: She is not in acute distress.     Appearance: Normal appearance. She is well-developed. She is not diaphoretic.   HENT:      Head: Normocephalic and atraumatic.      Right Ear: Hearing normal.      Left Ear: Hearing normal.   Eyes:      " General: Lids are normal.      Conjunctiva/sclera: Conjunctivae normal.   Pulmonary:      Effort: Pulmonary effort is normal. No respiratory distress.   Abdominal:      General: There is no distension.   Musculoskeletal:         General: No tenderness or deformity. Normal range of motion.      Right lower leg: No edema.      Left lower leg: No edema.   Feet:      Left foot:      Skin integrity: Erythema present.      Comments: Erythema and TTP L foot.  Skin:     General: Skin is warm and dry.      Coloration: Skin is not pale.      Findings: No erythema or rash.   Neurological:      Mental Status: She is alert and oriented to person, place, and time. She is not disoriented.         Lab Review  Results for orders placed or performed in visit on 09/06/22   Uric Acid    Specimen: Blood   Result Value Ref Range    Uric Acid 10.2 (H) 2.4 - 5.7 mg/dL   TSH    Specimen: Blood   Result Value Ref Range    TSH 0.826 0.270 - 4.200 uIU/mL            Assessment:     Diagnoses and all orders for this visit:    1. Cellulitis of foot (Primary)    2. Drug-induced nausea and vomiting  -     ondansetron ODT (Zofran ODT) 4 MG disintegrating tablet; Place 1 tablet on the tongue Every 8 (Eight) Hours As Needed for Nausea or Vomiting.  Dispense: 30 tablet; Refill: 0    3. Fatigue, unspecified type  -     TSH    4. Elevated uric acid in blood  -     Uric Acid    Instructed to take Doxycycline with food and will prescribe Zofran for nausea. Recheck uric acid due to previously elevated levels; will require Allopurinol. Most likely will need Probenecid or Febuxostat to achieve goal levels <3.5.    Plan:     I have seen and examined the patient.  I have reviewed the notes, assessments, and/or procedures performed by Bg Manley MD, I concur with her/his documentation and assessment and plan for Alda Constantino.        This document has been electronically signed by Keyonna Bo MD on September 15, 2022 09:27 CDT

## 2022-09-15 NOTE — THERAPY TREATMENT NOTE
Patient Name: Alda Constantino  : 1940    MRN: [id number]                              Today's Date: 9/15/2022       Admit Date: 2022    Visit Dx:     ICD-10-CM ICD-9-CM   1. Lung infiltrate  R91.8 793.19   2. Impaired mobility and ADLs  Z74.09 V49.89    Z78.9    3. Impaired functional mobility, balance, gait, and endurance  Z74.09 V49.89   4. Dyspnea, unspecified type  R06.00 786.09   5. NICM (nonischemic cardiomyopathy) ([facility])  I42.8 425.4     Patient Active Problem List   Diagnosis   • Acute febrile illness   • PVD (peripheral vascular disease) ([facility])   • Chronic obstructive pulmonary disease ([facility])   • Vomiting   • Leukocytosis   • Sepsis ([facility])   • CAP (community acquired pneumonia)   • (HFpEF) heart failure with preserved ejection fraction ([facility])   • Anemia due to multiple mechanisms   • Astigmatism of both eyes with presbyopia   • Candidal intertrigo   • Chronic diastolic heart failure ([facility])   • Chronic hypoxemic respiratory failure ([facility])   • Chronic midline thoracic back pain   • Cobalamin deficiency   • Dry eyes   • Dyspnea   • Encounter for general adult medical examination with abnormal findings   • Excess skin of eyelid   • Generalized anxiety disorder   • Generalized osteoarthritis of multiple sites   • GERD (gastroesophageal reflux disease)   • History of colonic polyps   • History of lung cancer   • Insomnia   • Iron deficiency anemia due to chronic blood loss   • Iron malabsorption   • Low back pain   • Visual disturbance   • Type 2 diabetes mellitus with neurologic complication, without long-term current use of insulin ([facility])   • Thrombocytopenia ([facility])   • Seborrheic dermatitis of scalp   • S/P lobectomy of lung   • Requires supplemental oxygen   • PVCs (premature ventricular contractions)   • Pseudophakia   • Posterior vitreous detachment   • Other screening mammogram   • Osteoporosis   • Nonischemic cardiomyopathy ([facility])   • Malignant neoplasm of upper lobe of left lung ([facility])     Past  Medical History:   Diagnosis Date   • Asthma    • Cancer (HCC)    • Congenital abnormalities     colon behind liver   • COPD (chronic obstructive pulmonary disease) (HCC)    • Hypertension      Past Surgical History:   Procedure Laterality Date   • ADENOIDECTOMY     • ADRENAL GLAND SURGERY Right    • BLADDER SURGERY     • BREAST BIOPSY     • COLONOSCOPY W/ BIOPSIES AND POLYPECTOMY     • EYE SURGERY     • HERNIA REPAIR     • HYSTERECTOMY     • LUNG LOBECTOMY Left    • RECTAL SURGERY      cyst   • SKIN CANCER EXCISION      legs   • TONSILLECTOMY     • VARICOSE VEIN SURGERY        General Information     Row Name 09/15/22 0826          OT Time and Intention    Document Type therapy note (daily note)  -CS     Mode of Treatment occupational therapy  -CS     Row Name 09/15/22 0826          General Information    Patient Profile Reviewed yes  -CS     Existing Precautions/Restrictions fall;oxygen therapy device and L/min  -CS     Row Name 09/15/22 0826          Cognition    Orientation Status (Cognition) oriented x 4  -CS     Row Name 09/15/22 0826          Safety Issues, Functional Mobility    Impairments Affecting Function (Mobility) balance;endurance/activity tolerance;shortness of breath;strength  -CS           User Key  (r) = Recorded By, (t) = Taken By, (c) = Cosigned By    Initials Name Provider Type    CS Paola Silveira COTA Occupational Therapist Assistant                 Mobility/ADL's     Row Name 09/15/22 0826          Transfers    Transfers sit-stand transfer;stand-sit transfer;toilet transfer  -CS     Sit-Stand [place] (Transfers) supervision  -CS     Stand-Sit [place] (Transfers) supervision  -CS     [place] Level (Toilet Transfer) standby assist  x2  -CS     Assistive Device (Toilet Transfer) raised toilet seat;grab bars/safety frame  -CS     Row Name 09/15/22 0826          Sit-Stand Transfer    Assistive Device (Sit-Stand Transfers) other (see comments)  none  -CS     Row Name 09/15/22  0826          Stand-Sit Transfer    Assistive Device (Stand-Sit Transfers) other (see comments)  none  -     Row Name 09/15/22 0826          Functional Mobility    Functional Mobility- Ind. Level supervision required  -     Functional Mobility- Device other (see comments)  -     Functional Mobility-Distance (Feet) 15  -     Row Name 09/15/22 0826          Activities of Daily Living    BADL Assessment/Intervention toileting;bathing;upper body dressing;lower body dressing  -[facility] Name 09/15/22 0826          Toileting Assessment/Training    [place] Level (Toileting) toileting skills;adjust/manage clothing;perform perineal hygiene;standby assist  -CS     Position (Toileting) unsupported sitting;unsupported standing  -[facility] Name 09/15/22 0826          Bathing Assessment/Intervention    [place] Level (Bathing) bathing skills;lower body;upper body;set up;standby assist  -CS     Position (Bathing) supported sitting;unsupported standing  -[facility] Name 09/15/22 0826          Upper Body Dressing Assessment/Training    [place] Level (Upper Body Dressing) upper body dressing skills;don;pull-over garment;independent  -CS     Position (Upper Body Dressing) supported sitting  -[facility] Name 09/15/22 0826          Lower Body Dressing Assessment/Training    [place] Level (Lower Body Dressing) lower body dressing skills;don;pants/bottoms;shoes/slippers;standby assist;set up  -CS     Position (Lower Body Dressing) supported sitting;unsupported standing  -CS           User Key  (r) = Recorded By, (t) = Taken By, (c) = Cosigned By    Initials Name Provider Type    CS Paola Silveira COTA Occupational Therapist Assistant               Obj/Interventions    No documentation.                Goals/Plan     [facility] Name 09/15/22 0826          Transfer Goal 1 (OT)    Activity/Assistive Device (Transfer Goal 1, OT) sit-to-stand/stand-to-sit;bed-to-chair/chair-to-bed;toilet  -     [place]  Level/Cues Needed (Transfer Goal 1, OT) independent  -CS     Time Frame (Transfer Goal 1, OT) long term goal (LTG);by discharge  -CS     Progress/Outcome (Transfer Goal 1, OT) goal not met;good progress toward goal  -CS     Row Name 09/15/22 0826          Bathing Goal 1 (OT)    Activity/Device (Bathing Goal 1, OT) lower body bathing  -CS     [place] Level/Cues Needed (Bathing Goal 1, OT) modified independence  -CS     Time Frame (Bathing Goal 1, OT) long term goal (LTG);by discharge  -CS     Progress/Outcomes (Bathing Goal 1, OT) goal not met  -CS     Row Name 09/15/22 0826          Dressing Goal 1 (OT)    Activity/Device (Dressing Goal 1, OT) lower body dressing  -CS     [place]/Cues Needed (Dressing Goal 1, OT) modified independence  -CS     Time Frame (Dressing Goal 1, OT) long term goal (LTG);by discharge  -CS     Progress/Outcome (Dressing Goal 1, OT) goal not met;good progress toward goal  -CS     Row Name 09/15/22 0826          Toileting Goal 1 (OT)    Activity/Device (Toileting Goal 1, OT) toileting skills, all  -CS     [place] Level/Cues Needed (Toileting Goal 1, OT) independent  -CS     Time Frame (Toileting Goal 1, OT) long term goal (LTG);by discharge  -CS     Progress/Outcome (Toileting Goal 1, OT) goal not met;good progress toward goal  -CS     [facility] Name 09/15/22 0826           Activity Tolerance Goal 1 (OT)    Activity Level (Endurance Goal 1, OT) 15 min activity;O2 sat >/ equal to 88%  -CS     Progress/Outcome (Activity Tolerance Goal 1, OT) goal not met  -CS           User Key  (r) = Recorded By, (t) = Taken By, (c) = Cosigned By    Initials Name Provider Type    CS Paola Silveira COTA Occupational Therapist Assistant               Clinical Impression     Row Name 09/15/22 0826          Pain Assessment    Pretreatment Pain Rating 0/10 - no pain  -CS     Posttreatment Pain Rating 0/10 - no pain  -CS     Row Name 09/15/22 0826          Plan of Care Review    Plan of Care Reviewed  With patient  -CS     Progress improving  -CS     Outcome Evaluation Pt tolerated tx well this date. Pt was SBA with sit-stand-sit. Pt was SBA with toilet t/f. Pt completed an ADL. Continue OT POC.  -CS     Row Name 09/15/22 0826          Therapy Assessment/Plan (OT)    Rehab Potential (OT) good, to achieve stated therapy goals  -CS     Criteria for Skilled Therapeutic Interventions Met (OT) yes;meets criteria;skilled treatment is necessary  -CS     Row Name 09/15/22 0826          Therapy Plan Review/Discharge Plan (OT)    Anticipated Discharge Disposition (OT) home with assist;home with home health  -CS     Row Name 09/15/22 0826          Vital Signs    Pre Patient Position Sitting  -CS     Post Patient Position Sitting  -CS     Row Name 09/15/22 0826          Positioning and Restraints    Pre-Treatment Position sitting in chair/recliner  -CS     Post Treatment Position chair  -CS     In Chair sitting;call light within reach;encouraged to call for assist  -CS           User Key  (r) = Recorded By, (t) = Taken By, (c) = Cosigned By    Initials Name Provider Type    Paola Ruano COTA Occupational Therapist Assistant               Outcome Measures     Row Name 09/15/22 1141          How much help from another is currently needed...    Putting on and taking off regular lower body clothing? 3  -CS     Bathing (including washing, rinsing, and drying) 3  -CS     Toileting (which includes using toilet bed pan or urinal) 3  -CS     Putting on and taking off regular upper body clothing 4  -CS     Taking care of personal grooming (such as brushing teeth) 4  -CS     Eating meals 4  -CS     AM-PAC 6 Clicks Score (OT) 21  -CS           User Key  (r) = Recorded By, (t) = Taken By, (c) = Cosigned By    Initials Name Provider Type    Paola Ruano COTA Occupational Therapist Assistant                Occupational Therapy Education                 Title: PT OT SLP Therapies (In Progress)     Topic: Occupational  Therapy (In Progress)     Point: ADL training (Done)     Description:   Instruct learner(s) on proper safety adaptation and remediation techniques during self care or transfers.   Instruct in proper use of assistive devices.              Learning Progress Summary           Patient Acceptance, E, VU by  at 9/12/2022 1536    Comment: Educated pt on fall prevention and safety with ADls                   Point: Home exercise program (Not Started)     Description:   Instruct learner(s) on appropriate technique for monitoring, assisting and/or progressing therapeutic exercises/activities.              Learner Progress:  Not documented in this visit.          Point: Precautions (Done)     Description:   Instruct learner(s) on prescribed precautions during self-care and functional transfers.              Learning Progress Summary           Patient Acceptance, E, VU by  at 9/12/2022 1536    Comment: Educated pt on fall prevention and safety with ADls                   Point: Body mechanics (Not Started)     Description:   Instruct learner(s) on proper positioning and spine alignment during self-care, functional mobility activities and/or exercises.              Learner Progress:  Not documented in this visit.                      User Key     Initials Effective Dates Name Provider Type Discipline     06/16/21 -  Charity Cervantes OT Occupational Therapist OT              OT Recommendation and Plan     Plan of Care Review  Plan of Care Reviewed With: patient  Progress: improving  Outcome Evaluation: Pt tolerated tx well this date. Pt was SBA with sit-stand-sit. Pt was SBA with toilet t/f. Pt completed an ADL. Continue OT POC.     Time Calculation:    Time Calculation- OT     Row Name 09/15/22 1141             Time Calculation- OT    OT Start Time 0826  -CS      OT Stop Time 0926  -CS      OT Time Calculation (min) 60 min  -CS      Total Timed Code Minutes- OT 30 minute(s)  -CS              Timed Charges    65414 - OT  Self Care/Mgmt Minutes 60  -CS              Total Minutes    Timed Charges Total Minutes 60  -CS       Total Minutes 60  -CS            User Key  (r) = Recorded By, (t) = Taken By, (c) = Cosigned By    Initials Name Provider Type    CS Paola Silveira COTA Occupational Therapist Assistant              Therapy Charges for Today     Code Description Service Date Service Provider Modifiers Qty    [id number] HC OT SELF CARE/MGMT/TRAIN EA 15 MIN 9/15/2022 Paola Silveira COTA GO 4               LIUDMILA Luo  9/15/2022

## 2022-09-15 NOTE — PLAN OF CARE
Goal Outcome Evaluation:  Plan of Care Reviewed With: patient        Progress: improving  Outcome Evaluation: stool softers given. pt had slightly small bowel movement. pt resting well. vss. no signs of distress at this time.

## 2022-09-15 NOTE — PROGRESS NOTES
Discharge Planning Assessment  [facility]     Patient Name: Alda Constantino  MRN: [id number]  Today's Date: 9/15/2022    Admit Date: 9/11/2022     Discharge Needs Assessment     Row Name 09/15/22 1247       Living Environment    People in Home alone    Current Living Arrangements home    Primary Care Provided by self    Provides Primary Care For no one    Family Caregiver if Needed other (see comments)  niece    Quality of Family Relationships helpful    Able to Return to Prior Arrangements yes       Resource/Environmental Concerns    Resource/Environmental Concerns none    Transportation Concerns none       Transition Planning    Patient/Family Anticipates Transition to home    Patient/Family Anticipated Services at Transition none    Transportation Anticipated family or friend will provide       Discharge Needs Assessment    Concerns to be Addressed denies needs/concerns at this time    Equipment Needed After Discharge none    Discharge Coordination/Progress pt lives alone and declines hh  states that has family that will check on her.  [facility] o[facility]  states no needs for dc               Discharge Plan    No documentation.               Continued Care and Services - Admitted Since 9/11/2022    Coordination has not been started for this encounter.       Expected Discharge Date and Time     Expected Discharge Date Expected Discharge Time    Sep 15, 2022          Demographic Summary     Row Name 09/15/22 1246       General Information    Admission Type inpatient    Arrived From home    Required Notices Provided Important Message from Medicare    Referral Source high risk screening    Reason for Consult discharge planning    Preferred Language English    General Information Comments confirmed face sheet and pharamcy               Functional Status    No documentation.                Psychosocial    No documentation.                Abuse/Neglect    No documentation.                Legal     No documentation.                Substance Abuse    No documentation.                Patient Forms    No documentation.                   Kristi Kulkarni RN

## 2022-09-15 NOTE — PLAN OF CARE
Goal Outcome Evaluation:  Plan of Care Reviewed With: patient        Progress: improving  Outcome Evaluation: Pt tolerated tx well this date. Pt was SBA with sit-stand-sit. Pt was SBA with toilet t/f. Pt completed an ADL. Continue OT POC.

## 2022-09-15 NOTE — OUTREACH NOTE
Prep Survey    Flowsheet Row Responses   Religious facility patient discharged from? [place]   Is LACE score < 7 ? No   Emergency Room discharge w/ pulse ox? No   Eligibility [facility]   Date of Admission 09/11/22   Date of Discharge 09/15/22   Discharge Disposition Home-Health Care Svc   Discharge diagnosis community acquired pneumonia   Does the patient have one of the following disease processes/diagnoses(primary or secondary)? Pneumonia   Does the patient have Home health ordered? No   Is there a DME ordered? No   Prep survey completed? Yes          NIKKIE MEREDITH - Registered Nurse

## 2022-09-16 ENCOUNTER — TRANSITIONAL CARE MANAGEMENT TELEPHONE ENCOUNTER (OUTPATIENT)
Dept: CALL CENTER | Facility: HOSPITAL | Age: 82
End: 2022-09-16

## 2022-09-16 LAB
BACTERIA SPEC AEROBE CULT: NORMAL
BACTERIA SPEC AEROBE CULT: NORMAL

## 2022-09-16 NOTE — OUTREACH NOTE
Call Center TCM Note    Flowsheet Row Responses   [facility] patient discharged from? [place]   Does the patient have one of the following disease processes/diagnoses(primary or secondary)? Pneumonia   TCM attempt successful? Yes   Call start time 1400   Call end time 1401   Discharge diagnosis community acquired pneumonia   Meds reviewed with patient/caregiver? Yes   Is the patient having any side effects they believe may be caused by any medication additions or changes? No   Does the patient have all medications ordered at discharge? Yes   Is the patient taking all medications as directed (includes completed medication regime)? Yes   Comments HOSP DC FU appt 9/23/22 @ 2:15 pm.    Has home health visited the patient within 72 hours of discharge? N/A   Pulse Ox monitoring Intermittent   Pulse Ox device source Patient   O2 Sat comments above 90% o2 2L   O2 Sat: education provided Sat levels, Monitoring frequency, When to seek care   Psychosocial issues? No   Is the patient/caregiver able to teach back signs and symptoms of worsening condition: Fever/chills, Shortness of breath, Chest pain   TCM call completed? Yes   Wrap up additional comments Pt eports she is doing better          Zelda Sherman RN    9/16/2022, 14:02 CDT

## 2022-09-23 ENCOUNTER — LAB (OUTPATIENT)
Dept: LAB | Facility: HOSPITAL | Age: 82
End: 2022-09-23

## 2022-09-23 ENCOUNTER — OFFICE VISIT (OUTPATIENT)
Dept: FAMILY MEDICINE CLINIC | Facility: CLINIC | Age: 82
End: 2022-09-23

## 2022-09-23 VITALS
HEIGHT: 68 IN | HEART RATE: 102 BPM | SYSTOLIC BLOOD PRESSURE: 128 MMHG | DIASTOLIC BLOOD PRESSURE: 62 MMHG | OXYGEN SATURATION: 95 % | WEIGHT: 245.7 LBS | BODY MASS INDEX: 37.24 KG/M2

## 2022-09-23 DIAGNOSIS — D50.9 RESTLESS LEG SYNDROME DUE TO IRON DEFICIENCY ANEMIA: ICD-10-CM

## 2022-09-23 DIAGNOSIS — G25.81 RESTLESS LEG SYNDROME DUE TO IRON DEFICIENCY ANEMIA: ICD-10-CM

## 2022-09-23 DIAGNOSIS — D64.89 ANEMIA DUE TO MULTIPLE MECHANISMS: Primary | ICD-10-CM

## 2022-09-23 DIAGNOSIS — T78.1XXA ALLERGIC REACTION TO ALPHA-GAL: ICD-10-CM

## 2022-09-23 DIAGNOSIS — R60.0 LOCALIZED EDEMA: ICD-10-CM

## 2022-09-23 DIAGNOSIS — J18.9 COMMUNITY ACQUIRED PNEUMONIA, UNSPECIFIED LATERALITY: ICD-10-CM

## 2022-09-23 DIAGNOSIS — D64.89 ANEMIA DUE TO MULTIPLE MECHANISMS: ICD-10-CM

## 2022-09-23 PROCEDURE — 84466 ASSAY OF TRANSFERRIN: CPT

## 2022-09-23 PROCEDURE — 1111F DSCHRG MED/CURRENT MED MERGE: CPT | Performed by: CHIROPRACTOR

## 2022-09-23 PROCEDURE — 83540 ASSAY OF IRON: CPT

## 2022-09-23 PROCEDURE — 36415 COLL VENOUS BLD VENIPUNCTURE: CPT

## 2022-09-23 PROCEDURE — 96372 THER/PROPH/DIAG INJ SC/IM: CPT | Performed by: CHIROPRACTOR

## 2022-09-23 PROCEDURE — 99496 TRANSJ CARE MGMT HIGH F2F 7D: CPT | Performed by: CHIROPRACTOR

## 2022-09-23 RX ORDER — EPINEPHRINE 0.3 MG/.3ML
0.3 INJECTION SUBCUTANEOUS ONCE
Qty: 1 EACH | Refills: 0 | Status: SHIPPED | OUTPATIENT
Start: 2022-09-23 | End: 2022-09-23

## 2022-09-23 RX ORDER — CYANOCOBALAMIN 1000 UG/ML
1000 INJECTION, SOLUTION INTRAMUSCULAR; SUBCUTANEOUS
Status: DISCONTINUED | OUTPATIENT
Start: 2022-09-23 | End: 2023-02-22

## 2022-09-23 RX ORDER — BUMETANIDE 2 MG/1
2 TABLET ORAL 2 TIMES DAILY
Qty: 60 TABLET | Refills: 0 | Status: ON HOLD | OUTPATIENT
Start: 2022-09-23 | End: 2023-02-22

## 2022-09-23 RX ORDER — AZITHROMYCIN 250 MG/1
TABLET, FILM COATED ORAL
COMMUNITY
Start: 2022-09-21 | End: 2023-01-06

## 2022-09-23 RX ADMIN — CYANOCOBALAMIN 1000 MCG: 1000 INJECTION, SOLUTION INTRAMUSCULAR; SUBCUTANEOUS at 15:14

## 2022-09-23 NOTE — PROGRESS NOTES
Family Medicine Residency  Gus Posada MD    Subjective:     Alda Constantino is a 81 y.o. female who presents for community-acquired pneumonia, lower extremity edema, and anemia.  She is here today for follow-up on a hospitalization.  She was in the ER on 9/11 where she was diagnosed with community-acquired pneumonia.  She was treated with Levaquin during her hospitalization and sent home with further treatment on prednisone.  She was discharged on 9/15.    The following portions of the patient's history were reviewed and updated as appropriate: allergies, current medications, past family history, past medical history, past social history, past surgical history and problem list.    Past Medical Hx:  Past Medical History:   Diagnosis Date   • Asthma    • Cancer (HCC)    • Congenital abnormalities     colon behind liver   • COPD (chronic obstructive pulmonary disease) (HCC)    • Hypertension        Past Surgical Hx:  Past Surgical History:   Procedure Laterality Date   • ADENOIDECTOMY     • ADRENAL GLAND SURGERY Right    • BLADDER SURGERY     • BREAST BIOPSY     • COLONOSCOPY W/ BIOPSIES AND POLYPECTOMY     • EYE SURGERY     • HERNIA REPAIR     • HYSTERECTOMY     • LUNG LOBECTOMY Left    • RECTAL SURGERY      cyst   • SKIN CANCER EXCISION      legs   • TONSILLECTOMY     • VARICOSE VEIN SURGERY         Current Meds:    Current Outpatient Medications:   •  acetaminophen (TYLENOL) 500 MG tablet, Take 500 mg by mouth Every 6 (Six) Hours As Needed for Mild Pain ., Disp: , Rfl:   •  albuterol (PROVENTIL HFA;VENTOLIN HFA) 108 (90 Base) MCG/ACT inhaler, Inhale 2 puffs Every 4 (Four) Hours As Needed for Wheezing., Disp: , Rfl:   •  azithromycin (ZITHROMAX) 250 MG tablet, , Disp: , Rfl:   •  budesonide-formoterol (SYMBICORT) 160-4.5 MCG/ACT inhaler, Inhale 2 puffs., Disp: , Rfl:   •  bumetanide (BUMEX) 2 MG tablet, Take 1 tablet by mouth 2 (Two) Times a Day., Disp: 60 tablet, Rfl: 0  •  carvedilol (COREG) 3.125 MG  tablet, Take 3.125 mg by mouth 2 (Two) Times a Day With Meals., Disp: , Rfl:   •  erythromycin base (E-MYCIN) 250 MG tablet, Take 250 mg by mouth Every Other Day., Disp: , Rfl:   •  gabapentin (NEURONTIN) 100 MG capsule, Take 1 capsule by mouth Daily., Disp: 30 capsule, Rfl: 2  •  Glucose Blood (PRECISION XTRA TEST VI), by In Vitro route., Disp: , Rfl:   •  guaiFENesin (MUCINEX) 600 MG 12 hr tablet, Take 1,200 mg by mouth 2 (Two) Times a Day., Disp: , Rfl:   •  hydrocortisone 2.5 % cream, , Disp: , Rfl:   •  IPRATROPIUM BROMIDE IN, Inhale., Disp: , Rfl:   •  ipratropium-albuterol (DUO-NEB) 0.5-2.5 mg/3 ml nebulizer, Take 1.5 mL by nebulization Every 4 (Four) Hours As Needed for Wheezing., Disp: , Rfl:   •  levocetirizine (XYZAL) 5 MG tablet, Take 5 mg by mouth Every Evening., Disp: , Rfl:   •  Melatonin 10 MG capsule, Take 1 each by mouth Every Night., Disp: , Rfl:   •  metFORMIN ER (GLUCOPHAGE-XR) 750 MG 24 hr tablet, TAKE 1 TABLET EVERY NIGHT AT BEDTIME, Disp: 30 tablet, Rfl: 11  •  nystatin (MYCOSTATIN) 486109 UNIT/GM cream, , Disp: , Rfl:   •  omeprazole (priLOSEC) 40 MG capsule, Take 40 mg by mouth Daily., Disp: , Rfl:   •  ondansetron ODT (Zofran ODT) 4 MG disintegrating tablet, Place 1 tablet on the tongue Every 8 (Eight) Hours As Needed for Nausea or Vomiting., Disp: 30 tablet, Rfl: 0  •  simethicone (Gas-X) 80 MG chewable tablet, Chew 1 tablet Every 6 (Six) Hours As Needed (abdominal bloating)., Disp: 10 tablet, Rfl: 0  •  spironolactone (ALDACTONE) 25 MG tablet, Take 25 mg by mouth Daily., Disp: , Rfl:     Current Facility-Administered Medications:   •  cyanocobalamin injection 1,000 mcg, 1,000 mcg, Intramuscular, Q28 Days, Gus Posada MD, 1,000 mcg at 09/23/22 1514    Allergies:  Allergies   Allergen Reactions   • Dilaudid [Hydromorphone Hcl] Anaphylaxis   • Diphenoxylate-Atropine Shortness Of Breath     Reaction: shortness of breath     • Iodinated Diagnostic Agents Hives     Reaction: HIVES   •  "Keflex [Cephalexin] Shortness Of Breath   • Lomotil [Diphenoxylate] Shortness Of Breath   • Aspirin Hives   • Beef (Diagnostic) Nausea And Vomiting     All mammals  All mammals   • Beef-Derived Products GI Intolerance   • Ciprofloxacin Nausea And Vomiting   • Contrast Dye Hives   • Levaquin [Levofloxacin] GI Intolerance   • Milk-Related Compounds GI Intolerance   • Nsaids Hives   • Pegademase Bovine Other (See Comments)     Other reaction(s): GI Intolerance   • Percocet [Oxycodone-Acetaminophen] Itching   • Pork-Derived Products GI Intolerance   • Tolmetin Hives       Family Hx:  Family History   Problem Relation Age of Onset   • Diabetes Mother    • Diabetes Father    • Heart disease Father    • Cancer Sister    • Heart disease Sister    • Thyroid disease Sister    • Diabetes Brother    • Heart disease Brother         Social History:  Social History     Socioeconomic History   • Marital status:    Tobacco Use   • Smoking status: Former Smoker   • Smokeless tobacco: Never Used   Vaping Use   • Vaping Use: Never used   Substance and Sexual Activity   • Alcohol use: Never   • Drug use: Never   • Sexual activity: Defer       Review of Systems  Review of Systems   Constitutional: Positive for fatigue. Negative for chills, diaphoresis and fever.   HENT: Negative for congestion, rhinorrhea and trouble swallowing.    Eyes: Negative for visual disturbance.   Respiratory: Positive for shortness of breath. Negative for cough.    Cardiovascular: Positive for leg swelling. Negative for chest pain and palpitations.   Gastrointestinal: Negative for abdominal pain and blood in stool.   Genitourinary: Negative for dysuria and hematuria.   Musculoskeletal: Positive for gait problem.   Skin: Negative for color change.   Neurological: Positive for dizziness and light-headedness. Negative for headaches.   Psychiatric/Behavioral: Negative for sleep disturbance.       Objective:     /62   Pulse 102   Ht 172.7 cm (68\")   " Wt 111 kg (245 lb 11.2 oz)   SpO2 95%   BMI 37.36 kg/m²   Physical Exam  Vitals reviewed.   Constitutional:       Appearance: She is not ill-appearing.   HENT:      Nose: No congestion or rhinorrhea.      Mouth/Throat:      Mouth: Mucous membranes are moist.      Pharynx: No oropharyngeal exudate or posterior oropharyngeal erythema.   Eyes:      General:         Left eye: No discharge.   Neck:      Vascular: No carotid bruit.   Cardiovascular:      Rate and Rhythm: Normal rate and regular rhythm.      Pulses: Normal pulses.      Heart sounds: No murmur heard.  Pulmonary:      Effort: No respiratory distress.      Breath sounds: No wheezing or rales.   Musculoskeletal:      Right lower leg: Edema present.      Left lower leg: Edema present.   Skin:     Findings: Erythema present.      Comments: Mildly erythematous lower extremities bilaterally secondary to venous stasis.   Neurological:      Mental Status: She is alert. Mental status is at baseline.          Assessment/Plan:     Diagnoses and all orders for this visit:    1. Anemia due to multiple mechanisms (Primary)  - Patient with current symptoms of restless leg syndrome.  Discussion was held that likely cause could be iron deficiency anemia.  1 year ago she was put on gabapentin for treatment of the RLS.  On my discussion with patient I advised her that my impression was the gabapentin was not healthy for her at her age.  She agrees to stop it and undergo some iron studies to elucidate the source of the RLS.  -     Iron and TIBC; Future  -     cyanocobalamin injection 1,000 mcg    2. Localized edema  - Since hospitalization patient endorses that edema in lower extremities has gotten worse.  - She is currently on Bumex 2 mg tablets once a day.  Advised her I would like to double the dose for the next week or 2 until the edema resolves.  At that time she will go back to 1 tablet daily.  -     bumetanide (BUMEX) 2 MG tablet; Take 1 tablet by mouth 2 (Two) Times a  Day.  Dispense: 60 tablet; Refill: 0    3. Allergic reaction to alpha-gal  -     EPINEPHrine (EpiPen 2-Bowen) 0.3 MG/0.3ML solution auto-injector injection; Inject 0.3 mL under the skin into the appropriate area as directed 1 (One) Time for 1 dose.  Dispense: 1 each; Refill: 0    4. Restless leg syndrome due to iron deficiency anemia  - Patient with current symptoms of restless leg syndrome.  Discussion was held that likely cause could be iron deficiency anemia.  1 year ago she was put on gabapentin for treatment of the RLS.  On my discussion with patient I advised her that my impression was the gabapentin was not healthy for her at her age.  She agrees to stop it and undergo some iron studies to elucidate the source of the RLS.  -     Iron and TIBC; Future    5. Community acquired pneumonia, unspecified laterality        - Recent hospitalization from 9/11 until discharge on 9/15 for community-acquired pneumonia.        - During hospitalization patient treated with Levaquin and prednisone.        - Sent home with continuing prednisone treatment which she has since finished.        - Currently saturating 95% on room air.        - Community-acquired pneumonia has resolved at this point.  No further treatment necessary.       Follow-up:     Return in about 4 weeks (around 10/21/2022) for Recheck.    Preventative:  Health Maintenance   Topic Date Due   • DXA SCAN  Never done   • ZOSTER VACCINE (1 of 2) Never done   • ANNUAL WELLNESS VISIT  Never done   • DIABETIC EYE EXAM  Never done   • URINE MICROALBUMIN  10/23/2021   • HEMOGLOBIN A1C  04/19/2022   • COVID-19 Vaccine (5 - Booster for Moderna series) 06/03/2022   • INFLUENZA VACCINE  10/01/2022   • TDAP/TD VACCINES (2 - Td or Tdap) 08/02/2026   • Pneumococcal Vaccine 65+  Completed     Alcohol use:  reports no history of alcohol use.  Nicotine status  reports that she has quit smoking. She has never used smokeless tobacco.     Goals    None             This document has  been electronically signed by Gus Posada MD on September 27, 2022 13:33 CDT

## 2022-09-24 LAB
IRON 24H UR-MRATE: 62 MCG/DL (ref 37–145)
IRON SATN MFR SERPL: 21 % (ref 20–50)
TIBC SERPL-MCNC: 301 MCG/DL (ref 298–536)
TRANSFERRIN SERPL-MCNC: 202 MG/DL (ref 200–360)

## 2022-09-25 LAB
QT INTERVAL: 412 MS
QTC INTERVAL: 438 MS

## 2022-09-27 ENCOUNTER — TELEPHONE (OUTPATIENT)
Dept: FAMILY MEDICINE CLINIC | Facility: CLINIC | Age: 82
End: 2022-09-27

## 2022-09-27 NOTE — TELEPHONE ENCOUNTER
First attempt to call patient to go over results of recent iron study with her.  Did not get an answer.  We will try again later.    This document has been electronically signed by Gus Posada MD on September 27, 2022 14:44 CDT

## 2022-09-29 ENCOUNTER — HOSPITAL ENCOUNTER (EMERGENCY)
Facility: HOSPITAL | Age: 82
Discharge: HOME | End: 2022-09-29
Attending: STUDENT IN AN ORGANIZED HEALTH CARE EDUCATION/TRAINING PROGRAM | Admitting: STUDENT IN AN ORGANIZED HEALTH CARE EDUCATION/TRAINING PROGRAM

## 2022-09-29 ENCOUNTER — APPOINTMENT (OUTPATIENT)
Dept: GENERAL RADIOLOGY | Facility: HOSPITAL | Age: 82
End: 2022-09-29

## 2022-09-29 VITALS
HEIGHT: 68 IN | SYSTOLIC BLOOD PRESSURE: 141 MMHG | DIASTOLIC BLOOD PRESSURE: 97 MMHG | WEIGHT: 244 LBS | HEART RATE: 67 BPM | TEMPERATURE: 98.2 F | OXYGEN SATURATION: 97 % | BODY MASS INDEX: 36.98 KG/M2 | RESPIRATION RATE: 24 BRPM

## 2022-09-29 DIAGNOSIS — R06.02 SOB (SHORTNESS OF BREATH): Primary | ICD-10-CM

## 2022-09-29 DIAGNOSIS — R91.8 LUNG MASS: ICD-10-CM

## 2022-09-29 DIAGNOSIS — R68.83 CHILLS: ICD-10-CM

## 2022-09-29 LAB
ALBUMIN SERPL-MCNC: 4.2 G/DL (ref 3.5–5.2)
ALBUMIN/GLOB SERPL: 1.8 G/DL
ALP SERPL-CCNC: 71 U/L (ref 39–117)
ALT SERPL W P-5'-P-CCNC: 14 U/L (ref 1–33)
ANION GAP SERPL CALCULATED.3IONS-SCNC: 12 MMOL/L (ref 5–15)
AST SERPL-CCNC: 14 U/L (ref 1–32)
BASOPHILS # BLD AUTO: 0.03 10*3/MM3 (ref 0–0.2)
BASOPHILS NFR BLD AUTO: 0.3 % (ref 0–1.5)
BILIRUB SERPL-MCNC: 0.4 MG/DL (ref 0–1.2)
BUN SERPL-MCNC: 30 MG/DL (ref 8–23)
BUN/CREAT SERPL: 27.5 (ref 7–25)
CALCIUM SPEC-SCNC: 8.5 MG/DL (ref 8.6–10.5)
CHLORIDE SERPL-SCNC: 99 MMOL/L (ref 98–107)
CO2 SERPL-SCNC: 24 MMOL/L (ref 22–29)
CREAT SERPL-MCNC: 1.09 MG/DL (ref 0.57–1)
DEPRECATED RDW RBC AUTO: 50.7 FL (ref 37–54)
EGFRCR SERPLBLD CKD-EPI 2021: 51.5 ML/MIN/1.73
EOSINOPHIL # BLD AUTO: 0.13 10*3/MM3 (ref 0–0.4)
EOSINOPHIL NFR BLD AUTO: 1.3 % (ref 0.3–6.2)
ERYTHROCYTE [DISTWIDTH] IN BLOOD BY AUTOMATED COUNT: 13.9 % (ref 12.3–15.4)
FLUAV RNA RESP QL NAA+PROBE: NOT DETECTED
FLUBV RNA RESP QL NAA+PROBE: NOT DETECTED
GLOBULIN UR ELPH-MCNC: 2.3 GM/DL
GLUCOSE SERPL-MCNC: 140 MG/DL (ref 65–99)
HCT VFR BLD AUTO: 35 % (ref 34–46.6)
HGB BLD-MCNC: 11.4 G/DL (ref 12–15.9)
HOLD SPECIMEN: NORMAL
IMM GRANULOCYTES # BLD AUTO: 0.04 10*3/MM3 (ref 0–0.05)
IMM GRANULOCYTES NFR BLD AUTO: 0.4 % (ref 0–0.5)
LYMPHOCYTES # BLD AUTO: 0.85 10*3/MM3 (ref 0.7–3.1)
LYMPHOCYTES NFR BLD AUTO: 8.3 % (ref 19.6–45.3)
MCH RBC QN AUTO: 32.1 PG (ref 26.6–33)
MCHC RBC AUTO-ENTMCNC: 32.6 G/DL (ref 31.5–35.7)
MCV RBC AUTO: 98.6 FL (ref 79–97)
MONOCYTES # BLD AUTO: 0.6 10*3/MM3 (ref 0.1–0.9)
MONOCYTES NFR BLD AUTO: 5.8 % (ref 5–12)
NEUTROPHILS NFR BLD AUTO: 8.64 10*3/MM3 (ref 1.7–7)
NEUTROPHILS NFR BLD AUTO: 83.9 % (ref 42.7–76)
NRBC BLD AUTO-RTO: 0 /100 WBC (ref 0–0.2)
NT-PROBNP SERPL-MCNC: 1365 PG/ML (ref 0–1800)
PLATELET # BLD AUTO: 175 10*3/MM3 (ref 140–450)
PMV BLD AUTO: 11.6 FL (ref 6–12)
POTASSIUM SERPL-SCNC: 4.1 MMOL/L (ref 3.5–5.2)
PROT SERPL-MCNC: 6.5 G/DL (ref 6–8.5)
RBC # BLD AUTO: 3.55 10*6/MM3 (ref 3.77–5.28)
SARS-COV-2 RNA RESP QL NAA+PROBE: NOT DETECTED
SODIUM SERPL-SCNC: 135 MMOL/L (ref 136–145)
TROPONIN T SERPL-MCNC: 0.02 NG/ML (ref 0–0.03)
WBC NRBC COR # BLD: 10.29 10*3/MM3 (ref 3.4–10.8)
WHOLE BLOOD HOLD COAG: NORMAL

## 2022-09-29 PROCEDURE — 80053 COMPREHEN METABOLIC PANEL: CPT | Performed by: STUDENT IN AN ORGANIZED HEALTH CARE EDUCATION/TRAINING PROGRAM

## 2022-09-29 PROCEDURE — 84484 ASSAY OF TROPONIN QUANT: CPT | Performed by: STUDENT IN AN ORGANIZED HEALTH CARE EDUCATION/TRAINING PROGRAM

## 2022-09-29 PROCEDURE — 93010 ELECTROCARDIOGRAM REPORT: CPT | Performed by: INTERNAL MEDICINE

## 2022-09-29 PROCEDURE — 99283 EMERGENCY DEPT VISIT LOW MDM: CPT

## 2022-09-29 PROCEDURE — 93005 ELECTROCARDIOGRAM TRACING: CPT

## 2022-09-29 PROCEDURE — 83880 ASSAY OF NATRIURETIC PEPTIDE: CPT | Performed by: STUDENT IN AN ORGANIZED HEALTH CARE EDUCATION/TRAINING PROGRAM

## 2022-09-29 PROCEDURE — 36415 COLL VENOUS BLD VENIPUNCTURE: CPT

## 2022-09-29 PROCEDURE — 87636 SARSCOV2 & INF A&B AMP PRB: CPT | Performed by: STUDENT IN AN ORGANIZED HEALTH CARE EDUCATION/TRAINING PROGRAM

## 2022-09-29 PROCEDURE — 93005 ELECTROCARDIOGRAM TRACING: CPT | Performed by: STUDENT IN AN ORGANIZED HEALTH CARE EDUCATION/TRAINING PROGRAM

## 2022-09-29 PROCEDURE — 71046 X-RAY EXAM CHEST 2 VIEWS: CPT

## 2022-09-29 PROCEDURE — 85025 COMPLETE CBC W/AUTO DIFF WBC: CPT | Performed by: STUDENT IN AN ORGANIZED HEALTH CARE EDUCATION/TRAINING PROGRAM

## 2022-09-29 RX ORDER — ONDANSETRON 4 MG/1
4 TABLET, FILM COATED ORAL EVERY 6 HOURS PRN
Qty: 12 TABLET | Refills: 0 | Status: SHIPPED | OUTPATIENT
Start: 2022-09-29 | End: 2022-12-07

## 2022-10-04 LAB
QT INTERVAL: 350 MS
QTC INTERVAL: 432 MS

## 2022-10-12 ENCOUNTER — READMISSION MANAGEMENT (OUTPATIENT)
Dept: CALL CENTER | Facility: HOSPITAL | Age: 82
End: 2022-10-12

## 2022-10-12 NOTE — OUTREACH NOTE
COPD/PN Week 4 Survey    Flowsheet Row Responses   [facility] patient discharged from? [place]   Does the patient have one of the following disease processes/diagnoses(primary or secondary)? Pneumonia   Week 4 attempt successful? Yes   Call start time 1205   Call end time 1215   Discharge diagnosis community acquired pneumonia   Is patient permission given to speak with other caregiver? Yes   List who call center can speak with son   Medication alerts for this patient Using Duo nebs BID which she used to use only in morning. Bumex BID now as well.   Meds reviewed with patient/caregiver? Yes   Is the patient taking all medications as directed (includes completed medication regime)? Yes   Has the patient kept scheduled appointments due by today? Yes   Pulse Ox monitoring Intermittent   O2 Sat comments has not been monitoring.    O2 Sat: education provided Sat levels, Monitoring frequency, When to seek care   Psychosocial issues? No   Comments Pt still having SOA w/exertion, LE edema present, not doing daily wts, encouraged her to do so,  productive cough, using IS at home up to 2000ml. Feels her gait is off balance, using walker.    What is the patient's perception of their health status since discharge? Same   Nursing Interventions Nurse provided patient education, Advised patient to call provider   Are the patient's immunizations up to date?  Yes   If the patient is a current smoker, are they able to teach back resources for cessation? Not a smoker   Is the patient/caregiver able to teach back the hierarchy of who to call/visit for symptoms/problems? PCP, Specialist, Home health nurse, Urgent Care, ED, 911 Yes   Is the patient able to teach back COPD zones? Yes   Nursing interventions Education provided on various zones   Patient reports what zone on this call? Yellow Zone   Yellow Zone Reports having a bad day or a COPD flare, I have less energy for my daily activities, More breathless than usual,  Increased or thicker phlegm/mucus, More swelling in ankles   Yellow interventions Continue taking daily medications, Use quick relief inhaler, Use oxygen as prescribed   Quick Relief Inhaler Usage BID   Is the patient/caregiver able to teach back signs and symptoms of worsening condition: Fever/chills, Shortness of breath   Week 4 call completed? Yes   Would the patient like one additional call? No   Graduated Yes   Is the patient interested in additional calls from an ambulatory ?  NOTE:  applies to high risk patients requiring additional follow-up. No   Did the patient feel the follow up calls were helpful during their recovery period? Yes   Wrap up additional comments .          ALBERT AGUILAR - Registered Nurse

## 2022-10-14 ENCOUNTER — OFFICE VISIT (OUTPATIENT)
Dept: FAMILY MEDICINE CLINIC | Facility: CLINIC | Age: 82
End: 2022-10-14

## 2022-10-14 VITALS
HEIGHT: 68 IN | OXYGEN SATURATION: 94 % | WEIGHT: 246.9 LBS | HEART RATE: 93 BPM | BODY MASS INDEX: 37.42 KG/M2 | TEMPERATURE: 97.3 F | DIASTOLIC BLOOD PRESSURE: 70 MMHG | SYSTOLIC BLOOD PRESSURE: 122 MMHG

## 2022-10-14 DIAGNOSIS — J44.1 COPD WITH EXACERBATION: Primary | ICD-10-CM

## 2022-10-14 PROCEDURE — 99213 OFFICE O/P EST LOW 20 MIN: CPT | Performed by: CHIROPRACTOR

## 2022-10-14 RX ORDER — ACETYLCYSTEINE 200 MG/ML
4 SOLUTION ORAL; RESPIRATORY (INHALATION) 3 TIMES DAILY
Qty: 30 ML | Refills: 0 | Status: SHIPPED | OUTPATIENT
Start: 2022-10-14 | End: 2022-12-06 | Stop reason: SDUPTHER

## 2022-10-14 RX ORDER — PREDNISONE 20 MG/1
20 TABLET ORAL DAILY
Qty: 5 TABLET | Refills: 0 | Status: SHIPPED | OUTPATIENT
Start: 2022-10-14 | End: 2022-12-06

## 2022-10-14 RX ORDER — METHYLPREDNISOLONE SODIUM SUCCINATE 40 MG/ML
80 INJECTION, POWDER, LYOPHILIZED, FOR SOLUTION INTRAMUSCULAR; INTRAVENOUS ONCE
Status: DISCONTINUED | OUTPATIENT
Start: 2022-10-14 | End: 2022-12-06

## 2022-10-14 NOTE — PROGRESS NOTES
0  Family Medicine Residency  Gus Posada MD    Subjective:     Alda Constantino is a 81 y.o. female who presents for COPD exacerbation.  She reports that approximately 3 to 4 weeks ago she had both the flu vaccination and COVID vaccination on the same day.  Immediately afterwards she began noticing that her shoulders were extremely painful and sore.  Subsequent to that she became achy all over with flulike symptoms.  She took her self to the ER on 9/29 due to increasing shortness of breath and generalized fatigue and malaise.  Examination there was unremarkable except for a questionable opacity in the right upper lung.  They recommended follow-up with PA x-ray here at the clinic.  Patient comes in today because of increasing trouble with shortness of breath.  She is currently on 2 L of oxygen which is her normal via an oxygen generator at home.  Of note echocardiogram done on 9/14 was unremarkable.  She endorses that she is compliant with all of her COPD medications.    The following portions of the patient's history were reviewed and updated as appropriate: allergies, current medications, past family history, past medical history, past social history, past surgical history and problem list.    Past Medical Hx:  Past Medical History:   Diagnosis Date   • Asthma    • Cancer (HCC)    • Congenital abnormalities     colon behind liver   • COPD (chronic obstructive pulmonary disease) (HCC)    • Hypertension        Past Surgical Hx:  Past Surgical History:   Procedure Laterality Date   • ADENOIDECTOMY     • ADRENAL GLAND SURGERY Right    • BLADDER SURGERY     • BREAST BIOPSY     • COLONOSCOPY W/ BIOPSIES AND POLYPECTOMY     • EYE SURGERY     • HERNIA REPAIR     • HYSTERECTOMY     • LUNG LOBECTOMY Left    • RECTAL SURGERY      cyst   • SKIN CANCER EXCISION      legs   • TONSILLECTOMY     • VARICOSE VEIN SURGERY         Current Meds:    Current Outpatient Medications:   •  acetaminophen (TYLENOL) 500 MG tablet, Take  500 mg by mouth Every 6 (Six) Hours As Needed for Mild Pain ., Disp: , Rfl:   •  albuterol (PROVENTIL HFA;VENTOLIN HFA) 108 (90 Base) MCG/ACT inhaler, Inhale 2 puffs Every 4 (Four) Hours As Needed for Wheezing., Disp: , Rfl:   •  azithromycin (ZITHROMAX) 250 MG tablet, , Disp: , Rfl:   •  budesonide-formoterol (SYMBICORT) 160-4.5 MCG/ACT inhaler, Inhale 2 puffs., Disp: , Rfl:   •  bumetanide (BUMEX) 2 MG tablet, Take 1 tablet by mouth 2 (Two) Times a Day., Disp: 60 tablet, Rfl: 0  •  carvedilol (COREG) 3.125 MG tablet, Take 3.125 mg by mouth 2 (Two) Times a Day With Meals., Disp: , Rfl:   •  gabapentin (NEURONTIN) 100 MG capsule, Take 1 capsule by mouth Daily., Disp: 30 capsule, Rfl: 2  •  Glucose Blood (PRECISION XTRA TEST VI), by In Vitro route., Disp: , Rfl:   •  guaiFENesin (MUCINEX) 600 MG 12 hr tablet, Take 1,200 mg by mouth 2 (Two) Times a Day., Disp: , Rfl:   •  hydrocortisone 2.5 % cream, , Disp: , Rfl:   •  IPRATROPIUM BROMIDE IN, Inhale., Disp: , Rfl:   •  ipratropium-albuterol (DUO-NEB) 0.5-2.5 mg/3 ml nebulizer, Take 1.5 mL by nebulization Every 4 (Four) Hours As Needed for Wheezing., Disp: , Rfl:   •  levocetirizine (XYZAL) 5 MG tablet, Take 5 mg by mouth Every Evening., Disp: , Rfl:   •  Melatonin 10 MG capsule, Take 1 each by mouth Every Night., Disp: , Rfl:   •  metFORMIN ER (GLUCOPHAGE-XR) 750 MG 24 hr tablet, TAKE 1 TABLET EVERY NIGHT AT BEDTIME, Disp: 30 tablet, Rfl: 11  •  nystatin (MYCOSTATIN) 925095 UNIT/GM cream, , Disp: , Rfl:   •  omeprazole (priLOSEC) 40 MG capsule, Take 40 mg by mouth Daily., Disp: , Rfl:   •  ondansetron (ZOFRAN) 4 MG tablet, Take 1 tablet by mouth Every 6 (Six) Hours As Needed for Nausea or Vomiting., Disp: 12 tablet, Rfl: 0  •  ondansetron ODT (Zofran ODT) 4 MG disintegrating tablet, Place 1 tablet on the tongue Every 8 (Eight) Hours As Needed for Nausea or Vomiting., Disp: 30 tablet, Rfl: 0  •  simethicone (Gas-X) 80 MG chewable tablet, Chew 1 tablet Every 6 (Six)  Hours As Needed (abdominal bloating)., Disp: 10 tablet, Rfl: 0  •  spironolactone (ALDACTONE) 25 MG tablet, Take 25 mg by mouth Daily., Disp: , Rfl:     Current Facility-Administered Medications:   •  cyanocobalamin injection 1,000 mcg, 1,000 mcg, Intramuscular, Q28 Days, Gus Posada MD, 1,000 mcg at 09/23/22 1514    Allergies:  Allergies   Allergen Reactions   • Dilaudid [Hydromorphone Hcl] Anaphylaxis   • Diphenoxylate-Atropine Shortness Of Breath     Reaction: shortness of breath     • Iodinated Diagnostic Agents Hives     Reaction: HIVES   • Keflex [Cephalexin] Shortness Of Breath   • Lomotil [Diphenoxylate] Shortness Of Breath   • Aspirin Hives   • Beef (Diagnostic) Nausea And Vomiting     All mammals  All mammals   • Beef-Derived Products GI Intolerance   • Ciprofloxacin Nausea And Vomiting   • Contrast Dye Hives   • Levaquin [Levofloxacin] GI Intolerance   • Milk-Related Compounds GI Intolerance   • Nsaids Hives   • Pegademase Bovine Other (See Comments)     Other reaction(s): GI Intolerance   • Percocet [Oxycodone-Acetaminophen] Itching   • Pork-Derived Products GI Intolerance   • Tolmetin Hives       Family Hx:  Family History   Problem Relation Age of Onset   • Diabetes Mother    • Diabetes Father    • Heart disease Father    • Cancer Sister    • Heart disease Sister    • Thyroid disease Sister    • Diabetes Brother    • Heart disease Brother         Social History:  Social History     Socioeconomic History   • Marital status:    Tobacco Use   • Smoking status: Former   • Smokeless tobacco: Never   Vaping Use   • Vaping Use: Never used   Substance and Sexual Activity   • Alcohol use: Never   • Drug use: Never   • Sexual activity: Defer       Review of Systems  Review of Systems   Constitutional: Positive for fatigue. Negative for diaphoresis and fever.   HENT: Positive for congestion. Negative for rhinorrhea and trouble swallowing.    Eyes: Negative for visual disturbance.   Respiratory:  "Positive for cough and shortness of breath.         Wants to cough but nothing comes up.  Home SOB she measured her oxygen level which was down to 85% on only 1 occasion last night.  Normally in the mid 90s.   Cardiovascular: Positive for chest pain. Negative for palpitations.        Occasional     Gastrointestinal: Negative for abdominal pain and blood in stool.   Genitourinary: Negative for dysuria and hematuria.   Musculoskeletal: Positive for gait problem and myalgias.        Left thigh sore.   Skin: Negative for color change.   Neurological: Negative for headaches.   Psychiatric/Behavioral: Negative for sleep disturbance.       Objective:     /70   Pulse 93   Temp 97.3 °F (36.3 °C)   Ht 172.7 cm (68\")   Wt 112 kg (246 lb 14.4 oz)   SpO2 94%   BMI 37.54 kg/m²   Physical Exam  Constitutional:       General: She is not in acute distress.     Appearance: She is ill-appearing. She is not diaphoretic.   HENT:      Head: Atraumatic.      Nose: No rhinorrhea.      Mouth/Throat:      Mouth: Mucous membranes are moist.      Pharynx: Oropharynx is clear.   Eyes:      General: No scleral icterus.        Right eye: No discharge.         Left eye: No discharge.   Neck:      Vascular: No carotid bruit.   Cardiovascular:      Rate and Rhythm: Normal rate and regular rhythm.      Pulses: Normal pulses.      Heart sounds: No murmur heard.  Pulmonary:      Effort: No respiratory distress.      Breath sounds: No wheezing.      Comments: Currently on 2 L oxygen via oxygen generator.  Good air movement all lung fields bilaterally.  Abdominal:      General: Bowel sounds are normal.      Palpations: There is no mass.      Tenderness: There is no abdominal tenderness.   Musculoskeletal:      Right lower leg: No edema.      Left lower leg: Edema present.   Skin:     Capillary Refill: Capillary refill takes less than 2 seconds.      Findings: No rash.   Neurological:      Mental Status: She is alert. Mental status is at " baseline.   Psychiatric:         Mood and Affect: Mood normal.         Thought Content: Thought content normal.          Assessment/Plan:     Diagnoses and all orders for this visit:    1. COPD with exacerbation (HCC) (Primary)  -Patient endorses worsening shortness of breath and feeling of fatigue ever since receiving both the COVID and flu shot on the same day approximately 3 weeks ago.  - 9/29 took self to the local ER for evaluation due to current symptoms.  Findings unremarkable except for questionable opacity right upper lung field.  - Has appointment to see her pulmonologist at [facility] on 11/16.  - Endorses daily compliance with her albuterol sulfate, Symbicort, and ipratropium bromide.  - Feels like she needs to cough however feels like it is completely nonproductive.  - Currently on 2 L of oxygen via oxygen generator.  - 1 episode of low saturation to 85% approximately last night.  Other than that has been historically in the 90s.  - Currently on azithromycin 250  3  times weekly.    - Repeat PA chest x-ray today for evaluation of questionable opacity.  - Solu-Medrol injection today in clinic 80 mg given to patient.  - Prednisone 20 mg once daily for 5 days at home.  - Start patient on Mucomyst 3 times a day for 3 days.    · Rx changes: Solu-Medrol injection, prednisone, Mucomyst.       Follow-up:     Return in about 1 month (around 11/14/2022) for Recheck.    Preventative:  Health Maintenance   Topic Date Due   • DXA SCAN  Never done   • ZOSTER VACCINE (1 of 2) Never done   • DIABETIC EYE EXAM  Never done   • ANNUAL WELLNESS VISIT  07/24/2021   • URINE MICROALBUMIN  10/23/2021   • HEMOGLOBIN A1C  04/19/2022   • TDAP/TD VACCINES (2 - Td or Tdap) 08/02/2026   • COVID-19 Vaccine  Completed   • INFLUENZA VACCINE  Completed   • Pneumococcal Vaccine 65+  Completed     Alcohol use:  reports no history of alcohol use.  Nicotine status  reports that she has quit smoking. She has never used smokeless tobacco.      Goals    None           This document has been electronically signed by Gus Posada MD on October 14, 2022 14:27 CDT

## 2022-10-17 NOTE — PROGRESS NOTES
I have reviewed the notes, assessments, and/or procedures performed by Gus Posada MD during office visit. I concur with her/his documentation and assessment and plan for Alda Constantino.          This document has been electronically signed by Keyonna Bo MD on October 17, 2022 09:36 CDT

## 2022-10-20 ENCOUNTER — TELEPHONE (OUTPATIENT)
Dept: FAMILY MEDICINE CLINIC | Facility: CLINIC | Age: 82
End: 2022-10-20

## 2022-10-20 NOTE — TELEPHONE ENCOUNTER
Left message that new chest xray did not show pathology remaining.  Good news.        This document has been electronically signed by Gus Posada MD on October 20, 2022 16:16 CDT

## 2022-11-11 ENCOUNTER — TELEPHONE (OUTPATIENT)
Dept: FAMILY MEDICINE CLINIC | Facility: CLINIC | Age: 82
End: 2022-11-11

## 2022-11-11 NOTE — TELEPHONE ENCOUNTER
Incoming Refill Request      Medication requested (name and dose):   gabapentin (NEURONTIN) 100 MG capsule    Pharmacy where request should be sent: [facility]    Additional details provided by patient: PATIENT HAS REQUESTED 90 DAYS SUPPLY    Best call back number: [phone]    Does the patient have less than a 3 day supply:  [] Yes  [x] No    Lynn Manley  11/11/22, 09:46 CST

## 2022-11-21 ENCOUNTER — TELEPHONE (OUTPATIENT)
Dept: FAMILY MEDICINE CLINIC | Facility: CLINIC | Age: 82
End: 2022-11-21

## 2022-11-21 NOTE — TELEPHONE ENCOUNTER
Patient has called to ask if Dr. Posada sent in a RX to [facility] for gabapentin (NEURONTIN) 100 MG capsule.  She stated she called a week or more ago, asking for a refill and has not heard from anyone or the pharmacy.  She has asked for a return call at [phone].    [facility]  Lynn

## 2022-11-22 DIAGNOSIS — E11.42 TYPE 2 DIABETES MELLITUS WITH DIABETIC POLYNEUROPATHY, WITHOUT LONG-TERM CURRENT USE OF INSULIN: Primary | ICD-10-CM

## 2022-11-22 RX ORDER — GABAPENTIN 100 MG/1
100 CAPSULE ORAL NIGHTLY
Qty: 90 CAPSULE | Refills: 0 | Status: SHIPPED | OUTPATIENT
Start: 2022-11-22 | End: 2022-12-07 | Stop reason: SDUPTHER

## 2022-12-06 ENCOUNTER — LAB (OUTPATIENT)
Dept: LAB | Facility: HOSPITAL | Age: 82
End: 2022-12-06

## 2022-12-06 ENCOUNTER — OFFICE VISIT (OUTPATIENT)
Dept: FAMILY MEDICINE CLINIC | Facility: CLINIC | Age: 82
End: 2022-12-06

## 2022-12-06 VITALS
HEIGHT: 68 IN | HEART RATE: 102 BPM | WEIGHT: 249.4 LBS | DIASTOLIC BLOOD PRESSURE: 78 MMHG | BODY MASS INDEX: 37.8 KG/M2 | OXYGEN SATURATION: 95 % | SYSTOLIC BLOOD PRESSURE: 140 MMHG | TEMPERATURE: 97.3 F

## 2022-12-06 DIAGNOSIS — J44.1 COPD WITH EXACERBATION: ICD-10-CM

## 2022-12-06 DIAGNOSIS — E11.40 TYPE 2 DIABETES MELLITUS WITH DIABETIC NEUROPATHY, WITHOUT LONG-TERM CURRENT USE OF INSULIN: ICD-10-CM

## 2022-12-06 DIAGNOSIS — M1A.0710 CHRONIC GOUT OF RIGHT FOOT, UNSPECIFIED CAUSE: ICD-10-CM

## 2022-12-06 DIAGNOSIS — E79.0 ELEVATED URIC ACID IN BLOOD: Primary | ICD-10-CM

## 2022-12-06 PROCEDURE — 84550 ASSAY OF BLOOD/URIC ACID: CPT | Performed by: STUDENT IN AN ORGANIZED HEALTH CARE EDUCATION/TRAINING PROGRAM

## 2022-12-06 PROCEDURE — 99213 OFFICE O/P EST LOW 20 MIN: CPT | Performed by: STUDENT IN AN ORGANIZED HEALTH CARE EDUCATION/TRAINING PROGRAM

## 2022-12-06 PROCEDURE — 80048 BASIC METABOLIC PNL TOTAL CA: CPT | Performed by: STUDENT IN AN ORGANIZED HEALTH CARE EDUCATION/TRAINING PROGRAM

## 2022-12-06 PROCEDURE — 36415 COLL VENOUS BLD VENIPUNCTURE: CPT | Performed by: STUDENT IN AN ORGANIZED HEALTH CARE EDUCATION/TRAINING PROGRAM

## 2022-12-06 RX ORDER — METHYLPREDNISOLONE 4 MG/1
TABLET ORAL
Qty: 21 TABLET | Refills: 0 | Status: SHIPPED | OUTPATIENT
Start: 2022-12-06 | End: 2022-12-07 | Stop reason: SDUPTHER

## 2022-12-06 RX ORDER — ACETYLCYSTEINE 200 MG/ML
4 SOLUTION ORAL; RESPIRATORY (INHALATION) 3 TIMES DAILY
Qty: 900 ML | Refills: 0 | Status: SHIPPED | OUTPATIENT
Start: 2022-12-06 | End: 2023-01-17

## 2022-12-06 RX ORDER — ALLOPURINOL 100 MG/1
100 TABLET ORAL DAILY
Qty: 90 TABLET | Refills: 1 | Status: SHIPPED | OUTPATIENT
Start: 2022-12-06 | End: 2022-12-07 | Stop reason: SDUPTHER

## 2022-12-06 NOTE — PROGRESS NOTES
Family Medicine Residency  Sinan Manley MD    Subjective:     Alda Constantino is a 82 y.o. female who presents for follow up for COPD.     Reports she is needing refill on Gabapentin. Medicine appears to have been previously sent in on 11/22/22. [facility] and says they reported her medicine was in [place] on this past Saturday. Patient does have 1 pill left for tonight. She was instructed to get 14 days worth from her doctor to get her through until her medicine comes in the mail.      Has more chest congestion and shortness of breath. Worsened by activity. Having productive cough. Continues taking mucinex. Continues using symbicort bid, duoneb bid, and albuterol prn. Prescribed mucomyst previously but she is unsure if she is taking this. Still trying to cough and is getting some mucus up. Reports this makes her feel better. Continues taking Azithromycin MWF. Continues with oxygen daily.     R Toes redness - reports this is a chronic issue. She has tried multiple treatments including kenalog cream but nothing seems to help. Says she went to the foot doctor but I do not see a podiatry appointment upon chart review. Previous uric acid levels on 9/6/22 shows levels elevated at 10.2. Reports toes are painful.     Past Medical Hx:  Past Medical History:   Diagnosis Date   • Asthma    • Cancer (HCC)    • Congenital abnormalities     colon behind liver   • COPD (chronic obstructive pulmonary disease) (HCC)    • Hypertension        Past Surgical Hx:  Past Surgical History:   Procedure Laterality Date   • ADENOIDECTOMY     • ADRENAL GLAND SURGERY Right    • BLADDER SURGERY     • BREAST BIOPSY     • COLONOSCOPY W/ BIOPSIES AND POLYPECTOMY     • EYE SURGERY     • HERNIA REPAIR     • HYSTERECTOMY     • LUNG LOBECTOMY Left    • RECTAL SURGERY      cyst   • SKIN CANCER EXCISION      legs   • TONSILLECTOMY     • VARICOSE VEIN SURGERY         Current Meds:    Current Outpatient Medications:    •  acetylcysteine (MUCOMYST) 20 % nebulizer solution, Take 4 mL by nebulization 3 (Three) Times a Day., Disp: 900 mL, Rfl: 0  •  acetaminophen (TYLENOL) 500 MG tablet, Take 500 mg by mouth Every 6 (Six) Hours As Needed for Mild Pain ., Disp: , Rfl:   •  albuterol (PROVENTIL HFA;VENTOLIN HFA) 108 (90 Base) MCG/ACT inhaler, Inhale 2 puffs Every 4 (Four) Hours As Needed for Wheezing., Disp: , Rfl:   •  allopurinol (Zyloprim) 100 MG tablet, Take 1 tablet by mouth Daily., Disp: 90 tablet, Rfl: 1  •  azithromycin (ZITHROMAX) 250 MG tablet, , Disp: , Rfl:   •  budesonide-formoterol (SYMBICORT) 160-4.5 MCG/ACT inhaler, Inhale 2 puffs., Disp: , Rfl:   •  bumetanide (BUMEX) 2 MG tablet, Take 1 tablet by mouth 2 (Two) Times a Day., Disp: 60 tablet, Rfl: 0  •  carvedilol (COREG) 3.125 MG tablet, Take 3.125 mg by mouth 2 (Two) Times a Day With Meals., Disp: , Rfl:   •  gabapentin (NEURONTIN) 100 MG capsule, Take 1 capsule by mouth Every Night., Disp: 90 capsule, Rfl: 0  •  Glucose Blood (PRECISION XTRA TEST VI), by In Vitro route., Disp: , Rfl:   •  guaiFENesin (MUCINEX) 600 MG 12 hr tablet, Take 1,200 mg by mouth 2 (Two) Times a Day., Disp: , Rfl:   •  hydrocortisone 2.5 % cream, , Disp: , Rfl:   •  IPRATROPIUM BROMIDE IN, Inhale., Disp: , Rfl:   •  ipratropium-albuterol (DUO-NEB) 0.5-2.5 mg/3 ml nebulizer, Take 1.5 mL by nebulization Every 4 (Four) Hours As Needed for Wheezing., Disp: , Rfl:   •  levocetirizine (XYZAL) 5 MG tablet, Take 5 mg by mouth Every Evening., Disp: , Rfl:   •  Melatonin 10 MG capsule, Take 1 each by mouth Every Night., Disp: , Rfl:   •  metFORMIN ER (GLUCOPHAGE-XR) 750 MG 24 hr tablet, TAKE 1 TABLET EVERY NIGHT AT BEDTIME, Disp: 30 tablet, Rfl: 11  •  methylPREDNISolone (MEDROL) 4 MG dose pack, Take as directed on package instructions., Disp: 21 tablet, Rfl: 0  •  nystatin (MYCOSTATIN) 370951 UNIT/GM cream, , Disp: , Rfl:   •  omeprazole (priLOSEC) 40 MG capsule, Take 40 mg by mouth Daily., Disp: ,  Rfl:   •  ondansetron (ZOFRAN) 4 MG tablet, Take 1 tablet by mouth Every 6 (Six) Hours As Needed for Nausea or Vomiting., Disp: 12 tablet, Rfl: 0  •  ondansetron ODT (Zofran ODT) 4 MG disintegrating tablet, Place 1 tablet on the tongue Every 8 (Eight) Hours As Needed for Nausea or Vomiting., Disp: 30 tablet, Rfl: 0  •  simethicone (Gas-X) 80 MG chewable tablet, Chew 1 tablet Every 6 (Six) Hours As Needed (abdominal bloating)., Disp: 10 tablet, Rfl: 0  •  spironolactone (ALDACTONE) 25 MG tablet, Take 25 mg by mouth Daily., Disp: , Rfl:     Current Facility-Administered Medications:   •  cyanocobalamin injection 1,000 mcg, 1,000 mcg, Intramuscular, Q28 Days, Gus Posada MD, 1,000 mcg at 09/23/22 1514    Allergies:  Allergies   Allergen Reactions   • Dilaudid [Hydromorphone Hcl] Anaphylaxis   • Diphenoxylate-Atropine Shortness Of Breath     Reaction: shortness of breath     • Iodinated Diagnostic Agents Hives     Reaction: HIVES   • Keflex [Cephalexin] Shortness Of Breath   • Lomotil [Diphenoxylate] Shortness Of Breath   • Aspirin Hives   • Beef (Diagnostic) Nausea And Vomiting     All mammals  All mammals   • Beef-Derived Products GI Intolerance   • Ciprofloxacin Nausea And Vomiting   • Contrast Dye Hives   • Levaquin [Levofloxacin] GI Intolerance   • Milk-Related Compounds GI Intolerance   • Nsaids Hives   • Pegademase Bovine Other (See Comments)     Other reaction(s): GI Intolerance   • Percocet [Oxycodone-Acetaminophen] Itching   • Pork-Derived Products GI Intolerance   • Tolmetin Hives       Family Hx:  Family History   Problem Relation Age of Onset   • Diabetes Mother    • Diabetes Father    • Heart disease Father    • Cancer Sister    • Heart disease Sister    • Thyroid disease Sister    • Diabetes Brother    • Heart disease Brother         Social History:  Social History     Socioeconomic History   • Marital status:    Tobacco Use   • Smoking status: Never   • Smokeless tobacco: Never   Vaping Use  "  • Vaping Use: Never used   Substance and Sexual Activity   • Alcohol use: Never   • Drug use: Never   • Sexual activity: Defer       Review of Systems  Review of Systems   HENT: Positive for congestion.    Respiratory: Positive for cough, shortness of breath and wheezing.        Objective:     /78   Pulse 102   Temp 97.3 °F (36.3 °C)   Ht 172.7 cm (68\")   Wt 113 kg (249 lb 6.4 oz)   SpO2 95%   BMI 37.92 kg/m²   Physical Exam  HENT:      Head: Normocephalic and atraumatic.   Cardiovascular:      Rate and Rhythm: Normal rate.   Pulmonary:      Effort: Pulmonary effort is normal. No respiratory distress.   Musculoskeletal:      Right foot: Deformity and prominent metatarsal heads present.      Comments: Erythema and swelling of right toes. Tenderness to palpation.    Feet:      Right foot:      Skin integrity: Erythema and warmth present.   Skin:     General: Skin is warm.      Findings: Erythema present.   Neurological:      Mental Status: She is alert. Mental status is at baseline.          Assessment/Plan:     Diagnoses and all orders for this visit:    1. Elevated uric acid in blood (Primary)  -     Uric acid  -     Basic metabolic panel  -     allopurinol (Zyloprim) 100 MG tablet; Take 1 tablet by mouth Daily.  Dispense: 90 tablet; Refill: 1  -     methylPREDNISolone (MEDROL) 4 MG dose pack; Take as directed on package instructions.  Dispense: 21 tablet; Refill: 0    2. Chronic gout of right foot, unspecified cause  -     Uric acid  -     Basic metabolic panel  -     allopurinol (Zyloprim) 100 MG tablet; Take 1 tablet by mouth Daily.  Dispense: 90 tablet; Refill: 1  -     methylPREDNISolone (MEDROL) 4 MG dose pack; Take as directed on package instructions.  Dispense: 21 tablet; Refill: 0    3. COPD with exacerbation (HCC)  -     acetylcysteine (MUCOMYST) 20 % nebulizer solution; Take 4 mL by nebulization 3 (Three) Times a Day.  Dispense: 900 mL; Refill: 0    4. 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  Chronic, uncontrolled.  Will recheck uric acid levels at this time and check kidney function.  We will start allopurinol and give Medrol Dosepak.  We will plan to recheck uric acid levels in the future to determine response to allopurinol.     3.  Chronic, stable.  Will prescribe Mucomyst again as patient has not been using this or was unable to get it from the pharmacy.  Prescribed to help with chest congestion and secretions that she continues to have.     4.  Chronic, stable.  Diabetes could also be contributing to peripheral vascular disease and contributing to abnormalities of the feet.  Believe that diabetic shoes could help relieve some of patient's foot pain and provide proper support. Patient has 4 more dose of gabapentin at this time.  Informed her to call our office tomorrow if her medication does not come in the mail and we will send in a short course of gabapentin until her prescription comes in the mail.             Follow-up:     Return in about 4 weeks (around 1/3/2023) for Recheck.    RISK SCORE: 4      This document has been electronically signed by Sinan Manley MD on December 7, 2022 12:50 CST

## 2022-12-07 ENCOUNTER — TELEPHONE (OUTPATIENT)
Dept: FAMILY MEDICINE CLINIC | Facility: CLINIC | Age: 82
End: 2022-12-07

## 2022-12-07 DIAGNOSIS — E79.0 ELEVATED URIC ACID IN BLOOD: ICD-10-CM

## 2022-12-07 DIAGNOSIS — E11.42 TYPE 2 DIABETES MELLITUS WITH DIABETIC POLYNEUROPATHY, WITHOUT LONG-TERM CURRENT USE OF INSULIN: ICD-10-CM

## 2022-12-07 DIAGNOSIS — M1A.0710 CHRONIC GOUT OF RIGHT FOOT, UNSPECIFIED CAUSE: ICD-10-CM

## 2022-12-07 LAB
ANION GAP SERPL CALCULATED.3IONS-SCNC: 9.5 MMOL/L (ref 5–15)
BUN SERPL-MCNC: 21 MG/DL (ref 8–23)
BUN/CREAT SERPL: 16.7 (ref 7–25)
CALCIUM SPEC-SCNC: 8.7 MG/DL (ref 8.6–10.5)
CHLORIDE SERPL-SCNC: 104 MMOL/L (ref 98–107)
CO2 SERPL-SCNC: 26.5 MMOL/L (ref 22–29)
CREAT SERPL-MCNC: 1.26 MG/DL (ref 0.57–1)
EGFRCR SERPLBLD CKD-EPI 2021: 43 ML/MIN/1.73
GLUCOSE SERPL-MCNC: 116 MG/DL (ref 65–99)
POTASSIUM SERPL-SCNC: 4.2 MMOL/L (ref 3.5–5.2)
SODIUM SERPL-SCNC: 140 MMOL/L (ref 136–145)
URATE SERPL-MCNC: 8.5 MG/DL (ref 2.4–5.7)

## 2022-12-07 RX ORDER — METHYLPREDNISOLONE 4 MG/1
TABLET ORAL
Qty: 21 TABLET | Refills: 0 | OUTPATIENT
Start: 2022-12-07 | End: 2023-01-03

## 2022-12-07 RX ORDER — GABAPENTIN 100 MG/1
100 CAPSULE ORAL NIGHTLY
Qty: 14 CAPSULE | Refills: 0 | Status: SHIPPED | OUTPATIENT
Start: 2022-12-07 | End: 2022-12-21 | Stop reason: SDUPTHER

## 2022-12-07 RX ORDER — ALLOPURINOL 100 MG/1
100 TABLET ORAL DAILY
Qty: 90 TABLET | Refills: 1 | Status: SHIPPED | OUTPATIENT
Start: 2022-12-07 | End: 2023-01-16

## 2022-12-07 NOTE — TELEPHONE ENCOUNTER
Incoming Refill Request      Medication requested (name and dose): methylPREDNISolone (MEDROL) 4 MG dose packgabapentin (NEURONTIN) 100 MG capsule    Pharmacy where request should be sent: [facility]    Additional details provided by patient: [facility]    Best call back number: [phone]    Does the patient have less than a 3 day supply:  [x] Yes  [] No    Eve Johnson  12/07/22, 09:08 CST

## 2022-12-07 NOTE — TELEPHONE ENCOUNTER
Patient presented today complaining of similar issues unable to fill gabapentin.  Dr. Ross agreed to send in 2 weeks

## 2022-12-08 ENCOUNTER — TELEPHONE (OUTPATIENT)
Dept: FAMILY MEDICINE CLINIC | Facility: CLINIC | Age: 82
End: 2022-12-08

## 2022-12-08 DIAGNOSIS — N18.32 STAGE 3B CHRONIC KIDNEY DISEASE: Primary | ICD-10-CM

## 2022-12-08 NOTE — TELEPHONE ENCOUNTER
Incoming Refill Request      Medication requested (name and dose): gabapentin (NEURONTIN) 100 MG capsule    Pharmacy where request should be sent: [facility]    Additional details provided by patient: Patient was in yesterday and it was going to be sent    Best call back number:[phone]    Does the patient have less than a 3 day supply:  [x] Yes  [] No    Eve Johnson  12/08/22, 09:08 CST

## 2022-12-09 ENCOUNTER — TELEPHONE (OUTPATIENT)
Dept: FAMILY MEDICINE CLINIC | Facility: CLINIC | Age: 82
End: 2022-12-09

## 2022-12-09 NOTE — PROGRESS NOTES
I have reviewed the notes, assessments, and/or procedures performed by dr Manley during office visit. I concur with her/his documentation and assessment and plan for Alda Constantino.           This document has been electronically signed by Jamie Ross MD on December 9, 2022 10:01 CST

## 2022-12-09 NOTE — TELEPHONE ENCOUNTER
Patient has called asking for Dr. Manley to send a PA to [facility] for gabapentin (NEURONTIN) 100 MG capsule for a two week supply.  The RX is there just need the PA.  Her mail order from [facility] is lost in transit.  She has asked for a return call when it is ready since she lives out of town.  Her # is [phone].    ALEYDA Bailey

## 2022-12-09 NOTE — TELEPHONE ENCOUNTER
Called pharmacy, medication has been there since last night and is available for ~5.00.  Unable to reach patient

## 2022-12-12 NOTE — PROGRESS NOTES
Contacted patient about abnormal uric acid and bmp results. Her uric acid levels are still elevated but the allorpurinol we sent in for her should help control this. Also, her BMP shows that she continues to have Chronic Kidney Disease so I am sending in a referral to Nephrology ([facility]) to help us care for her kidneys.

## 2022-12-20 ENCOUNTER — TELEPHONE (OUTPATIENT)
Dept: FAMILY MEDICINE CLINIC | Facility: CLINIC | Age: 82
End: 2022-12-20

## 2022-12-20 NOTE — TELEPHONE ENCOUNTER
Incoming Refill Request      Medication requested (name and dose): gabapentin (NEURONTIN) 100 MG capsule    Pharmacy where request should be sent: [facility]    Additional details provided by patient: [facility] Day [facility]: [phone]    Does the patient have less than a 3 day supply:  [x] Yes  [] No    Eve Johnson  12/20/22, 08:47 CST

## 2022-12-21 ENCOUNTER — TELEPHONE (OUTPATIENT)
Dept: FAMILY MEDICINE CLINIC | Facility: CLINIC | Age: 82
End: 2022-12-21

## 2022-12-21 DIAGNOSIS — E11.42 TYPE 2 DIABETES MELLITUS WITH DIABETIC POLYNEUROPATHY, WITHOUT LONG-TERM CURRENT USE OF INSULIN: ICD-10-CM

## 2022-12-21 RX ORDER — GABAPENTIN 100 MG/1
100 CAPSULE ORAL NIGHTLY
Qty: 14 CAPSULE | Refills: 0 | Status: SHIPPED | OUTPATIENT
Start: 2022-12-21 | End: 2022-12-21 | Stop reason: SDUPTHER

## 2022-12-21 RX ORDER — GABAPENTIN 100 MG/1
100 CAPSULE ORAL NIGHTLY
Qty: 30 CAPSULE | Refills: 0 | Status: SHIPPED | OUTPATIENT
Start: 2022-12-21 | End: 2022-12-27 | Stop reason: SDUPTHER

## 2022-12-21 NOTE — TELEPHONE ENCOUNTER
Patient has left a v/mail stating she spoke with the doctor this morning who told her a RX was being sent to [facility] on [address], however when she called at 11:00 and again at 2:00 they still have not received the RX.  She has asked for a return call to inform her what is going on.  Her # is [phone].

## 2022-12-22 DIAGNOSIS — E11.42 TYPE 2 DIABETES MELLITUS WITH DIABETIC POLYNEUROPATHY, WITHOUT LONG-TERM CURRENT USE OF INSULIN: ICD-10-CM

## 2022-12-22 RX ORDER — GABAPENTIN 100 MG/1
100 CAPSULE ORAL NIGHTLY
Qty: 30 CAPSULE | Refills: 0 | OUTPATIENT
Start: 2022-12-22

## 2022-12-22 NOTE — TELEPHONE ENCOUNTER
Incoming Refill Request      Medication requested (name and dose):   gabapentin (NEURONTIN) 100 MG capsule    Pharmacy where request should be sent: 2 WKS SUPPLY TO [facility] [address]  AND  90 DAYS SUPPLY TO [facility]    Additional details provided by patient:     Best call back number: [phone]    Does the patient have less than a 3 day supply:  [x] Yes  [] No    Lynn Manley  12/22/22, 13:49 CST

## 2022-12-27 DIAGNOSIS — E11.42 TYPE 2 DIABETES MELLITUS WITH DIABETIC POLYNEUROPATHY, WITHOUT LONG-TERM CURRENT USE OF INSULIN: ICD-10-CM

## 2022-12-27 RX ORDER — GABAPENTIN 100 MG/1
100 CAPSULE ORAL NIGHTLY
Qty: 90 CAPSULE | Refills: 1 | Status: ON HOLD | OUTPATIENT
Start: 2022-12-27 | End: 2023-02-22

## 2023-01-03 ENCOUNTER — HOSPITAL ENCOUNTER (EMERGENCY)
Facility: HOSPITAL | Age: 83
Discharge: HOME | End: 2023-01-03
Attending: EMERGENCY MEDICINE | Admitting: EMERGENCY MEDICINE
Payer: MEDICARE

## 2023-01-03 ENCOUNTER — APPOINTMENT (OUTPATIENT)
Dept: GENERAL RADIOLOGY | Facility: HOSPITAL | Age: 83
End: 2023-01-03
Payer: MEDICARE

## 2023-01-03 VITALS
SYSTOLIC BLOOD PRESSURE: 142 MMHG | HEIGHT: 68 IN | WEIGHT: 246 LBS | OXYGEN SATURATION: 94 % | RESPIRATION RATE: 18 BRPM | BODY MASS INDEX: 37.28 KG/M2 | HEART RATE: 84 BPM | TEMPERATURE: 98.2 F | DIASTOLIC BLOOD PRESSURE: 84 MMHG

## 2023-01-03 DIAGNOSIS — M10.9 ACUTE GOUT INVOLVING TOE OF RIGHT FOOT, UNSPECIFIED CAUSE: ICD-10-CM

## 2023-01-03 DIAGNOSIS — R42 VERTIGO: ICD-10-CM

## 2023-01-03 DIAGNOSIS — R05.1 ACUTE COUGH: Primary | ICD-10-CM

## 2023-01-03 LAB
ALBUMIN SERPL-MCNC: 3.7 G/DL (ref 3.5–5.2)
ALBUMIN/GLOB SERPL: 1.2 G/DL
ALP SERPL-CCNC: 75 U/L (ref 39–117)
ALT SERPL W P-5'-P-CCNC: 12 U/L (ref 1–33)
ANION GAP SERPL CALCULATED.3IONS-SCNC: 8 MMOL/L (ref 5–15)
AST SERPL-CCNC: 13 U/L (ref 1–32)
BASOPHILS # BLD AUTO: 0.05 10*3/MM3 (ref 0–0.2)
BASOPHILS NFR BLD AUTO: 0.6 % (ref 0–1.5)
BILIRUB SERPL-MCNC: 0.2 MG/DL (ref 0–1.2)
BUN SERPL-MCNC: 20 MG/DL (ref 8–23)
BUN/CREAT SERPL: 17.4 (ref 7–25)
CALCIUM SPEC-SCNC: 9 MG/DL (ref 8.6–10.5)
CHLORIDE SERPL-SCNC: 98 MMOL/L (ref 98–107)
CO2 SERPL-SCNC: 29 MMOL/L (ref 22–29)
CREAT SERPL-MCNC: 1.15 MG/DL (ref 0.57–1)
DEPRECATED RDW RBC AUTO: 50.2 FL (ref 37–54)
EGFRCR SERPLBLD CKD-EPI 2021: 48 ML/MIN/1.73
EOSINOPHIL # BLD AUTO: 0.2 10*3/MM3 (ref 0–0.4)
EOSINOPHIL NFR BLD AUTO: 2.4 % (ref 0.3–6.2)
ERYTHROCYTE [DISTWIDTH] IN BLOOD BY AUTOMATED COUNT: 14.2 % (ref 12.3–15.4)
GLOBULIN UR ELPH-MCNC: 3.2 GM/DL
GLUCOSE SERPL-MCNC: 115 MG/DL (ref 65–99)
HCT VFR BLD AUTO: 34.8 % (ref 34–46.6)
HGB BLD-MCNC: 11.1 G/DL (ref 12–15.9)
HOLD SPECIMEN: NORMAL
HOLD SPECIMEN: NORMAL
IMM GRANULOCYTES # BLD AUTO: 0.07 10*3/MM3 (ref 0–0.05)
IMM GRANULOCYTES NFR BLD AUTO: 0.9 % (ref 0–0.5)
LYMPHOCYTES # BLD AUTO: 1.65 10*3/MM3 (ref 0.7–3.1)
LYMPHOCYTES NFR BLD AUTO: 20.1 % (ref 19.6–45.3)
MCH RBC QN AUTO: 31.1 PG (ref 26.6–33)
MCHC RBC AUTO-ENTMCNC: 31.9 G/DL (ref 31.5–35.7)
MCV RBC AUTO: 97.5 FL (ref 79–97)
MONOCYTES # BLD AUTO: 0.53 10*3/MM3 (ref 0.1–0.9)
MONOCYTES NFR BLD AUTO: 6.5 % (ref 5–12)
NEUTROPHILS NFR BLD AUTO: 5.69 10*3/MM3 (ref 1.7–7)
NEUTROPHILS NFR BLD AUTO: 69.5 % (ref 42.7–76)
NRBC BLD AUTO-RTO: 0 /100 WBC (ref 0–0.2)
NT-PROBNP SERPL-MCNC: 840.9 PG/ML (ref 0–1800)
PLATELET # BLD AUTO: 241 10*3/MM3 (ref 140–450)
PMV BLD AUTO: 11.3 FL (ref 6–12)
POTASSIUM SERPL-SCNC: 3.7 MMOL/L (ref 3.5–5.2)
PROT SERPL-MCNC: 6.9 G/DL (ref 6–8.5)
RBC # BLD AUTO: 3.57 10*6/MM3 (ref 3.77–5.28)
SODIUM SERPL-SCNC: 135 MMOL/L (ref 136–145)
TROPONIN T SERPL-MCNC: <0.01 NG/ML (ref 0–0.03)
WBC NRBC COR # BLD: 8.19 10*3/MM3 (ref 3.4–10.8)
WHOLE BLOOD HOLD COAG: NORMAL
WHOLE BLOOD HOLD SPECIMEN: NORMAL

## 2023-01-03 PROCEDURE — 84484 ASSAY OF TROPONIN QUANT: CPT

## 2023-01-03 PROCEDURE — 25010000002 METHYLPREDNISOLONE PER 125 MG: Performed by: EMERGENCY MEDICINE

## 2023-01-03 PROCEDURE — 93010 ELECTROCARDIOGRAM REPORT: CPT | Performed by: INTERNAL MEDICINE

## 2023-01-03 PROCEDURE — 71046 X-RAY EXAM CHEST 2 VIEWS: CPT

## 2023-01-03 PROCEDURE — 93005 ELECTROCARDIOGRAM TRACING: CPT | Performed by: EMERGENCY MEDICINE

## 2023-01-03 PROCEDURE — 80053 COMPREHEN METABOLIC PANEL: CPT

## 2023-01-03 PROCEDURE — 85025 COMPLETE CBC W/AUTO DIFF WBC: CPT

## 2023-01-03 PROCEDURE — 96372 THER/PROPH/DIAG INJ SC/IM: CPT

## 2023-01-03 PROCEDURE — 99284 EMERGENCY DEPT VISIT MOD MDM: CPT

## 2023-01-03 PROCEDURE — 93005 ELECTROCARDIOGRAM TRACING: CPT

## 2023-01-03 PROCEDURE — 83880 ASSAY OF NATRIURETIC PEPTIDE: CPT

## 2023-01-03 PROCEDURE — 63710000001 ONDANSETRON PER 8 MG: Performed by: EMERGENCY MEDICINE

## 2023-01-03 RX ORDER — ONDANSETRON 4 MG/1
4 TABLET, FILM COATED ORAL ONCE
Status: COMPLETED | OUTPATIENT
Start: 2023-01-03 | End: 2023-01-03

## 2023-01-03 RX ORDER — ONDANSETRON 4 MG/1
4 TABLET, FILM COATED ORAL EVERY 6 HOURS PRN
Qty: 12 TABLET | Refills: 0 | Status: ON HOLD | OUTPATIENT
Start: 2023-01-03 | End: 2023-02-22

## 2023-01-03 RX ORDER — SODIUM CHLORIDE 0.9 % (FLUSH) 0.9 %
10 SYRINGE (ML) INJECTION AS NEEDED
Status: DISCONTINUED | OUTPATIENT
Start: 2023-01-03 | End: 2023-01-03 | Stop reason: HOSPADM

## 2023-01-03 RX ORDER — MECLIZINE HYDROCHLORIDE 25 MG/1
50 TABLET ORAL ONCE
Status: COMPLETED | OUTPATIENT
Start: 2023-01-03 | End: 2023-01-03

## 2023-01-03 RX ORDER — METHYLPREDNISOLONE SODIUM SUCCINATE 125 MG/2ML
80 INJECTION, POWDER, LYOPHILIZED, FOR SOLUTION INTRAMUSCULAR; INTRAVENOUS ONCE
Status: COMPLETED | OUTPATIENT
Start: 2023-01-03 | End: 2023-01-03

## 2023-01-03 RX ORDER — MECLIZINE HYDROCHLORIDE 25 MG/1
25-50 TABLET ORAL 3 TIMES DAILY PRN
Qty: 15 TABLET | Refills: 0 | Status: ON HOLD | OUTPATIENT
Start: 2023-01-03 | End: 2023-02-22

## 2023-01-03 RX ADMIN — METHYLPREDNISOLONE SODIUM SUCCINATE 80 MG: 125 INJECTION, POWDER, FOR SOLUTION INTRAMUSCULAR; INTRAVENOUS at 21:06

## 2023-01-03 RX ADMIN — MECLIZINE HYDROCHLORIDE 50 MG: 25 TABLET ORAL at 21:06

## 2023-01-03 RX ADMIN — ONDANSETRON HYDROCHLORIDE 4 MG: 4 TABLET, FILM COATED ORAL at 21:05

## 2023-01-04 NOTE — ED PROVIDER NOTES
Subjective   History of Present Illness  82 year old female presents to the ED with multiple complaints.  She lives home alone.  She wears oxygen for COPD.  She presents with complaint of right toe pain and swelling from gout. Has a prescription from her PCP but has not started because of multiple issues in the past with medicines and she is waiting until someone can be with her to monitor her when she takes it. She is requesting a steroid shot to help until she can start the medicine. She is also having cough but is on her home amount of prescribed oxygen without hypoxia.  No fever or chills.  No additional swelling.  Lasix is working as usual.  Her last complaint is of dizziness.  History of vertigo but out of her medicine.  Reports it feels the same.    Family history, surgical history, social history, current medications and allergies are reviewed with the patient and triage documentation and vitals are reviewed.    History provided by:  Patient   used: No        Review of Systems   Constitutional: Negative for chills and fever.   HENT: Negative for congestion and sore throat.    Eyes: Negative for photophobia and visual disturbance.   Respiratory: Positive for cough and shortness of breath. Negative for chest tightness and wheezing.    Cardiovascular: Negative for chest pain, palpitations and leg swelling.   Gastrointestinal: Negative for abdominal pain, diarrhea, nausea and vomiting.   Endocrine: Negative for polydipsia, polyphagia and polyuria.   Genitourinary: Negative for dysuria, frequency and urgency.   Musculoskeletal: Positive for arthralgias and joint swelling. Negative for back pain and neck pain.   Skin: Negative for color change, rash and wound.   Allergic/Immunologic: Negative.    Neurological: Positive for dizziness. Negative for weakness, light-headedness, numbness and headaches.   Hematological: Negative.    Psychiatric/Behavioral: Negative.        Past Medical History:    Diagnosis Date   • Asthma    • Cancer (HCC)    • Congenital abnormalities     colon behind liver   • COPD (chronic obstructive pulmonary disease) (HCC)    • Hypertension        Allergies   Allergen Reactions   • Dilaudid [Hydromorphone Hcl] Anaphylaxis   • Diphenoxylate-Atropine Shortness Of Breath     Reaction: shortness of breath     • Iodinated Contrast Media Hives     Reaction: HIVES   • Keflex [Cephalexin] Shortness Of Breath   • Lomotil [Diphenoxylate] Shortness Of Breath   • Aspirin Hives   • Beef (Diagnostic) Nausea And Vomiting     All mammals  All mammals   • Beef-Derived Products GI Intolerance   • Ciprofloxacin Nausea And Vomiting   • Contrast Dye Hives   • Levaquin [Levofloxacin] GI Intolerance   • Milk-Related Compounds GI Intolerance   • Nsaids Hives   • Pegademase Bovine Other (See Comments)     Other reaction(s): GI Intolerance   • Percocet [Oxycodone-Acetaminophen] Itching   • Pork-Derived Products GI Intolerance   • Tolmetin Hives       Past Surgical History:   Procedure Laterality Date   • ADENOIDECTOMY     • ADRENAL GLAND SURGERY Right    • BLADDER SURGERY     • BREAST BIOPSY     • COLONOSCOPY W/ BIOPSIES AND POLYPECTOMY     • EYE SURGERY     • HERNIA REPAIR     • HYSTERECTOMY     • LUNG LOBECTOMY Left    • RECTAL SURGERY      cyst   • SKIN CANCER EXCISION      legs   • TONSILLECTOMY     • VARICOSE VEIN SURGERY         Family History   Problem Relation Age of Onset   • Diabetes Mother    • Diabetes Father    • Heart disease Father    • Cancer Sister    • Heart disease Sister    • Thyroid disease Sister    • Diabetes Brother    • Heart disease Brother        Social History     Socioeconomic History   • Marital status:    Tobacco Use   • Smoking status: Never   • Smokeless tobacco: Never   Vaping Use   • Vaping Use: Never used   Substance and Sexual Activity   • Alcohol use: Never   • Drug use: Never   • Sexual activity: Defer           Objective   Physical Exam  Vitals and nursing note  reviewed.   Constitutional:       General: She is not in acute distress.     Appearance: She is well-developed. She is obese. She is not ill-appearing, toxic-appearing or diaphoretic.   HENT:      Head: Normocephalic.      Mouth/Throat:      Mouth: Mucous membranes are moist.   Eyes:      Pupils: Pupils are equal, round, and reactive to light.   Neck:      Vascular: No JVD.   Cardiovascular:      Rate and Rhythm: Normal rate and regular rhythm.  No extrasystoles are present.     Pulses: Normal pulses.      Heart sounds: No murmur heard.  Pulmonary:      Effort: Pulmonary effort is normal.      Breath sounds: Normal breath sounds. No decreased breath sounds, wheezing, rhonchi or rales.   Chest:      Chest wall: No tenderness or crepitus.   Abdominal:      General: Bowel sounds are normal.      Palpations: Abdomen is soft.   Musculoskeletal:         General: Normal range of motion.      Cervical back: Normal range of motion and neck supple.      Right lower leg: No edema.      Left lower leg: No edema.   Skin:     General: Skin is warm and dry.      Capillary Refill: Capillary refill takes less than 2 seconds.   Neurological:      General: No focal deficit present.      Mental Status: She is alert and oriented to person, place, and time.   Psychiatric:         Mood and Affect: Mood normal.         Behavior: Behavior normal.         ECG 12 Lead      Date/Time: 1/3/2023 5:40 PM  Performed by: Kirk Draper DO  Authorized by: Kirk Draper DO   Interpreted by physician  Comments: EKG January 3, 2023 at 1740 reveals normal sinus rhythm rate of 82 bpm.  Right axis deviation.  Low voltage throughout with diffuse T wave flattening in inferior and lateral leads.  No T wave inversion.  No ST elevation or depression.  No evidence of acute ischemia.  .                 ED Course      Labs Reviewed   COMPREHENSIVE METABOLIC PANEL - Abnormal; Notable for the following components:       Result Value    Glucose 115  (*)     Creatinine 1.15 (*)     Sodium 135 (*)     eGFR 48.0 (*)     All other components within normal limits    Narrative:     GFR Normal >60  Chronic Kidney Disease <60  Kidney Failure <15    The GFR formula is only valid for adults with stable renal function between ages 18 and 70.   CBC WITH AUTO DIFFERENTIAL - Abnormal; Notable for the following components:    RBC 3.57 (*)     Hemoglobin 11.1 (*)     MCV 97.5 (*)     Immature Grans % 0.9 (*)     Immature Grans, Absolute 0.07 (*)     All other components within normal limits   BNP (IN-HOUSE) - Normal    Narrative:     Among patients with dyspnea, NT-proBNP is highly sensitive for the detection of acute congestive heart failure. In addition NT-proBNP of <300 pg/ml effectively rules out acute congestive heart failure with 99% negative predictive value.     TROPONIN (IN-HOUSE) - Normal    Narrative:     Troponin T Reference Range:  <= 0.03 ng/mL-   Negative for AMI  >0.03 ng/mL-     Abnormal for myocardial necrosis.  Clinicians would have to utilize clinical acumen, EKG, Troponin and serial changes to determine if it is an Acute Myocardial Infarction or myocardial injury due to an underlying chronic condition.       Results may be falsely decreased if patient taking Biotin.     RAINBOW DRAW    Narrative:     The following orders were created for panel order [place] Draw.  Procedure                               Abnormality         Status                     ---------                               -----------         ------                     Green Top (Gel)[[id number]]                                  Final result               Lavender Top[[id number]]                                     Final result               Gold Top - SST[[id number]]                                   Final result               Light Blue Top[[id number]]                                   Final result                 Please view results for these tests on the individual orders.   CBC AND  DIFFERENTIAL    Narrative:     The following orders were created for panel order CBC & Differential.  Procedure                               Abnormality         Status                     ---------                               -----------         ------                     CBC Auto Differential[[id number]]        Abnormal            Final result                 Please view results for these tests on the individual orders.   GREEN TOP   LAVENDER TOP   GOLD TOP - SST   LIGHT BLUE TOP     XR Chest 2 View    Result Date: 1/3/2023  Narrative: TWO VIEW CHEST HISTORY: Shortness of air Frontal and lateral films of the chest were obtained. COMPARISON: October 14, 2022 FINDINGS:  Chronic obstructive pulmonary disease. No acute infiltrate. Stable cardiomegaly. The pulmonary vasculature is not increased. No pleural effusion. No pneumothorax. Osteoporosis. Old minimal to moderate compression deformities thoracic spine with associated accentuation of dorsal kyphosis. Degenerative changes and degenerative disc disease thoracic spine.     Impression: CONCLUSION: Chronic obstructive pulmonary disease. No acute infiltrate. Stable cardiomegaly. [id number] Electronically signed by:  Misael Grady MD  1/3/2023 7:46 PM CST Workstation: [id number]      Medical Decision Making  Acute cough: complicated acute illness or injury  Acute gout involving toe of right foot, unspecified cause: complicated acute illness or injury  Vertigo: chronic illness or injury  Amount and/or Complexity of Data Reviewed  Labs: ordered. Decision-making details documented in ED Course.  Radiology: ordered. Decision-making details documented in ED Course.  ECG/medicine tests: ordered and independent interpretation performed. Decision-making details documented in ED Course.      Risk  Prescription drug management.      Patient treated with steroid shot and given meclizine and Zofran.  She had requested the Zofran prescription as she is out.  Prescription for meclizine and  Zofran sent to the pharmacy.  She has no evidence of exacerbation of heart failure or COPD.  Labs unremarkable.  Kidney function improving.  She is not hypoxic on her 2 L nasal cannula in the emergency department throughout stay.  Advised on close outpatient follow-up and agreeable to discharge.    Final diagnoses:   Acute cough   Acute gout involving toe of right foot, unspecified cause   Vertigo         ED Disposition  ED Disposition     ED Disposition   Discharge    Condition   Stable    Comment   --             Gus Posada MD  [facility] [address] [place] [address] [phone]               Medication List      New Prescriptions    meclizine 25 MG tablet  Commonly known as: ANTIVERT  Take 1-2 tablets by mouth 3 (Three) Times a Day As Needed for Dizziness.     ondansetron 4 MG tablet  Commonly known as: ZOFRAN  Take 1 tablet by mouth Every 6 (Six) Hours As Needed for Nausea or Vomiting.        Stop    methylPREDNISolone 4 MG dose pack  Commonly known as: MEDROL     ondansetron ODT 4 MG disintegrating tablet  Commonly known as: Zofran ODT           Where to Get Your Medications      These medications were sent to [id number] - [place] [id number] [facility] - [phone]  - [phone]   [address] [facility] [address]    Phone: [phone]   · meclizine 25 MG tablet  · ondansetron 4 MG tablet          Kirk Draper,   01/03/23 9851

## 2023-01-04 NOTE — DISCHARGE INSTRUCTIONS
Please return for new or worsening symptoms.  Prescriptions of insulin to the pharmacy.  Follow closely with primary care.

## 2023-01-06 ENCOUNTER — OFFICE VISIT (OUTPATIENT)
Dept: FAMILY MEDICINE CLINIC | Facility: CLINIC | Age: 83
End: 2023-01-06
Payer: MEDICARE

## 2023-01-06 VITALS
HEIGHT: 68 IN | DIASTOLIC BLOOD PRESSURE: 92 MMHG | OXYGEN SATURATION: 97 % | BODY MASS INDEX: 36.56 KG/M2 | SYSTOLIC BLOOD PRESSURE: 160 MMHG | HEART RATE: 69 BPM | WEIGHT: 241.2 LBS

## 2023-01-06 DIAGNOSIS — I10 PRIMARY HYPERTENSION: ICD-10-CM

## 2023-01-06 DIAGNOSIS — J44.1 COPD WITH EXACERBATION: ICD-10-CM

## 2023-01-06 DIAGNOSIS — M1A.0710 CHRONIC GOUT OF RIGHT FOOT, UNSPECIFIED CAUSE: Primary | ICD-10-CM

## 2023-01-06 PROCEDURE — 99213 OFFICE O/P EST LOW 20 MIN: CPT | Performed by: STUDENT IN AN ORGANIZED HEALTH CARE EDUCATION/TRAINING PROGRAM

## 2023-01-06 RX ORDER — PREDNISONE 50 MG/1
50 TABLET ORAL DAILY
Qty: 5 TABLET | Refills: 0 | Status: SHIPPED | OUTPATIENT
Start: 2023-01-06 | End: 2023-01-11

## 2023-01-06 RX ORDER — KETOCONAZOLE 20 MG/G
CREAM TOPICAL
Status: ON HOLD | COMMUNITY
Start: 2022-11-04 | End: 2023-02-22

## 2023-01-06 RX ORDER — NEBIVOLOL 2.5 MG/1
2.5 TABLET ORAL DAILY
Qty: 30 TABLET | Refills: 0 | Status: SHIPPED | OUTPATIENT
Start: 2023-01-06 | End: 2023-02-09

## 2023-01-06 RX ORDER — EPINEPHRINE 0.3 MG/.3ML
INJECTION SUBCUTANEOUS
COMMUNITY
Start: 2022-09-23

## 2023-01-06 RX ORDER — SUCRALFATE 1 G/1
TABLET ORAL
COMMUNITY
Start: 2022-09-10 | End: 2023-01-17

## 2023-01-06 NOTE — PROGRESS NOTES
Family Medicine Residency  Carlos Sanchez MD    Subjective:     Alda Constantino is a 82 y.o. female who presents for ED follow up for a gout flare.    At this time patient is not taking her allopurinol cause she is afraid of an allergic reaction.  She also is having headaches.  Also claims to be coughing up green sputum.  Already taking a Z-pack.  Went to the ED and got a solumedrol injection for toe pain.    The following portions of the patient's history were reviewed and updated as appropriate: allergies, current medications, past family history, past medical history, past social history, past surgical history and problem list.    Past Medical Hx:  Past Medical History:   Diagnosis Date   • Asthma    • Cancer (HCC)    • Congenital abnormalities     colon behind liver   • COPD (chronic obstructive pulmonary disease) (HCC)    • Hypertension        Past Surgical Hx:  Past Surgical History:   Procedure Laterality Date   • ADENOIDECTOMY     • ADRENAL GLAND SURGERY Right    • BLADDER SURGERY     • BREAST BIOPSY     • COLONOSCOPY W/ BIOPSIES AND POLYPECTOMY     • EYE SURGERY     • HERNIA REPAIR     • HYSTERECTOMY     • LUNG LOBECTOMY Left    • RECTAL SURGERY      cyst   • SKIN CANCER EXCISION      legs   • TONSILLECTOMY     • VARICOSE VEIN SURGERY         Current Meds:    Current Outpatient Medications:   •  acetaminophen (TYLENOL) 500 MG tablet, Take 500 mg by mouth Every 6 (Six) Hours As Needed for Mild Pain ., Disp: , Rfl:   •  albuterol (PROVENTIL HFA;VENTOLIN HFA) 108 (90 Base) MCG/ACT inhaler, Inhale 2 puffs Every 4 (Four) Hours As Needed for Wheezing., Disp: , Rfl:   •  budesonide-formoterol (SYMBICORT) 160-4.5 MCG/ACT inhaler, Inhale 2 puffs., Disp: , Rfl:   •  bumetanide (BUMEX) 2 MG tablet, Take 1 tablet by mouth 2 (Two) Times a Day., Disp: 60 tablet, Rfl: 0  •  EPINEPHrine (EPIPEN) 0.3 MG/0.3ML solution auto-injector injection, , Disp: , Rfl:   •  gabapentin (NEURONTIN) 100 MG capsule, Take 1 capsule by  mouth Every Night., Disp: 90 capsule, Rfl: 1  •  Glucose Blood (PRECISION XTRA TEST VI), by In Vitro route., Disp: , Rfl:   •  guaiFENesin (MUCINEX) 600 MG 12 hr tablet, Take 1,200 mg by mouth 2 (Two) Times a Day., Disp: , Rfl:   •  hydrocortisone 2.5 % cream, , Disp: , Rfl:   •  IPRATROPIUM BROMIDE IN, Inhale., Disp: , Rfl:   •  ipratropium-albuterol (DUO-NEB) 0.5-2.5 mg/3 ml nebulizer, Take 1.5 mL by nebulization Every 4 (Four) Hours As Needed for Wheezing., Disp: , Rfl:   •  ketoconazole (NIZORAL) 2 % cream, , Disp: , Rfl:   •  levocetirizine (XYZAL) 5 MG tablet, Take 5 mg by mouth Every Evening., Disp: , Rfl:   •  meclizine (ANTIVERT) 25 MG tablet, Take 1-2 tablets by mouth 3 (Three) Times a Day As Needed for Dizziness., Disp: 15 tablet, Rfl: 0  •  Melatonin 10 MG capsule, Take 1 each by mouth Every Night., Disp: , Rfl:   •  metFORMIN ER (GLUCOPHAGE-XR) 750 MG 24 hr tablet, TAKE 1 TABLET EVERY NIGHT AT BEDTIME, Disp: 30 tablet, Rfl: 11  •  nystatin (MYCOSTATIN) 825681 UNIT/GM cream, , Disp: , Rfl:   •  omeprazole (priLOSEC) 40 MG capsule, Take 40 mg by mouth Daily., Disp: , Rfl:   •  ondansetron (ZOFRAN) 4 MG tablet, Take 1 tablet by mouth Every 6 (Six) Hours As Needed for Nausea or Vomiting., Disp: 12 tablet, Rfl: 0  •  spironolactone (ALDACTONE) 25 MG tablet, Take 25 mg by mouth Daily., Disp: , Rfl:   •  sucralfate (CARAFATE) 1 g tablet, , Disp: , Rfl:   •  acetylcysteine (MUCOMYST) 20 % nebulizer solution, Take 4 mL by nebulization 3 (Three) Times a Day., Disp: 900 mL, Rfl: 0  •  allopurinol (Zyloprim) 100 MG tablet, Take 1 tablet by mouth Daily., Disp: 90 tablet, Rfl: 1  •  nebivolol (Bystolic) 2.5 MG tablet, Take 1 tablet by mouth Daily., Disp: 30 tablet, Rfl: 0  •  predniSONE (DELTASONE) 50 MG tablet, Take 1 tablet by mouth Daily for 5 days., Disp: 5 tablet, Rfl: 0    Current Facility-Administered Medications:   •  cyanocobalamin injection 1,000 mcg, 1,000 mcg, Intramuscular, Q28 Days, Gus Posada MD,  1,000 mcg at 09/23/22 5789    Allergies:  Allergies   Allergen Reactions   • Dilaudid [Hydromorphone Hcl] Anaphylaxis   • Diphenoxylate-Atropine Shortness Of Breath     Reaction: shortness of breath     • Iodinated Contrast Media Hives     Reaction: HIVES   • Keflex [Cephalexin] Shortness Of Breath   • Lomotil [Diphenoxylate] Shortness Of Breath   • Aspirin Hives   • Beef (Diagnostic) Nausea And Vomiting     All mammals  All mammals   • Beef-Derived Products GI Intolerance   • Ciprofloxacin Nausea And Vomiting   • Contrast Dye Hives   • Levaquin [Levofloxacin] GI Intolerance   • Milk-Related Compounds GI Intolerance   • Nsaids Hives   • Pegademase Bovine Other (See Comments)     Other reaction(s): GI Intolerance   • Percocet [Oxycodone-Acetaminophen] Itching   • Pork-Derived Products GI Intolerance   • Tolmetin Hives       Family Hx:  Family History   Problem Relation Age of Onset   • Diabetes Mother    • Diabetes Father    • Heart disease Father    • Cancer Sister    • Heart disease Sister    • Thyroid disease Sister    • Diabetes Brother    • Heart disease Brother         Social History:  Social History     Socioeconomic History   • Marital status:    Tobacco Use   • Smoking status: Never   • Smokeless tobacco: Never   Vaping Use   • Vaping Use: Never used   Substance and Sexual Activity   • Alcohol use: Never   • Drug use: Never   • Sexual activity: Defer       Review of Systems  Review of Systems   Musculoskeletal: Positive for arthralgias and joint swelling.   Neurological: Positive for headaches.       Objective:     /92   Pulse 69   Ht 172.7 cm (68\")   Wt 109 kg (241 lb 3.2 oz)   SpO2 97%   BMI 36.67 kg/m²   Physical Exam  Constitutional:       General: She is not in acute distress.  HENT:      Head: Normocephalic and atraumatic.   Cardiovascular:      Rate and Rhythm: Normal rate and regular rhythm.      Heart sounds: Normal heart sounds.   Pulmonary:      Effort: Pulmonary effort is  normal. No respiratory distress.      Breath sounds: Normal breath sounds.      Comments: On NC O2  Musculoskeletal:         General: Swelling present.      Right lower leg: No edema.      Left lower leg: No edema.      Comments: Swelling and erythema base of right 3rd digit foot.   Skin:     General: Skin is warm and dry.   Neurological:      Mental Status: She is alert.          Assessment/Plan:     Diagnoses and all orders for this visit:    1. Chronic gout of right foot, unspecified cause (Primary)  Patient supposed to be taking allopurinol cause she cannot have NSAIDS due to CKD.  She refuses to do so until she can have someone watching her take it incase of a reaction.  I told her steroids are all we can offer, but if she doesn't take the allopurinol, it likely wouldn't improve.  She states she wouldn't be able to do so until Monday at the earliest.  -     predniSONE (DELTASONE) 50 MG tablet; Take 1 tablet by mouth Daily for 5 days.  Dispense: 5 tablet; Refill: 0    2. Primary hypertension  BP was high and patient stated she had headache.  0.1 Clonidine provided and BP's normalized to SBP's 140s with headache resolved.  Switching to Bystolic from coreg to better control BP as hr is close to 60 at this time.  -     nebivolol (Bystolic) 2.5 MG tablet; Take 1 tablet by mouth Daily.  Dispense: 30 tablet; Refill: 0    3. COPD with exacerbation (HCC)  Already on Zpack.  Prednisone will help with gout as well as COPD.  Lungs clear, and no change in O2 status.  PNA unlikely.  -     predniSONE (DELTASONE) 50 MG tablet; Take 1 tablet by mouth Daily for 5 days.  Dispense: 5 tablet; Refill: 0             Follow-up:     Swetha 1/16/23    Preventative:  Health Maintenance   Topic Date Due   • DXA SCAN  Never done   • ZOSTER VACCINE (1 of 2) Never done   • DIABETIC EYE EXAM  Never done   • ANNUAL WELLNESS VISIT  07/24/2021   • URINE MICROALBUMIN  10/23/2021   • HEMOGLOBIN A1C  04/19/2022   • COVID-19 Vaccine (5 - Booster  for Moderna series) 06/03/2022   • TDAP/TD VACCINES (2 - Td or Tdap) 08/02/2026   • INFLUENZA VACCINE  Completed   • Pneumococcal Vaccine 65+  Completed       Alcohol use:  reports no history of alcohol use.  Nicotine status  reports that she has never smoked. She has never used smokeless tobacco.    RISK SCORE: 2      This document has been electronically signed by Carlos Sanchez MD on January 6, 2023 16:44 CST

## 2023-01-06 NOTE — PROGRESS NOTES
I have reviewed the notes, assessments, and/or procedures performed by me. I concur with her/his documentation of Alda Constantino.

## 2023-01-07 LAB
QT INTERVAL: 374 MS
QTC INTERVAL: 436 MS

## 2023-01-16 ENCOUNTER — OFFICE VISIT (OUTPATIENT)
Dept: FAMILY MEDICINE CLINIC | Facility: CLINIC | Age: 83
End: 2023-01-16
Payer: MEDICARE

## 2023-01-16 VITALS
BODY MASS INDEX: 36.53 KG/M2 | DIASTOLIC BLOOD PRESSURE: 78 MMHG | WEIGHT: 241 LBS | HEART RATE: 92 BPM | HEIGHT: 68 IN | TEMPERATURE: 97.9 F | SYSTOLIC BLOOD PRESSURE: 124 MMHG | OXYGEN SATURATION: 95 %

## 2023-01-16 DIAGNOSIS — M10.041 ACUTE IDIOPATHIC GOUT OF RIGHT HAND: Primary | ICD-10-CM

## 2023-01-16 PROCEDURE — 99213 OFFICE O/P EST LOW 20 MIN: CPT | Performed by: CHIROPRACTOR

## 2023-01-16 RX ORDER — METHYLPREDNISOLONE SODIUM SUCCINATE 125 MG/2ML
125 INJECTION, POWDER, LYOPHILIZED, FOR SOLUTION INTRAMUSCULAR; INTRAVENOUS EVERY 6 HOURS
Status: DISCONTINUED | OUTPATIENT
Start: 2023-01-16 | End: 2023-02-22

## 2023-01-16 RX ORDER — METHYLPREDNISOLONE 4 MG/1
TABLET ORAL
Qty: 21 TABLET | Refills: 0 | Status: SHIPPED | OUTPATIENT
Start: 2023-01-16 | End: 2023-02-22

## 2023-01-16 RX ORDER — ALLOPURINOL 200 MG/1
200 TABLET ORAL DAILY
Qty: 30 TABLET | Refills: 0 | Status: SHIPPED | OUTPATIENT
Start: 2023-01-16 | End: 2023-02-09 | Stop reason: DRUGHIGH

## 2023-01-16 NOTE — PROGRESS NOTES
Family Medicine Residency  Gus Posada MD    Subjective:     Alda Constantino is a 82 y.o. female who presents for gout now in the right hand as well as the right..  She was in this clinic on 1/6 where she saw Dr. Sanchez for acute flareup of gout in the right foot digit #2.  A few days earlier she had been in the ER.  She was supposed be taking allopurinol however did not want to take it because of side effects associated with NSAIDs and her chronic renal disease.  After the visit with Dr. Sanchez she waited a few days and started taking the allopurinol last Monday.  She is also taking a course of steroids.  Unfortunately, she reports that Thursday evening she developed an inflamed joint in her right hand digit #3.  It is now more significantly swollen and painful than the right foot gout that she continues to have.    The following portions of the patient's history were reviewed and updated as appropriate: allergies, current medications, past family history, past medical history, past social history, past surgical history and problem list.    Past Medical Hx:  Past Medical History:   Diagnosis Date   • Asthma    • Cancer (HCC)    • Congenital abnormalities     colon behind liver   • COPD (chronic obstructive pulmonary disease) (HCC)    • Hypertension        Past Surgical Hx:  Past Surgical History:   Procedure Laterality Date   • ADENOIDECTOMY     • ADRENAL GLAND SURGERY Right    • BLADDER SURGERY     • BREAST BIOPSY     • COLONOSCOPY W/ BIOPSIES AND POLYPECTOMY     • EYE SURGERY     • HERNIA REPAIR     • HYSTERECTOMY     • LUNG LOBECTOMY Left    • RECTAL SURGERY      cyst   • SKIN CANCER EXCISION      legs   • TONSILLECTOMY     • VARICOSE VEIN SURGERY         Current Meds:    Current Outpatient Medications:   •  acetaminophen (TYLENOL) 500 MG tablet, Take 500 mg by mouth Every 6 (Six) Hours As Needed for Mild Pain ., Disp: , Rfl:   •  albuterol (PROVENTIL HFA;VENTOLIN HFA) 108 (90 Base) MCG/ACT inhaler, Inhale 2  puffs Every 4 (Four) Hours As Needed for Wheezing., Disp: , Rfl:   •  budesonide-formoterol (SYMBICORT) 160-4.5 MCG/ACT inhaler, Inhale 2 puffs., Disp: , Rfl:   •  bumetanide (BUMEX) 2 MG tablet, Take 1 tablet by mouth 2 (Two) Times a Day., Disp: 60 tablet, Rfl: 0  •  EPINEPHrine (EPIPEN) 0.3 MG/0.3ML solution auto-injector injection, , Disp: , Rfl:   •  gabapentin (NEURONTIN) 100 MG capsule, Take 1 capsule by mouth Every Night., Disp: 90 capsule, Rfl: 1  •  Glucose Blood (PRECISION XTRA TEST VI), by In Vitro route., Disp: , Rfl:   •  guaiFENesin (MUCINEX) 600 MG 12 hr tablet, Take 1,200 mg by mouth 2 (Two) Times a Day., Disp: , Rfl:   •  hydrocortisone 2.5 % cream, , Disp: , Rfl:   •  IPRATROPIUM BROMIDE IN, Inhale., Disp: , Rfl:   •  ipratropium-albuterol (DUO-NEB) 0.5-2.5 mg/3 ml nebulizer, Take 1.5 mL by nebulization Every 4 (Four) Hours As Needed for Wheezing., Disp: , Rfl:   •  ketoconazole (NIZORAL) 2 % cream, , Disp: , Rfl:   •  levocetirizine (XYZAL) 5 MG tablet, Take 5 mg by mouth Every Evening., Disp: , Rfl:   •  meclizine (ANTIVERT) 25 MG tablet, Take 1-2 tablets by mouth 3 (Three) Times a Day As Needed for Dizziness., Disp: 15 tablet, Rfl: 0  •  Melatonin 10 MG capsule, Take 1 each by mouth Every Night., Disp: , Rfl:   •  metFORMIN ER (GLUCOPHAGE-XR) 750 MG 24 hr tablet, TAKE 1 TABLET EVERY NIGHT AT BEDTIME, Disp: 30 tablet, Rfl: 11  •  nystatin (MYCOSTATIN) 879833 UNIT/GM cream, , Disp: , Rfl:   •  omeprazole (priLOSEC) 40 MG capsule, Take 40 mg by mouth Daily., Disp: , Rfl:   •  ondansetron (ZOFRAN) 4 MG tablet, Take 1 tablet by mouth Every 6 (Six) Hours As Needed for Nausea or Vomiting., Disp: 12 tablet, Rfl: 0  •  spironolactone (ALDACTONE) 25 MG tablet, Take 25 mg by mouth Daily., Disp: , Rfl:   •  acetylcysteine (MUCOMYST) 20 % nebulizer solution, Take 4 mL by nebulization 3 (Three) Times a Day., Disp: 900 mL, Rfl: 0  •  allopurinol 200 MG tablet, Take 200 mg by mouth Daily., Disp: 30 tablet, Rfl:  0  •  methylPREDNISolone (MEDROL) 4 MG dose pack, Take as directed on package instructions., Disp: 21 tablet, Rfl: 0  •  nebivolol (Bystolic) 2.5 MG tablet, Take 1 tablet by mouth Daily. (Patient taking differently: Take  by mouth Daily.), Disp: 30 tablet, Rfl: 0  •  sucralfate (CARAFATE) 1 g tablet, , Disp: , Rfl:     Current Facility-Administered Medications:   •  cyanocobalamin injection 1,000 mcg, 1,000 mcg, Intramuscular, Q28 Days, Gus Posada MD, 1,000 mcg at 09/23/22 1514  •  methylPREDNISolone sodium succinate (SOLU-Medrol) injection 125 mg, 125 mg, Intravenous, Q6H, Gus Posada MD    Allergies:  Allergies   Allergen Reactions   • Dilaudid [Hydromorphone Hcl] Anaphylaxis   • Diphenoxylate-Atropine Shortness Of Breath     Reaction: shortness of breath     • Iodinated Contrast Media Hives     Reaction: HIVES   • Keflex [Cephalexin] Shortness Of Breath   • Lomotil [Diphenoxylate] Shortness Of Breath   • Aspirin Hives   • Beef (Diagnostic) Nausea And Vomiting     All mammals  All mammals   • Beef-Derived Products GI Intolerance   • Ciprofloxacin Nausea And Vomiting   • Contrast Dye (Echo Or Unknown Ct/Mr) Hives   • Levaquin [Levofloxacin] GI Intolerance   • Milk-Related Compounds GI Intolerance   • Nsaids Hives   • Pegademase Bovine Other (See Comments)     Other reaction(s): GI Intolerance   • Percocet [Oxycodone-Acetaminophen] Itching   • Pork-Derived Products GI Intolerance   • Tolmetin Hives       Family Hx:  Family History   Problem Relation Age of Onset   • Diabetes Mother    • Diabetes Father    • Heart disease Father    • Cancer Sister    • Heart disease Sister    • Thyroid disease Sister    • Diabetes Brother    • Heart disease Brother         Social History:  Social History     Socioeconomic History   • Marital status:    Tobacco Use   • Smoking status: Never   • Smokeless tobacco: Never   Vaping Use   • Vaping Use: Never used   Substance and Sexual Activity   • Alcohol use: Never   •  "Drug use: Never   • Sexual activity: Defer       Review of Systems  Review of Systems   Constitutional: Negative for fatigue.   HENT: Negative for congestion, rhinorrhea and trouble swallowing.    Eyes: Negative for visual disturbance.   Respiratory: Negative for cough and shortness of breath.    Cardiovascular: Negative for chest pain and palpitations.   Gastrointestinal: Negative for abdominal pain and blood in stool.   Genitourinary: Negative for dysuria and hematuria.   Musculoskeletal: Positive for gait problem and joint swelling.   Skin: Positive for color change.   Neurological: Negative for headaches.   Psychiatric/Behavioral: Negative for sleep disturbance.       Objective:     /78   Pulse 92   Temp 97.9 °F (36.6 °C)   Ht 172.7 cm (68\")   Wt 109 kg (241 lb)   SpO2 95%   BMI 36.64 kg/m²   Physical Exam  Vitals reviewed.   Constitutional:       General: She is in acute distress.      Appearance: She is not diaphoretic.   HENT:      Nose: No rhinorrhea.      Mouth/Throat:      Mouth: Mucous membranes are moist.      Pharynx: Oropharynx is clear.   Eyes:      General: No scleral icterus.        Right eye: No discharge.         Left eye: No discharge.   Neck:      Vascular: No carotid bruit.   Cardiovascular:      Rate and Rhythm: Normal rate and regular rhythm.      Pulses: Normal pulses.      Heart sounds: No murmur heard.  Pulmonary:      Effort: No respiratory distress.      Breath sounds: No wheezing.   Abdominal:      General: Bowel sounds are normal.      Palpations: There is no mass.      Tenderness: There is no abdominal tenderness.   Musculoskeletal:         General: Swelling, tenderness and deformity present.      Right lower leg: No edema.      Left lower leg: No edema.      Comments: Moderate swelling right foot digit #2.  Moderate tenderness, moderate erythema.    Severe swelling right hand digit #3 proximal interphalangeal joint.  Severe tenderness, significant erythema.   Skin:     " Capillary Refill: Capillary refill takes less than 2 seconds.      Findings: Erythema present. No rash.   Neurological:      Mental Status: She is alert. Mental status is at baseline.   Psychiatric:         Mood and Affect: Mood normal.         Thought Content: Thought content normal.          Assessment/Plan:     Diagnoses and all orders for this visit:    1. Acute idiopathic gout of right hand (Primary)  Patient reported initial onset of gout spring 2022 in the right foot.  On 12/6/22 she was in to see her PCP, Dr. Manley, at that point he noted elevated uric acid levels and prescribed her allopurinol and a Medrol Dosepak for right foot gout.  On 1/3/23 she was in the local ER for sundry complaints 1 of which was pain in the right foot secondary to the gout.  She  had not started the medication Dr. Manley prescribed her due to concerns over her reaction to NSAIDs in the past.  She was given a shot of steroid in the ER for this issue.  On 1/6/23 she saw Dr. Sanchez in this clinic who advised her to start allopurinol on a daily basis gave her a round of p.o. steroids.  She reports that she did not take the medication until 1/9.  On 1/12 she reports that she woke up having developed a new gouty joint in the right hand.  Since that time it is gotten steadily worse until today's presentation when the pain is quite severe.  Examination does show a significantly swollen and exquisitely tender right hand digit #3 proximal interphalangeal joint.  She is unable to tolerate any movement of the joint.  It is significantly erythematous.  The gouty joint of the right toe #2 has improved since starting the allopurinol.  After discussion with the patient it was agreed that she would increase the dose of the allopurinol to 200 a day.  We discussed the fact that the allopurinol dose may have to be increased even more.  I also gave her IM Solu-Medrol 125.  Gave her a prescription for a Medrol Dosepak for steroid taper to be taken over the  next 5 days.  Patient was advised to refrain from red meat, seafood, and alcohol.  She denies consumption of any of these 3 foods.  She is going to return to the clinic in 1 week for follow-up.  -     methylPREDNISolone sodium succinate (SOLU-Medrol) injection 125 mg  -     methylPREDNISolone (MEDROL) 4 MG dose pack; Take as directed on package instructions.  Dispense: 21 tablet; Refill: 0  -     allopurinol 200 MG tablet; Take 200 mg by mouth Daily.  Dispense: 30 tablet; Refill: 0        · Rx changes: Increase allopurinol to 200 daily.  Solu-Medrol Dosepak 21 tablets.  IM injection Solu-Medrol 125 given in clinic today.    Follow-up:     Return in about 1 week (around 1/23/2023), or Gout follow-up.    Preventative:  Health Maintenance   Topic Date Due   • DXA SCAN  Never done   • ZOSTER VACCINE (1 of 2) Never done   • DIABETIC EYE EXAM  Never done   • ANNUAL WELLNESS VISIT  07/24/2021   • URINE MICROALBUMIN  10/23/2021   • HEMOGLOBIN A1C  04/19/2022   • COVID-19 Vaccine (5 - Booster for Moderna series) 06/03/2022   • TDAP/TD VACCINES (2 - Td or Tdap) 08/02/2026   • INFLUENZA VACCINE  Completed   • Pneumococcal Vaccine 65+  Completed       Alcohol use:  reports no history of alcohol use.  Nicotine status  reports that she has never smoked. She has never used smokeless tobacco.     Goals    None           This document has been electronically signed by Gus Posada MD on January 17, 2023 08:46 CST

## 2023-01-19 NOTE — PROGRESS NOTES
I have reviewed the notes, assessments, and/or procedures performed by Gus Posada MD during office visit. I concur with her/his documentation and assessment and plan for Alda Constantino.           This document has been electronically signed by Jamie Ross MD on January 19, 2023 11:22 CST

## 2023-02-09 ENCOUNTER — OFFICE VISIT (OUTPATIENT)
Dept: FAMILY MEDICINE CLINIC | Facility: CLINIC | Age: 83
End: 2023-02-09
Payer: MEDICARE

## 2023-02-09 VITALS
DIASTOLIC BLOOD PRESSURE: 82 MMHG | SYSTOLIC BLOOD PRESSURE: 130 MMHG | WEIGHT: 244 LBS | BODY MASS INDEX: 36.98 KG/M2 | HEART RATE: 53 BPM | HEIGHT: 68 IN | OXYGEN SATURATION: 94 %

## 2023-02-09 DIAGNOSIS — M1A.0710 CHRONIC GOUT OF RIGHT FOOT, UNSPECIFIED CAUSE: ICD-10-CM

## 2023-02-09 DIAGNOSIS — C34.31 MALIGNANT NEOPLASM OF LOWER LOBE OF RIGHT LUNG: ICD-10-CM

## 2023-02-09 DIAGNOSIS — M10.041 ACUTE IDIOPATHIC GOUT OF RIGHT HAND: Primary | ICD-10-CM

## 2023-02-09 DIAGNOSIS — K21.9 GASTROESOPHAGEAL REFLUX DISEASE WITHOUT ESOPHAGITIS: ICD-10-CM

## 2023-02-09 PROCEDURE — 99213 OFFICE O/P EST LOW 20 MIN: CPT | Performed by: FAMILY MEDICINE

## 2023-02-09 RX ORDER — OMEPRAZOLE 40 MG/1
40 CAPSULE, DELAYED RELEASE ORAL DAILY
Qty: 90 CAPSULE | Refills: 2 | Status: SHIPPED | OUTPATIENT
Start: 2023-02-09

## 2023-02-09 RX ORDER — BUDESONIDE 0.25 MG/2ML
INHALANT ORAL
COMMUNITY
Start: 2023-02-08

## 2023-02-09 RX ORDER — ALLOPURINOL 200 MG/1
200 TABLET ORAL DAILY
Qty: 30 TABLET | Refills: 3 | Status: SHIPPED | OUTPATIENT
Start: 2023-02-09 | End: 2023-02-14 | Stop reason: SDUPTHER

## 2023-02-09 RX ORDER — ALLOPURINOL 100 MG/1
TABLET ORAL
COMMUNITY
Start: 2022-12-06 | End: 2023-02-09 | Stop reason: SDUPTHER

## 2023-02-09 RX ORDER — ARFORMOTEROL TARTRATE 15 UG/2ML
SOLUTION RESPIRATORY (INHALATION)
COMMUNITY
Start: 2023-02-08

## 2023-02-09 NOTE — PROGRESS NOTES
I have reviewed the notes, assessments, and/or procedures performed by Dr. Gus Posada, I concur with his  documentation and assessment and plan for Alda Constantino        This document has been electronically signed by Sd Mackey MD on February 9, 2023 14:48 CST

## 2023-02-09 NOTE — PROGRESS NOTES
Family Medicine Residency  Gus Posada MD    Subjective:     Alda Constantino is a 82 y.o. female who presents for acute gout flareup and lung cancer..  She was seen on 1/16/2023 at which time she was given a dose of IM Solu-Medrol 125 and advised to increase her allopurinol to 200 mg daily.  Unfortunately due to some miscommunication she has only been taking 100 of the allopurinol a day.  Gout is not any better.  He also reports that recently she was in to see her pulmonologist and oncologist and was diagnosed with recurrent cancer in the lung.  They have made some changes to her medications and is have planned 5 rounds of radiation therapy.    The following portions of the patient's history were reviewed and updated as appropriate: allergies, current medications, past family history, past medical history, past social history, past surgical history and problem list.    Past Medical Hx:  Past Medical History:   Diagnosis Date   • Asthma    • Cancer (HCC)    • Congenital abnormalities     colon behind liver   • COPD (chronic obstructive pulmonary disease) (HCC)    • Hypertension        Past Surgical Hx:  Past Surgical History:   Procedure Laterality Date   • ADENOIDECTOMY     • ADRENAL GLAND SURGERY Right    • BLADDER SURGERY     • BREAST BIOPSY     • COLONOSCOPY W/ BIOPSIES AND POLYPECTOMY     • EYE SURGERY     • HERNIA REPAIR     • HYSTERECTOMY     • LUNG LOBECTOMY Left    • RECTAL SURGERY      cyst   • SKIN CANCER EXCISION      legs   • TONSILLECTOMY     • VARICOSE VEIN SURGERY         Current Meds:    Current Outpatient Medications:   •  acetaminophen (TYLENOL) 500 MG tablet, Take 500 mg by mouth Every 6 (Six) Hours As Needed for Mild Pain ., Disp: , Rfl:   •  albuterol (PROVENTIL HFA;VENTOLIN HFA) 108 (90 Base) MCG/ACT inhaler, Inhale 2 puffs Every 4 (Four) Hours As Needed for Wheezing., Disp: , Rfl:   •  allopurinol (ZYLOPRIM) 100 MG tablet, , Disp: , Rfl:   •  arformoterol (BROVANA) 15 MCG/2ML  nebulizer solution, , Disp: , Rfl:   •  budesonide (PULMICORT) 0.25 MG/2ML nebulizer solution, , Disp: , Rfl:   •  bumetanide (BUMEX) 2 MG tablet, Take 1 tablet by mouth 2 (Two) Times a Day., Disp: 60 tablet, Rfl: 0  •  EPINEPHrine (EPIPEN) 0.3 MG/0.3ML solution auto-injector injection, , Disp: , Rfl:   •  gabapentin (NEURONTIN) 100 MG capsule, Take 1 capsule by mouth Every Night., Disp: 90 capsule, Rfl: 1  •  Glucose Blood (PRECISION XTRA TEST VI), by In Vitro route., Disp: , Rfl:   •  guaiFENesin (MUCINEX) 600 MG 12 hr tablet, Take 1,200 mg by mouth 2 (Two) Times a Day., Disp: , Rfl:   •  hydrocortisone 2.5 % cream, , Disp: , Rfl:   •  IPRATROPIUM BROMIDE IN, Inhale., Disp: , Rfl:   •  ipratropium-albuterol (DUO-NEB) 0.5-2.5 mg/3 ml nebulizer, Take 1.5 mL by nebulization Every 4 (Four) Hours As Needed for Wheezing., Disp: , Rfl:   •  ketoconazole (NIZORAL) 2 % cream, , Disp: , Rfl:   •  levocetirizine (XYZAL) 5 MG tablet, Take 5 mg by mouth Every Evening., Disp: , Rfl:   •  meclizine (ANTIVERT) 25 MG tablet, Take 1-2 tablets by mouth 3 (Three) Times a Day As Needed for Dizziness., Disp: 15 tablet, Rfl: 0  •  Melatonin 10 MG capsule, Take 1 each by mouth Every Night., Disp: , Rfl:   •  metFORMIN ER (GLUCOPHAGE-XR) 750 MG 24 hr tablet, TAKE 1 TABLET EVERY NIGHT AT BEDTIME, Disp: 30 tablet, Rfl: 11  •  methylPREDNISolone (MEDROL) 4 MG dose pack, Take as directed on package instructions., Disp: 21 tablet, Rfl: 0  •  nebivolol (Bystolic) 2.5 MG tablet, Take 1 tablet by mouth Daily. (Patient taking differently: Take  by mouth Daily.), Disp: 30 tablet, Rfl: 0  •  omeprazole (priLOSEC) 40 MG capsule, Take 40 mg by mouth Daily., Disp: , Rfl:   •  ondansetron (ZOFRAN) 4 MG tablet, Take 1 tablet by mouth Every 6 (Six) Hours As Needed for Nausea or Vomiting., Disp: 12 tablet, Rfl: 0  •  spironolactone (ALDACTONE) 25 MG tablet, Take 25 mg by mouth Daily., Disp: , Rfl:     Current Facility-Administered Medications:   •   cyanocobalamin injection 1,000 mcg, 1,000 mcg, Intramuscular, Q28 Days, Gus Posada MD, 1,000 mcg at 09/23/22 1514  •  methylPREDNISolone sodium succinate (SOLU-Medrol) injection 125 mg, 125 mg, Intravenous, Q6H, Gus Posada MD    Allergies:  Allergies   Allergen Reactions   • Dilaudid [Hydromorphone Hcl] Anaphylaxis   • Diphenoxylate-Atropine Shortness Of Breath     Reaction: shortness of breath     • Iodinated Contrast Media Hives     Reaction: HIVES   • Keflex [Cephalexin] Shortness Of Breath   • Lomotil [Diphenoxylate] Shortness Of Breath   • Aspirin Hives   • Beef (Diagnostic) Nausea And Vomiting     All mammals  All mammals   • Beef-Derived Products GI Intolerance   • Ciprofloxacin Nausea And Vomiting   • Contrast Dye (Echo Or Unknown Ct/Mr) Hives   • Levaquin [Levofloxacin] GI Intolerance   • Milk-Related Compounds GI Intolerance   • Nsaids Hives   • Pegademase Bovine Other (See Comments)     Other reaction(s): GI Intolerance   • Percocet [Oxycodone-Acetaminophen] Itching   • Pork-Derived Products GI Intolerance   • Tolmetin Hives       Family Hx:  Family History   Problem Relation Age of Onset   • Diabetes Mother    • Diabetes Father    • Heart disease Father    • Cancer Sister    • Heart disease Sister    • Thyroid disease Sister    • Diabetes Brother    • Heart disease Brother         Social History:  Social History     Socioeconomic History   • Marital status:    Tobacco Use   • Smoking status: Never   • Smokeless tobacco: Never   Vaping Use   • Vaping Use: Never used   Substance and Sexual Activity   • Alcohol use: Never   • Drug use: Never   • Sexual activity: Defer       Review of Systems  Review of Systems   Constitutional: Negative for fatigue.   HENT: Negative for congestion, rhinorrhea and trouble swallowing.    Eyes: Negative for visual disturbance.   Respiratory: Positive for shortness of breath. Negative for cough.         On 2L   Cardiovascular: Negative for chest pain and  "palpitations.   Gastrointestinal: Negative for abdominal pain and blood in stool.   Genitourinary: Negative for dysuria and hematuria.   Musculoskeletal: Positive for gait problem and joint swelling.   Skin: Positive for color change.   Neurological: Positive for headaches.   Psychiatric/Behavioral: Negative for sleep disturbance.       Objective:     /82   Pulse 53   Ht 172.7 cm (68\")   Wt 111 kg (244 lb)   SpO2 94%   BMI 37.10 kg/m²   Physical Exam  Vitals reviewed.   Constitutional:       General: She is not in acute distress.     Appearance: She is not toxic-appearing.   HENT:      Nose: No rhinorrhea.   Neck:      Vascular: No carotid bruit.   Cardiovascular:      Rate and Rhythm: Normal rate and regular rhythm.      Pulses: Normal pulses.   Pulmonary:      Breath sounds: No wheezing or rales.      Comments: Currently on 2 L of oxygen from a clinical concentrator.  Musculoskeletal:         General: Tenderness present.      Right lower leg: Edema present.      Left lower leg: Edema present.      Comments: Significant palpatory tenderness bilateral lower extremities over the area of the tibia.  Uses a wheeled walker for ambulation   Lymphadenopathy:      Cervical: No cervical adenopathy.   Skin:     Capillary Refill: Capillary refill takes less than 2 seconds.   Neurological:      General: No focal deficit present.      Mental Status: She is alert.   Psychiatric:         Mood and Affect: Mood normal.         Thought Content: Thought content normal.          Assessment/Plan:     Diagnoses and all orders for this visit:    1. Acute idiopathic gout of right hand (Primary)  Last visit had noted the patient was started on allopurinol yet was still having significant problems with gout.  She had had a flareup of gout in the right hand digit #4 proximal interphalangeal joint.  At that time she was given injection of methylprednisolone 125 IM I also advised to increase her allopurinol to 200 mg daily.  " Unfortunately, due to poor communication she did not realize she was was to take 200 today and has only been taking 100 up to this point.  Today advised her to increase it to 200 daily by taking 2 of the 100s every day until her new prescription for the 200 mg pills came in from [facility].  She is good with this plan.    2. Chronic gout of right foot, unspecified cause  Gouty arthritis present right foot digit #2.  This is improved slightly since last visit.  Would like for patient regardless to take 200 mg of allopurinol daily.    3.  Malignant neoplasm right lower lobe lung  Patient is a former 2 pack a day smoker who quit in September 2015 after being diagnosed with lung cancer.  October 2015 had left upper lobectomy performed.  Has not smoked since then.  Recently saw her oncologist and pulmonologist who had discovered a new suspicious nodule in the right lower lobe.  PET scan confirms malignancy.  Advised to stop Symbicort and start Pulmicort and Brovana.  Have made plans for 5 rounds of radiation therapy to be administered every other day.     Follow-up:     Return in about 2 months (around 4/9/2023).    Preventative:  Health Maintenance   Topic Date Due   • DXA SCAN  Never done   • ZOSTER VACCINE (1 of 2) Never done   • DIABETIC EYE EXAM  Never done   • ANNUAL WELLNESS VISIT  07/24/2021   • URINE MICROALBUMIN  10/23/2021   • HEMOGLOBIN A1C  04/19/2022   • COVID-19 Vaccine (5 - Booster for Moderna series) 06/03/2022   • TDAP/TD VACCINES (2 - Td or Tdap) 08/02/2026   • INFLUENZA VACCINE  Completed   • Pneumococcal Vaccine 65+  Completed         Alcohol use:  reports no history of alcohol use.  Nicotine status  reports that she has never smoked. She has never used smokeless tobacco.     Goals    None                 This document has been electronically signed by Gus Posada MD on February 9, 2023 14:41 CST

## 2023-02-13 ENCOUNTER — TELEPHONE (OUTPATIENT)
Dept: FAMILY MEDICINE CLINIC | Facility: CLINIC | Age: 83
End: 2023-02-13
Payer: MEDICARE

## 2023-02-13 NOTE — TELEPHONE ENCOUNTER
PATIENT NEEDS SCRIPT TO [facility]. THEY DON'T CARRY 200MG/ALLOPURINOL. SCRIPT NEEDS TO BE FOR 100MG TWICE A DAY AND  PILLS FOR A 90 DAY SUPPLY. CALL BACK NUMBER [phone]    NAUN PARDO

## 2023-02-14 DIAGNOSIS — M10.041 ACUTE IDIOPATHIC GOUT OF RIGHT HAND: ICD-10-CM

## 2023-02-14 DIAGNOSIS — M1A.0710 CHRONIC GOUT OF RIGHT FOOT, UNSPECIFIED CAUSE: ICD-10-CM

## 2023-02-14 RX ORDER — ALLOPURINOL 100 MG/1
100 TABLET ORAL 2 TIMES DAILY
Qty: 180 TABLET | Refills: 2 | Status: ON HOLD | OUTPATIENT
Start: 2023-02-14 | End: 2023-03-22 | Stop reason: SDUPTHER

## 2023-02-19 ENCOUNTER — HOSPITAL ENCOUNTER (EMERGENCY)
Facility: HOSPITAL | Age: 83
Discharge: HOME | DRG: 194 | End: 2023-02-19
Attending: FAMILY MEDICINE | Admitting: FAMILY MEDICINE
Payer: MEDICARE

## 2023-02-19 ENCOUNTER — APPOINTMENT (OUTPATIENT)
Dept: GENERAL RADIOLOGY | Facility: HOSPITAL | Age: 83
DRG: 194 | End: 2023-02-19
Payer: MEDICARE

## 2023-02-19 VITALS
DIASTOLIC BLOOD PRESSURE: 85 MMHG | WEIGHT: 250 LBS | RESPIRATION RATE: 18 BRPM | SYSTOLIC BLOOD PRESSURE: 137 MMHG | HEIGHT: 68 IN | TEMPERATURE: 98.1 F | HEART RATE: 79 BPM | BODY MASS INDEX: 37.89 KG/M2 | OXYGEN SATURATION: 97 %

## 2023-02-19 DIAGNOSIS — S22.31XA CLOSED FRACTURE OF ONE RIB OF RIGHT SIDE, INITIAL ENCOUNTER: Primary | ICD-10-CM

## 2023-02-19 PROCEDURE — 25010000002 TETANUS-DIPHTH-ACELL PERTUSSIS 5-2.5-18.5 LF-MCG/0.5 SUSPENSION PREFILLED SYRINGE: Performed by: FAMILY MEDICINE

## 2023-02-19 PROCEDURE — 90715 TDAP VACCINE 7 YRS/> IM: CPT | Performed by: FAMILY MEDICINE

## 2023-02-19 PROCEDURE — 71101 X-RAY EXAM UNILAT RIBS/CHEST: CPT

## 2023-02-19 PROCEDURE — 99283 EMERGENCY DEPT VISIT LOW MDM: CPT

## 2023-02-19 PROCEDURE — 90471 IMMUNIZATION ADMIN: CPT | Performed by: FAMILY MEDICINE

## 2023-02-19 RX ORDER — HYDROCODONE BITARTRATE AND ACETAMINOPHEN 5; 325 MG/1; MG/1
1 TABLET ORAL EVERY 6 HOURS PRN
Qty: 12 TABLET | Refills: 0 | Status: SHIPPED | OUTPATIENT
Start: 2023-02-19 | End: 2023-02-24 | Stop reason: HOSPADM

## 2023-02-19 RX ORDER — HYDROCODONE BITARTRATE AND ACETAMINOPHEN 5; 325 MG/1; MG/1
1 TABLET ORAL ONCE
Status: COMPLETED | OUTPATIENT
Start: 2023-02-19 | End: 2023-02-19

## 2023-02-19 RX ORDER — HYDROCODONE BITARTRATE AND ACETAMINOPHEN 7.5; 325 MG/1; MG/1
1 TABLET ORAL ONCE
Status: COMPLETED | OUTPATIENT
Start: 2023-02-19 | End: 2023-02-19

## 2023-02-19 RX ADMIN — HYDROCODONE BITARTRATE AND ACETAMINOPHEN 1 TABLET: 7.5; 325 TABLET ORAL at 19:55

## 2023-02-19 RX ADMIN — TETANUS TOXOID, REDUCED DIPHTHERIA TOXOID AND ACELLULAR PERTUSSIS VACCINE, ADSORBED 0.5 ML: 5; 2.5; 8; 8; 2.5 SUSPENSION INTRAMUSCULAR at 20:10

## 2023-02-19 RX ADMIN — HYDROCODONE BITARTRATE AND ACETAMINOPHEN 1 TABLET: 5; 325 TABLET ORAL at 22:16

## 2023-02-20 NOTE — ED PROVIDER NOTES
Subjective   History of Present Illness  Patient presents emergency department after having a ground-level fall.  She states at 11:30 AM this morning she went to get up from the table and lost her balance and tripped over a chair and fell to the hardwood floor.  She complains of right chest wall pain.  Patient denies head injury or loss of consciousness.  She wears home oxygen, 2 L via nasal cannula.  Patient has a history of CHF, stage I lung cancer, and is scheduled to start radiation this coming Thursday.  She has never been on chemotherapy before.  She is a former smoker who quit in October 2015 she is currently on azithromycin 250 mg p.o. q. OD.    Fall  Mechanism of injury: fall    Injury location:  Torso  Torso injury location:  R chest  Incident location:  Home  Fall:     Impact surface:  Hard floor    Point of impact:  Unable to specify    Entrapped after fall: no    Protective equipment: none    Suspicion of alcohol use: no    Suspicion of drug use: no    Associated symptoms: no abdominal pain, no chest pain, no headaches, no nausea, no neck pain, no seizures and no vomiting        Review of Systems   Constitutional: Negative for appetite change, chills, diaphoresis, fatigue and fever.   HENT: Negative for congestion, ear discharge, ear pain, nosebleeds, rhinorrhea, sinus pressure, sore throat and trouble swallowing.    Eyes: Negative for discharge and redness.   Respiratory: Negative for apnea, cough, chest tightness, shortness of breath and wheezing.    Cardiovascular: Negative for chest pain.   Gastrointestinal: Negative for abdominal pain, diarrhea, nausea and vomiting.   Endocrine: Negative for polyuria.   Genitourinary: Negative for dysuria, frequency and urgency.   Musculoskeletal: Positive for myalgias. Negative for neck pain.   Skin: Negative for color change and rash.   Allergic/Immunologic: Negative for immunocompromised state.   Neurological: Negative for dizziness, seizures, syncope, weakness,  light-headedness and headaches.   Hematological: Negative for adenopathy. Does not bruise/bleed easily.   Psychiatric/Behavioral: Negative for behavioral problems and confusion.   All other systems reviewed and are negative.      Past Medical History:   Diagnosis Date   • Asthma    • Cancer (HCC)    • Congenital abnormalities     colon behind liver   • COPD (chronic obstructive pulmonary disease) (HCC)    • Hypertension        Allergies   Allergen Reactions   • Dilaudid [Hydromorphone Hcl] Anaphylaxis   • Diphenoxylate-Atropine Shortness Of Breath     Reaction: shortness of breath     • Iodinated Contrast Media Hives     Reaction: HIVES   • Keflex [Cephalexin] Shortness Of Breath   • Lomotil [Diphenoxylate] Shortness Of Breath   • Aspirin Hives   • Beef (Diagnostic) Nausea And Vomiting     All mammals  All mammals   • Beef-Derived Products GI Intolerance   • Ciprofloxacin Nausea And Vomiting   • Contrast Dye (Echo Or Unknown Ct/Mr) Hives   • Levaquin [Levofloxacin] GI Intolerance   • Milk-Related Compounds GI Intolerance   • Nsaids Hives   • Pegademase Bovine Other (See Comments)     Other reaction(s): GI Intolerance   • Percocet [Oxycodone-Acetaminophen] Itching   • Pork-Derived Products GI Intolerance   • Tolmetin Hives       Past Surgical History:   Procedure Laterality Date   • ADENOIDECTOMY     • ADRENAL GLAND SURGERY Right    • BLADDER SURGERY     • BREAST BIOPSY     • COLONOSCOPY W/ BIOPSIES AND POLYPECTOMY     • EYE SURGERY     • HERNIA REPAIR     • HYSTERECTOMY     • LUNG LOBECTOMY Left    • RECTAL SURGERY      cyst   • SKIN CANCER EXCISION      legs   • TONSILLECTOMY     • VARICOSE VEIN SURGERY         Family History   Problem Relation Age of Onset   • Diabetes Mother    • Diabetes Father    • Heart disease Father    • Cancer Sister    • Heart disease Sister    • Thyroid disease Sister    • Diabetes Brother    • Heart disease Brother        Social History     Socioeconomic History   • Marital status:     Tobacco Use   • Smoking status: Never   • Smokeless tobacco: Never   Vaping Use   • Vaping Use: Never used   Substance and Sexual Activity   • Alcohol use: Never   • Drug use: Never   • Sexual activity: Defer           Objective   Physical Exam  Vitals and nursing note reviewed.   Constitutional:       Appearance: She is well-developed.   HENT:      Head: Normocephalic and atraumatic.      Nose: Nose normal.   Eyes:      General: No scleral icterus.        Right eye: No discharge.         Left eye: No discharge.      Conjunctiva/sclera: Conjunctivae normal.      Pupils: Pupils are equal, round, and reactive to light.   Neck:      Trachea: No tracheal deviation.   Cardiovascular:      Rate and Rhythm: Normal rate and regular rhythm.      Heart sounds: Normal heart sounds. No murmur heard.  Pulmonary:      Effort: Pulmonary effort is normal. No respiratory distress.      Breath sounds: Normal breath sounds. No stridor. No wheezing or rales.   Chest:      Chest wall: Tenderness present.       Abdominal:      General: Bowel sounds are normal. There is no distension.      Palpations: Abdomen is soft. There is no mass.      Tenderness: There is no abdominal tenderness. There is no guarding or rebound.   Musculoskeletal:      Cervical back: Normal range of motion and neck supple.   Skin:     General: Skin is warm and dry.      Findings: No erythema or rash.   Neurological:      Mental Status: She is alert and oriented to person, place, and time.      Coordination: Coordination normal.   Psychiatric:         Behavior: Behavior normal.         Thought Content: Thought content normal.         Procedures           ED Course              Labs Reviewed - No data to display    XR Ribs Right With PA Chest   Final Result      1. At least one acute, mildly displaced right-sided rib fracture.   Rib series was limited due to underpenetration, and additional   right-sided rib fractures may be radiographically occult.      2.  Interstitial prominence is noted and can represent central   vascular congestion. Atypical infectious process would have been   excluded clinically.      3. Redemonstration of known right lower lung lesion, better   characterized on the chest CT      Electronically signed by:  Marcelo Harrington MD  2/19/2023 9:32 PM [facility]   Workstation: [id number]                                    GIBSON reviewed by Pedro Padilla MD       [facility]    Final diagnoses:   Closed fracture of one rib of right side, initial encounter       ED Disposition  ED Disposition     ED Disposition   Discharge    Condition   Stable    Comment   --             Gus Posada MD  [address] [place] [address] [phone]    In 1 week           Medication List      New Prescriptions    HYDROcodone-acetaminophen 5-325 MG per tablet  Commonly known as: NORCO  Take 1 tablet by mouth Every 6 (Six) Hours As Needed for Moderate Pain.           Where to Get Your Medications      These medications were sent to [id number] - [place] [id number] [facility] - [phone]  - [phone] [address] [address]    Phone: [phone]   · HYDROcodone-acetaminophen 5-325 MG per tablet          Pedro Padilla MD  02/19/23 2156       Pedro Padilla MD  02/19/23 0155

## 2023-02-22 ENCOUNTER — HOSPITAL ENCOUNTER (INPATIENT)
Facility: HOSPITAL | Age: 83
LOS: 2 days | Discharge: HOME HEALTH | DRG: 194 | End: 2023-02-24
Attending: EMERGENCY MEDICINE | Admitting: EMERGENCY MEDICINE
Payer: MEDICARE

## 2023-02-22 ENCOUNTER — APPOINTMENT (OUTPATIENT)
Dept: CT IMAGING | Facility: HOSPITAL | Age: 83
DRG: 194 | End: 2023-02-22
Payer: MEDICARE

## 2023-02-22 ENCOUNTER — APPOINTMENT (OUTPATIENT)
Dept: GENERAL RADIOLOGY | Facility: HOSPITAL | Age: 83
DRG: 194 | End: 2023-02-22
Payer: MEDICARE

## 2023-02-22 DIAGNOSIS — R77.8 ELEVATED TROPONIN: ICD-10-CM

## 2023-02-22 DIAGNOSIS — Z78.9 IMPAIRED MOBILITY AND ADLS: ICD-10-CM

## 2023-02-22 DIAGNOSIS — Z87.81 HISTORY OF RIB FRACTURE: ICD-10-CM

## 2023-02-22 DIAGNOSIS — Z74.09 IMPAIRED MOBILITY AND ADLS: ICD-10-CM

## 2023-02-22 DIAGNOSIS — J18.9 PNEUMONIA OF RIGHT LOWER LOBE DUE TO INFECTIOUS ORGANISM: Primary | ICD-10-CM

## 2023-02-22 DIAGNOSIS — R79.89 POSITIVE D DIMER: ICD-10-CM

## 2023-02-22 DIAGNOSIS — E11.42 TYPE 2 DIABETES MELLITUS WITH DIABETIC POLYNEUROPATHY, WITHOUT LONG-TERM CURRENT USE OF INSULIN: ICD-10-CM

## 2023-02-22 DIAGNOSIS — J44.1 COPD WITH EXACERBATION: ICD-10-CM

## 2023-02-22 DIAGNOSIS — Z74.09 IMPAIRED FUNCTIONAL MOBILITY AND ACTIVITY TOLERANCE: ICD-10-CM

## 2023-02-22 PROBLEM — I10 HTN (HYPERTENSION): Status: ACTIVE | Noted: 2023-02-22

## 2023-02-22 LAB
ALBUMIN SERPL-MCNC: 3.8 G/DL (ref 3.5–5.2)
ALBUMIN/GLOB SERPL: 1.2 G/DL
ALP SERPL-CCNC: 81 U/L (ref 39–117)
ALT SERPL W P-5'-P-CCNC: 12 U/L (ref 1–33)
ANION GAP SERPL CALCULATED.3IONS-SCNC: 11 MMOL/L (ref 5–15)
APTT PPP: 34.5 SECONDS (ref 20–40.3)
AST SERPL-CCNC: 19 U/L (ref 1–32)
BASOPHILS # BLD AUTO: 0.04 10*3/MM3 (ref 0–0.2)
BASOPHILS NFR BLD AUTO: 0.4 % (ref 0–1.5)
BILIRUB SERPL-MCNC: 0.4 MG/DL (ref 0–1.2)
BUN SERPL-MCNC: 28 MG/DL (ref 8–23)
BUN/CREAT SERPL: 21.1 (ref 7–25)
CALCIUM SPEC-SCNC: 8.6 MG/DL (ref 8.6–10.5)
CHLORIDE SERPL-SCNC: 98 MMOL/L (ref 98–107)
CO2 SERPL-SCNC: 28 MMOL/L (ref 22–29)
CREAT SERPL-MCNC: 1.33 MG/DL (ref 0.57–1)
D-DIMER, QUANTITATIVE (MAD,POW, STR): 910 NG/ML (FEU) (ref 0–810)
D-LACTATE SERPL-SCNC: 1.7 MMOL/L (ref 0.5–2)
DEPRECATED RDW RBC AUTO: 54.1 FL (ref 37–54)
EGFRCR SERPLBLD CKD-EPI 2021: 40.3 ML/MIN/1.73
EOSINOPHIL # BLD AUTO: 0.21 10*3/MM3 (ref 0–0.4)
EOSINOPHIL NFR BLD AUTO: 2.1 % (ref 0.3–6.2)
ERYTHROCYTE [DISTWIDTH] IN BLOOD BY AUTOMATED COUNT: 15 % (ref 12.3–15.4)
FLUAV RNA RESP QL NAA+PROBE: NOT DETECTED
FLUBV RNA RESP QL NAA+PROBE: NOT DETECTED
GEN 5 2HR TROPONIN T REFLEX: 18 NG/L
GLOBULIN UR ELPH-MCNC: 3.1 GM/DL
GLUCOSE BLDC GLUCOMTR-MCNC: 171 MG/DL (ref 70–130)
GLUCOSE SERPL-MCNC: 116 MG/DL (ref 65–99)
HCT VFR BLD AUTO: 35.6 % (ref 34–46.6)
HGB BLD-MCNC: 11.3 G/DL (ref 12–15.9)
HOLD SPECIMEN: NORMAL
IMM GRANULOCYTES # BLD AUTO: 0.06 10*3/MM3 (ref 0–0.05)
IMM GRANULOCYTES NFR BLD AUTO: 0.6 % (ref 0–0.5)
INR PPP: 1.06 (ref 0.8–1.2)
L PNEUMO1 AG UR QL IA: NEGATIVE
LYMPHOCYTES # BLD AUTO: 1.86 10*3/MM3 (ref 0.7–3.1)
LYMPHOCYTES NFR BLD AUTO: 18.4 % (ref 19.6–45.3)
MCH RBC QN AUTO: 31.2 PG (ref 26.6–33)
MCHC RBC AUTO-ENTMCNC: 31.7 G/DL (ref 31.5–35.7)
MCV RBC AUTO: 98.3 FL (ref 79–97)
MONOCYTES # BLD AUTO: 0.61 10*3/MM3 (ref 0.1–0.9)
MONOCYTES NFR BLD AUTO: 6 % (ref 5–12)
NEUTROPHILS NFR BLD AUTO: 7.32 10*3/MM3 (ref 1.7–7)
NEUTROPHILS NFR BLD AUTO: 72.5 % (ref 42.7–76)
NRBC BLD AUTO-RTO: 0 /100 WBC (ref 0–0.2)
NT-PROBNP SERPL-MCNC: 621.6 PG/ML (ref 0–1800)
PLATELET # BLD AUTO: 207 10*3/MM3 (ref 140–450)
PMV BLD AUTO: 12.1 FL (ref 6–12)
POTASSIUM SERPL-SCNC: 4.6 MMOL/L (ref 3.5–5.2)
PROT SERPL-MCNC: 6.9 G/DL (ref 6–8.5)
PROTHROMBIN TIME: 13.7 SECONDS (ref 11.1–15.3)
RBC # BLD AUTO: 3.62 10*6/MM3 (ref 3.77–5.28)
S PNEUM AG SPEC QL LA: NEGATIVE
SARS-COV-2 RNA RESP QL NAA+PROBE: NOT DETECTED
SODIUM SERPL-SCNC: 137 MMOL/L (ref 136–145)
TROPONIN T DELTA: -5 NG/L
TROPONIN T SERPL HS-MCNC: 23 NG/L
TROPONIN T SERPL HS-MCNC: 29 NG/L
WBC NRBC COR # BLD: 10.1 10*3/MM3 (ref 3.4–10.8)
WHOLE BLOOD HOLD COAG: NORMAL
WHOLE BLOOD HOLD SPECIMEN: NORMAL

## 2023-02-22 PROCEDURE — 94799 UNLISTED PULMONARY SVC/PX: CPT

## 2023-02-22 PROCEDURE — 87899 AGENT NOS ASSAY W/OPTIC: CPT

## 2023-02-22 PROCEDURE — 93010 ELECTROCARDIOGRAM REPORT: CPT | Performed by: INTERNAL MEDICINE

## 2023-02-22 PROCEDURE — 84145 PROCALCITONIN (PCT): CPT

## 2023-02-22 PROCEDURE — 93005 ELECTROCARDIOGRAM TRACING: CPT | Performed by: EMERGENCY MEDICINE

## 2023-02-22 PROCEDURE — 84484 ASSAY OF TROPONIN QUANT: CPT | Performed by: EMERGENCY MEDICINE

## 2023-02-22 PROCEDURE — 83605 ASSAY OF LACTIC ACID: CPT | Performed by: EMERGENCY MEDICINE

## 2023-02-22 PROCEDURE — 99222 1ST HOSP IP/OBS MODERATE 55: CPT

## 2023-02-22 PROCEDURE — 87449 NOS EACH ORGANISM AG IA: CPT

## 2023-02-22 PROCEDURE — 99285 EMERGENCY DEPT VISIT HI MDM: CPT

## 2023-02-22 PROCEDURE — 82962 GLUCOSE BLOOD TEST: CPT

## 2023-02-22 PROCEDURE — 87040 BLOOD CULTURE FOR BACTERIA: CPT | Performed by: EMERGENCY MEDICINE

## 2023-02-22 PROCEDURE — 80053 COMPREHEN METABOLIC PANEL: CPT | Performed by: EMERGENCY MEDICINE

## 2023-02-22 PROCEDURE — 87636 SARSCOV2 & INF A&B AMP PRB: CPT | Performed by: EMERGENCY MEDICINE

## 2023-02-22 PROCEDURE — 25010000002 MORPHINE PER 10 MG

## 2023-02-22 PROCEDURE — 63710000001 PREDNISONE PER 1 MG: Performed by: EMERGENCY MEDICINE

## 2023-02-22 PROCEDURE — 25010000002 ENOXAPARIN PER 10 MG: Performed by: EMERGENCY MEDICINE

## 2023-02-22 PROCEDURE — 94760 N-INVAS EAR/PLS OXIMETRY 1: CPT

## 2023-02-22 PROCEDURE — 85025 COMPLETE CBC W/AUTO DIFF WBC: CPT

## 2023-02-22 PROCEDURE — 93005 ELECTROCARDIOGRAM TRACING: CPT

## 2023-02-22 PROCEDURE — 94640 AIRWAY INHALATION TREATMENT: CPT

## 2023-02-22 PROCEDURE — 85379 FIBRIN DEGRADATION QUANT: CPT | Performed by: EMERGENCY MEDICINE

## 2023-02-22 PROCEDURE — 94664 DEMO&/EVAL PT USE INHALER: CPT

## 2023-02-22 PROCEDURE — 71250 CT THORAX DX C-: CPT

## 2023-02-22 PROCEDURE — 83880 ASSAY OF NATRIURETIC PEPTIDE: CPT | Performed by: EMERGENCY MEDICINE

## 2023-02-22 PROCEDURE — 63710000001 INSULIN ASPART PER 5 UNITS

## 2023-02-22 PROCEDURE — 85610 PROTHROMBIN TIME: CPT | Performed by: EMERGENCY MEDICINE

## 2023-02-22 PROCEDURE — 36415 COLL VENOUS BLD VENIPUNCTURE: CPT | Performed by: EMERGENCY MEDICINE

## 2023-02-22 PROCEDURE — 71045 X-RAY EXAM CHEST 1 VIEW: CPT

## 2023-02-22 PROCEDURE — 87581 M.PNEUMON DNA AMP PROBE: CPT

## 2023-02-22 PROCEDURE — 85730 THROMBOPLASTIN TIME PARTIAL: CPT | Performed by: EMERGENCY MEDICINE

## 2023-02-22 PROCEDURE — 25010000002 LEVOFLOXACIN PER 250 MG: Performed by: EMERGENCY MEDICINE

## 2023-02-22 RX ORDER — NICOTINE POLACRILEX 4 MG
15 LOZENGE BUCCAL
Status: DISCONTINUED | OUTPATIENT
Start: 2023-02-22 | End: 2023-02-24 | Stop reason: HOSPADM

## 2023-02-22 RX ORDER — SODIUM CHLORIDE 0.9 % (FLUSH) 0.9 %
10 SYRINGE (ML) INJECTION EVERY 12 HOURS SCHEDULED
Status: DISCONTINUED | OUTPATIENT
Start: 2023-02-22 | End: 2023-02-24 | Stop reason: HOSPADM

## 2023-02-22 RX ORDER — ALBUTEROL SULFATE 2.5 MG/3ML
2.5 SOLUTION RESPIRATORY (INHALATION) EVERY 4 HOURS PRN
Status: DISCONTINUED | OUTPATIENT
Start: 2023-02-22 | End: 2023-02-24 | Stop reason: HOSPADM

## 2023-02-22 RX ORDER — NALOXONE HCL 0.4 MG/ML
0.4 VIAL (ML) INJECTION
Status: DISCONTINUED | OUTPATIENT
Start: 2023-02-22 | End: 2023-02-24

## 2023-02-22 RX ORDER — POLYETHYLENE GLYCOL 3350 17 G/17G
17 POWDER, FOR SOLUTION ORAL DAILY PRN
Status: DISCONTINUED | OUTPATIENT
Start: 2023-02-22 | End: 2023-02-24 | Stop reason: HOSPADM

## 2023-02-22 RX ORDER — IPRATROPIUM BROMIDE AND ALBUTEROL SULFATE 2.5; .5 MG/3ML; MG/3ML
1.5 SOLUTION RESPIRATORY (INHALATION) EVERY 4 HOURS PRN
Status: CANCELLED | OUTPATIENT
Start: 2023-02-22

## 2023-02-22 RX ORDER — AZITHROMYCIN 250 MG/1
250 TABLET, FILM COATED ORAL
Status: DISCONTINUED | OUTPATIENT
Start: 2023-02-24 | End: 2023-02-24 | Stop reason: HOSPADM

## 2023-02-22 RX ORDER — CHOLECALCIFEROL (VITAMIN D3) 125 MCG
5 CAPSULE ORAL NIGHTLY PRN
Status: DISCONTINUED | OUTPATIENT
Start: 2023-02-22 | End: 2023-02-24 | Stop reason: HOSPADM

## 2023-02-22 RX ORDER — SODIUM CHLORIDE 9 MG/ML
40 INJECTION, SOLUTION INTRAVENOUS AS NEEDED
Status: DISCONTINUED | OUTPATIENT
Start: 2023-02-22 | End: 2023-02-24 | Stop reason: HOSPADM

## 2023-02-22 RX ORDER — CLOPIDOGREL BISULFATE 75 MG/1
75 TABLET ORAL DAILY
Status: DISCONTINUED | OUTPATIENT
Start: 2023-02-22 | End: 2023-02-22

## 2023-02-22 RX ORDER — SPIRONOLACTONE 25 MG/1
25 TABLET ORAL NIGHTLY
Status: DISCONTINUED | OUTPATIENT
Start: 2023-02-22 | End: 2023-02-24

## 2023-02-22 RX ORDER — BISACODYL 10 MG
10 SUPPOSITORY, RECTAL RECTAL DAILY PRN
Status: DISCONTINUED | OUTPATIENT
Start: 2023-02-22 | End: 2023-02-24 | Stop reason: HOSPADM

## 2023-02-22 RX ORDER — ACETAMINOPHEN 325 MG/1
650 TABLET ORAL EVERY 4 HOURS PRN
Status: DISCONTINUED | OUTPATIENT
Start: 2023-02-22 | End: 2023-02-24 | Stop reason: HOSPADM

## 2023-02-22 RX ORDER — SODIUM CHLORIDE 9 MG/ML
75 INJECTION, SOLUTION INTRAVENOUS CONTINUOUS
Status: DISCONTINUED | OUTPATIENT
Start: 2023-02-22 | End: 2023-02-22

## 2023-02-22 RX ORDER — SODIUM CHLORIDE 0.9 % (FLUSH) 0.9 %
10 SYRINGE (ML) INJECTION AS NEEDED
Status: DISCONTINUED | OUTPATIENT
Start: 2023-02-22 | End: 2023-02-24 | Stop reason: HOSPADM

## 2023-02-22 RX ORDER — AZITHROMYCIN 250 MG/1
1 TABLET, FILM COATED ORAL DAILY
COMMUNITY

## 2023-02-22 RX ORDER — BISACODYL 5 MG/1
5 TABLET, DELAYED RELEASE ORAL DAILY PRN
Status: DISCONTINUED | OUTPATIENT
Start: 2023-02-22 | End: 2023-02-24 | Stop reason: HOSPADM

## 2023-02-22 RX ORDER — ENOXAPARIN SODIUM 100 MG/ML
40 INJECTION SUBCUTANEOUS DAILY
Status: DISCONTINUED | OUTPATIENT
Start: 2023-02-23 | End: 2023-02-22

## 2023-02-22 RX ORDER — INSULIN ASPART 100 [IU]/ML
0-7 INJECTION, SOLUTION INTRAVENOUS; SUBCUTANEOUS
Status: DISCONTINUED | OUTPATIENT
Start: 2023-02-22 | End: 2023-02-24 | Stop reason: HOSPADM

## 2023-02-22 RX ORDER — BUDESONIDE 0.5 MG/2ML
0.25 INHALANT ORAL
Status: DISCONTINUED | OUTPATIENT
Start: 2023-02-22 | End: 2023-02-24 | Stop reason: HOSPADM

## 2023-02-22 RX ORDER — ENOXAPARIN SODIUM 150 MG/ML
1 INJECTION SUBCUTANEOUS ONCE
Status: COMPLETED | OUTPATIENT
Start: 2023-02-22 | End: 2023-02-22

## 2023-02-22 RX ORDER — ALLOPURINOL 100 MG/1
100 TABLET ORAL 2 TIMES DAILY
Status: DISCONTINUED | OUTPATIENT
Start: 2023-02-22 | End: 2023-02-24 | Stop reason: HOSPADM

## 2023-02-22 RX ORDER — AMOXICILLIN 250 MG
2 CAPSULE ORAL 2 TIMES DAILY
Status: DISCONTINUED | OUTPATIENT
Start: 2023-02-22 | End: 2023-02-24 | Stop reason: HOSPADM

## 2023-02-22 RX ORDER — ACETAMINOPHEN 650 MG/1
650 SUPPOSITORY RECTAL EVERY 4 HOURS PRN
Status: DISCONTINUED | OUTPATIENT
Start: 2023-02-22 | End: 2023-02-24 | Stop reason: HOSPADM

## 2023-02-22 RX ORDER — ACETAMINOPHEN 160 MG/5ML
650 SOLUTION ORAL EVERY 4 HOURS PRN
Status: DISCONTINUED | OUTPATIENT
Start: 2023-02-22 | End: 2023-02-24 | Stop reason: HOSPADM

## 2023-02-22 RX ORDER — BUMETANIDE 0.25 MG/ML
2 INJECTION INTRAMUSCULAR; INTRAVENOUS ONCE
Status: COMPLETED | OUTPATIENT
Start: 2023-02-22 | End: 2023-02-22

## 2023-02-22 RX ORDER — CETIRIZINE HYDROCHLORIDE 10 MG/1
10 TABLET ORAL NIGHTLY
Status: DISCONTINUED | OUTPATIENT
Start: 2023-02-22 | End: 2023-02-24 | Stop reason: HOSPADM

## 2023-02-22 RX ORDER — DEXTROSE MONOHYDRATE 25 G/50ML
25 INJECTION, SOLUTION INTRAVENOUS
Status: DISCONTINUED | OUTPATIENT
Start: 2023-02-22 | End: 2023-02-24 | Stop reason: HOSPADM

## 2023-02-22 RX ORDER — ACETYLCYSTEINE 100 MG/ML
4 SOLUTION ORAL; RESPIRATORY (INHALATION)
Status: DISCONTINUED | OUTPATIENT
Start: 2023-02-22 | End: 2023-02-24 | Stop reason: HOSPADM

## 2023-02-22 RX ORDER — GABAPENTIN 100 MG/1
100 CAPSULE ORAL NIGHTLY
Status: DISCONTINUED | OUTPATIENT
Start: 2023-02-22 | End: 2023-02-24 | Stop reason: HOSPADM

## 2023-02-22 RX ORDER — IPRATROPIUM BROMIDE AND ALBUTEROL SULFATE 2.5; .5 MG/3ML; MG/3ML
3 SOLUTION RESPIRATORY (INHALATION)
Status: DISCONTINUED | OUTPATIENT
Start: 2023-02-22 | End: 2023-02-24 | Stop reason: HOSPADM

## 2023-02-22 RX ORDER — PANTOPRAZOLE SODIUM 40 MG/1
40 TABLET, DELAYED RELEASE ORAL
Status: DISCONTINUED | OUTPATIENT
Start: 2023-02-23 | End: 2023-02-24 | Stop reason: HOSPADM

## 2023-02-22 RX ORDER — LEVOFLOXACIN 5 MG/ML
750 INJECTION, SOLUTION INTRAVENOUS ONCE
Status: COMPLETED | OUTPATIENT
Start: 2023-02-22 | End: 2023-02-22

## 2023-02-22 RX ADMIN — IPRATROPIUM BROMIDE AND ALBUTEROL SULFATE 3 ML: 2.5; .5 SOLUTION RESPIRATORY (INHALATION) at 19:03

## 2023-02-22 RX ADMIN — INSULIN ASPART 2 UNITS: 100 INJECTION, SOLUTION INTRAVENOUS; SUBCUTANEOUS at 18:26

## 2023-02-22 RX ADMIN — CLOPIDOGREL BISULFATE 75 MG: 75 TABLET ORAL at 14:16

## 2023-02-22 RX ADMIN — SODIUM CHLORIDE 75 ML/HR: 9 INJECTION, SOLUTION INTRAVENOUS at 14:04

## 2023-02-22 RX ADMIN — ACETYLCYSTEINE 4 ML: 100 SOLUTION ORAL; RESPIRATORY (INHALATION) at 22:27

## 2023-02-22 RX ADMIN — SPIRONOLACTONE 25 MG: 25 TABLET ORAL at 20:27

## 2023-02-22 RX ADMIN — DOCUSATE SODIUM 50 MG AND SENNOSIDES 8.6 MG 2 TABLET: 8.6; 5 TABLET, FILM COATED ORAL at 20:27

## 2023-02-22 RX ADMIN — MORPHINE SULFATE 4 MG: 4 INJECTION, SOLUTION INTRAMUSCULAR; INTRAVENOUS at 20:27

## 2023-02-22 RX ADMIN — Medication 10 ML: at 21:25

## 2023-02-22 RX ADMIN — PREDNISONE 50 MG: 20 TABLET ORAL at 14:16

## 2023-02-22 RX ADMIN — BUMETANIDE 2 MG: 0.25 INJECTION INTRAMUSCULAR; INTRAVENOUS at 14:04

## 2023-02-22 RX ADMIN — BUDESONIDE 0.25 MG: 0.5 SUSPENSION RESPIRATORY (INHALATION) at 19:03

## 2023-02-22 RX ADMIN — ENOXAPARIN SODIUM 105 MG: 120 INJECTION SUBCUTANEOUS at 14:51

## 2023-02-22 RX ADMIN — ALBUTEROL SULFATE 2.5 MG: 2.5 SOLUTION RESPIRATORY (INHALATION) at 22:27

## 2023-02-22 RX ADMIN — LEVOFLOXACIN 750 MG: 5 INJECTION, SOLUTION INTRAVENOUS at 15:50

## 2023-02-22 RX ADMIN — ACETYLCYSTEINE 4 ML: 100 SOLUTION ORAL; RESPIRATORY (INHALATION) at 19:03

## 2023-02-22 RX ADMIN — ALLOPURINOL 100 MG: 100 TABLET ORAL at 20:27

## 2023-02-22 RX ADMIN — CETIRIZINE HYDROCHLORIDE 10 MG: 10 TABLET, FILM COATED ORAL at 20:27

## 2023-02-22 RX ADMIN — GABAPENTIN 100 MG: 100 CAPSULE ORAL at 20:27

## 2023-02-23 ENCOUNTER — APPOINTMENT (OUTPATIENT)
Dept: CT IMAGING | Facility: HOSPITAL | Age: 83
DRG: 194 | End: 2023-02-23
Payer: MEDICARE

## 2023-02-23 ENCOUNTER — APPOINTMENT (OUTPATIENT)
Dept: CARDIOLOGY | Facility: HOSPITAL | Age: 83
DRG: 194 | End: 2023-02-23
Payer: MEDICARE

## 2023-02-23 LAB
ANION GAP SERPL CALCULATED.3IONS-SCNC: 10 MMOL/L (ref 5–15)
BASOPHILS # BLD AUTO: 0.02 10*3/MM3 (ref 0–0.2)
BASOPHILS NFR BLD AUTO: 0.3 % (ref 0–1.5)
BH CV ECHO LEFT VENTRICLE GLOBAL LONGITUDINAL STRAIN: -16.9 %
BH CV ECHO MEAS - ACS: 1.84 CM
BH CV ECHO MEAS - AO MAX PG: 14.7 MMHG
BH CV ECHO MEAS - AO MEAN PG: 9 MMHG
BH CV ECHO MEAS - AO ROOT DIAM: 3.3 CM
BH CV ECHO MEAS - AO V2 MAX: 192 CM/SEC
BH CV ECHO MEAS - AO V2 VTI: 35.1 CM
BH CV ECHO MEAS - AVA(I,D): 1.76 CM2
BH CV ECHO MEAS - EDV(CUBED): 115.9 ML
BH CV ECHO MEAS - EDV(MOD-SP2): 97.4 ML
BH CV ECHO MEAS - EDV(MOD-SP4): 82.8 ML
BH CV ECHO MEAS - EF(MOD-BP): 49.8 %
BH CV ECHO MEAS - EF(MOD-SP2): 48 %
BH CV ECHO MEAS - EF(MOD-SP4): 53 %
BH CV ECHO MEAS - ESV(CUBED): 57.6 ML
BH CV ECHO MEAS - ESV(MOD-SP2): 50.6 ML
BH CV ECHO MEAS - ESV(MOD-SP4): 38.9 ML
BH CV ECHO MEAS - FS: 20.8 %
BH CV ECHO MEAS - IVS/LVPW: 1.03 CM
BH CV ECHO MEAS - IVSD: 1.25 CM
BH CV ECHO MEAS - LA DIMENSION: 4.5 CM
BH CV ECHO MEAS - LAT PEAK E' VEL: 7.4 CM/SEC
BH CV ECHO MEAS - LV DIASTOLIC VOL/BSA (35-75): 37.2 CM2
BH CV ECHO MEAS - LV MASS(C)D: 232.1 GRAMS
BH CV ECHO MEAS - LV MAX PG: 3 MMHG
BH CV ECHO MEAS - LV MEAN PG: 1.64 MMHG
BH CV ECHO MEAS - LV SYSTOLIC VOL/BSA (12-30): 17.5 CM2
BH CV ECHO MEAS - LV V1 MAX: 86.9 CM/SEC
BH CV ECHO MEAS - LV V1 VTI: 17.7 CM
BH CV ECHO MEAS - LVIDD: 4.9 CM
BH CV ECHO MEAS - LVIDS: 3.9 CM
BH CV ECHO MEAS - LVOT AREA: 3.5 CM2
BH CV ECHO MEAS - LVOT DIAM: 2.11 CM
BH CV ECHO MEAS - LVPWD: 1.21 CM
BH CV ECHO MEAS - MED PEAK E' VEL: 5.9 CM/SEC
BH CV ECHO MEAS - MV A MAX VEL: 107.7 CM/SEC
BH CV ECHO MEAS - MV DEC SLOPE: 483.8 CM/SEC2
BH CV ECHO MEAS - MV E MAX VEL: 75.3 CM/SEC
BH CV ECHO MEAS - MV E/A: 0.7
BH CV ECHO MEAS - MV MAX PG: 4.8 MMHG
BH CV ECHO MEAS - MV MEAN PG: 2.04 MMHG
BH CV ECHO MEAS - MV P1/2T: 50.1 MSEC
BH CV ECHO MEAS - MV V2 VTI: 22.8 CM
BH CV ECHO MEAS - MVA(P1/2T): 4.4 CM2
BH CV ECHO MEAS - MVA(VTI): 2.7 CM2
BH CV ECHO MEAS - PA V2 MAX: 112.7 CM/SEC
BH CV ECHO MEAS - RAP SYSTOLE: 3 MMHG
BH CV ECHO MEAS - RVDD: 2.7 CM
BH CV ECHO MEAS - RVSP: 31.9 MMHG
BH CV ECHO MEAS - SI(MOD-SP2): 21 ML/M2
BH CV ECHO MEAS - SI(MOD-SP4): 19.7 ML/M2
BH CV ECHO MEAS - SV(LVOT): 61.8 ML
BH CV ECHO MEAS - SV(MOD-SP2): 46.8 ML
BH CV ECHO MEAS - SV(MOD-SP4): 43.9 ML
BH CV ECHO MEAS - TAPSE (>1.6): 2.29 CM
BH CV ECHO MEAS - TR MAX PG: 28.9 MMHG
BH CV ECHO MEAS - TR MAX VEL: 268.8 CM/SEC
BH CV ECHO MEASUREMENTS AVERAGE E/E' RATIO: 11.32
BUN SERPL-MCNC: 32 MG/DL (ref 8–23)
BUN/CREAT SERPL: 23.7 (ref 7–25)
CALCIUM SPEC-SCNC: 8.9 MG/DL (ref 8.6–10.5)
CHLORIDE SERPL-SCNC: 98 MMOL/L (ref 98–107)
CO2 SERPL-SCNC: 27 MMOL/L (ref 22–29)
CREAT SERPL-MCNC: 1.35 MG/DL (ref 0.57–1)
DEPRECATED RDW RBC AUTO: 52.1 FL (ref 37–54)
EGFRCR SERPLBLD CKD-EPI 2021: 39.6 ML/MIN/1.73
EOSINOPHIL # BLD AUTO: 0 10*3/MM3 (ref 0–0.4)
EOSINOPHIL NFR BLD AUTO: 0 % (ref 0.3–6.2)
ERYTHROCYTE [DISTWIDTH] IN BLOOD BY AUTOMATED COUNT: 14.8 % (ref 12.3–15.4)
GLUCOSE BLDC GLUCOMTR-MCNC: 121 MG/DL (ref 70–130)
GLUCOSE BLDC GLUCOMTR-MCNC: 166 MG/DL (ref 70–130)
GLUCOSE BLDC GLUCOMTR-MCNC: 207 MG/DL (ref 70–130)
GLUCOSE BLDC GLUCOMTR-MCNC: 237 MG/DL (ref 70–130)
GLUCOSE SERPL-MCNC: 130 MG/DL (ref 65–99)
HCT VFR BLD AUTO: 31.5 % (ref 34–46.6)
HGB BLD-MCNC: 10.3 G/DL (ref 12–15.9)
IMM GRANULOCYTES # BLD AUTO: 0.05 10*3/MM3 (ref 0–0.05)
IMM GRANULOCYTES NFR BLD AUTO: 0.7 % (ref 0–0.5)
LV EF 2D ECHO EST: 48 %
LYMPHOCYTES # BLD AUTO: 1.31 10*3/MM3 (ref 0.7–3.1)
LYMPHOCYTES NFR BLD AUTO: 17.2 % (ref 19.6–45.3)
MAXIMAL PREDICTED HEART RATE: 139 BPM
MCH RBC QN AUTO: 31.7 PG (ref 26.6–33)
MCHC RBC AUTO-ENTMCNC: 32.7 G/DL (ref 31.5–35.7)
MCV RBC AUTO: 96.9 FL (ref 79–97)
MONOCYTES # BLD AUTO: 0.48 10*3/MM3 (ref 0.1–0.9)
MONOCYTES NFR BLD AUTO: 6.3 % (ref 5–12)
MYCOPLASMAE PNEUMONIAE BY PCR: NEGATIVE
NEUTROPHILS NFR BLD AUTO: 5.75 10*3/MM3 (ref 1.7–7)
NEUTROPHILS NFR BLD AUTO: 75.5 % (ref 42.7–76)
NRBC BLD AUTO-RTO: 0 /100 WBC (ref 0–0.2)
PLATELET # BLD AUTO: 185 10*3/MM3 (ref 140–450)
PMV BLD AUTO: 12.1 FL (ref 6–12)
POTASSIUM SERPL-SCNC: 4.6 MMOL/L (ref 3.5–5.2)
PROCALCITONIN SERPL-MCNC: 0.06 NG/ML (ref 0–0.25)
RBC # BLD AUTO: 3.25 10*6/MM3 (ref 3.77–5.28)
SODIUM SERPL-SCNC: 135 MMOL/L (ref 136–145)
STRESS TARGET HR: 118 BPM
WBC NRBC COR # BLD: 7.61 10*3/MM3 (ref 3.4–10.8)

## 2023-02-23 PROCEDURE — 63710000001 INSULIN ASPART PER 5 UNITS

## 2023-02-23 PROCEDURE — 93005 ELECTROCARDIOGRAM TRACING: CPT | Performed by: STUDENT IN AN ORGANIZED HEALTH CARE EDUCATION/TRAINING PROGRAM

## 2023-02-23 PROCEDURE — 82962 GLUCOSE BLOOD TEST: CPT

## 2023-02-23 PROCEDURE — 63710000001 PREDNISONE PER 1 MG: Performed by: STUDENT IN AN ORGANIZED HEALTH CARE EDUCATION/TRAINING PROGRAM

## 2023-02-23 PROCEDURE — 63710000001 PREDNISONE PER 5 MG: Performed by: STUDENT IN AN ORGANIZED HEALTH CARE EDUCATION/TRAINING PROGRAM

## 2023-02-23 PROCEDURE — 93356 MYOCRD STRAIN IMG SPCKL TRCK: CPT | Performed by: INTERNAL MEDICINE

## 2023-02-23 PROCEDURE — 97166 OT EVAL MOD COMPLEX 45 MIN: CPT

## 2023-02-23 PROCEDURE — 97162 PT EVAL MOD COMPLEX 30 MIN: CPT

## 2023-02-23 PROCEDURE — 93306 TTE W/DOPPLER COMPLETE: CPT | Performed by: INTERNAL MEDICINE

## 2023-02-23 PROCEDURE — 94799 UNLISTED PULMONARY SVC/PX: CPT

## 2023-02-23 PROCEDURE — 99232 SBSQ HOSP IP/OBS MODERATE 35: CPT | Performed by: STUDENT IN AN ORGANIZED HEALTH CARE EDUCATION/TRAINING PROGRAM

## 2023-02-23 PROCEDURE — 94760 N-INVAS EAR/PLS OXIMETRY 1: CPT

## 2023-02-23 PROCEDURE — 25010000002 MORPHINE PER 10 MG

## 2023-02-23 PROCEDURE — 25010000002 DIPHENHYDRAMINE PER 50 MG: Performed by: STUDENT IN AN ORGANIZED HEALTH CARE EDUCATION/TRAINING PROGRAM

## 2023-02-23 PROCEDURE — 71275 CT ANGIOGRAPHY CHEST: CPT

## 2023-02-23 PROCEDURE — 85025 COMPLETE CBC W/AUTO DIFF WBC: CPT

## 2023-02-23 PROCEDURE — 94664 DEMO&/EVAL PT USE INHALER: CPT

## 2023-02-23 PROCEDURE — 80048 BASIC METABOLIC PNL TOTAL CA: CPT

## 2023-02-23 PROCEDURE — 25010000002 ENOXAPARIN PER 10 MG: Performed by: STUDENT IN AN ORGANIZED HEALTH CARE EDUCATION/TRAINING PROGRAM

## 2023-02-23 PROCEDURE — 93306 TTE W/DOPPLER COMPLETE: CPT

## 2023-02-23 PROCEDURE — 25010000002 LEVOFLOXACIN PER 250 MG: Performed by: STUDENT IN AN ORGANIZED HEALTH CARE EDUCATION/TRAINING PROGRAM

## 2023-02-23 PROCEDURE — 93356 MYOCRD STRAIN IMG SPCKL TRCK: CPT

## 2023-02-23 PROCEDURE — 93010 ELECTROCARDIOGRAM REPORT: CPT | Performed by: INTERNAL MEDICINE

## 2023-02-23 RX ORDER — BUMETANIDE 1 MG/1
2 TABLET ORAL DAILY
Status: DISCONTINUED | OUTPATIENT
Start: 2023-02-23 | End: 2023-02-24

## 2023-02-23 RX ORDER — LEVOFLOXACIN 5 MG/ML
750 INJECTION, SOLUTION INTRAVENOUS ONCE
Status: COMPLETED | OUTPATIENT
Start: 2023-02-23 | End: 2023-02-23

## 2023-02-23 RX ORDER — DIPHENHYDRAMINE HYDROCHLORIDE 50 MG/ML
50 INJECTION INTRAMUSCULAR; INTRAVENOUS
Status: COMPLETED | OUTPATIENT
Start: 2023-02-23 | End: 2023-02-23

## 2023-02-23 RX ORDER — DIPHENHYDRAMINE HCL 50 MG
50 CAPSULE ORAL
Status: COMPLETED | OUTPATIENT
Start: 2023-02-23 | End: 2023-02-23

## 2023-02-23 RX ORDER — ENOXAPARIN SODIUM 100 MG/ML
40 INJECTION SUBCUTANEOUS DAILY
Status: DISCONTINUED | OUTPATIENT
Start: 2023-02-23 | End: 2023-02-24 | Stop reason: HOSPADM

## 2023-02-23 RX ADMIN — ACETYLCYSTEINE 4 ML: 100 SOLUTION ORAL; RESPIRATORY (INHALATION) at 11:35

## 2023-02-23 RX ADMIN — IPRATROPIUM BROMIDE AND ALBUTEROL SULFATE 3 ML: 2.5; .5 SOLUTION RESPIRATORY (INHALATION) at 08:14

## 2023-02-23 RX ADMIN — Medication 10 ML: at 20:42

## 2023-02-23 RX ADMIN — GABAPENTIN 100 MG: 100 CAPSULE ORAL at 20:42

## 2023-02-23 RX ADMIN — PANTOPRAZOLE SODIUM 40 MG: 40 TABLET, DELAYED RELEASE ORAL at 06:28

## 2023-02-23 RX ADMIN — Medication 10 ML: at 08:51

## 2023-02-23 RX ADMIN — MORPHINE SULFATE 4 MG: 4 INJECTION, SOLUTION INTRAMUSCULAR; INTRAVENOUS at 23:34

## 2023-02-23 RX ADMIN — IPRATROPIUM BROMIDE AND ALBUTEROL SULFATE 3 ML: 2.5; .5 SOLUTION RESPIRATORY (INHALATION) at 19:23

## 2023-02-23 RX ADMIN — ENOXAPARIN SODIUM 40 MG: 40 INJECTION SUBCUTANEOUS at 11:08

## 2023-02-23 RX ADMIN — DOCUSATE SODIUM 50 MG AND SENNOSIDES 8.6 MG 2 TABLET: 8.6; 5 TABLET, FILM COATED ORAL at 20:42

## 2023-02-23 RX ADMIN — PREDNISONE 50 MG: 20 TABLET ORAL at 17:24

## 2023-02-23 RX ADMIN — BUDESONIDE 0.25 MG: 0.5 SUSPENSION RESPIRATORY (INHALATION) at 19:23

## 2023-02-23 RX ADMIN — ALLOPURINOL 100 MG: 100 TABLET ORAL at 08:48

## 2023-02-23 RX ADMIN — BUDESONIDE 0.25 MG: 0.5 SUSPENSION RESPIRATORY (INHALATION) at 08:14

## 2023-02-23 RX ADMIN — ACETYLCYSTEINE 4 ML: 100 SOLUTION ORAL; RESPIRATORY (INHALATION) at 03:48

## 2023-02-23 RX ADMIN — ALBUTEROL SULFATE 2.5 MG: 2.5 SOLUTION RESPIRATORY (INHALATION) at 03:48

## 2023-02-23 RX ADMIN — Medication 5 MG: at 01:05

## 2023-02-23 RX ADMIN — DIPHENHYDRAMINE HYDROCHLORIDE 50 MG: 50 INJECTION, SOLUTION INTRAMUSCULAR; INTRAVENOUS at 23:33

## 2023-02-23 RX ADMIN — ACETYLCYSTEINE 4 ML: 100 SOLUTION ORAL; RESPIRATORY (INHALATION) at 08:14

## 2023-02-23 RX ADMIN — PREDNISONE 50 MG: 20 TABLET ORAL at 11:10

## 2023-02-23 RX ADMIN — INSULIN ASPART 2 UNITS: 100 INJECTION, SOLUTION INTRAVENOUS; SUBCUTANEOUS at 11:09

## 2023-02-23 RX ADMIN — DOCUSATE SODIUM 50 MG AND SENNOSIDES 8.6 MG 2 TABLET: 8.6; 5 TABLET, FILM COATED ORAL at 08:49

## 2023-02-23 RX ADMIN — SPIRONOLACTONE 25 MG: 25 TABLET ORAL at 20:42

## 2023-02-23 RX ADMIN — LEVOFLOXACIN 750 MG: 5 INJECTION, SOLUTION INTRAVENOUS at 14:13

## 2023-02-23 RX ADMIN — INSULIN ASPART 3 UNITS: 100 INJECTION, SOLUTION INTRAVENOUS; SUBCUTANEOUS at 17:24

## 2023-02-23 RX ADMIN — IPRATROPIUM BROMIDE AND ALBUTEROL SULFATE 3 ML: 2.5; .5 SOLUTION RESPIRATORY (INHALATION) at 15:21

## 2023-02-23 RX ADMIN — ALLOPURINOL 100 MG: 100 TABLET ORAL at 20:42

## 2023-02-23 RX ADMIN — PREDNISONE 50 MG: 20 TABLET ORAL at 23:33

## 2023-02-23 RX ADMIN — MORPHINE SULFATE 4 MG: 4 INJECTION, SOLUTION INTRAMUSCULAR; INTRAVENOUS at 16:14

## 2023-02-23 RX ADMIN — MORPHINE SULFATE 4 MG: 4 INJECTION, SOLUTION INTRAMUSCULAR; INTRAVENOUS at 03:57

## 2023-02-23 RX ADMIN — BUMETANIDE 2 MG: 1 TABLET ORAL at 08:49

## 2023-02-23 RX ADMIN — ACETYLCYSTEINE 4 ML: 100 SOLUTION ORAL; RESPIRATORY (INHALATION) at 15:21

## 2023-02-23 RX ADMIN — IPRATROPIUM BROMIDE AND ALBUTEROL SULFATE 3 ML: 2.5; .5 SOLUTION RESPIRATORY (INHALATION) at 11:35

## 2023-02-23 RX ADMIN — CETIRIZINE HYDROCHLORIDE 10 MG: 10 TABLET, FILM COATED ORAL at 20:42

## 2023-02-24 ENCOUNTER — HOME HEALTH ADMISSION (OUTPATIENT)
Dept: HOME HEALTH SERVICES | Facility: HOME HEALTHCARE | Age: 83
End: 2023-02-24
Payer: MEDICARE

## 2023-02-24 ENCOUNTER — READMISSION MANAGEMENT (OUTPATIENT)
Dept: CALL CENTER | Facility: HOSPITAL | Age: 83
End: 2023-02-24
Payer: MEDICARE

## 2023-02-24 VITALS
RESPIRATION RATE: 20 BRPM | HEIGHT: 68 IN | BODY MASS INDEX: 37.09 KG/M2 | SYSTOLIC BLOOD PRESSURE: 130 MMHG | WEIGHT: 244.71 LBS | TEMPERATURE: 97 F | DIASTOLIC BLOOD PRESSURE: 60 MMHG | HEART RATE: 103 BPM | OXYGEN SATURATION: 93 %

## 2023-02-24 PROBLEM — R06.00 DYSPNEA: Status: RESOLVED | Noted: 2020-11-25 | Resolved: 2023-02-24

## 2023-02-24 PROBLEM — J18.9 PNEUMONIA OF RIGHT LOWER LOBE DUE TO INFECTIOUS ORGANISM: Status: RESOLVED | Noted: 2023-02-22 | Resolved: 2023-02-24

## 2023-02-24 LAB
ANION GAP SERPL CALCULATED.3IONS-SCNC: 7 MMOL/L (ref 5–15)
BASOPHILS # BLD AUTO: 0.02 10*3/MM3 (ref 0–0.2)
BASOPHILS NFR BLD AUTO: 0.2 % (ref 0–1.5)
BUN SERPL-MCNC: 34 MG/DL (ref 8–23)
BUN/CREAT SERPL: 24.5 (ref 7–25)
CALCIUM SPEC-SCNC: 9.3 MG/DL (ref 8.6–10.5)
CHLORIDE SERPL-SCNC: 96 MMOL/L (ref 98–107)
CO2 SERPL-SCNC: 29 MMOL/L (ref 22–29)
CREAT SERPL-MCNC: 1.39 MG/DL (ref 0.57–1)
DEPRECATED RDW RBC AUTO: 50.6 FL (ref 37–54)
EGFRCR SERPLBLD CKD-EPI 2021: 38.2 ML/MIN/1.73
EOSINOPHIL # BLD AUTO: 0 10*3/MM3 (ref 0–0.4)
EOSINOPHIL NFR BLD AUTO: 0 % (ref 0.3–6.2)
ERYTHROCYTE [DISTWIDTH] IN BLOOD BY AUTOMATED COUNT: 14.7 % (ref 12.3–15.4)
GLUCOSE BLDC GLUCOMTR-MCNC: 196 MG/DL (ref 70–130)
GLUCOSE BLDC GLUCOMTR-MCNC: 232 MG/DL (ref 70–130)
GLUCOSE SERPL-MCNC: 171 MG/DL (ref 65–99)
HCT VFR BLD AUTO: 34 % (ref 34–46.6)
HGB BLD-MCNC: 11 G/DL (ref 12–15.9)
IMM GRANULOCYTES # BLD AUTO: 0.07 10*3/MM3 (ref 0–0.05)
IMM GRANULOCYTES NFR BLD AUTO: 0.8 % (ref 0–0.5)
LYMPHOCYTES # BLD AUTO: 0.55 10*3/MM3 (ref 0.7–3.1)
LYMPHOCYTES NFR BLD AUTO: 6.5 % (ref 19.6–45.3)
MCH RBC QN AUTO: 31.3 PG (ref 26.6–33)
MCHC RBC AUTO-ENTMCNC: 32.4 G/DL (ref 31.5–35.7)
MCV RBC AUTO: 96.6 FL (ref 79–97)
MONOCYTES # BLD AUTO: 0.11 10*3/MM3 (ref 0.1–0.9)
MONOCYTES NFR BLD AUTO: 1.3 % (ref 5–12)
NEUTROPHILS NFR BLD AUTO: 7.74 10*3/MM3 (ref 1.7–7)
NEUTROPHILS NFR BLD AUTO: 91.2 % (ref 42.7–76)
NRBC BLD AUTO-RTO: 0 /100 WBC (ref 0–0.2)
PLATELET # BLD AUTO: 188 10*3/MM3 (ref 140–450)
PMV BLD AUTO: 12.4 FL (ref 6–12)
POTASSIUM SERPL-SCNC: 4.4 MMOL/L (ref 3.5–5.2)
RBC # BLD AUTO: 3.52 10*6/MM3 (ref 3.77–5.28)
SODIUM SERPL-SCNC: 132 MMOL/L (ref 136–145)
WBC NRBC COR # BLD: 8.49 10*3/MM3 (ref 3.4–10.8)

## 2023-02-24 PROCEDURE — 25010000002 MORPHINE PER 10 MG

## 2023-02-24 PROCEDURE — 94760 N-INVAS EAR/PLS OXIMETRY 1: CPT

## 2023-02-24 PROCEDURE — 25010000002 ENOXAPARIN PER 10 MG: Performed by: STUDENT IN AN ORGANIZED HEALTH CARE EDUCATION/TRAINING PROGRAM

## 2023-02-24 PROCEDURE — 94799 UNLISTED PULMONARY SVC/PX: CPT

## 2023-02-24 PROCEDURE — 80048 BASIC METABOLIC PNL TOTAL CA: CPT

## 2023-02-24 PROCEDURE — 25510000001 IOPAMIDOL PER 1 ML: Performed by: STUDENT IN AN ORGANIZED HEALTH CARE EDUCATION/TRAINING PROGRAM

## 2023-02-24 PROCEDURE — 97116 GAIT TRAINING THERAPY: CPT

## 2023-02-24 PROCEDURE — 63710000001 INSULIN ASPART PER 5 UNITS

## 2023-02-24 PROCEDURE — 97110 THERAPEUTIC EXERCISES: CPT

## 2023-02-24 PROCEDURE — 99239 HOSP IP/OBS DSCHRG MGMT >30: CPT | Performed by: STUDENT IN AN ORGANIZED HEALTH CARE EDUCATION/TRAINING PROGRAM

## 2023-02-24 PROCEDURE — 97535 SELF CARE MNGMENT TRAINING: CPT

## 2023-02-24 PROCEDURE — 85025 COMPLETE CBC W/AUTO DIFF WBC: CPT

## 2023-02-24 PROCEDURE — 82962 GLUCOSE BLOOD TEST: CPT

## 2023-02-24 RX ORDER — SPIRONOLACTONE 25 MG/1
25 TABLET ORAL NIGHTLY
Status: DISCONTINUED | OUTPATIENT
Start: 2023-02-24 | End: 2023-02-24 | Stop reason: HOSPADM

## 2023-02-24 RX ORDER — HYDROCODONE BITARTRATE AND ACETAMINOPHEN 7.5; 325 MG/1; MG/1
1 TABLET ORAL EVERY 6 HOURS PRN
Qty: 20 TABLET | Refills: 0 | Status: SHIPPED | OUTPATIENT
Start: 2023-02-24

## 2023-02-24 RX ORDER — BUMETANIDE 1 MG/1
2 TABLET ORAL NIGHTLY
Status: DISCONTINUED | OUTPATIENT
Start: 2023-02-24 | End: 2023-02-24 | Stop reason: HOSPADM

## 2023-02-24 RX ORDER — HYDROCODONE BITARTRATE AND ACETAMINOPHEN 7.5; 325 MG/1; MG/1
1 TABLET ORAL EVERY 6 HOURS PRN
Status: DISCONTINUED | OUTPATIENT
Start: 2023-02-24 | End: 2023-02-24 | Stop reason: HOSPADM

## 2023-02-24 RX ORDER — GUAIFENESIN 600 MG/1
1200 TABLET, EXTENDED RELEASE ORAL EVERY 12 HOURS SCHEDULED
Qty: 60 TABLET | Refills: 0 | Status: SHIPPED | OUTPATIENT
Start: 2023-02-24

## 2023-02-24 RX ORDER — GUAIFENESIN 600 MG/1
1200 TABLET, EXTENDED RELEASE ORAL EVERY 12 HOURS SCHEDULED
Status: DISCONTINUED | OUTPATIENT
Start: 2023-02-24 | End: 2023-02-24 | Stop reason: HOSPADM

## 2023-02-24 RX ORDER — OXYCODONE AND ACETAMINOPHEN 7.5; 325 MG/1; MG/1
1 TABLET ORAL EVERY 6 HOURS PRN
Status: DISCONTINUED | OUTPATIENT
Start: 2023-02-24 | End: 2023-02-24

## 2023-02-24 RX ADMIN — HYDROCODONE BITARTRATE AND ACETAMINOPHEN 1 TABLET: 7.5; 325 TABLET ORAL at 09:46

## 2023-02-24 RX ADMIN — DOCUSATE SODIUM 50 MG AND SENNOSIDES 8.6 MG 2 TABLET: 8.6; 5 TABLET, FILM COATED ORAL at 08:50

## 2023-02-24 RX ADMIN — AZITHROMYCIN MONOHYDRATE 250 MG: 250 TABLET ORAL at 08:50

## 2023-02-24 RX ADMIN — ENOXAPARIN SODIUM 40 MG: 40 INJECTION SUBCUTANEOUS at 08:50

## 2023-02-24 RX ADMIN — Medication 10 ML: at 08:52

## 2023-02-24 RX ADMIN — BUDESONIDE 0.25 MG: 0.5 SUSPENSION RESPIRATORY (INHALATION) at 07:46

## 2023-02-24 RX ADMIN — GUAIFENESIN 1200 MG: 600 TABLET, EXTENDED RELEASE ORAL at 08:51

## 2023-02-24 RX ADMIN — ALBUTEROL SULFATE 2.5 MG: 2.5 SOLUTION RESPIRATORY (INHALATION) at 03:53

## 2023-02-24 RX ADMIN — IPRATROPIUM BROMIDE AND ALBUTEROL SULFATE 3 ML: 2.5; .5 SOLUTION RESPIRATORY (INHALATION) at 07:46

## 2023-02-24 RX ADMIN — MORPHINE SULFATE 4 MG: 4 INJECTION, SOLUTION INTRAMUSCULAR; INTRAVENOUS at 04:26

## 2023-02-24 RX ADMIN — INSULIN ASPART 2 UNITS: 100 INJECTION, SOLUTION INTRAVENOUS; SUBCUTANEOUS at 12:18

## 2023-02-24 RX ADMIN — PANTOPRAZOLE SODIUM 40 MG: 40 TABLET, DELAYED RELEASE ORAL at 05:03

## 2023-02-24 RX ADMIN — INSULIN ASPART 3 UNITS: 100 INJECTION, SOLUTION INTRAVENOUS; SUBCUTANEOUS at 08:51

## 2023-02-24 RX ADMIN — ALLOPURINOL 100 MG: 100 TABLET ORAL at 08:50

## 2023-02-24 RX ADMIN — IOPAMIDOL 80 ML: 755 INJECTION, SOLUTION INTRAVENOUS at 00:36

## 2023-02-24 RX ADMIN — ZINC OXIDE 1 APPLICATION: 200 OINTMENT TOPICAL at 09:42

## 2023-02-24 RX ADMIN — ACETYLCYSTEINE 4 ML: 100 SOLUTION ORAL; RESPIRATORY (INHALATION) at 03:53

## 2023-02-24 RX ADMIN — ACETYLCYSTEINE 4 ML: 100 SOLUTION ORAL; RESPIRATORY (INHALATION) at 07:46

## 2023-02-24 NOTE — OUTREACH NOTE
Prep Survey    Flowsheet Row Responses   [facility] discharged from? [place]   Is LACE score < 7 ? No   Eligibility [facility]   Date of Admission 02/22/23   Date of Discharge 02/24/23   Discharge Disposition Home-Health Care Svc   Discharge diagnosis dyspnea, recent rib fracture, RLL PNA, Hx COPD, CHF,    Does the patient have one of the following disease processes/diagnoses(primary or secondary)? Pneumonia   Does the patient have Home health ordered? Yes   What is the Home health agency?  [facility]   Is there a DME ordered? No   Prep survey completed? Yes          Melva TEJEDA - Registered Nurse

## 2023-02-27 ENCOUNTER — TRANSITIONAL CARE MANAGEMENT TELEPHONE ENCOUNTER (OUTPATIENT)
Dept: CALL CENTER | Facility: HOSPITAL | Age: 83
End: 2023-02-27
Payer: MEDICARE

## 2023-02-27 LAB
BACTERIA SPEC AEROBE CULT: NORMAL
BACTERIA SPEC AEROBE CULT: NORMAL

## 2023-02-27 NOTE — OUTREACH NOTE
Call Center TCM Note    Flowsheet Row Responses   [facility] patient discharged from? [place]   Does the patient have one of the following disease processes/diagnoses(primary or secondary)? Pneumonia   TCM attempt successful? Yes  [contacts have verbal release]   Call start time 0933   Call end time 0941   Discharge diagnosis dyspnea, recent rib fracture, RLL PNA, Hx COPD, CHF,    Person spoke with today (if not patient) and relationship camille   Meds reviewed with patient/caregiver? Yes   Is the patient having any side effects they believe may be caused by any medication additions or changes? No   Does the patient have all medications ordered at discharge? Yes   Is the patient taking all medications as directed (includes completed medication regime)? Yes   Comments Appt 3/3/23 10 am. States telephone visit. Please ensure appt meets TCM guidelines.    Does the patient have an appointment with their PCP within 7 days of discharge? Yes   What is the Home health agency?  [facility]   Has home health visited the patient within 72 hours of discharge? Unsure   Did the patient receive a copy of their discharge instructions? Yes   Nursing interventions Reviewed instructions with patient   What is the patient's perception of their health status since discharge? Improving   Nursing Interventions Nurse provided patient education   Is the patient/caregiver able to teach back the hierarchy of who to call/visit for symptoms/problems? PCP, Specialist, Home health nurse, Urgent Care, ED, 911 Yes   Is the patient/caregiver able to teach back signs and symptoms of worsening condition: Fever/chills, Shortness of breath, Chest pain   TCM call completed? Yes   Wrap up additional comments Camille reports pt seems to be doing better.    Call end time 0941          Zelda Sherman RN    2/27/2023, 09:41 CST

## 2023-03-01 ENCOUNTER — HOME CARE VISIT (OUTPATIENT)
Dept: HOME HEALTH SERVICES | Facility: HOME HEALTHCARE | Age: 83
End: 2023-03-01

## 2023-03-02 LAB
QT INTERVAL: 362 MS
QT INTERVAL: 384 MS
QTC INTERVAL: 437 MS
QTC INTERVAL: 440 MS

## 2023-03-03 ENCOUNTER — HOME CARE VISIT (OUTPATIENT)
Dept: HOME HEALTH SERVICES | Facility: CLINIC | Age: 83
End: 2023-03-03
Payer: MEDICARE

## 2023-03-03 ENCOUNTER — OFFICE VISIT (OUTPATIENT)
Dept: FAMILY MEDICINE CLINIC | Facility: CLINIC | Age: 83
End: 2023-03-03
Payer: MEDICARE

## 2023-03-03 VITALS — HEART RATE: 88 BPM | RESPIRATION RATE: 18 BRPM | OXYGEN SATURATION: 96 % | TEMPERATURE: 97.3 F

## 2023-03-03 VITALS — WEIGHT: 244 LBS | HEIGHT: 68 IN | BODY MASS INDEX: 36.98 KG/M2

## 2023-03-03 DIAGNOSIS — Z87.81 HISTORY OF RIB FRACTURE: Primary | ICD-10-CM

## 2023-03-03 DIAGNOSIS — I50.32 CHRONIC HEART FAILURE WITH PRESERVED EJECTION FRACTION: ICD-10-CM

## 2023-03-03 DIAGNOSIS — R53.1 DECREASED STRENGTH, ENDURANCE, AND MOBILITY: ICD-10-CM

## 2023-03-03 DIAGNOSIS — Z74.09 DECREASED STRENGTH, ENDURANCE, AND MOBILITY: ICD-10-CM

## 2023-03-03 DIAGNOSIS — R68.89 DECREASED STRENGTH, ENDURANCE, AND MOBILITY: ICD-10-CM

## 2023-03-03 PROCEDURE — G0151 HHCP-SERV OF PT,EA 15 MIN: HCPCS

## 2023-03-03 PROCEDURE — 99442 PR PHYS/QHP TELEPHONE EVALUATION 11-20 MIN: CPT | Performed by: CHIROPRACTOR

## 2023-03-03 NOTE — HOME HEALTH
"Reason for referral: Pt presents a pleasant 82 year old s/p hospitla stay at [facility] 2-22 to 2-24 with pneumonia. Pt does state that prior to hosp she had a fall in home and fx 3 ribs, did get herself up off floor to knees then to a chair and pulled self up. Had sig right side pain and drove self to hospital in ER, did not stay. Pt demonstrates today decreased strength legs, gait deviations, dependence in trasnfers and ADLs and would benefit from skilled PT to address deficits and work toward safety and indep at home.    Past med HX: CHF,  is on O2@ 2L/nc continuously for years, lung cancer tx 2015 with lobe surgery, and again under going radiation tx now, sciatica last year, falls, denies  CVA, denies joint surg, gout  niece lives in [place], [place]. Son lives in [facility]    Patient Goal:  \"Get back to taking care of myself.\"  Prior level of function: drove self and cared for self, niece has helped care for home for a year  Numbness/tingling: from gout right hand ring finger  Precautions:use rollator walker all amb  Restrictions:  Appt 3-8 Dr Garsia at [place] [facility] 2pm  radiation appts 3-7 1pm [facility], also 3-9 1pm, 3-13 1pm, also 3-15 1pm    Had a phone visist Dr Posada 3-3"

## 2023-03-06 ENCOUNTER — HOME CARE VISIT (OUTPATIENT)
Dept: HOME HEALTH SERVICES | Facility: CLINIC | Age: 83
End: 2023-03-06
Payer: MEDICARE

## 2023-03-06 VITALS
RESPIRATION RATE: 18 BRPM | HEART RATE: 90 BPM | OXYGEN SATURATION: 95 % | SYSTOLIC BLOOD PRESSURE: 110 MMHG | DIASTOLIC BLOOD PRESSURE: 64 MMHG | TEMPERATURE: 97.7 F

## 2023-03-06 PROCEDURE — G0299 HHS/HOSPICE OF RN EA 15 MIN: HCPCS

## 2023-03-06 NOTE — PROGRESS NOTES
Family Medicine Residency  Gus Posada MD    Subjective:     Patient requested a phone visit.  Permission was granted by her for this visit to be conducted as a telephone only visit understanding its limitations.  Call was initiated at 10:13 AM and terminated at 10:32 AM for total time of 19 minutes on the phone.    Alda Constantino is a 82 y.o. female who presents for rib fracture, difficulty ambulating, and heart failure with preserved ejection fraction.  She was in the ER locally on 2/19 after falling at home earlier that day and fracturing some ribs on her right side.  She has been receiving physical therapy at home secondary to that.  She endorses the pain in her right ribs and the right side of her back is 10/10.  She had been prescribed in the ER some Norco 7.5-325's.  She only has a few pills left and has been using them sparingly.  She has difficulty getting around secondary to osteoarthritis in the low back, hips, and knees as well as some sciatica in the right leg.  She does have an appointment to see her cardiologist this upcoming Wednesday at [place] in [place].  She is going to talk to them about her swelling in her lower extremities.  She has been diagnosed with reduced kidney function by Dr. Castillo in [facility] and is worried about taking a water pill given her bad kidney function.    The following portions of the patient's history were reviewed and updated as appropriate: allergies, current medications, past family history, past medical history, past social history, past surgical history and problem list.    Past Medical Hx:  Past Medical History:   Diagnosis Date   • Asthma    • Cancer (HCC)    • Congenital abnormalities     colon behind liver   • COPD (chronic obstructive pulmonary disease) (HCC)    • Hypertension        Past Surgical Hx:  Past Surgical History:   Procedure Laterality Date   • ADENOIDECTOMY     • ADRENAL GLAND SURGERY Right    • BLADDER SURGERY     • BREAST  BIOPSY     • COLONOSCOPY W/ BIOPSIES AND POLYPECTOMY     • EYE SURGERY     • HERNIA REPAIR     • HYSTERECTOMY     • LUNG LOBECTOMY Left    • RECTAL SURGERY      cyst   • SKIN CANCER EXCISION      legs   • TONSILLECTOMY     • VARICOSE VEIN SURGERY         Current Meds:    Current Outpatient Medications:   •  albuterol (PROVENTIL HFA;VENTOLIN HFA) 108 (90 Base) MCG/ACT inhaler, Inhale 2 puffs Every 4 (Four) Hours As Needed for Wheezing., Disp: , Rfl:   •  allopurinol (Zyloprim) 100 MG tablet, Take 1 tablet by mouth 2 (Two) Times a Day., Disp: 180 tablet, Rfl: 2  •  arformoterol (BROVANA) 15 MCG/2ML nebulizer solution, , Disp: , Rfl:   •  azithromycin (ZITHROMAX) 250 MG tablet, Take 1 tablet by mouth Daily. Monday Wednesday and Friday, Disp: , Rfl:   •  budesonide (PULMICORT) 0.25 MG/2ML nebulizer solution, , Disp: , Rfl:   •  EPINEPHrine (EPIPEN) 0.3 MG/0.3ML solution auto-injector injection, , Disp: , Rfl:   •  Glucose Blood (PRECISION XTRA TEST VI), by In Vitro route., Disp: , Rfl:   •  guaiFENesin (MUCINEX) 600 MG 12 hr tablet, Take 2 tablets by mouth Every 12 (Twelve) Hours., Disp: 60 tablet, Rfl: 0  •  HYDROcodone-acetaminophen (NORCO) 7.5-325 MG per tablet, Take 1 tablet by mouth Every 6 (Six) Hours As Needed for Moderate Pain., Disp: 20 tablet, Rfl: 0  •  IPRATROPIUM BROMIDE IN, Inhale., Disp: , Rfl:   •  ipratropium-albuterol (DUO-NEB) 0.5-2.5 mg/3 ml nebulizer, Take 1.5 mL by nebulization Every 4 (Four) Hours As Needed for Wheezing., Disp: , Rfl:   •  levocetirizine (XYZAL) 5 MG tablet, Take 1 tablet by mouth Every Evening., Disp: , Rfl:   •  metFORMIN ER (GLUCOPHAGE-XR) 750 MG 24 hr tablet, TAKE 1 TABLET EVERY NIGHT AT BEDTIME, Disp: 30 tablet, Rfl: 11  •  omeprazole (priLOSEC) 40 MG capsule, Take 1 capsule by mouth Daily., Disp: 90 capsule, Rfl: 2  •  spironolactone (ALDACTONE) 25 MG tablet, Take 1 tablet by mouth Daily., Disp: , Rfl:   •  acetaminophen (TYLENOL) 500 MG tablet, Take 500 mg by mouth Every 6  (Six) Hours As Needed for Mild Pain., Disp: , Rfl:   •  gabapentin (NEURONTIN) 100 MG capsule, Take 100 mg by mouth every night at bedtime., Disp: , Rfl:     Allergies:  Allergies   Allergen Reactions   • Dilaudid [Hydromorphone Hcl] Anaphylaxis   • Diphenoxylate-Atropine Shortness Of Breath     Reaction: shortness of breath     • Iodinated Contrast Media Hives     Reaction: HIVES   • Keflex [Cephalexin] Shortness Of Breath   • Lomotil [Diphenoxylate] Shortness Of Breath   • Aspirin Hives   • Beef (Diagnostic) Nausea And Vomiting     All mammals  All mammals   • Beef-Derived Products GI Intolerance   • Ciprofloxacin Nausea And Vomiting   • Contrast Dye (Echo Or Unknown Ct/Mr) Hives   • Levaquin [Levofloxacin] GI Intolerance   • Milk-Related Compounds GI Intolerance   • Nsaids Hives   • Pegademase Bovine Other (See Comments)     Other reaction(s): GI Intolerance   • Percocet [Oxycodone-Acetaminophen] Itching   • Pork-Derived Products GI Intolerance   • Tolmetin Hives       Family Hx:  Family History   Problem Relation Age of Onset   • Diabetes Mother    • Diabetes Father    • Heart disease Father    • Cancer Sister    • Heart disease Sister    • Thyroid disease Sister    • Diabetes Brother    • Heart disease Brother         Social History:  Social History     Socioeconomic History   • Marital status:    Tobacco Use   • Smoking status: Never   • Smokeless tobacco: Never   Vaping Use   • Vaping Use: Never used   Substance and Sexual Activity   • Alcohol use: Never   • Drug use: Never   • Sexual activity: Defer       Review of Systems  Review of Systems   Constitutional: Positive for fatigue. Negative for fever.   HENT: Negative for congestion, rhinorrhea and trouble swallowing.    Eyes: Negative for visual disturbance.   Respiratory: Positive for shortness of breath. Negative for cough.         Has diagnosis of cancer in right lower lung.   Cardiovascular: Positive for leg swelling. Negative for chest pain and  palpitations.   Gastrointestinal: Negative for abdominal pain and blood in stool.   Genitourinary: Negative for dysuria and hematuria.   Musculoskeletal: Positive for arthralgias, back pain and gait problem.   Skin: Negative for color change.   Neurological: Negative for headaches.       Objective:     Physical examination not performed today as this was a telephone only visit.    Assessment/Plan:     Diagnoses and all orders for this visit:    1. History of rib fracture (Primary)  Patient fell at home on 2/19/2023 sustaining rib fractures on the right side.  Was seen later that day in ER.  Has been taking prescribed Norco 7.5-325 since that time.  She uses them sparingly because she only has a limited number of tablets.  She continues to endorse severe 10/10 pain on the pain scale.  It is sharp and significantly increases with any movement, deep respiration, laughing, or coughing.  She does have a history of cancer in the right lower lobe of her lung and is currently undergoing treatment for that.  She has her next appointment for radiation oncology this coming Tuesday in [facility].  We discussed refilling her Norco's.  Given that it is a controlled substance I advised her that she would need to come into the office for an in person visit, sign a pain Contract, and submit to urine drug screening before this medication be could be prescribed in the outpatient setting.  She cannot take NSAIDs due to a hive allergy.  Advised her to continue taking Tylenol as needed.  She is going to attempt to get an appointment to come in for an in person visit.  I do feel like in this case she would benefit from further treatment with [place].    2. Chronic heart failure with preserved ejection fraction (HCC)  Patient is complaining of swelling in her lower extremities.  She was told by a previous provider Dr. Castillo in [facility] that her kidneys were bad.  She does have an appointment to see her cardiologist at  [place] this coming Wednesday.  At that visit she will ask them what medication would be safe for her to take to reduce the fluid buildup.  She does have an elevated BNP at her last hospitalization of 680.  She would like to wear POLO hose to control the edema in the lower extremities.    3. Decreased strength, endurance, and mobility  Patient complains of decreased mobility.  She questions whether she might qualify for electric wheelchair at this time.  Endorses a long history of osteoarthritis in the low back with sciatica into the right leg.  Imaging done in 2021 and 2022 does show extensive degeneration in the lumbar spine however the hip joints appear to be normal for a female of her age.  We do not have any recent images of the hips or knees.  If she would like to pursue obtaining an electric wheelchair we will need to have these studies done as well as examination in person for mobility issues associated with the lower extremities.       Follow-up:     Return in about 1 week (around 3/10/2023).    Preventative:  Health Maintenance   Topic Date Due   • ZOSTER VACCINE (1 of 2) Never done   • DIABETIC EYE EXAM  06/20/2018   • DXA SCAN  09/18/2019   • ANNUAL WELLNESS VISIT  07/24/2021   • URINE MICROALBUMIN  10/23/2021   • HEMOGLOBIN A1C  04/19/2022   • COVID-19 Vaccine (5 - Booster for Moderna series) 06/03/2022   • TDAP/TD VACCINES (3 - Td or Tdap) 02/19/2033   • INFLUENZA VACCINE  Completed   • Pneumococcal Vaccine 65+  Completed         Alcohol use:  reports no history of alcohol use.  Nicotine status  reports that she has never smoked. She has never used smokeless tobacco.     Goals    None           This document has been electronically signed by Gus Posada MD on March 5, 2023 19:12 CST

## 2023-03-08 ENCOUNTER — TELEPHONE (OUTPATIENT)
Dept: FAMILY MEDICINE CLINIC | Facility: CLINIC | Age: 83
End: 2023-03-08
Payer: MEDICARE

## 2023-03-09 NOTE — PROGRESS NOTES
I have reviewed the notes, assessments, and/or procedures performed by Gus Posada MD , I concur with documentation of Alda Constantino.

## 2023-03-10 ENCOUNTER — HOME CARE VISIT (OUTPATIENT)
Dept: HOME HEALTH SERVICES | Facility: HOME HEALTHCARE | Age: 83
End: 2023-03-10
Payer: MEDICARE

## 2023-03-14 ENCOUNTER — READMISSION MANAGEMENT (OUTPATIENT)
Dept: CALL CENTER | Facility: HOSPITAL | Age: 83
End: 2023-03-14
Payer: MEDICARE

## 2023-03-14 ENCOUNTER — HOME CARE VISIT (OUTPATIENT)
Dept: HOME HEALTH SERVICES | Facility: CLINIC | Age: 83
End: 2023-03-14
Payer: MEDICARE

## 2023-03-14 VITALS
HEART RATE: 93 BPM | RESPIRATION RATE: 20 BRPM | DIASTOLIC BLOOD PRESSURE: 65 MMHG | SYSTOLIC BLOOD PRESSURE: 115 MMHG | OXYGEN SATURATION: 93 % | TEMPERATURE: 98 F

## 2023-03-14 PROCEDURE — G0151 HHCP-SERV OF PT,EA 15 MIN: HCPCS

## 2023-03-14 NOTE — HOME HEALTH
"\"The ribs still hurt and my left shoulder but I will tell the doctor about it. I got the results from heart echo and dye test ad eeryhting good, Dr Garsia told me my kidneys were good. I go to see that  A lady doctor in [facility] office on Thursday.\""

## 2023-03-14 NOTE — OUTREACH NOTE
COPD/PN Week 3 Survey    Flowsheet Row Responses   [facility] patient discharged from? [place]   Does the patient have one of the following disease processes/diagnoses(primary or secondary)? Pneumonia   Week 3 attempt successful? Yes   Call start time 1453   Call end time 1456   Discharge diagnosis dyspnea, recent rib fracture, RLL PNA, Hx COPD, CHF,    Meds reviewed with patient/caregiver? Yes   Is the patient taking all medications as directed (includes completed medication regime)? Yes   Medication comments Added Bumex   Does the patient have a primary care provider?  Yes   Does the patient have an appointment with their PCP or specialist within 7 days of discharge? Yes   Has the patient kept scheduled appointments due by today? Yes   Comments Has another appt coming   What is the Home health agency?  [facility]   Has home health visited the patient within 72 hours of discharge? Yes   Pulse Ox monitoring Intermittent   O2 Sat comments 93%   O2 Sat: education provided Sat levels, Monitoring frequency, When to seek care   Psychosocial issues? No   Did the patient receive a copy of their discharge instructions? Yes   Nursing interventions Reviewed instructions with patient   What is the patient's perception of their health status since discharge? Improving   Is the patient/caregiver able to teach back the hierarchy of who to call/visit for symptoms/problems? PCP, Specialist, Home health nurse, Urgent Care, ED, 911 Yes   Is the patient able to teach back COPD zones? Yes   Week 3 call completed? Yes   Wrap up additional comments HH is coming out and Pt reports she is doing better. Pt states she is also taking bumex and is elevating feet. Reports swelling has improved.           MATT LAI - Registered Nurse

## 2023-03-16 ENCOUNTER — OFFICE VISIT (OUTPATIENT)
Dept: FAMILY MEDICINE CLINIC | Facility: CLINIC | Age: 83
End: 2023-03-16
Payer: MEDICARE

## 2023-03-16 VITALS
BODY MASS INDEX: 37.98 KG/M2 | WEIGHT: 242 LBS | DIASTOLIC BLOOD PRESSURE: 90 MMHG | HEIGHT: 67 IN | SYSTOLIC BLOOD PRESSURE: 158 MMHG | HEART RATE: 71 BPM | TEMPERATURE: 97.8 F

## 2023-03-16 DIAGNOSIS — Z78.0 POSTMENOPAUSE: ICD-10-CM

## 2023-03-16 DIAGNOSIS — Z87.81 HISTORY OF RIB FRACTURE: Primary | ICD-10-CM

## 2023-03-16 DIAGNOSIS — Z87.39 HISTORY OF OSTEOPOROSIS: ICD-10-CM

## 2023-03-16 PROCEDURE — 99213 OFFICE O/P EST LOW 20 MIN: CPT | Performed by: STUDENT IN AN ORGANIZED HEALTH CARE EDUCATION/TRAINING PROGRAM

## 2023-03-16 PROCEDURE — 3077F SYST BP >= 140 MM HG: CPT | Performed by: STUDENT IN AN ORGANIZED HEALTH CARE EDUCATION/TRAINING PROGRAM

## 2023-03-16 PROCEDURE — 1160F RVW MEDS BY RX/DR IN RCRD: CPT | Performed by: STUDENT IN AN ORGANIZED HEALTH CARE EDUCATION/TRAINING PROGRAM

## 2023-03-16 PROCEDURE — 3080F DIAST BP >= 90 MM HG: CPT | Performed by: STUDENT IN AN ORGANIZED HEALTH CARE EDUCATION/TRAINING PROGRAM

## 2023-03-16 PROCEDURE — 1159F MED LIST DOCD IN RCRD: CPT | Performed by: STUDENT IN AN ORGANIZED HEALTH CARE EDUCATION/TRAINING PROGRAM

## 2023-03-16 NOTE — PROGRESS NOTES
I have reviewed the notes, assessments, and/or procedures performed by Dr. Shemar Jefferson, I concur with his  documentation and assessment and plan for Alda Constantino        This document has been electronically signed by Sd Mackey MD on March 16, 2023 16:31 CDT

## 2023-03-16 NOTE — PROGRESS NOTES
Family Medicine Residency  Shemar Jefferson MD    Subjective:     Alda Constantino is a 82 y.o. female who presents for history of right-sided rib fractures, history of osteoporosis and management of medications.  Patient reports the right-sided rib pain has improved very much.  Patient had some confusion over that she may require refill of pain medication.  Patient's pain is now back to baseline level as per patient's niece who is present during this consultation.  The patient's niece reports that the patient is now ambulating independently and has decreased levels of pain.    The following portions of the patient's history were reviewed and updated as appropriate: allergies, current medications, past family history, past medical history, past social history, past surgical history and problem list.    Past Medical Hx:  Past Medical History:   Diagnosis Date   • Asthma    • Cancer (HCC)    • Congenital abnormalities     colon behind liver   • COPD (chronic obstructive pulmonary disease) (HCC)    • Hypertension        Past Surgical Hx:  Past Surgical History:   Procedure Laterality Date   • ADENOIDECTOMY     • ADRENAL GLAND SURGERY Right    • BLADDER SURGERY     • BREAST BIOPSY     • COLONOSCOPY W/ BIOPSIES AND POLYPECTOMY     • EYE SURGERY     • HERNIA REPAIR     • HYSTERECTOMY     • LUNG LOBECTOMY Left    • RECTAL SURGERY      cyst   • SKIN CANCER EXCISION      legs   • TONSILLECTOMY     • VARICOSE VEIN SURGERY         Current Meds:    Current Outpatient Medications:   •  acetaminophen (Tylenol 8 Hour Arthritis Pain) 650 MG 8 hr tablet, Take 1,300 mg by mouth Every 8 (Eight) Hours As Needed. Indications: Pain, Disp: , Rfl:   •  acetaminophen (TYLENOL) 500 MG tablet, Take 500 mg by mouth Every 6 (Six) Hours As Needed for Mild Pain., Disp: , Rfl:   •  albuterol (PROVENTIL HFA;VENTOLIN HFA) 108 (90 Base) MCG/ACT inhaler, Inhale 2 puffs Every 4 (Four) Hours As Needed for Wheezing., Disp: , Rfl:   •  allopurinol  (Zyloprim) 100 MG tablet, Take 1 tablet by mouth 2 (Two) Times a Day., Disp: 180 tablet, Rfl: 2  •  arformoterol (BROVANA) 15 MCG/2ML nebulizer solution, , Disp: , Rfl:   •  azithromycin (ZITHROMAX) 250 MG tablet, Take 1 tablet by mouth Daily. Monday Wednesday and Friday  Indications: Pneumonia, Disp: , Rfl:   •  budesonide (PULMICORT) 0.25 MG/2ML nebulizer solution, , Disp: , Rfl:   •  bumetanide (Bumex) 2 MG tablet, Take 2 mg by mouth Daily. Indications: Edema, Disp: , Rfl:   •  EPINEPHrine (EPIPEN) 0.3 MG/0.3ML solution auto-injector injection, , Disp: , Rfl:   •  gabapentin (NEURONTIN) 100 MG capsule, Take 100 mg by mouth every night at bedtime., Disp: , Rfl:   •  Glucose Blood (PRECISION XTRA TEST VI), by In Vitro route., Disp: , Rfl:   •  guaiFENesin (MUCINEX) 600 MG 12 hr tablet, Take 2 tablets by mouth Every 12 (Twelve) Hours., Disp: 60 tablet, Rfl: 0  •  HYDROcodone-acetaminophen (NORCO) 7.5-325 MG per tablet, Take 1 tablet by mouth Every 6 (Six) Hours As Needed for Moderate Pain., Disp: 20 tablet, Rfl: 0  •  IPRATROPIUM BROMIDE IN, Inhale., Disp: , Rfl:   •  ipratropium-albuterol (DUO-NEB) 0.5-2.5 mg/3 ml nebulizer, Take 1.5 mL by nebulization Every 4 (Four) Hours As Needed for Wheezing., Disp: , Rfl:   •  levocetirizine (XYZAL) 5 MG tablet, Take 1 tablet by mouth Every Evening. Indications: Perennial Allergic Rhinitis, Disp: , Rfl:   •  metFORMIN ER (GLUCOPHAGE-XR) 750 MG 24 hr tablet, TAKE 1 TABLET EVERY NIGHT AT BEDTIME, Disp: 30 tablet, Rfl: 11  •  omeprazole (priLOSEC) 40 MG capsule, Take 1 capsule by mouth Daily., Disp: 90 capsule, Rfl: 2  •  spironolactone (ALDACTONE) 25 MG tablet, Take 1 tablet by mouth Daily. 1/2 tablet daily  Indications: Edema, Disp: , Rfl:     Allergies:  Allergies   Allergen Reactions   • Dilaudid [Hydromorphone Hcl] Anaphylaxis   • Diphenoxylate-Atropine Shortness Of Breath     Reaction: shortness of breath     • Iodinated Contrast Media Hives     Reaction: HIVES   • Keflex  [Cephalexin] Shortness Of Breath   • Lomotil [Diphenoxylate] Shortness Of Breath   • Aspirin Hives   • Beef (Diagnostic) Nausea And Vomiting     All mammals  All mammals   • Beef-Derived Products GI Intolerance   • Ciprofloxacin Nausea And Vomiting   • Contrast Dye (Echo Or Unknown Ct/Mr) Hives   • Levaquin [Levofloxacin] GI Intolerance   • Milk-Related Compounds GI Intolerance   • Nsaids Hives   • Pegademase Bovine Other (See Comments)     Other reaction(s): GI Intolerance   • Percocet [Oxycodone-Acetaminophen] Itching   • Pork-Derived Products GI Intolerance   • Tolmetin Hives       Family Hx:  Family History   Problem Relation Age of Onset   • Diabetes Mother    • Diabetes Father    • Heart disease Father    • Cancer Sister    • Heart disease Sister    • Thyroid disease Sister    • Diabetes Brother    • Heart disease Brother         Social History:  Social History     Socioeconomic History   • Marital status:    Tobacco Use   • Smoking status: Never   • Smokeless tobacco: Never   Vaping Use   • Vaping Use: Never used   Substance and Sexual Activity   • Alcohol use: Never   • Drug use: Never   • Sexual activity: Defer       Review of Systems  Review of Systems   Constitutional: Negative for activity change, appetite change, chills, diaphoresis, fatigue and fever.   HENT: Negative for congestion, dental problem, ear discharge, ear pain, hearing loss, mouth sores, nosebleeds, postnasal drip, sinus pain, sore throat, tinnitus, trouble swallowing and voice change.    Eyes: Negative for photophobia, pain, discharge and itching.   Respiratory: Negative for cough, choking, chest tightness, shortness of breath and wheezing.    Cardiovascular: Negative for chest pain, palpitations and leg swelling.   Gastrointestinal: Negative for abdominal distention, abdominal pain, blood in stool, constipation, diarrhea, nausea and vomiting.   Endocrine: Negative for cold intolerance and heat intolerance.   Genitourinary:  "Negative for difficulty urinating, dysuria, flank pain and hematuria.   Musculoskeletal: Positive for arthralgias and myalgias. Negative for back pain, joint swelling and neck pain.   Skin: Negative for color change and rash.   Neurological: Negative for dizziness, tremors, seizures, weakness, light-headedness, numbness and headaches.   Hematological: Negative for adenopathy.   Psychiatric/Behavioral: Negative for behavioral problems, confusion, hallucinations, self-injury and sleep disturbance.       Objective:     /90   Pulse 71   Temp 97.8 °F (36.6 °C)   Ht 170.2 cm (67\")   Wt 110 kg (242 lb)   BMI 37.90 kg/m²   Physical Exam  Vitals and nursing note reviewed.   Constitutional:       Appearance: Normal appearance. She is not ill-appearing or diaphoretic.   HENT:      Head: Normocephalic and atraumatic.      Right Ear: External ear normal.      Left Ear: External ear normal.      Nose: Nose normal. No rhinorrhea.      Mouth/Throat:      Mouth: Mucous membranes are moist.      Pharynx: No posterior oropharyngeal erythema.   Eyes:      General: No scleral icterus.        Right eye: No discharge.         Left eye: No discharge.      Extraocular Movements: Extraocular movements intact.   Neck:      Vascular: No carotid bruit.   Cardiovascular:      Rate and Rhythm: Normal rate and regular rhythm.      Pulses: Normal pulses.      Heart sounds: Normal heart sounds.     No gallop.   Pulmonary:      Effort: Pulmonary effort is normal.      Breath sounds: Normal breath sounds. No wheezing.   Chest:      Chest wall: No tenderness.   Abdominal:      General: Bowel sounds are normal.      Palpations: Abdomen is soft.      Tenderness: There is no rebound.   Musculoskeletal:         General: No swelling.        Arms:       Cervical back: No muscular tenderness.      Right lower leg: No edema.      Left lower leg: No edema.        Legs:    Lymphadenopathy:      Cervical: No cervical adenopathy.   Skin:     General: " Skin is warm and dry.      Capillary Refill: Capillary refill takes less than 2 seconds.      Findings: No erythema or rash.   Neurological:      General: No focal deficit present.      Mental Status: She is alert and oriented to person, place, and time.      Motor: No weakness.   Psychiatric:         Mood and Affect: Mood normal.         Behavior: Behavior normal.         Thought Content: Thought content normal.         Judgment: Judgment normal.          Assessment   Alda Constantino is a 82 y.o. female who presents for:    History of rib fractures:  2/19/2023  1. At least one acute, mildly displaced right-sided rib fracture.  Rib series was limited due to underpenetration, and additional  right-sided rib fractures may be radiographically occult.  2. Interstitial prominence is noted and can represent central  vascular congestion. Atypical infectious process would have been  excluded clinically.  3. Redemonstration of known right lower lung lesion, better  characterized on the chest CT  Chest x-ray reordered today 3/16/2023   Patient's pain currently under control    History of Osteoporosis:  Dexa scan ordered 3/16/2023   Pt has used occasions in the past for osteoporosis unknown medicines  Educated the patient and the patient's niece in relation to osteoporosis and the medications available to treat this should she need treatment.  Educated the patient and the patient's niece in relation to medications used for osteoporosis and pain specifically.    At the end of her conversation both the patient and her niece were very happy with the plan of care in relation to both her rib fractures and plan in relation to osteoporosis.  The patient plans to follow-up with Dr. Posada in relation to annual wellness visit care gap closure and address any issues that Ms. Constantino may have in ADLs.  The patient's pain is currently under control.    Allan: Currently out of service.  Patient continues on gabapentin and this is  managing her pain well at this point in time.  Patient is advised to follow-up with her PCP in relation to plan of care in relation to this medication.    The patient was educated on the risks, benefits and alternatives to therapy. The patient was in agreement with therapy and plan of care.    Plan     Next visit annual physical  Next visit close care gaps  Next visit check patients medication regime.  Next visit check patients compliance to medication regime.    Diagnoses and all orders for this visit:    1. History of rib fracture (Primary)  -     XR Ribs Right With PA Chest; Future    2. Postmenopause  -     DEXA Bone Density Axial; Future    3. History of osteoporosis  -     XR Ribs Right With PA Chest; Future      · Rx changes: As above  · Patient Education: Advised and educated the patient in relation to diet, lifestyle, healthcare goals and educated patient in relation to best management of pain.  · Compliance at present is estimated to be good.   · Efforts to improve compliance (if necessary) will be directed at increased exercise.    Depression screening: Depression screening performed today; result negative; no follow up needed       PHQ-2 Depression Screening  Little interest or pleasure in doing things?  1   Feeling down, depressed, or hopeless?  0   PHQ-2 Total Score  1        PHQ-2 Total Score:   1    Patient has no suicidal, no homicidal ideation.    Patient has been offered psychiatric and psychological therapy input however has refused.    Follow-up:     Return in about 1 week (around 3/23/2023), or Harrison Reyna, for Recheck.    Preventative:  Health Maintenance   Topic Date Due   • ZOSTER VACCINE (1 of 2) Never done   • DIABETIC EYE EXAM  06/20/2018   • DXA SCAN  09/18/2019   • ANNUAL WELLNESS VISIT  07/24/2021   • URINE MICROALBUMIN  10/23/2021   • HEMOGLOBIN A1C  04/19/2022   • COVID-19 Vaccine (5 - Booster for Moderna series) 06/03/2022   • TDAP/TD VACCINES (3 - Td or Tdap) 02/19/2033   • INFLUENZA  VACCINE  Completed   • Pneumococcal Vaccine 65+  Completed     Female Preventative: Does monthly breast self examination  Recommended: none  Vaccine Counseling: N/A    Weight  -Class: Obese Class II: 35-39.9kg/m2  -Class 2 Severe Obesity (BMI >=35 and <=39.9). Obesity-related health conditions include the following: hypertension. Obesity is unchanged. BMI is is above average; BMI management plan is completed. We discussed portion control and increasing exercise.   eat more fruits and vegetables, decrease soda or juice intake, increase water intake, increase physical activity, reduce screen time, reduce portion size, cut out extra servings, reduce fast food intake, family to eat at dinner table more often and keep TV off during meals    Alcohol use:  reports no history of alcohol use.  Nicotine status  reports that she has never smoked. She has never used smokeless tobacco.     Goals    None         RISK SCORE: 3    Allan request #[id number]. Allan out of service.     [place] [address]  Office: [phone]      This document has been electronically signed by hSemar Jefferson MD on March 16, 2023 14:43 CDT      Portions of this note were copied from progress note written by Dr. Jefferson; note reviewed and updated as appropriate.    Part of this note may be an electronic transcription/translation of spoken language to printed text using the Dragon Dictation System.

## 2023-03-17 ENCOUNTER — HOME CARE VISIT (OUTPATIENT)
Dept: HOME HEALTH SERVICES | Facility: CLINIC | Age: 83
End: 2023-03-17
Payer: MEDICARE

## 2023-03-17 PROCEDURE — G0299 HHS/HOSPICE OF RN EA 15 MIN: HCPCS

## 2023-03-18 ENCOUNTER — HOSPITAL ENCOUNTER (EMERGENCY)
Facility: HOSPITAL | Age: 83
Discharge: HOME | End: 2023-03-18
Attending: FAMILY MEDICINE | Admitting: FAMILY MEDICINE
Payer: MEDICARE

## 2023-03-18 ENCOUNTER — APPOINTMENT (OUTPATIENT)
Dept: ULTRASOUND IMAGING | Facility: HOSPITAL | Age: 83
End: 2023-03-18
Payer: MEDICARE

## 2023-03-18 VITALS
SYSTOLIC BLOOD PRESSURE: 121 MMHG | DIASTOLIC BLOOD PRESSURE: 67 MMHG | HEART RATE: 90 BPM | HEIGHT: 67 IN | TEMPERATURE: 98.2 F | RESPIRATION RATE: 18 BRPM | OXYGEN SATURATION: 97 % | WEIGHT: 242 LBS | BODY MASS INDEX: 37.98 KG/M2

## 2023-03-18 DIAGNOSIS — M25.571 ACUTE RIGHT ANKLE PAIN: Primary | ICD-10-CM

## 2023-03-18 DIAGNOSIS — L03.115 CELLULITIS OF RIGHT LOWER EXTREMITY: ICD-10-CM

## 2023-03-18 LAB
ALBUMIN SERPL-MCNC: 3.6 G/DL (ref 3.5–5.2)
ALBUMIN/GLOB SERPL: 1.2 G/DL
ALP SERPL-CCNC: 87 U/L (ref 39–117)
ALT SERPL W P-5'-P-CCNC: 8 U/L (ref 1–33)
ANION GAP SERPL CALCULATED.3IONS-SCNC: 9 MMOL/L (ref 5–15)
AST SERPL-CCNC: 17 U/L (ref 1–32)
BASOPHILS # BLD AUTO: 0.02 10*3/MM3 (ref 0–0.2)
BASOPHILS NFR BLD AUTO: 0.3 % (ref 0–1.5)
BILIRUB SERPL-MCNC: 0.5 MG/DL (ref 0–1.2)
BUN SERPL-MCNC: 20 MG/DL (ref 8–23)
BUN/CREAT SERPL: 17.7 (ref 7–25)
CALCIUM SPEC-SCNC: 8 MG/DL (ref 8.6–10.5)
CHLORIDE SERPL-SCNC: 99 MMOL/L (ref 98–107)
CO2 SERPL-SCNC: 29 MMOL/L (ref 22–29)
CREAT SERPL-MCNC: 1.13 MG/DL (ref 0.57–1)
DEPRECATED RDW RBC AUTO: 55.1 FL (ref 37–54)
EGFRCR SERPLBLD CKD-EPI 2021: 49 ML/MIN/1.73
EOSINOPHIL # BLD AUTO: 0.15 10*3/MM3 (ref 0–0.4)
EOSINOPHIL NFR BLD AUTO: 2.1 % (ref 0.3–6.2)
ERYTHROCYTE [DISTWIDTH] IN BLOOD BY AUTOMATED COUNT: 15.3 % (ref 12.3–15.4)
GLOBULIN UR ELPH-MCNC: 3 GM/DL
GLUCOSE SERPL-MCNC: 109 MG/DL (ref 65–99)
HCT VFR BLD AUTO: 33.6 % (ref 34–46.6)
HGB BLD-MCNC: 10.8 G/DL (ref 12–15.9)
HOLD SPECIMEN: NORMAL
IMM GRANULOCYTES # BLD AUTO: 0.02 10*3/MM3 (ref 0–0.05)
IMM GRANULOCYTES NFR BLD AUTO: 0.3 % (ref 0–0.5)
LYMPHOCYTES # BLD AUTO: 0.93 10*3/MM3 (ref 0.7–3.1)
LYMPHOCYTES NFR BLD AUTO: 13 % (ref 19.6–45.3)
MCH RBC QN AUTO: 31.8 PG (ref 26.6–33)
MCHC RBC AUTO-ENTMCNC: 32.1 G/DL (ref 31.5–35.7)
MCV RBC AUTO: 98.8 FL (ref 79–97)
MONOCYTES # BLD AUTO: 0.56 10*3/MM3 (ref 0.1–0.9)
MONOCYTES NFR BLD AUTO: 7.8 % (ref 5–12)
NEUTROPHILS NFR BLD AUTO: 5.47 10*3/MM3 (ref 1.7–7)
NEUTROPHILS NFR BLD AUTO: 76.5 % (ref 42.7–76)
NRBC BLD AUTO-RTO: 0 /100 WBC (ref 0–0.2)
NT-PROBNP SERPL-MCNC: 585.8 PG/ML (ref 0–1800)
PLATELET # BLD AUTO: 173 10*3/MM3 (ref 140–450)
PMV BLD AUTO: 11.4 FL (ref 6–12)
POTASSIUM SERPL-SCNC: 3.8 MMOL/L (ref 3.5–5.2)
PROT SERPL-MCNC: 6.6 G/DL (ref 6–8.5)
RBC # BLD AUTO: 3.4 10*6/MM3 (ref 3.77–5.28)
SODIUM SERPL-SCNC: 137 MMOL/L (ref 136–145)
WBC NRBC COR # BLD: 7.15 10*3/MM3 (ref 3.4–10.8)
WHOLE BLOOD HOLD COAG: NORMAL

## 2023-03-18 PROCEDURE — 96372 THER/PROPH/DIAG INJ SC/IM: CPT

## 2023-03-18 PROCEDURE — 99283 EMERGENCY DEPT VISIT LOW MDM: CPT

## 2023-03-18 PROCEDURE — 83880 ASSAY OF NATRIURETIC PEPTIDE: CPT | Performed by: FAMILY MEDICINE

## 2023-03-18 PROCEDURE — 25010000002 TRIAMCINOLONE PER 10 MG: Performed by: FAMILY MEDICINE

## 2023-03-18 PROCEDURE — 85025 COMPLETE CBC W/AUTO DIFF WBC: CPT | Performed by: FAMILY MEDICINE

## 2023-03-18 PROCEDURE — 80053 COMPREHEN METABOLIC PANEL: CPT | Performed by: FAMILY MEDICINE

## 2023-03-18 PROCEDURE — 93971 EXTREMITY STUDY: CPT

## 2023-03-18 RX ORDER — DOXYCYCLINE 100 MG/1
100 CAPSULE ORAL ONCE
Status: COMPLETED | OUTPATIENT
Start: 2023-03-18 | End: 2023-03-18

## 2023-03-18 RX ORDER — TRIAMCINOLONE ACETONIDE 40 MG/ML
40 INJECTION, SUSPENSION INTRA-ARTICULAR; INTRAMUSCULAR ONCE
Status: COMPLETED | OUTPATIENT
Start: 2023-03-18 | End: 2023-03-18

## 2023-03-18 RX ORDER — DOXYCYCLINE 100 MG/1
100 CAPSULE ORAL 2 TIMES DAILY
Qty: 20 CAPSULE | Refills: 0 | Status: SHIPPED | OUTPATIENT
Start: 2023-03-18 | End: 2023-03-18 | Stop reason: SDUPTHER

## 2023-03-18 RX ORDER — DOXYCYCLINE 100 MG/1
100 CAPSULE ORAL 2 TIMES DAILY
Qty: 20 CAPSULE | Refills: 0 | Status: SHIPPED | OUTPATIENT
Start: 2023-03-18 | End: 2023-03-22 | Stop reason: HOSPADM

## 2023-03-18 RX ORDER — HYDROCODONE BITARTRATE AND ACETAMINOPHEN 5; 325 MG/1; MG/1
1 TABLET ORAL EVERY 6 HOURS PRN
Qty: 12 TABLET | Refills: 0 | Status: SHIPPED | OUTPATIENT
Start: 2023-03-18 | End: 2023-03-18

## 2023-03-18 RX ORDER — HYDROCODONE BITARTRATE AND ACETAMINOPHEN 5; 325 MG/1; MG/1
1 TABLET ORAL EVERY 6 HOURS PRN
Qty: 12 TABLET | Refills: 0 | Status: SHIPPED | OUTPATIENT
Start: 2023-03-18 | End: 2023-03-22 | Stop reason: HOSPADM

## 2023-03-18 RX ORDER — HYDROCODONE BITARTRATE AND ACETAMINOPHEN 5; 325 MG/1; MG/1
1 TABLET ORAL ONCE
Status: COMPLETED | OUTPATIENT
Start: 2023-03-18 | End: 2023-03-18

## 2023-03-18 RX ADMIN — TRIAMCINOLONE ACETONIDE 40 MG: 40 INJECTION, SUSPENSION INTRA-ARTICULAR; INTRAMUSCULAR at 18:07

## 2023-03-18 RX ADMIN — DOXYCYCLINE 100 MG: 100 CAPSULE ORAL at 18:07

## 2023-03-18 RX ADMIN — HYDROCODONE BITARTRATE AND ACETAMINOPHEN 1 TABLET: 5; 325 TABLET ORAL at 18:07

## 2023-03-18 NOTE — ED PROVIDER NOTES
Subjective   History of Present Illness  Right foot pain began last night. SS in past, with h/o gout and cellulitis    Foot Pain  Location:  Right foot and ankle  Severity:  Moderate  Onset quality:  Gradual  Duration:  1 day  Timing:  Constant  Progression:  Waxing and waning  Chronicity:  Recurrent  Relieved by:  Nothing  Worsened by:  Movement  Associated symptoms: diarrhea and myalgias    Associated symptoms: no abdominal pain, no chest pain, no congestion, no cough, no ear pain, no fatigue, no fever, no headaches, no nausea, no rash, no rhinorrhea, no shortness of breath, no sore throat, no vomiting and no wheezing        Review of Systems   Constitutional: Negative for appetite change, chills, diaphoresis, fatigue and fever.   HENT: Negative for congestion, ear discharge, ear pain, nosebleeds, rhinorrhea, sinus pressure, sore throat and trouble swallowing.    Eyes: Negative for discharge and redness.   Respiratory: Negative for apnea, cough, chest tightness, shortness of breath and wheezing.    Cardiovascular: Negative for chest pain.   Gastrointestinal: Positive for diarrhea. Negative for abdominal pain, nausea and vomiting.   Endocrine: Negative for polyuria.   Genitourinary: Negative for dysuria, frequency and urgency.   Musculoskeletal: Positive for arthralgias and myalgias. Negative for neck pain.   Skin: Negative for color change and rash.   Allergic/Immunologic: Negative for immunocompromised state.   Neurological: Negative for dizziness, seizures, syncope, weakness, light-headedness and headaches.   Hematological: Negative for adenopathy. Does not bruise/bleed easily.   Psychiatric/Behavioral: Negative for behavioral problems and confusion.   All other systems reviewed and are negative.      Past Medical History:   Diagnosis Date   • Asthma    • Cancer (HCC)    • Congenital abnormalities     colon behind liver   • COPD (chronic obstructive pulmonary disease) (HCC)    • Hypertension        Allergies    Allergen Reactions   • Dilaudid [Hydromorphone Hcl] Anaphylaxis   • Diphenoxylate-Atropine Shortness Of Breath     Reaction: shortness of breath     • Iodinated Contrast Media Hives     Reaction: HIVES   • Keflex [Cephalexin] Shortness Of Breath   • Lomotil [Diphenoxylate] Shortness Of Breath   • Aspirin Hives   • Beef (Diagnostic) Nausea And Vomiting     All mammals  All mammals   • Beef-Derived Products GI Intolerance   • Ciprofloxacin Nausea And Vomiting   • Contrast Dye (Echo Or Unknown Ct/Mr) Hives   • Levaquin [Levofloxacin] GI Intolerance   • Milk-Related Compounds GI Intolerance   • Nsaids Hives   • Pegademase Bovine Other (See Comments)     Other reaction(s): GI Intolerance   • Percocet [Oxycodone-Acetaminophen] Itching   • Pork-Derived Products GI Intolerance   • Tolmetin Hives       Past Surgical History:   Procedure Laterality Date   • ADENOIDECTOMY     • ADRENAL GLAND SURGERY Right    • BLADDER SURGERY     • BREAST BIOPSY     • COLONOSCOPY W/ BIOPSIES AND POLYPECTOMY     • EYE SURGERY     • HERNIA REPAIR     • HYSTERECTOMY     • LUNG LOBECTOMY Left    • RECTAL SURGERY      cyst   • SKIN CANCER EXCISION      legs   • TONSILLECTOMY     • VARICOSE VEIN SURGERY         Family History   Problem Relation Age of Onset   • Diabetes Mother    • Diabetes Father    • Heart disease Father    • Cancer Sister    • Heart disease Sister    • Thyroid disease Sister    • Diabetes Brother    • Heart disease Brother        Social History     Socioeconomic History   • Marital status:    Tobacco Use   • Smoking status: Never   • Smokeless tobacco: Never   Vaping Use   • Vaping Use: Never used   Substance and Sexual Activity   • Alcohol use: Never   • Drug use: Never   • Sexual activity: Defer           Objective   Physical Exam  Vitals and nursing note reviewed.   Constitutional:       Appearance: She is well-developed.   HENT:      Head: Normocephalic and atraumatic.      Nose: Nose normal.   Eyes:      General: No  scleral icterus.        Right eye: No discharge.         Left eye: No discharge.      Conjunctiva/sclera: Conjunctivae normal.      Pupils: Pupils are equal, round, and reactive to light.   Neck:      Trachea: No tracheal deviation.   Cardiovascular:      Rate and Rhythm: Normal rate and regular rhythm.      Heart sounds: Normal heart sounds. No murmur heard.  Pulmonary:      Effort: Pulmonary effort is normal. No respiratory distress.      Breath sounds: Normal breath sounds. No stridor. No wheezing or rales.   Abdominal:      General: Bowel sounds are normal. There is no distension.      Palpations: Abdomen is soft. There is no mass.      Tenderness: There is no abdominal tenderness. There is no guarding or rebound.   Musculoskeletal:      Cervical back: Normal range of motion and neck supple.      Right lower leg: Tenderness present. Edema present.        Legs:       Comments: Mild erythema over the right lower extremity.   Skin:     General: Skin is warm and dry.      Findings: No erythema or rash.   Neurological:      Mental Status: She is alert and oriented to person, place, and time.      Coordination: Coordination normal.   Psychiatric:         Behavior: Behavior normal.         Thought Content: Thought content normal.         Procedures           ED Course              Labs Reviewed   COMPREHENSIVE METABOLIC PANEL - Abnormal; Notable for the following components:       Result Value    Glucose 109 (*)     Creatinine 1.13 (*)     Calcium 8.0 (*)     eGFR 49.0 (*)     All other components within normal limits    Narrative:     GFR Normal >60  Chronic Kidney Disease <60  Kidney Failure <15    The GFR formula is only valid for adults with stable renal function between ages 18 and 70.   CBC WITH AUTO DIFFERENTIAL - Abnormal; Notable for the following components:    RBC 3.40 (*)     Hemoglobin 10.8 (*)     Hematocrit 33.6 (*)     MCV 98.8 (*)     RDW-SD 55.1 (*)     Neutrophil % 76.5 (*)     Lymphocyte % 13.0 (*)      All other components within normal limits   BNP (IN-HOUSE) - Normal    Narrative:     Among patients with dyspnea, NT-proBNP is highly sensitive for the detection of acute congestive heart failure. In addition NT-proBNP of <300 pg/ml effectively rules out acute congestive heart failure with 99% negative predictive value.     CBC AND DIFFERENTIAL    Narrative:     The following orders were created for panel order CBC & Differential.  Procedure                               Abnormality         Status                     ---------                               -----------         ------                     CBC Auto Differential[[id number]]        Abnormal            Final result                 Please view results for these tests on the individual orders.   EXTRA TUBES    Narrative:     The following orders were created for panel order Extra Tubes.  Procedure                               Abnormality         Status                     ---------                               -----------         ------                     Gold Top - SST[[id number]]                                   Final result               Light Blue Top[[id number]]                                   Final result                 Please view results for these tests on the individual orders.   GOLD TOP - SST   LIGHT BLUE TOP       US Venous Doppler Lower Extremity Right (duplex)   Final Result   CONCLUSION:   No ultrasound evidence of deep venous thrombosis of the right   lower extremity.      [id number]      Electronically signed by:  Misael Grady MD  3/18/2023 5:22 PM CDT   Workstation: [id number]                                            Medical Decision Making  Acute right ankle pain: acute illness or injury  Cellulitis of right lower extremity: acute illness or injury  Amount and/or Complexity of Data Reviewed  Labs: ordered.  ECG/medicine tests: ordered.      Risk  Prescription drug management.          Final diagnoses:   Acute right ankle pain    Cellulitis of right lower extremity       ED Disposition  ED Disposition     ED Disposition   Discharge    Condition   Stable    Comment   --             Gus Posada MD  [address] [place] [address]   [place] [address] [phone]    In 3 days           Medication List      New Prescriptions    doxycycline 100 MG capsule  Commonly known as: MONODOX  Take 1 capsule by mouth 2 (Two) Times a Day.        Changed    * HYDROcodone-acetaminophen 7.5-325 MG per tablet  Commonly known as: NORCO  Take 1 tablet by mouth Every 6 (Six) Hours As Needed for Moderate Pain.  What changed: Another medication with the same name was added. Make sure you understand how and when to take each.     * HYDROcodone-acetaminophen 5-325 MG per tablet  Commonly known as: NORCO  Take 1 tablet by mouth Every 6 (Six) Hours As Needed for Moderate Pain.  What changed: You were already taking a medication with the same name, and this prescription was added. Make sure you understand how and when to take each.         * This list has 2 medication(s) that are the same as other medications prescribed for you. Read the directions carefully, and ask your doctor or other care provider to review them with you.               Where to Get Your Medications      These medications were sent to [facility] - [place], [place] - [address] [place] [address] - [phone]  - [phone] [address] [address]    Phone: [phone]   · doxycycline 100 MG capsule  · HYDROcodone-acetaminophen 5-325 MG per tablet         Pedro Padilla MD  03/18/23 [id number]

## 2023-03-19 ENCOUNTER — APPOINTMENT (OUTPATIENT)
Dept: GENERAL RADIOLOGY | Facility: HOSPITAL | Age: 83
End: 2023-03-19
Payer: MEDICARE

## 2023-03-19 ENCOUNTER — HOSPITAL ENCOUNTER (OUTPATIENT)
Facility: HOSPITAL | Age: 83
Setting detail: OBSERVATION
LOS: 1 days | Discharge: HOME | End: 2023-03-22
Attending: EMERGENCY MEDICINE | Admitting: HOSPITALIST
Payer: MEDICARE

## 2023-03-19 DIAGNOSIS — M1A.0710 CHRONIC GOUT OF RIGHT FOOT, UNSPECIFIED CAUSE: ICD-10-CM

## 2023-03-19 DIAGNOSIS — L03.115 CELLULITIS OF RIGHT FOOT: Primary | ICD-10-CM

## 2023-03-19 DIAGNOSIS — L03.115 CELLULITIS OF RIGHT LEG: ICD-10-CM

## 2023-03-19 DIAGNOSIS — M10.041 ACUTE IDIOPATHIC GOUT OF RIGHT HAND: ICD-10-CM

## 2023-03-19 DIAGNOSIS — Z74.09 IMPAIRED FUNCTIONAL MOBILITY, BALANCE, GAIT, AND ENDURANCE: ICD-10-CM

## 2023-03-19 LAB
ALBUMIN SERPL-MCNC: 3.7 G/DL (ref 3.5–5.2)
ALBUMIN/GLOB SERPL: 1.2 G/DL
ALP SERPL-CCNC: 85 U/L (ref 39–117)
ALT SERPL W P-5'-P-CCNC: 9 U/L (ref 1–33)
ANION GAP SERPL CALCULATED.3IONS-SCNC: 13 MMOL/L (ref 5–15)
AST SERPL-CCNC: 12 U/L (ref 1–32)
BASOPHILS # BLD AUTO: 0.02 10*3/MM3 (ref 0–0.2)
BASOPHILS NFR BLD AUTO: 0.2 % (ref 0–1.5)
BILIRUB SERPL-MCNC: 0.6 MG/DL (ref 0–1.2)
BUN SERPL-MCNC: 26 MG/DL (ref 8–23)
BUN/CREAT SERPL: 21.7 (ref 7–25)
CALCIUM SPEC-SCNC: 8.4 MG/DL (ref 8.6–10.5)
CHLORIDE SERPL-SCNC: 96 MMOL/L (ref 98–107)
CO2 SERPL-SCNC: 27 MMOL/L (ref 22–29)
CREAT SERPL-MCNC: 1.2 MG/DL (ref 0.57–1)
CRP SERPL-MCNC: 11.8 MG/DL (ref 0–0.5)
D-LACTATE SERPL-SCNC: 0.7 MMOL/L (ref 0.5–2)
DEPRECATED RDW RBC AUTO: 54.5 FL (ref 37–54)
EGFRCR SERPLBLD CKD-EPI 2021: 45.6 ML/MIN/1.73
EOSINOPHIL # BLD AUTO: 0.09 10*3/MM3 (ref 0–0.4)
EOSINOPHIL NFR BLD AUTO: 1 % (ref 0.3–6.2)
ERYTHROCYTE [DISTWIDTH] IN BLOOD BY AUTOMATED COUNT: 15.3 % (ref 12.3–15.4)
ERYTHROCYTE [SEDIMENTATION RATE] IN BLOOD: 62 MM/HR (ref 0–30)
GLOBULIN UR ELPH-MCNC: 3.1 GM/DL
GLUCOSE BLDC GLUCOMTR-MCNC: 142 MG/DL (ref 70–130)
GLUCOSE SERPL-MCNC: 124 MG/DL (ref 65–99)
HCT VFR BLD AUTO: 32.4 % (ref 34–46.6)
HGB BLD-MCNC: 10.6 G/DL (ref 12–15.9)
HOLD SPECIMEN: NORMAL
IMM GRANULOCYTES # BLD AUTO: 0.04 10*3/MM3 (ref 0–0.05)
IMM GRANULOCYTES NFR BLD AUTO: 0.5 % (ref 0–0.5)
LYMPHOCYTES # BLD AUTO: 0.86 10*3/MM3 (ref 0.7–3.1)
LYMPHOCYTES NFR BLD AUTO: 9.8 % (ref 19.6–45.3)
MCH RBC QN AUTO: 32 PG (ref 26.6–33)
MCHC RBC AUTO-ENTMCNC: 32.7 G/DL (ref 31.5–35.7)
MCV RBC AUTO: 97.9 FL (ref 79–97)
MONOCYTES # BLD AUTO: 0.75 10*3/MM3 (ref 0.1–0.9)
MONOCYTES NFR BLD AUTO: 8.6 % (ref 5–12)
NEUTROPHILS NFR BLD AUTO: 7.01 10*3/MM3 (ref 1.7–7)
NEUTROPHILS NFR BLD AUTO: 79.9 % (ref 42.7–76)
NRBC BLD AUTO-RTO: 0 /100 WBC (ref 0–0.2)
PLATELET # BLD AUTO: 174 10*3/MM3 (ref 140–450)
PMV BLD AUTO: 11.8 FL (ref 6–12)
POTASSIUM SERPL-SCNC: 3.5 MMOL/L (ref 3.5–5.2)
PROT SERPL-MCNC: 6.8 G/DL (ref 6–8.5)
RBC # BLD AUTO: 3.31 10*6/MM3 (ref 3.77–5.28)
SODIUM SERPL-SCNC: 136 MMOL/L (ref 136–145)
WBC NRBC COR # BLD: 8.77 10*3/MM3 (ref 3.4–10.8)
WHOLE BLOOD HOLD COAG: NORMAL

## 2023-03-19 PROCEDURE — 25010000002 PIPERACILLIN SOD-TAZOBACTAM PER 1 G: Performed by: EMERGENCY MEDICINE

## 2023-03-19 PROCEDURE — 94640 AIRWAY INHALATION TREATMENT: CPT

## 2023-03-19 PROCEDURE — 82962 GLUCOSE BLOOD TEST: CPT

## 2023-03-19 PROCEDURE — 73590 X-RAY EXAM OF LOWER LEG: CPT

## 2023-03-19 PROCEDURE — 85025 COMPLETE CBC W/AUTO DIFF WBC: CPT | Performed by: EMERGENCY MEDICINE

## 2023-03-19 PROCEDURE — 73610 X-RAY EXAM OF ANKLE: CPT

## 2023-03-19 PROCEDURE — 96372 THER/PROPH/DIAG INJ SC/IM: CPT

## 2023-03-19 PROCEDURE — 96366 THER/PROPH/DIAG IV INF ADDON: CPT

## 2023-03-19 PROCEDURE — 80053 COMPREHEN METABOLIC PANEL: CPT | Performed by: EMERGENCY MEDICINE

## 2023-03-19 PROCEDURE — 85652 RBC SED RATE AUTOMATED: CPT | Performed by: EMERGENCY MEDICINE

## 2023-03-19 PROCEDURE — G0378 HOSPITAL OBSERVATION PER HR: HCPCS

## 2023-03-19 PROCEDURE — 94799 UNLISTED PULMONARY SVC/PX: CPT

## 2023-03-19 PROCEDURE — 94760 N-INVAS EAR/PLS OXIMETRY 1: CPT

## 2023-03-19 PROCEDURE — 36415 COLL VENOUS BLD VENIPUNCTURE: CPT

## 2023-03-19 PROCEDURE — 83605 ASSAY OF LACTIC ACID: CPT | Performed by: EMERGENCY MEDICINE

## 2023-03-19 PROCEDURE — 25010000002 VANCOMYCIN 10 G RECONSTITUTED SOLUTION: Performed by: EMERGENCY MEDICINE

## 2023-03-19 PROCEDURE — 99284 EMERGENCY DEPT VISIT MOD MDM: CPT

## 2023-03-19 PROCEDURE — 94664 DEMO&/EVAL PT USE INHALER: CPT

## 2023-03-19 PROCEDURE — 25010000002 ENOXAPARIN PER 10 MG: Performed by: CHIROPRACTOR

## 2023-03-19 PROCEDURE — 86140 C-REACTIVE PROTEIN: CPT | Performed by: EMERGENCY MEDICINE

## 2023-03-19 PROCEDURE — 87040 BLOOD CULTURE FOR BACTERIA: CPT | Performed by: EMERGENCY MEDICINE

## 2023-03-19 PROCEDURE — 96365 THER/PROPH/DIAG IV INF INIT: CPT

## 2023-03-19 RX ORDER — SODIUM CHLORIDE 0.9 % (FLUSH) 0.9 %
10 SYRINGE (ML) INJECTION AS NEEDED
Status: DISCONTINUED | OUTPATIENT
Start: 2023-03-19 | End: 2023-03-22 | Stop reason: HOSPADM

## 2023-03-19 RX ORDER — AMOXICILLIN 250 MG
2 CAPSULE ORAL 2 TIMES DAILY
Status: DISCONTINUED | OUTPATIENT
Start: 2023-03-19 | End: 2023-03-22 | Stop reason: HOSPADM

## 2023-03-19 RX ORDER — ENOXAPARIN SODIUM 100 MG/ML
40 INJECTION SUBCUTANEOUS NIGHTLY
Status: DISCONTINUED | OUTPATIENT
Start: 2023-03-19 | End: 2023-03-22 | Stop reason: HOSPADM

## 2023-03-19 RX ORDER — HYDROCODONE BITARTRATE AND ACETAMINOPHEN 5; 325 MG/1; MG/1
1 TABLET ORAL EVERY 4 HOURS PRN
Status: DISCONTINUED | OUTPATIENT
Start: 2023-03-19 | End: 2023-03-22 | Stop reason: HOSPADM

## 2023-03-19 RX ORDER — ACETAMINOPHEN 160 MG/5ML
650 SOLUTION ORAL EVERY 4 HOURS PRN
Status: DISCONTINUED | OUTPATIENT
Start: 2023-03-19 | End: 2023-03-22 | Stop reason: HOSPADM

## 2023-03-19 RX ORDER — BISACODYL 5 MG/1
5 TABLET, DELAYED RELEASE ORAL DAILY PRN
Status: DISCONTINUED | OUTPATIENT
Start: 2023-03-19 | End: 2023-03-22 | Stop reason: HOSPADM

## 2023-03-19 RX ORDER — HYDROCODONE BITARTRATE AND ACETAMINOPHEN 7.5; 325 MG/1; MG/1
2 TABLET ORAL EVERY 4 HOURS PRN
Status: DISCONTINUED | OUTPATIENT
Start: 2023-03-19 | End: 2023-03-22 | Stop reason: HOSPADM

## 2023-03-19 RX ORDER — INSULIN ASPART 100 [IU]/ML
0-7 INJECTION, SOLUTION INTRAVENOUS; SUBCUTANEOUS
Status: DISCONTINUED | OUTPATIENT
Start: 2023-03-20 | End: 2023-03-22 | Stop reason: HOSPADM

## 2023-03-19 RX ORDER — ACETAMINOPHEN 650 MG/1
650 SUPPOSITORY RECTAL EVERY 4 HOURS PRN
Status: DISCONTINUED | OUTPATIENT
Start: 2023-03-19 | End: 2023-03-22 | Stop reason: HOSPADM

## 2023-03-19 RX ORDER — BUDESONIDE 0.5 MG/2ML
0.5 INHALANT ORAL
Status: DISCONTINUED | OUTPATIENT
Start: 2023-03-19 | End: 2023-03-22 | Stop reason: HOSPADM

## 2023-03-19 RX ORDER — SPIRONOLACTONE 25 MG/1
25 TABLET ORAL DAILY
Status: DISCONTINUED | OUTPATIENT
Start: 2023-03-19 | End: 2023-03-20

## 2023-03-19 RX ORDER — SODIUM CHLORIDE 9 MG/ML
40 INJECTION, SOLUTION INTRAVENOUS AS NEEDED
Status: DISCONTINUED | OUTPATIENT
Start: 2023-03-19 | End: 2023-03-22 | Stop reason: HOSPADM

## 2023-03-19 RX ORDER — GABAPENTIN 100 MG/1
100 CAPSULE ORAL NIGHTLY
Status: DISCONTINUED | OUTPATIENT
Start: 2023-03-19 | End: 2023-03-22 | Stop reason: HOSPADM

## 2023-03-19 RX ORDER — ACETAMINOPHEN 325 MG/1
650 TABLET ORAL EVERY 4 HOURS PRN
Status: DISCONTINUED | OUTPATIENT
Start: 2023-03-19 | End: 2023-03-22 | Stop reason: HOSPADM

## 2023-03-19 RX ORDER — ALLOPURINOL 100 MG/1
100 TABLET ORAL 2 TIMES DAILY
Status: DISCONTINUED | OUTPATIENT
Start: 2023-03-19 | End: 2023-03-22 | Stop reason: HOSPADM

## 2023-03-19 RX ORDER — IPRATROPIUM BROMIDE AND ALBUTEROL SULFATE 2.5; .5 MG/3ML; MG/3ML
1.5 SOLUTION RESPIRATORY (INHALATION) EVERY 4 HOURS PRN
Status: DISCONTINUED | OUTPATIENT
Start: 2023-03-19 | End: 2023-03-22 | Stop reason: HOSPADM

## 2023-03-19 RX ORDER — CETIRIZINE HYDROCHLORIDE 10 MG/1
10 TABLET ORAL DAILY
Status: DISCONTINUED | OUTPATIENT
Start: 2023-03-19 | End: 2023-03-22 | Stop reason: HOSPADM

## 2023-03-19 RX ORDER — BUMETANIDE 1 MG/1
2 TABLET ORAL DAILY
Status: DISCONTINUED | OUTPATIENT
Start: 2023-03-19 | End: 2023-03-20

## 2023-03-19 RX ORDER — POLYETHYLENE GLYCOL 3350 17 G/17G
17 POWDER, FOR SOLUTION ORAL DAILY PRN
Status: DISCONTINUED | OUTPATIENT
Start: 2023-03-19 | End: 2023-03-22 | Stop reason: HOSPADM

## 2023-03-19 RX ORDER — FAMOTIDINE 40 MG/1
40 TABLET, FILM COATED ORAL DAILY
Status: DISCONTINUED | OUTPATIENT
Start: 2023-03-19 | End: 2023-03-22 | Stop reason: HOSPADM

## 2023-03-19 RX ORDER — AZITHROMYCIN 250 MG/1
250 TABLET, FILM COATED ORAL 3 TIMES WEEKLY
Status: DISCONTINUED | OUTPATIENT
Start: 2023-03-20 | End: 2023-03-20

## 2023-03-19 RX ORDER — ALBUTEROL SULFATE 2.5 MG/3ML
2.5 SOLUTION RESPIRATORY (INHALATION) EVERY 4 HOURS PRN
Status: DISCONTINUED | OUTPATIENT
Start: 2023-03-19 | End: 2023-03-19 | Stop reason: SDUPTHER

## 2023-03-19 RX ORDER — NYSTATIN 100000 [USP'U]/G
POWDER TOPICAL EVERY 12 HOURS SCHEDULED
Status: DISCONTINUED | OUTPATIENT
Start: 2023-03-19 | End: 2023-03-22 | Stop reason: HOSPADM

## 2023-03-19 RX ORDER — CARVEDILOL 3.12 MG/1
3.12 TABLET ORAL 2 TIMES DAILY WITH MEALS
Status: DISCONTINUED | OUTPATIENT
Start: 2023-03-19 | End: 2023-03-22 | Stop reason: HOSPADM

## 2023-03-19 RX ORDER — BISACODYL 10 MG
10 SUPPOSITORY, RECTAL RECTAL DAILY PRN
Status: DISCONTINUED | OUTPATIENT
Start: 2023-03-19 | End: 2023-03-22 | Stop reason: HOSPADM

## 2023-03-19 RX ORDER — CARVEDILOL 3.12 MG/1
3.12 TABLET ORAL 2 TIMES DAILY WITH MEALS
COMMUNITY

## 2023-03-19 RX ORDER — GUAIFENESIN 600 MG/1
1200 TABLET, EXTENDED RELEASE ORAL EVERY 12 HOURS SCHEDULED
Status: DISCONTINUED | OUTPATIENT
Start: 2023-03-19 | End: 2023-03-22 | Stop reason: HOSPADM

## 2023-03-19 RX ORDER — NICOTINE POLACRILEX 4 MG
15 LOZENGE BUCCAL
Status: DISCONTINUED | OUTPATIENT
Start: 2023-03-19 | End: 2023-03-22 | Stop reason: HOSPADM

## 2023-03-19 RX ORDER — SODIUM CHLORIDE 0.9 % (FLUSH) 0.9 %
10 SYRINGE (ML) INJECTION EVERY 12 HOURS SCHEDULED
Status: DISCONTINUED | OUTPATIENT
Start: 2023-03-19 | End: 2023-03-22 | Stop reason: HOSPADM

## 2023-03-19 RX ORDER — ONDANSETRON 4 MG/1
4 TABLET, FILM COATED ORAL EVERY 6 HOURS PRN
Status: DISCONTINUED | OUTPATIENT
Start: 2023-03-19 | End: 2023-03-22 | Stop reason: HOSPADM

## 2023-03-19 RX ORDER — ONDANSETRON 2 MG/ML
4 INJECTION INTRAMUSCULAR; INTRAVENOUS EVERY 6 HOURS PRN
Status: DISCONTINUED | OUTPATIENT
Start: 2023-03-19 | End: 2023-03-22 | Stop reason: HOSPADM

## 2023-03-19 RX ORDER — DEXTROSE MONOHYDRATE 25 G/50ML
25 INJECTION, SOLUTION INTRAVENOUS
Status: DISCONTINUED | OUTPATIENT
Start: 2023-03-19 | End: 2023-03-22 | Stop reason: HOSPADM

## 2023-03-19 RX ORDER — SODIUM CHLORIDE 9 MG/ML
125 INJECTION, SOLUTION INTRAVENOUS CONTINUOUS
Status: DISCONTINUED | OUTPATIENT
Start: 2023-03-19 | End: 2023-03-19

## 2023-03-19 RX ADMIN — Medication 2250 MG: at 15:47

## 2023-03-19 RX ADMIN — SPIRONOLACTONE 12.5 MG: 25 TABLET ORAL at 20:29

## 2023-03-19 RX ADMIN — GUAIFENESIN 1200 MG: 600 TABLET, EXTENDED RELEASE ORAL at 20:29

## 2023-03-19 RX ADMIN — DOCUSATE SODIUM 50 MG AND SENNOSIDES 8.6 MG 2 TABLET: 8.6; 5 TABLET, FILM COATED ORAL at 20:29

## 2023-03-19 RX ADMIN — ENOXAPARIN SODIUM 40 MG: 40 INJECTION SUBCUTANEOUS at 20:30

## 2023-03-19 RX ADMIN — ALLOPURINOL 100 MG: 100 TABLET ORAL at 20:29

## 2023-03-19 RX ADMIN — FAMOTIDINE 40 MG: 40 TABLET, FILM COATED ORAL at 20:29

## 2023-03-19 RX ADMIN — HYDROCODONE BITARTRATE AND ACETAMINOPHEN 1 TABLET: 5; 325 TABLET ORAL at 20:45

## 2023-03-19 RX ADMIN — SODIUM CHLORIDE 125 ML/HR: 9 INJECTION, SOLUTION INTRAVENOUS at 18:17

## 2023-03-19 RX ADMIN — CARVEDILOL 3.12 MG: 3.12 TABLET, FILM COATED ORAL at 23:07

## 2023-03-19 RX ADMIN — GABAPENTIN 100 MG: 100 CAPSULE ORAL at 23:07

## 2023-03-19 RX ADMIN — NYSTATIN: 100000 POWDER TOPICAL at 23:50

## 2023-03-19 RX ADMIN — BUDESONIDE 0.5 MG: 0.5 SUSPENSION RESPIRATORY (INHALATION) at 22:10

## 2023-03-19 RX ADMIN — BUMETANIDE 2 MG: 1 TABLET ORAL at 20:29

## 2023-03-19 NOTE — ED NOTES
"Nursing report ED to floor  Alda Constantino  82 y.o.  female    HPI:   Chief Complaint   Patient presents with    Foot Pain     right       Admitting doctor:   Clif Vaca MD    Consulting provider(s):  Consults       No orders found from 2/18/2023 to 3/20/2023.             Admitting diagnosis:   The primary encounter diagnosis was Cellulitis of right foot. A diagnosis of Cellulitis of right leg was also pertinent to this visit.    Code status:   Current Code Status       Date Active Code Status Order ID Comments User Context       Prior            Allergies:   Dilaudid [hydromorphone hcl], Diphenoxylate-atropine, Iodinated contrast media, Keflex [cephalexin], Lomotil [diphenoxylate], Aspirin, Beef (diagnostic), Beef-derived products, Ciprofloxacin, Contrast dye (echo or unknown ct/mr), Levaquin [levofloxacin], Milk-related compounds, Nsaids, Pegademase bovine, Percocet [oxycodone-acetaminophen], Pork-derived products, and Tolmetin    Intake and Output  No intake or output data in the 24 hours ending 03/19/23 1651    Weight:       03/19/23  1430   Weight: 110 kg (242 lb)       Most recent vitals:   Vitals:    03/19/23 1401 03/19/23 1430   BP: 115/66    BP Location: Right arm    Patient Position: Sitting    Pulse: 87    Resp: 20    Temp: 98.6 °F (37 °C)    TempSrc: Oral    SpO2: 100%    Weight:  110 kg (242 lb)   Height:  170.2 cm (67\")     Oxygen Therapy: room air    Active LDAs/IV Access:   Lines, Drains & Airways       Active LDAs       Name Placement date Placement time Site Days    Peripheral IV 03/19/23 1500 Left Antecubital 03/19/23  1500  Antecubital  less than 1                    Labs (abnormal labs have a star):   Labs Reviewed   COMPREHENSIVE METABOLIC PANEL - Abnormal; Notable for the following components:       Result Value    Glucose 124 (*)     BUN 26 (*)     Creatinine 1.20 (*)     Chloride 96 (*)     Calcium 8.4 (*)     eGFR 45.6 (*)     All other components within normal limits    " Narrative:     GFR Normal >60  Chronic Kidney Disease <60  Kidney Failure <15    The GFR formula is only valid for adults with stable renal function between ages 18 and 70.   SEDIMENTATION RATE - Abnormal; Notable for the following components:    Sed Rate 62 (*)     All other components within normal limits   C-REACTIVE PROTEIN - Abnormal; Notable for the following components:    C-Reactive Protein 11.80 (*)     All other components within normal limits   CBC WITH AUTO DIFFERENTIAL - Abnormal; Notable for the following components:    RBC 3.31 (*)     Hemoglobin 10.6 (*)     Hematocrit 32.4 (*)     MCV 97.9 (*)     RDW-SD 54.5 (*)     Neutrophil % 79.9 (*)     Lymphocyte % 9.8 (*)     Neutrophils, Absolute 7.01 (*)     All other components within normal limits   LACTIC ACID, PLASMA - Normal   BLOOD CULTURE   BLOOD CULTURE   CBC AND DIFFERENTIAL    Narrative:     The following orders were created for panel order CBC & Differential.  Procedure                               Abnormality         Status                     ---------                               -----------         ------                     CBC Auto Differential[[id number]]        Abnormal            Final result                 Please view results for these tests on the individual orders.   EXTRA TUBES    Narrative:     The following orders were created for panel order Extra Tubes.  Procedure                               Abnormality         Status                     ---------                               -----------         ------                     Gold Top - SST[[id number]]                                   Final result               Light Blue Top[[id number]]                                   Final result                 Please view results for these tests on the individual orders.   GOLD TOP - SST   LIGHT BLUE TOP       Meds given in ED:   Medications   sodium chloride 0.9 % flush 10 mL (has no administration in time range)   sodium chloride 0.9 %  infusion (has no administration in time range)   piperacillin-tazobactam (ZOSYN) 3.375 g/100 mL 0.9% NS IVPB (mbp) (0 g Intravenous Stopped 3/19/23 1603)   vancomycin 2250 mg/500 mL 0.9% NS IVPB ([facility]) (2,250 mg Intravenous New Bag 3/19/23 1547)     sodium chloride, 125 mL/hr         NIH Stroke Scale:       Isolation/Infection(s):  No active isolations   No active infections     COVID Testing  Collected n/a  Resulted n/a    Nursing report ED to floor:  Mental status: alert and oriented  Ambulatory status: assist of 2  Precautions: none    ED nurse phone [phone]- [phone]

## 2023-03-19 NOTE — ED PROVIDER NOTES
Subjective   History of Present Illness  Patient presents to the emergency department complaint of worsening pain in the right foot and ankle and tib-fib region.  Patient states she was seen yesterday and started on antibiotics for cellulitis in the region.  Patient also had duplex ultrasound which was negative for DVT.  Patient has had gouty arthritis in the past though no 1 particular joint is involved here is the entire foot going up to about the mid leg.  Patient notes soreness redness and warmth over this entire area that has been progressive.  Patient started on doxycycline after receiving IV antibiotics yesterday in the emergency department.  Patient returns risks because she is no longer able to bear weight on the foot to complete her ADLs.  No fevers though has been having chills at home.    History provided by:  Patient      Review of Systems   Constitutional: Positive for activity change and chills. Negative for fever.   HENT: Negative for congestion.    Respiratory: Negative for cough and shortness of breath.    Gastrointestinal: Negative for abdominal pain, diarrhea, nausea and vomiting.   Genitourinary: Positive for dysuria.   Musculoskeletal: Negative for back pain.   Neurological: Positive for weakness. Negative for dizziness, light-headedness and headaches.   All other systems reviewed and are negative.      Past Medical History:   Diagnosis Date   • Asthma    • Cancer (HCC)    • Congenital abnormalities     colon behind liver   • COPD (chronic obstructive pulmonary disease) (HCC)    • Hypertension        Allergies   Allergen Reactions   • Dilaudid [Hydromorphone Hcl] Anaphylaxis   • Diphenoxylate-Atropine Shortness Of Breath     Reaction: shortness of breath     • Iodinated Contrast Media Hives     Reaction: HIVES   • Keflex [Cephalexin] Shortness Of Breath   • Lomotil [Diphenoxylate] Shortness Of Breath   • Aspirin Hives   • Beef (Diagnostic) Nausea And Vomiting     All mammals  All mammals   •  Beef-Derived Products GI Intolerance   • Ciprofloxacin Nausea And Vomiting   • Contrast Dye (Echo Or Unknown Ct/Mr) Hives   • Levaquin [Levofloxacin] GI Intolerance   • Milk-Related Compounds GI Intolerance   • Nsaids Hives   • Pegademase Bovine Other (See Comments)     Other reaction(s): GI Intolerance   • Percocet [Oxycodone-Acetaminophen] Itching   • Pork-Derived Products GI Intolerance   • Tolmetin Hives       Past Surgical History:   Procedure Laterality Date   • ADENOIDECTOMY     • ADRENAL GLAND SURGERY Right    • BLADDER SURGERY     • BREAST BIOPSY     • COLONOSCOPY W/ BIOPSIES AND POLYPECTOMY     • EYE SURGERY     • HERNIA REPAIR     • HYSTERECTOMY     • LUNG LOBECTOMY Left    • RECTAL SURGERY      cyst   • SKIN CANCER EXCISION      legs   • TONSILLECTOMY     • VARICOSE VEIN SURGERY         Family History   Problem Relation Age of Onset   • Diabetes Mother    • Diabetes Father    • Heart disease Father    • Cancer Sister    • Heart disease Sister    • Thyroid disease Sister    • Diabetes Brother    • Heart disease Brother        Social History     Socioeconomic History   • Marital status:    Tobacco Use   • Smoking status: Never   • Smokeless tobacco: Never   Vaping Use   • Vaping Use: Never used   Substance and Sexual Activity   • Alcohol use: Never   • Drug use: Never   • Sexual activity: Defer           Objective   Physical Exam  Vitals and nursing note reviewed.   Constitutional:       General: She is not in acute distress.     Appearance: Normal appearance. She is not ill-appearing or diaphoretic.   HENT:      Head: Normocephalic.      Right Ear: Tympanic membrane normal.      Left Ear: Tympanic membrane normal.      Nose: Nose normal.      Mouth/Throat:      Mouth: Mucous membranes are moist.   Eyes:      Extraocular Movements: Extraocular movements intact.   Cardiovascular:      Rate and Rhythm: Normal rate.      Pulses: Normal pulses.      Heart sounds: Normal heart sounds.   Pulmonary:       Effort: Pulmonary effort is normal.      Breath sounds: Normal breath sounds.   Abdominal:      General: Abdomen is flat.   Musculoskeletal:         General: Swelling and tenderness present. No signs of injury.      Cervical back: Normal range of motion.      Right lower leg: Edema present.      Left lower leg: No edema.      Comments: Patient with right lower extremity edema approximately mid calf through the foot.  The toes are involved with the greatest being the second digit.  There is no skin opening, there is warmth and edema to the lower extremity consistent with cellulitis.  Very tender to touch with 2+ distal pulses on the dorsalis pedis and posterior tibial on the involved foot.  There is no palpable cord noted.   Skin:     Capillary Refill: Capillary refill takes less than 2 seconds.   Neurological:      General: No focal deficit present.      Mental Status: She is alert.   Psychiatric:         Mood and Affect: Mood normal.         Behavior: Behavior normal.         Thought Content: Thought content normal.         Judgment: Judgment normal.         Procedures           ED Course                                          Labs Reviewed   COMPREHENSIVE METABOLIC PANEL - Abnormal; Notable for the following components:       Result Value    Glucose 124 (*)     BUN 26 (*)     Creatinine 1.20 (*)     Chloride 96 (*)     Calcium 8.4 (*)     eGFR 45.6 (*)     All other components within normal limits    Narrative:     GFR Normal >60  Chronic Kidney Disease <60  Kidney Failure <15    The GFR formula is only valid for adults with stable renal function between ages 18 and 70.   SEDIMENTATION RATE - Abnormal; Notable for the following components:    Sed Rate 62 (*)     All other components within normal limits   C-REACTIVE PROTEIN - Abnormal; Notable for the following components:    C-Reactive Protein 11.80 (*)     All other components within normal limits   CBC WITH AUTO DIFFERENTIAL - Abnormal; Notable for the  following components:    RBC 3.31 (*)     Hemoglobin 10.6 (*)     Hematocrit 32.4 (*)     MCV 97.9 (*)     RDW-SD 54.5 (*)     Neutrophil % 79.9 (*)     Lymphocyte % 9.8 (*)     Neutrophils, Absolute 7.01 (*)     All other components within normal limits   LACTIC ACID, PLASMA - Normal   BLOOD CULTURE   BLOOD CULTURE   CBC AND DIFFERENTIAL    Narrative:     The following orders were created for panel order CBC & Differential.  Procedure                               Abnormality         Status                     ---------                               -----------         ------                     CBC Auto Differential[[id number]]        Abnormal            Final result                 Please view results for these tests on the individual orders.   EXTRA TUBES    Narrative:     The following orders were created for panel order Extra Tubes.  Procedure                               Abnormality         Status                     ---------                               -----------         ------                     Gold Top - SST[[id number]]                                   Final result               Light Blue Top[[id number]]                                   Final result                 Please view results for these tests on the individual orders.   GOLD TOP - SST   LIGHT BLUE TOP       XR Tibia Fibula 2 View Right   Final Result   CONCLUSION:   Subcutaneous edema consistent with a history of cellulitis.      [id number]      Electronically signed by:  Misael Grady MD  3/19/2023 3:31 PM CDT   Workstation: [id number]      XR Ankle 3+ View Right   Final Result   CONCLUSION:   Subcutaneous edema consistent with a history of cellulitis.      [id number]      Electronically signed by:  Misael Grady MD  3/19/2023 3:30 PM CDT   Workstation: [id number]            Medical Decision Making  Patient with worsening cellulitis despite outpatient antibiotics.  Patient started on Vanco and Zosyn in the emergency department.  Patient will be admitted  for further IV antibiotics and improvement.  Patient continues to associate this with her gout.  There is no 1 joint whether it is the metacarpal phalangeal joint or ankle joint that appears involved.  Patient does have very tight tender soft tissues in the region.    Cellulitis of right foot: chronic illness or injury  Cellulitis of right leg: acute illness or injury  Amount and/or Complexity of Data Reviewed  Labs: ordered.  Radiology: ordered.      Risk  Prescription drug management.  Decision regarding hospitalization.          Final diagnoses:   Cellulitis of right foot   Cellulitis of right leg       ED Disposition  ED Disposition     ED Disposition   Decision to Admit    Condition   --    Comment   Level of Care: Med/Surg [1]   Diagnosis: Cellulitis of right foot [317500]   Admitting Physician: ANTOIN MORALES [[id number]]   Attending Physician: ANTONI MORALES [[id number]]               No follow-up provider specified.       Medication List      No changes were made to your prescriptions during this visit.          William Orr MD  03/19/23 8396

## 2023-03-19 NOTE — H&P
HISTORY AND PHYSICAL  NAME: Alda Constantino  : 1940  MRN: [id number]    DATE OF ADMISSION:  3/19/2023     DATE & TIME SEEN: 23 at 1745    PCP: Gus Posada MD    CODE STATUS: Full code    CHIEF COMPLAINT: Right lower leg pain and swelling    HPI:  Alda Constantino is a 82 y.o. female with a CMH of COPD, cancer, HTN, gout, T2DM who presents complaining of 4 days of right lower leg pain.  She reports that Thursday when she was coming to the outpatient clinic for an appointment to see the doctor for her rib pain that she began experiencing increased pain in the right foot.  She had previously been diagnosed with gout in the toe of the right foot.  Friday the pain in her foot increased and she began noticing some swelling and redness.  Saturday she came to this ER where she was diagnosed with cellulitis of the right lower extremity and given doxycycline p.o.  She was also given some pain medication in the form of Norco to take.  Unfortunately, she was not able to  the Norco until this morning and today comes back into the ER with worsening right foot pain and swelling.  In the ER today she was diagnosed with worsening cellulitis and placed on IV antibiotics Zosyn and vancomycin.  Testing for DVT yesterday in the ER was reportedly negative    CONCURRENT MEDICAL HISTORY:  Past Medical History:   Diagnosis Date   • Asthma    • Cancer (HCC)    • Congenital abnormalities     colon behind liver   • COPD (chronic obstructive pulmonary disease) (HCC)    • Hypertension        PAST SURGICAL HISTORY:  Past Surgical History:   Procedure Laterality Date   • ADENOIDECTOMY     • ADRENAL GLAND SURGERY Right    • BLADDER SURGERY     • BREAST BIOPSY     • COLONOSCOPY W/ BIOPSIES AND POLYPECTOMY     • EYE SURGERY     • HERNIA REPAIR     • HYSTERECTOMY     • LUNG LOBECTOMY Left    • RECTAL SURGERY      cyst   • SKIN CANCER EXCISION      legs   • TONSILLECTOMY     • VARICOSE VEIN SURGERY         FAMILY  HISTORY:  Family History   Problem Relation Age of Onset   • Diabetes Mother    • Diabetes Father    • Heart disease Father    • Cancer Sister    • Heart disease Sister    • Thyroid disease Sister    • Diabetes Brother    • Heart disease Brother         SOCIAL HISTORY:  Social History     Socioeconomic History   • Marital status:    Tobacco Use   • Smoking status: Never   • Smokeless tobacco: Never   Vaping Use   • Vaping Use: Never used   Substance and Sexual Activity   • Alcohol use: Never   • Drug use: Never   • Sexual activity: Defer       HOME MEDICATIONS:  Prior to Admission medications    Medication Sig Start Date End Date Taking? Authorizing Provider   acetaminophen (TYLENOL) 500 MG tablet Take 1 tablet by mouth Every 6 (Six) Hours As Needed for Mild Pain. 3/3/23  Yes Nicole Cline MD   acetaminophen (TYLENOL) 650 MG 8 hr tablet Take 2 tablets by mouth Every 8 (Eight) Hours As Needed. Indications: Pain 3/6/23  Yes Nicole Cline MD   albuterol (PROVENTIL HFA;VENTOLIN HFA) 108 (90 Base) MCG/ACT inhaler Inhale 2 puffs Every 4 (Four) Hours As Needed for Wheezing.   Yes Nicole Cline MD   allopurinol (Zyloprim) 100 MG tablet Take 1 tablet by mouth 2 (Two) Times a Day. 2/14/23  Yes Gus Posada MD   arformoterol (BROVANA) 15 MCG/2ML nebulizer solution  2/8/23  Yes Nicole Cline MD   azithromycin (ZITHROMAX) 250 MG tablet Take 1 tablet by mouth Daily. Monday Wednesday and Friday  Indications: Pneumonia   Yes Nicole Cline MD   budesonide (PULMICORT) 0.25 MG/2ML nebulizer solution  2/8/23  Yes Nicole Cline MD   bumetanide (BUMEX) 2 MG tablet Take 1 tablet by mouth Daily. Indications: Edema 10/4/22  Yes Nicole Cline MD   doxycycline (MONODOX) 100 MG capsule Take 1 capsule by mouth 2 (Two) Times a Day. 3/18/23  Yes Pedro Padilla MD   gabapentin (NEURONTIN) 100 MG capsule Take 1 capsule by mouth every night at bedtime. 3/3/23  Yes Marlyn  MD Nicole   Glucose Blood (PRECISION XTRA TEST VI) by In Vitro route.   Yes Nicole Cline MD   guaiFENesin (MUCINEX) 600 MG 12 hr tablet Take 2 tablets by mouth Every 12 (Twelve) Hours. 2/24/23  Yes Maeve Rae MD   HYDROcodone-acetaminophen (NORCO) 5-325 MG per tablet Take 1 tablet by mouth Every 6 (Six) Hours As Needed for Moderate Pain. 3/18/23  Yes Pedro Padilla MD   HYDROcodone-acetaminophen (NORCO) 7.5-325 MG per tablet Take 1 tablet by mouth Every 6 (Six) Hours As Needed for Moderate Pain. 2/24/23  Yes Maeve Rae MD   ipratropium-albuterol (DUO-NEB) 0.5-2.5 mg/3 ml nebulizer Take 1.5 mL by nebulization Every 4 (Four) Hours As Needed for Wheezing.   Yes Nicole Cline MD   levocetirizine (XYZAL) 5 MG tablet Take 1 tablet by mouth Every Evening. Indications: Perennial Allergic Rhinitis   Yes Nicole Cline MD   metFORMIN ER (GLUCOPHAGE-XR) 750 MG 24 hr tablet TAKE 1 TABLET EVERY NIGHT AT BEDTIME 7/12/22  Yes Gus Posada MD   omeprazole (priLOSEC) 40 MG capsule Take 1 capsule by mouth Daily. 2/9/23  Yes Gus Posada MD   spironolactone (ALDACTONE) 25 MG tablet Take 1 tablet by mouth Daily. 1/2 tablet daily  Indications: Edema 10/8/22  Yes Nicole Cline MD   EPINEPHrine (EPIPEN) 0.3 MG/0.3ML solution auto-injector injection  9/23/22   Nicole Cline MD   IPRATROPIUM BROMIDE IN Inhale.    Nicole Cline MD       ALLERGIES:  Dilaudid [hydromorphone hcl], Diphenoxylate-atropine, Iodinated contrast media, Keflex [cephalexin], Lomotil [diphenoxylate], Aspirin, Beef (diagnostic), Beef-derived products, Ciprofloxacin, Contrast dye (echo or unknown ct/mr), Levaquin [levofloxacin], Milk-related compounds, Nsaids, Pegademase bovine, Percocet [oxycodone-acetaminophen], Pork-derived products, and Tolmetin    REVIEW OF SYSTEMS  Review of Systems   Constitutional: Positive for activity change, appetite change, chills and fatigue. Negative for fever.   HENT:  Positive for ear pain and rhinorrhea. Negative for congestion, ear discharge, postnasal drip, sinus pain, sore throat and trouble swallowing.    Eyes: Negative for photophobia, pain, discharge and itching.   Respiratory: Positive for cough and shortness of breath. Negative for choking and chest tightness.    Cardiovascular: Positive for leg swelling. Negative for chest pain and palpitations.   Gastrointestinal: Positive for constipation. Negative for abdominal distention, blood in stool, diarrhea and vomiting.   Endocrine: Negative for cold intolerance and heat intolerance.   Genitourinary: Negative for dysuria, flank pain, hematuria and urgency.   Musculoskeletal: Positive for arthralgias, gait problem and joint swelling. Negative for neck pain.   Skin: Positive for rash. Negative for color change and wound.   Neurological: Negative for dizziness, tremors, seizures, syncope, weakness, numbness and headaches.   Hematological: Negative for adenopathy.   Psychiatric/Behavioral: Negative for confusion, hallucinations and sleep disturbance.       PHYSICAL EXAM:  Temp:  [98.6 °F (37 °C)-99.5 °F (37.5 °C)] 99.5 °F (37.5 °C)  Heart Rate:  [] 100  Resp:  [20] 20  BP: (115-144)/(66-70) 144/70  Body mass index is 36.83 kg/m².  Physical Exam  Vitals reviewed.   Constitutional:       General: She is not in acute distress.     Appearance: Normal appearance. She is not ill-appearing or diaphoretic.   HENT:      Head: Normocephalic and atraumatic.      Right Ear: External ear normal.      Left Ear: External ear normal.      Nose: Nose normal.      Mouth/Throat:      Mouth: Mucous membranes are moist.      Pharynx: No posterior oropharyngeal erythema.   Eyes:      General: No scleral icterus.        Right eye: No discharge.         Left eye: No discharge.      Extraocular Movements: Extraocular movements intact.   Neck:      Vascular: No carotid bruit.   Cardiovascular:      Rate and Rhythm: Normal rate and regular rhythm.       Pulses: Normal pulses.      Heart sounds: Normal heart sounds. No murmur heard.  Pulmonary:      Effort: Pulmonary effort is normal.      Breath sounds: Wheezing present. No rhonchi.      Comments: Diminished breath sounds left upper lung field.  Expiratory wheezing lower lung fields bilaterally.  Chest:      Chest wall: No tenderness.   Abdominal:      General: Bowel sounds are normal. There is no distension.      Palpations: Abdomen is soft. There is no mass.      Tenderness: There is no abdominal tenderness.   Musculoskeletal:         General: Tenderness present. No deformity or signs of injury.      Cervical back: No rigidity. No muscular tenderness.      Right lower leg: Edema present.      Left lower leg: Edema present.      Comments: +1 pitting edema left lower extremity below the knee.  +2 pitting edema right lower extremity below knee.  Tenderness present over what appears to be T6 or 7 rib underneath right breast.  Tenderness in mid thoracic region as well.  This tenderness is most likely due to to the fracture that she was previously diagnosed with that is in the earlier stages of healing   Lymphadenopathy:      Cervical: No cervical adenopathy.   Skin:     Capillary Refill: Capillary refill takes less than 2 seconds.      Coloration: Skin is not jaundiced or pale.      Findings: Erythema present. No lesion or rash.      Comments: Area of erythema extends up to mid calf region area was marked with a blue pen.  No apparent wound on skin.   Neurological:      General: No focal deficit present.      Mental Status: She is alert and oriented to person, place, and time.      Cranial Nerves: No cranial nerve deficit.      Sensory: No sensory deficit.      Motor: No weakness.   Psychiatric:         Mood and Affect: Mood normal.         DIAGNOSTIC DATA:   Lab Results (last 24 hours)     Procedure Component Value Units Date/Time    Extra Tubes [[id number]] Collected: 03/19/23 1500    Specimen: Blood, Venous Line  Updated: 03/19/23 1601    Narrative:      The following orders were created for panel order Extra Tubes.  Procedure                               Abnormality         Status                     ---------                               -----------         ------                     Gold Top - SST[[id number]]                                   Final result               Light Blue Top[[id number]]                                   Final result                 Please view results for these tests on the individual orders.    Gold Top - SST [[id number]] Collected: 03/19/23 1500    Specimen: Blood Updated: 03/19/23 1601     Extra Tube Hold for add-ons.     Comment: Auto resulted.       Light Blue Top [[id number]] Collected: 03/19/23 1500    Specimen: Blood from Arm, Left Updated: 03/19/23 1601     Extra Tube Hold for add-ons.     Comment: Auto resulted       Lactic Acid, Plasma [[id number]]  (Normal) Collected: 03/19/23 1500    Specimen: Blood Updated: 03/19/23 1541     Lactate 0.7 mmol/L     Comprehensive Metabolic Panel [[id number]]  (Abnormal) Collected: 03/19/23 1500    Specimen: Blood Updated: 03/19/23 1525     Glucose 124 mg/dL      BUN 26 mg/dL      Creatinine 1.20 mg/dL      Sodium 136 mmol/L      Potassium 3.5 mmol/L      Chloride 96 mmol/L      CO2 27.0 mmol/L      Calcium 8.4 mg/dL      Total Protein 6.8 g/dL      Albumin 3.7 g/dL      ALT (SGPT) 9 U/L      AST (SGOT) 12 U/L      Alkaline Phosphatase 85 U/L      Total Bilirubin 0.6 mg/dL      Globulin 3.1 gm/dL      A/G Ratio 1.2 g/dL      BUN/Creatinine Ratio 21.7     Anion Gap 13.0 mmol/L      eGFR 45.6 mL/min/1.73     Narrative:      GFR Normal >60  Chronic Kidney Disease <60  Kidney Failure <15    The GFR formula is only valid for adults with stable renal function between ages 18 and 70.    C-reactive Protein [[id number]]  (Abnormal) Collected: 03/19/23 1500    Specimen: Blood Updated: 03/19/23 1525     C-Reactive Protein 11.80 mg/dL     Sedimentation Rate [[id number]]   (Abnormal) Collected: 03/19/23 1500    Specimen: Blood Updated: 03/19/23 1511     Sed Rate 62 mm/hr     Blood Culture - Blood, Arm, Right [[id number]] Collected: 03/19/23 1501    Specimen: Blood from Arm, Right Updated: 03/19/23 1507    Blood Culture - Blood, Arm, Left [[id number]] Collected: 03/19/23 1500    Specimen: Blood from Arm, Left Updated: 03/19/23 1505    CBC & Differential [[id number]]  (Abnormal) Collected: 03/19/23 1500    Specimen: Blood Updated: 03/19/23 1503    Narrative:      The following orders were created for panel order CBC & Differential.  Procedure                               Abnormality         Status                     ---------                               -----------         ------                     CBC Auto Differential[[id number]]        Abnormal            Final result                 Please view results for these tests on the individual orders.    CBC Auto Differential [[id number]]  (Abnormal) Collected: 03/19/23 1500    Specimen: Blood Updated: 03/19/23 1503     WBC 8.77 10*3/mm3      RBC 3.31 10*6/mm3      Hemoglobin 10.6 g/dL      Hematocrit 32.4 %      MCV 97.9 fL      MCH 32.0 pg      MCHC 32.7 g/dL      RDW 15.3 %      RDW-SD 54.5 fl      MPV 11.8 fL      Platelets 174 10*3/mm3      Neutrophil % 79.9 %      Lymphocyte % 9.8 %      Monocyte % 8.6 %      Eosinophil % 1.0 %      Basophil % 0.2 %      Immature Grans % 0.5 %      Neutrophils, Absolute 7.01 10*3/mm3      Lymphocytes, Absolute 0.86 10*3/mm3      Monocytes, Absolute 0.75 10*3/mm3      Eosinophils, Absolute 0.09 10*3/mm3      Basophils, Absolute 0.02 10*3/mm3      Immature Grans, Absolute 0.04 10*3/mm3      nRBC 0.0 /100 WBC            Imaging Results (Last 24 Hours)     Procedure Component Value Units Date/Time    XR Tibia Fibula 2 View Right [[id number]] Collected: 03/19/23 1505     Updated: 03/19/23 1534    Narrative:        Two view right leg    HISTORY: Cellulitis    AP and lateral views obtained.    COMPARISON:  None    FINDINGS:   Subcutaneous edema consistent with a history of cellulitis.  No bone destruction or periosteal reaction.  Atherosclerotic calcifications.  Narrowing of the lateral joint space of the knee.  No fracture or dislocation.  No other osseous or articular abnormality.      Impression:      CONCLUSION:  Subcutaneous edema consistent with a history of cellulitis.    [id number]    Electronically signed by:  Misael Grady MD  3/19/2023 3:31 PM CDT  Workstation: [id number]    XR Ankle 3+ View Right [[id number]] Collected: 03/19/23 1504     Updated: 03/19/23 1532    Narrative:        Three view right ankle    HISTORY: Cellulitis    AP, lateral and oblique views obtained.    COMPARISON: None    FINDINGS:   Subcutaneous edema consistent with a history of cellulitis.  No bone destruction or periosteal reaction.  Small spur medial malleolus  Small plantar calcaneal spur.  No fracture or dislocation.  No other osseous or articular abnormality.  Atherosclerotic calcifications in the ankle.      Impression:      CONCLUSION:  Subcutaneous edema consistent with a history of cellulitis.    [id number]    Electronically signed by:  Misael Grady MD  3/19/2023 3:30 PM CDT  Workstation: [id number]            I reviewed the patient's new clinical results.    ASSESSMENT AND PLAN: This is a 82 y.o. female with new onset cellulitis right lower extremity primarily in foot and ankle.  Initially seen in ER and placed on p.o. doxycycline which has not been effective for after 1 day.  In ER placed on IV vancomycin and Zosyn.  Admitted for IV antibiotic treatment for worsening cellulitis.    Active Hospital Problems    Diagnosis  POA   • **Cellulitis of right foot [L03.115]  Yes   • History of rib fracture [Z87.81]  Not Applicable   • HTN (hypertension) [I10]  Yes   • History of lung cancer [Z85.118]  Not Applicable   • (HFpEF) heart failure with preserved ejection fraction (HCC) [I50.30]  Yes     Last Assessment & Plan:   Formatting of this note  might be different from the original.  -Repeat labs ordered by cardiologist since previous was hemolyzed  Formatting of this note might be different from the original.  Last Assessment & Plan:   -Repeat labs ordered by cardiologist since previous was hemolyzed    Last Assessment & Plan:   Formatting of this note might be different from the original.  -Repeat labs ordered by cardiologist since previous was hemolyzed     • GERD (gastroesophageal reflux disease) [K21.9]  Yes   • Chronic obstructive pulmonary disease (HCC) [J44.9]  Yes     Cellulitis right foot.  - Monitor closely for development of compartment syndrome  - IV Zosyn every 8 for 5 days  - If MRSA is suspected add vancomycin  - CBC  - CMP    Hypertension  - Continue home Coreg 3.125 mg daily  - Continue spironolactone 25 mg daily  - Bumex 2 mg daily    Diabetes  - Low-dose SSI    COPD  - Continue azithromycin 500 3 times weekly.  - Continue Mucinex 1200 every 12  - Pulmicort nebulizer 0.5 mg twice daily      Neuropathy  - Continue gabapentin 100 mg daily      DVT prophylaxis: Severinox     Alda Constantino and I have discussed pain goals for this hospitalization after reviewing her current clinical condition, medical history and prior pain experiences.  The goal is to keep the pain level mild to moderate.  To help achieve this, I plan to use Tylenol for mild pain and Norco 5-7.5 for more moderate to severe pain.    PDMP reviewed and consistent with patient reported medications.    Expected Length of Stay: When:  3days    I discussed the patient's findings and my recommendations with patient and family.     Clif Vaca MD is the attending on record at time of admission, He is aware of the patient's status and agrees with the above history and physical.        This document has been electronically signed by Gus Posada MD on March 19, 2023 22:07 CDT

## 2023-03-19 NOTE — ACP (ADVANCE CARE PLANNING)
Patient wishes to be a full code. They do wish to have chest compressions, emergency medications and be intubated and placed on a ventilator should their heart stop or they stop breathing. They do not have a living will or advanced care directive. They would like to designate her son Hemant Curtis Jr. to make medical decisions on their behalf should they not be able to. Number [phone].        This document has been electronically signed by Gus Posada MD on March 19, 2023 18:43 CDT

## 2023-03-20 ENCOUNTER — APPOINTMENT (OUTPATIENT)
Dept: GENERAL RADIOLOGY | Facility: HOSPITAL | Age: 83
End: 2023-03-20
Payer: MEDICARE

## 2023-03-20 ENCOUNTER — HOME CARE VISIT (OUTPATIENT)
Dept: HOME HEALTH SERVICES | Facility: HOME HEALTHCARE | Age: 83
End: 2023-03-20
Payer: MEDICARE

## 2023-03-20 VITALS
TEMPERATURE: 97.8 F | RESPIRATION RATE: 18 BRPM | SYSTOLIC BLOOD PRESSURE: 134 MMHG | OXYGEN SATURATION: 96 % | HEART RATE: 87 BPM | DIASTOLIC BLOOD PRESSURE: 82 MMHG

## 2023-03-20 PROBLEM — M10.9 ACUTE GOUT INVOLVING TOE OF RIGHT FOOT: Status: ACTIVE | Noted: 2023-03-20

## 2023-03-20 LAB
ALBUMIN SERPL-MCNC: 3.2 G/DL (ref 3.5–5.2)
ALBUMIN/GLOB SERPL: 1.1 G/DL
ALP SERPL-CCNC: 71 U/L (ref 39–117)
ALT SERPL W P-5'-P-CCNC: 8 U/L (ref 1–33)
ANION GAP SERPL CALCULATED.3IONS-SCNC: 8 MMOL/L (ref 5–15)
AST SERPL-CCNC: 10 U/L (ref 1–32)
BASOPHILS # BLD AUTO: 0.02 10*3/MM3 (ref 0–0.2)
BASOPHILS NFR BLD AUTO: 0.3 % (ref 0–1.5)
BILIRUB SERPL-MCNC: 0.6 MG/DL (ref 0–1.2)
BUN SERPL-MCNC: 24 MG/DL (ref 8–23)
BUN/CREAT SERPL: 20.5 (ref 7–25)
CALCIUM SPEC-SCNC: 8 MG/DL (ref 8.6–10.5)
CHLORIDE SERPL-SCNC: 100 MMOL/L (ref 98–107)
CO2 SERPL-SCNC: 28 MMOL/L (ref 22–29)
CREAT SERPL-MCNC: 1.17 MG/DL (ref 0.57–1)
CRP SERPL-MCNC: 13.67 MG/DL (ref 0–0.5)
DEPRECATED RDW RBC AUTO: 55.1 FL (ref 37–54)
EGFRCR SERPLBLD CKD-EPI 2021: 47 ML/MIN/1.73
EOSINOPHIL # BLD AUTO: 0.17 10*3/MM3 (ref 0–0.4)
EOSINOPHIL NFR BLD AUTO: 2.5 % (ref 0.3–6.2)
ERYTHROCYTE [DISTWIDTH] IN BLOOD BY AUTOMATED COUNT: 15.4 % (ref 12.3–15.4)
GLOBULIN UR ELPH-MCNC: 2.8 GM/DL
GLUCOSE BLDC GLUCOMTR-MCNC: 107 MG/DL (ref 70–130)
GLUCOSE BLDC GLUCOMTR-MCNC: 121 MG/DL (ref 70–130)
GLUCOSE BLDC GLUCOMTR-MCNC: 132 MG/DL (ref 70–130)
GLUCOSE BLDC GLUCOMTR-MCNC: 160 MG/DL (ref 70–130)
GLUCOSE SERPL-MCNC: 129 MG/DL (ref 65–99)
HCT VFR BLD AUTO: 28 % (ref 34–46.6)
HGB BLD-MCNC: 9.1 G/DL (ref 12–15.9)
IMM GRANULOCYTES # BLD AUTO: 0.03 10*3/MM3 (ref 0–0.05)
IMM GRANULOCYTES NFR BLD AUTO: 0.4 % (ref 0–0.5)
LYMPHOCYTES # BLD AUTO: 0.91 10*3/MM3 (ref 0.7–3.1)
LYMPHOCYTES NFR BLD AUTO: 13.6 % (ref 19.6–45.3)
MCH RBC QN AUTO: 31.9 PG (ref 26.6–33)
MCHC RBC AUTO-ENTMCNC: 32.5 G/DL (ref 31.5–35.7)
MCV RBC AUTO: 98.2 FL (ref 79–97)
MONOCYTES # BLD AUTO: 0.63 10*3/MM3 (ref 0.1–0.9)
MONOCYTES NFR BLD AUTO: 9.4 % (ref 5–12)
NEUTROPHILS NFR BLD AUTO: 4.91 10*3/MM3 (ref 1.7–7)
NEUTROPHILS NFR BLD AUTO: 73.8 % (ref 42.7–76)
NRBC BLD AUTO-RTO: 0 /100 WBC (ref 0–0.2)
PLATELET # BLD AUTO: 155 10*3/MM3 (ref 140–450)
PMV BLD AUTO: 12.4 FL (ref 6–12)
POTASSIUM SERPL-SCNC: 3.7 MMOL/L (ref 3.5–5.2)
PROT SERPL-MCNC: 6 G/DL (ref 6–8.5)
RBC # BLD AUTO: 2.85 10*6/MM3 (ref 3.77–5.28)
SODIUM SERPL-SCNC: 136 MMOL/L (ref 136–145)
URATE SERPL-MCNC: 5.7 MG/DL (ref 2.4–5.7)
WBC NRBC COR # BLD: 6.67 10*3/MM3 (ref 3.4–10.8)

## 2023-03-20 PROCEDURE — G0180 MD CERTIFICATION HHA PATIENT: HCPCS | Performed by: HOSPITALIST

## 2023-03-20 PROCEDURE — 86140 C-REACTIVE PROTEIN: CPT | Performed by: STUDENT IN AN ORGANIZED HEALTH CARE EDUCATION/TRAINING PROGRAM

## 2023-03-20 PROCEDURE — 94760 N-INVAS EAR/PLS OXIMETRY 1: CPT

## 2023-03-20 PROCEDURE — 82962 GLUCOSE BLOOD TEST: CPT

## 2023-03-20 PROCEDURE — 94799 UNLISTED PULMONARY SVC/PX: CPT

## 2023-03-20 PROCEDURE — 96367 TX/PROPH/DG ADDL SEQ IV INF: CPT

## 2023-03-20 PROCEDURE — 25010000002 METHYLPREDNISOLONE PER 40 MG: Performed by: STUDENT IN AN ORGANIZED HEALTH CARE EDUCATION/TRAINING PROGRAM

## 2023-03-20 PROCEDURE — 96366 THER/PROPH/DIAG IV INF ADDON: CPT

## 2023-03-20 PROCEDURE — 73620 X-RAY EXAM OF FOOT: CPT

## 2023-03-20 PROCEDURE — 80053 COMPREHEN METABOLIC PANEL: CPT | Performed by: CHIROPRACTOR

## 2023-03-20 PROCEDURE — 96372 THER/PROPH/DIAG INJ SC/IM: CPT

## 2023-03-20 PROCEDURE — 25010000002 PIPERACILLIN SOD-TAZOBACTAM PER 1 G: Performed by: CHIROPRACTOR

## 2023-03-20 PROCEDURE — 96375 TX/PRO/DX INJ NEW DRUG ADDON: CPT

## 2023-03-20 PROCEDURE — 94664 DEMO&/EVAL PT USE INHALER: CPT

## 2023-03-20 PROCEDURE — 85025 COMPLETE CBC W/AUTO DIFF WBC: CPT | Performed by: CHIROPRACTOR

## 2023-03-20 PROCEDURE — 25010000002 ENOXAPARIN PER 10 MG: Performed by: CHIROPRACTOR

## 2023-03-20 PROCEDURE — 84550 ASSAY OF BLOOD/URIC ACID: CPT | Performed by: STUDENT IN AN ORGANIZED HEALTH CARE EDUCATION/TRAINING PROGRAM

## 2023-03-20 PROCEDURE — 97162 PT EVAL MOD COMPLEX 30 MIN: CPT

## 2023-03-20 PROCEDURE — G0378 HOSPITAL OBSERVATION PER HR: HCPCS

## 2023-03-20 RX ORDER — BUMETANIDE 1 MG/1
2 TABLET ORAL NIGHTLY
Status: DISCONTINUED | OUTPATIENT
Start: 2023-03-20 | End: 2023-03-22 | Stop reason: HOSPADM

## 2023-03-20 RX ORDER — METHYLPREDNISOLONE SODIUM SUCCINATE 40 MG/ML
20 INJECTION, POWDER, LYOPHILIZED, FOR SOLUTION INTRAMUSCULAR; INTRAVENOUS DAILY
Status: DISCONTINUED | OUTPATIENT
Start: 2023-03-20 | End: 2023-03-21

## 2023-03-20 RX ORDER — AZITHROMYCIN 250 MG/1
250 TABLET, FILM COATED ORAL 3 TIMES WEEKLY
Status: DISCONTINUED | OUTPATIENT
Start: 2023-03-20 | End: 2023-03-22 | Stop reason: HOSPADM

## 2023-03-20 RX ADMIN — FAMOTIDINE 40 MG: 40 TABLET, FILM COATED ORAL at 09:40

## 2023-03-20 RX ADMIN — HYDROCODONE BITARTRATE AND ACETAMINOPHEN 1 TABLET: 5; 325 TABLET ORAL at 01:35

## 2023-03-20 RX ADMIN — Medication 10 ML: at 20:53

## 2023-03-20 RX ADMIN — ENOXAPARIN SODIUM 40 MG: 40 INJECTION SUBCUTANEOUS at 20:51

## 2023-03-20 RX ADMIN — BUDESONIDE 0.5 MG: 0.5 SUSPENSION RESPIRATORY (INHALATION) at 07:15

## 2023-03-20 RX ADMIN — CARVEDILOL 3.12 MG: 3.12 TABLET, FILM COATED ORAL at 17:50

## 2023-03-20 RX ADMIN — AZITHROMYCIN MONOHYDRATE 250 MG: 250 TABLET ORAL at 13:15

## 2023-03-20 RX ADMIN — GUAIFENESIN 1200 MG: 600 TABLET, EXTENDED RELEASE ORAL at 09:41

## 2023-03-20 RX ADMIN — CARVEDILOL 3.12 MG: 3.12 TABLET, FILM COATED ORAL at 09:41

## 2023-03-20 RX ADMIN — ALLOPURINOL 100 MG: 100 TABLET ORAL at 20:50

## 2023-03-20 RX ADMIN — IPRATROPIUM BROMIDE AND ALBUTEROL SULFATE 1.5 ML: 2.5; .5 SOLUTION RESPIRATORY (INHALATION) at 09:58

## 2023-03-20 RX ADMIN — BUDESONIDE 0.5 MG: 0.5 SUSPENSION RESPIRATORY (INHALATION) at 20:11

## 2023-03-20 RX ADMIN — PIPERACILLIN SODIUM AND TAZOBACTAM SODIUM 3.38 G: 3; .375 INJECTION, POWDER, LYOPHILIZED, FOR SOLUTION INTRAVENOUS at 01:28

## 2023-03-20 RX ADMIN — PIPERACILLIN SODIUM AND TAZOBACTAM SODIUM 3.38 G: 3; .375 INJECTION, POWDER, LYOPHILIZED, FOR SOLUTION INTRAVENOUS at 09:49

## 2023-03-20 RX ADMIN — DOCUSATE SODIUM 50 MG AND SENNOSIDES 8.6 MG 2 TABLET: 8.6; 5 TABLET, FILM COATED ORAL at 20:51

## 2023-03-20 RX ADMIN — ALLOPURINOL 100 MG: 100 TABLET ORAL at 09:41

## 2023-03-20 RX ADMIN — HYDROCODONE BITARTRATE AND ACETAMINOPHEN 1 TABLET: 5; 325 TABLET ORAL at 15:15

## 2023-03-20 RX ADMIN — GUAIFENESIN 1200 MG: 600 TABLET, EXTENDED RELEASE ORAL at 20:50

## 2023-03-20 RX ADMIN — BUMETANIDE 2 MG: 1 TABLET ORAL at 20:50

## 2023-03-20 RX ADMIN — GABAPENTIN 100 MG: 100 CAPSULE ORAL at 20:50

## 2023-03-20 RX ADMIN — HYDROCODONE BITARTRATE AND ACETAMINOPHEN 1 TABLET: 5; 325 TABLET ORAL at 09:48

## 2023-03-20 RX ADMIN — Medication 12.5 MG: at 20:54

## 2023-03-20 RX ADMIN — PIPERACILLIN SODIUM AND TAZOBACTAM SODIUM 3.38 G: 3; .375 INJECTION, POWDER, LYOPHILIZED, FOR SOLUTION INTRAVENOUS at 17:50

## 2023-03-20 RX ADMIN — METHYLPREDNISOLONE SODIUM SUCCINATE 20 MG: 40 INJECTION, POWDER, FOR SOLUTION INTRAMUSCULAR; INTRAVENOUS at 17:50

## 2023-03-20 RX ADMIN — HYDROCODONE BITARTRATE AND ACETAMINOPHEN 1 TABLET: 5; 325 TABLET ORAL at 20:50

## 2023-03-20 RX ADMIN — NYSTATIN: 100000 POWDER TOPICAL at 20:55

## 2023-03-20 RX ADMIN — NYSTATIN: 100000 POWDER TOPICAL at 08:35

## 2023-03-20 NOTE — THERAPY EVALUATION
Patient Name: Alda Constantino  : 1940    MRN: [id number]                              Today's Date: 3/20/2023       Admit Date: 3/19/2023    Visit Dx:     ICD-10-CM ICD-9-CM   1. Cellulitis of right foot  L03.115 682.7   2. Cellulitis of right leg  L03.115 682.6   3. Impaired functional mobility, balance, gait, and endurance  Z74.09 V49.89     Patient Active Problem List   Diagnosis   • Acute febrile illness   • PVD (peripheral vascular disease) ([facility])   • Chronic obstructive pulmonary disease ([facility])   • Vomiting   • Leukocytosis   • Sepsis ([facility])   • CAP (community acquired pneumonia)   • (HFpEF) heart failure with preserved ejection fraction ([facility])   • Anemia due to multiple mechanisms   • Astigmatism of both eyes with presbyopia   • Candidal intertrigo   • Chronic diastolic heart failure ([facility])   • Chronic hypoxemic respiratory failure ([facility])   • Chronic midline thoracic back pain   • Cobalamin deficiency   • Dry eyes   • Encounter for general adult medical examination with abnormal findings   • Excess skin of eyelid   • Generalized anxiety disorder   • Generalized osteoarthritis of multiple sites   • GERD (gastroesophageal reflux disease)   • History of colonic polyps   • History of lung cancer   • Insomnia   • Iron deficiency anemia due to chronic blood loss   • Iron malabsorption   • Low back pain   • Visual disturbance   • Type 2 diabetes mellitus with neurologic complication, without long-term current use of insulin ([facility])   • Thrombocytopenia ([facility])   • Seborrheic dermatitis of scalp   • S/P lobectomy of lung   • Requires supplemental oxygen   • PVCs (premature ventricular contractions)   • Pseudophakia   • Posterior vitreous detachment   • Other screening mammogram   • Osteoporosis   • Nonischemic cardiomyopathy ([facility])   • Malignant neoplasm of upper lobe of left lung ([facility])   • COPD with exacerbation ([facility])   • HTN (hypertension)   • History of rib fracture   • Cellulitis of right foot   • Acute gout  involving toe of right foot     Past Medical History:   Diagnosis Date   • Asthma    • Cancer (HCC)    • Congenital abnormalities     colon behind liver   • COPD (chronic obstructive pulmonary disease) (HCC)    • Hypertension      Past Surgical History:   Procedure Laterality Date   • ADENOIDECTOMY     • ADRENAL GLAND SURGERY Right    • BLADDER SURGERY     • BREAST BIOPSY     • COLONOSCOPY W/ BIOPSIES AND POLYPECTOMY     • EYE SURGERY     • HERNIA REPAIR     • HYSTERECTOMY     • LUNG LOBECTOMY Left    • RECTAL SURGERY      cyst   • SKIN CANCER EXCISION      legs   • TONSILLECTOMY     • VARICOSE VEIN SURGERY        General Information     Row Name 03/20/23 1000          Physical Therapy Time and Intention    Document Type evaluation  -CZ     Row Name 03/20/23 1000          General Information    Patient Profile Reviewed yes  -CZ     Prior Level of Function independent:;all household mobility  -CZ     Existing Precautions/Restrictions fall  -CZ     Row Name 03/20/23 1000          Living Environment    People in Home alone  -CZ     Row Name 03/20/23 1000          Home Main Entrance    Number of Stairs, Main Entrance other (see comments)  -CZ     Row Name 03/20/23 1000          Stairs Within Home, Primary    Stairs, Within Home, Primary Lives alone, niece checks on patient during week. Just finished chemo for lung cancer. Ramp in garage.  Ambulates with 4WW, also has FWW. Ambulates in home only.  -CZ     Number of Stairs, Within Home, Primary none  -CZ     Row Name 03/20/23 1000          Cognition    Orientation Status (Cognition) oriented x 4  -CZ     Row Name 03/20/23 1000          Safety Issues, Functional Mobility    Impairments Affecting Function (Mobility) strength;endurance/activity tolerance;balance;pain  -CZ           User Key  (r) = Recorded By, (t) = Taken By, (c) = Cosigned By    Initials Name Provider Type    CZ Сергей Mon, PT Physical Therapist               Mobility     Row Name 03/20/23 1100           Bed Mobility    Bed Mobility supine-sit  -CZ     Supine-Sit [place] (Bed Mobility) minimum assist (75% patient effort)  -CZ     Assistive Device (Bed Mobility) head of bed elevated;bed rails  -CZ     Row Name 03/20/23 1100          Bed-Chair Transfer    Bed-Chair [place] (Transfers) minimum assist (75% patient effort)  -CZ     Assistive Device (Bed-Chair Transfers) walker, front-wheeled  -CZ     Row Name 03/20/23 1100          Sit-Stand Transfer    Sit-Stand [place] (Transfers) minimum assist (75% patient effort)  -CZ     Assistive Device (Sit-Stand Transfers) walker, front-wheeled  -CZ     Comment, (Sit-Stand Transfer) Cues for hand placement.  3 attempts prior to success.  -CZ     Row Name 03/20/23 1100          Gait/Stairs (Locomotion)    [place] Level (Gait) minimum assist (75% patient effort)  -CZ     Assistive Device (Gait) walker, front-wheeled  -CZ     Distance in Feet (Gait) 0  -CZ     Comment, (Gait/Stairs) Able to stand, cues for upright posture, poor weightbearing through RLE d/t pain. Pivots with L foot to transfer to w/c with transport tech.  -CZ           User Key  (r) = Recorded By, (t) = Taken By, (c) = Cosigned By    Initials Name Provider Type    CZ Сергей Mon, PT Physical Therapist               Obj/Interventions     Row Name 03/20/23 1050          Range of Motion Comprehensive    General Range of Motion bilateral lower extremity ROM WFL  -CZ     Row Name 03/20/23 1050          Strength Comprehensive (MMT)    Comment, General Manual Muscle Testing (MMT) Assessment RLE: 3/5, LLE: 3+/5 grossly.  -CZ     Row Name 03/20/23 1050          Sensory Assessment (Somatosensory)    Sensory Assessment (Somatosensory) unable/difficult to assess  -CZ           User Key  (r) = Recorded By, (t) = Taken By, (c) = Cosigned By    Initials Name Provider Type    CZ Сергей Mon, PT Physical Therapist               Goals/Plan     Row Name 03/20/23 1050          Bed Mobility Goal 1 (PT)     Activity/Assistive Device (Bed Mobility Goal 1, PT) sit to supine/supine to sit  -CZ     [place] Level/Cues Needed (Bed Mobility Goal 1, PT) independent  -CZ     Time Frame (Bed Mobility Goal 1, PT) by discharge  -CZ     Strategies/Barriers (Bed Mobility Goal 1, PT) HOB flat, no bed rails.  -CZ     Progress/Outcomes (Bed Mobility Goal 1, PT) goal not met  -CZ     Row Name 03/20/23 1050          Transfer Goal 1 (PT)    Activity/Assistive Device (Transfer Goal 1, PT) sit-to-stand/stand-to-sit;bed-to-chair/chair-to-bed;walker, rolling  -CZ     [place]y Level/Cues Needed (Transfer Goal 1, PT) contact guard required  -CZ     Time Frame (Transfer Goal 1, PT) by discharge  -CZ     Strategies/Barriers (Transfers Goal 1, PT) R foot pain secondary to gout.  -CZ     Progress/Outcome (Transfer Goal 1, PT) goal not met  -CZ     Row Name 03/20/23 1050          Gait Training Goal 1 (PT)    Activity/Assistive Device (Gait Training Goal 1, PT) walker, rolling  -CZ     [place] Level (Gait Training Goal 1, PT) contact guard required  -CZ     Distance (Gait Training Goal 1, PT) 25'x1.  -CZ     Time Frame (Gait Training Goal 1, PT) by discharge  -CZ     Strategies/Barriers (Gait Training Goal 1, PT) R foot pain secondary to gout.  -CZ     Progress/Outcome (Gait Training Goal 1, PT) goal not met  -CZ     Row Name 03/20/23 1050          Therapy Assessment/Plan (PT)    Planned Therapy Interventions (PT) balance training;bed mobility training;gait training;patient/family education;transfer training;strengthening;stretching;ROM (range of motion)  -CZ           User Key  (r) = Recorded By, (t) = Taken By, (c) = Cosigned By    Initials Name Provider Type    CZ Сергей Mon, PT Physical Therapist               Clinical Impression     Row Name 03/20/23 1000          Pain    Pretreatment Pain Rating 10/10  -CZ     Posttreatment Pain Rating 0/10 - no pain  -CZ     Pain Location - Side/Orientation Right  -CZ      Pre/Posttreatment Pain Comment Reports 10-15/10 pain, no tears, smiling, joking, attempted re-education of pain scale.  -CZ     Pain Intervention(s) Repositioned;Ambulation/increased activity;Distraction  -CZ     Row Name 03/20/23 1000          Plan of Care Review    Plan of Care Reviewed With patient  -CZ     Outcome Evaluation Initial PT evaluation complete.  Patient is alert, fairly cooperative. She reports she wants to stand but can't, reports 15/10 pain in R foot d/t suspected gout.  Patient requires min Ax1 with bed mobility, transfers and gait.  Patient able to stand with FWW and stand pivot to w/c (taken by transport for x-ray), poor weight bearing through R LE d/t c/o pain. Barriers to discharge: lives alone, ramp to enter home, currently poor ability to ambulate. Patient may need rehab prior to discharge home if she remains limited by pain.  Patient has own 4WW and FWW at home. Goals established, continue skilled I/P PT.  -CZ     Row Name 03/20/23 1000          Therapy Assessment/Plan (PT)    Rehab Potential (PT) good, to achieve stated therapy goals  -     Criteria for Skilled Interventions Met (PT) yes;skilled treatment is necessary  -CZ     Therapy Frequency (PT) 5 times/wk  -CZ     Row Name 03/20/23 1000          Vital Signs    Intra Systolic BP Rehab 102  -CZ     Intra Treatment Diastolic BP 63  -CZ     Pretreatment Heart Rate (beats/min) 76  -CZ     Pre SpO2 (%) 97  -CZ     O2 Delivery Pre Treatment nasal cannula  2 LPM, same as home O2.  -CZ     Intra Patient Position Sitting  -CZ     Row Name 03/20/23 1000          Positioning and Restraints    Pre-Treatment Position in bed  -CZ     Post Treatment Position wheelchair  -CZ           User Key  (r) = Recorded By, (t) = Taken By, (c) = Cosigned By    Initials Name Provider Type    CZ Сергей Mon, PT Physical Therapist               Outcome Measures     Row Name 03/20/23 1050 03/20/23 0800       How much help from another person do you currently  need...    Turning from your back to your side while in flat bed without using bedrails? 3  -CZ 3  -RM    Moving from lying on back to sitting on the side of a flat bed without bedrails? 3  -CZ 3  -RM    Moving to and from a bed to a chair (including a wheelchair)? 3  -CZ 2  -RM    Standing up from a chair using your arms (e.g., wheelchair, bedside chair)? 3  -CZ 2  -RM    Climbing 3-5 steps with a railing? 2  -CZ 1  -RM    To walk in hospital room? 2  -CZ 2  -RM    AM-PAC 6 Clicks Score (PT) 16  -CZ 13  -RM    Highest level of mobility 5 --> Static standing  -CZ 4 --> Transferred to chair/commode  -RM    Row Name 03/20/23 1050          Functional Assessment    Outcome Measure Options AM-PAC 6 Clicks Basic Mobility (PT)  -           User Key  (r) = Recorded By, (t) = Taken By, (c) = Cosigned By    Initials Name Provider Type     Marilu Mercado RN Registered Nurse    Сергей Campbell, PT Physical Therapist                             Physical Therapy Education     Title: PT OT SLP Therapies (In Progress)     Topic: Physical Therapy (In Progress)     Point: Mobility training (In Progress)     Learning Progress Summary           Patient Acceptance, E, NR by  at 3/20/2023 1131    Comment: PT POC, use of walker, hand placement with transfers.                   Point: Home exercise program (Not Started)     Learner Progress:  Not documented in this visit.          Point: Body mechanics (Not Started)     Learner Progress:  Not documented in this visit.          Point: Precautions (Not Started)     Learner Progress:  Not documented in this visit.                      User Key     Initials Effective Dates Name Provider Type Discipline     09/18/22 -  Сергей Mon, PT Physical Therapist PT              PT Recommendation and Plan  Planned Therapy Interventions (PT): balance training, bed mobility training, gait training, patient/family education, transfer training, strengthening, stretching, ROM (range of  motion)  Plan of Care Reviewed With: patient  Outcome Evaluation: Initial PT evaluation complete.  Patient is alert, fairly cooperative. She reports she wants to stand but can't, reports 15/10 pain in R foot d/t suspected gout.  Patient requires min Ax1 with bed mobility, transfers and gait.  Patient able to stand with FWW and stand pivot to w/c (taken by transport for x-ray), poor weight bearing through R LE d/t c/o pain. Barriers to discharge: lives alone, ramp to enter home, currently poor ability to ambulate. Patient may need rehab prior to discharge home if she remains limited by pain.  Patient has own 4WW and FWW at home. Goals established, continue skilled I/P PT.     Time Calculation:    PT Charges     Row Name 03/20/23 1226             Time Calculation    Start Time 1050  -CZ      Stop Time 1137  -CZ      Time Calculation (min) 47 min  -CZ      PT Received On 03/20/23  -CZ      PT Goal Re-Cert Due Date 04/02/23  -CZ         Untimed Charges    PT Eval/Re-eval Minutes 47  -CZ         Total Minutes    Untimed Charges Total Minutes 47  -CZ       Total Minutes 47  -CZ            User Key  (r) = Recorded By, (t) = Taken By, (c) = Cosigned By    Initials Name Provider Type    CZ Сергей Mon, PT Physical Therapist              Therapy Charges for Today     Code Description Service Date Service Provider Modifiers Qty    [id number] HC PT EVAL MOD COMPLEXITY 3 3/20/2023 Сергей Mon, PT GP 1          PT G-Codes  Outcome Measure Options: AM-PAC 6 Clicks Basic Mobility (PT)  AM-PAC 6 Clicks Score (PT): 16  PT Discharge Summary  Anticipated Discharge Disposition (PT): home with assist    Сергей Mon PT  3/20/2023

## 2023-03-20 NOTE — PROGRESS NOTES
FAMILY MEDICINE RESIDENCY SERVICE  DAILY PROGRESS NOTE    NAME: Alda Constantino  : 1940  MRN: [id number]      LOS: 1 day     PROVIDER OF SERVICE: Sinan Manley MD    Chief Complaint: Cellulitis of right foot    Subjective:     Interval History:  History taken from: patient chart  Reports continued right leg pain. Denies any other new concerns. Afebrile overnight. Cellulitis marked last night not showing worsening this am.     Review of Systems:   Review of Systems   Constitutional: Negative for fever.   Respiratory: Negative for shortness of breath.    Cardiovascular: Negative for chest pain and leg swelling.   Gastrointestinal: Negative for abdominal pain, constipation, diarrhea, nausea and vomiting.   Genitourinary: Negative for difficulty urinating.   Skin: Positive for color change.   Neurological: Negative for dizziness and light-headedness.       Objective:     Vital Signs  Temp:  [97.1 °F (36.2 °C)-99.5 °F (37.5 °C)] 97.2 °F (36.2 °C)  Heart Rate:  [] 74  Resp:  [16-20] 20  BP: (102-144)/(57-70) 102/63  Flow (L/min):  [2] 2   Body mass index is 36.83 kg/m².    Physical Exam  Physical Exam  Constitutional:       Appearance: She is not ill-appearing.      Interventions: Nasal cannula in place.   HENT:      Head: Normocephalic and atraumatic.   Cardiovascular:      Rate and Rhythm: Normal rate and regular rhythm.   Pulmonary:      Effort: Pulmonary effort is normal. No respiratory distress.   Abdominal:      Palpations: Abdomen is soft.      Tenderness: There is no abdominal tenderness.   Musculoskeletal:      Comments: Slight erythema and warmth to palpation of RLL (appears to be improving)  Erythema and swelling of right second toe with significant pain to palpation   Neurological:      Mental Status: She is alert. Mental status is at baseline.   Psychiatric:         Mood and Affect: Mood normal.         Behavior: Behavior normal.       Scheduled Meds   allopurinol, 100 mg, Oral,  BID  azithromycin, 250 mg, Oral, Once per day on Mon Wed Fri  budesonide, 0.5 mg, Nebulization, BID - RT  bumetanide, 2 mg, Oral, Nightly  carvedilol, 3.125 mg, Oral, BID With Meals  cetirizine, 10 mg, Oral, Daily  enoxaparin, 40 mg, Subcutaneous, Nightly  famotidine, 40 mg, Oral, Daily  gabapentin, 100 mg, Oral, Nightly  guaiFENesin, 1,200 mg, Oral, Q12H  Insulin Aspart, 0-7 Units, Subcutaneous, TID AC  nystatin, , Topical, Q12H  piperacillin-tazobactam, 3.375 g, Intravenous, Q8H  senna-docusate sodium, 2 tablet, Oral, BID  sodium chloride, 10 mL, Intravenous, Q12H  spironolactone, 12.5 mg, Oral, Nightly       PRN Meds   •  acetaminophen **OR** acetaminophen **OR** acetaminophen  •  senna-docusate sodium **AND** polyethylene glycol **AND** bisacodyl **AND** bisacodyl  •  dextrose  •  dextrose  •  glucagon (human recombinant)  •  HYDROcodone-acetaminophen  •  HYDROcodone-acetaminophen  •  hydrocortisone-bacitracin-zinc oxide-nystatin  •  ipratropium-albuterol  •  ondansetron **OR** ondansetron  •  [COMPLETED] Insert Peripheral IV **AND** sodium chloride  •  sodium chloride  •  sodium chloride      Diagnostic Data    Lab Results (last 24 hours)     Procedure Component Value Units Date/Time    POC Glucose Once [[id number]]  (Abnormal) Collected: 03/20/23 1113    Specimen: Blood Updated: 03/20/23 1139     Glucose 132 mg/dL      Comment: Result Not ConfirmedOperator: [id number] [employer] TechnologiesMeter ID: [id number]       Uric Acid [[id number]]  (Normal) Collected: 03/20/23 0538    Specimen: Blood Updated: 03/20/23 1039     Uric Acid 5.7 mg/dL     POC Glucose Once [[id number]]  (Normal) Collected: 03/20/23 0726    Specimen: Blood Updated: 03/20/23 0755     Glucose 107 mg/dL      Comment: : [id number] excentosITAMeter ID: [id number]       C-reactive Protein [[id number]]  (Abnormal) Collected: 03/20/23 0538    Specimen: Blood Updated: 03/20/23 0734     C-Reactive Protein 13.67 mg/dL     Comprehensive Metabolic  Panel [[id number]]  (Abnormal) Collected: 03/20/23 0538    Specimen: Blood Updated: 03/20/23 0624     Glucose 129 mg/dL      BUN 24 mg/dL      Creatinine 1.17 mg/dL      Sodium 136 mmol/L      Potassium 3.7 mmol/L      Chloride 100 mmol/L      CO2 28.0 mmol/L      Calcium 8.0 mg/dL      Total Protein 6.0 g/dL      Albumin 3.2 g/dL      ALT (SGPT) 8 U/L      AST (SGOT) 10 U/L      Alkaline Phosphatase 71 U/L      Total Bilirubin 0.6 mg/dL      Globulin 2.8 gm/dL      A/G Ratio 1.1 g/dL      BUN/Creatinine Ratio 20.5     Anion Gap 8.0 mmol/L      eGFR 47.0 mL/min/1.73     Narrative:      GFR Normal >60  Chronic Kidney Disease <60  Kidney Failure <15    The GFR formula is only valid for adults with stable renal function between ages 18 and 70.    CBC & Differential [[id number]]  (Abnormal) Collected: 03/20/23 0538    Specimen: Blood Updated: 03/20/23 0611    Narrative:      The following orders were created for panel order CBC & Differential.  Procedure                               Abnormality         Status                     ---------                               -----------         ------                     CBC Auto Differential[[id number]]        Abnormal            Final result                 Please view results for these tests on the individual orders.    CBC Auto Differential [[id number]]  (Abnormal) Collected: 03/20/23 0538    Specimen: Blood Updated: 03/20/23 0611     WBC 6.67 10*3/mm3      RBC 2.85 10*6/mm3      Hemoglobin 9.1 g/dL      Hematocrit 28.0 %      MCV 98.2 fL      MCH 31.9 pg      MCHC 32.5 g/dL      RDW 15.4 %      RDW-SD 55.1 fl      MPV 12.4 fL      Platelets 155 10*3/mm3      Neutrophil % 73.8 %      Lymphocyte % 13.6 %      Monocyte % 9.4 %      Eosinophil % 2.5 %      Basophil % 0.3 %      Immature Grans % 0.4 %      Neutrophils, Absolute 4.91 10*3/mm3      Lymphocytes, Absolute 0.91 10*3/mm3      Monocytes, Absolute 0.63 10*3/mm3      Eosinophils, Absolute 0.17 10*3/mm3      Basophils,  Absolute 0.02 10*3/mm3      Immature Grans, Absolute 0.03 10*3/mm3      nRBC 0.0 /100 WBC     POC Glucose Once [[id number]]  (Abnormal) Collected: 03/19/23 2307    Specimen: Blood Updated: 03/19/23 2321     Glucose 142 mg/dL      Comment: : [id number] HORACE REYESIEMeter ID: [id number]       Extra Tubes [[id number]] Collected: 03/19/23 1500    Specimen: Blood, Venous Line Updated: 03/19/23 1601    Narrative:      The following orders were created for panel order Extra Tubes.  Procedure                               Abnormality         Status                     ---------                               -----------         ------                     Gold Top - SST[[id number]]                                   Final result               Light Blue Top[[id number]]                                   Final result                 Please view results for these tests on the individual orders.    Gold Top - SST [[id number]] Collected: 03/19/23 1500    Specimen: Blood Updated: 03/19/23 1601     Extra Tube Hold for add-ons.     Comment: Auto resulted.       Light Blue Top [[id number]] Collected: 03/19/23 1500    Specimen: Blood from Arm, Left Updated: 03/19/23 1601     Extra Tube Hold for add-ons.     Comment: Auto resulted       Lactic Acid, Plasma [[id number]]  (Normal) Collected: 03/19/23 1500    Specimen: Blood Updated: 03/19/23 1541     Lactate 0.7 mmol/L     Comprehensive Metabolic Panel [[id number]]  (Abnormal) Collected: 03/19/23 1500    Specimen: Blood Updated: 03/19/23 1525     Glucose 124 mg/dL      BUN 26 mg/dL      Creatinine 1.20 mg/dL      Sodium 136 mmol/L      Potassium 3.5 mmol/L      Chloride 96 mmol/L      CO2 27.0 mmol/L      Calcium 8.4 mg/dL      Total Protein 6.8 g/dL      Albumin 3.7 g/dL      ALT (SGPT) 9 U/L      AST (SGOT) 12 U/L      Alkaline Phosphatase 85 U/L      Total Bilirubin 0.6 mg/dL      Globulin 3.1 gm/dL      A/G Ratio 1.2 g/dL      BUN/Creatinine Ratio 21.7     Anion Gap 13.0 mmol/L       eGFR 45.6 mL/min/1.73     Narrative:      GFR Normal >60  Chronic Kidney Disease <60  Kidney Failure <15    The GFR formula is only valid for adults with stable renal function between ages 18 and 70.    C-reactive Protein [[id number]]  (Abnormal) Collected: 03/19/23 1500    Specimen: Blood Updated: 03/19/23 1525     C-Reactive Protein 11.80 mg/dL     Sedimentation Rate [[id number]]  (Abnormal) Collected: 03/19/23 1500    Specimen: Blood Updated: 03/19/23 1511     Sed Rate 62 mm/hr     Blood Culture - Blood, Arm, Right [[id number]] Collected: 03/19/23 1501    Specimen: Blood from Arm, Right Updated: 03/19/23 1507    Blood Culture - Blood, Arm, Left [[id number]] Collected: 03/19/23 1500    Specimen: Blood from Arm, Left Updated: 03/19/23 1505    CBC & Differential [[id number]]  (Abnormal) Collected: 03/19/23 1500    Specimen: Blood Updated: 03/19/23 1503    Narrative:      The following orders were created for panel order CBC & Differential.  Procedure                               Abnormality         Status                     ---------                               -----------         ------                     CBC Auto Differential[[id number]]        Abnormal            Final result                 Please view results for these tests on the individual orders.    CBC Auto Differential [[id number]]  (Abnormal) Collected: 03/19/23 1500    Specimen: Blood Updated: 03/19/23 1503     WBC 8.77 10*3/mm3      RBC 3.31 10*6/mm3      Hemoglobin 10.6 g/dL      Hematocrit 32.4 %      MCV 97.9 fL      MCH 32.0 pg      MCHC 32.7 g/dL      RDW 15.3 %      RDW-SD 54.5 fl      MPV 11.8 fL      Platelets 174 10*3/mm3      Neutrophil % 79.9 %      Lymphocyte % 9.8 %      Monocyte % 8.6 %      Eosinophil % 1.0 %      Basophil % 0.2 %      Immature Grans % 0.5 %      Neutrophils, Absolute 7.01 10*3/mm3      Lymphocytes, Absolute 0.86 10*3/mm3      Monocytes, Absolute 0.75 10*3/mm3      Eosinophils, Absolute 0.09 10*3/mm3      Basophils,  Absolute 0.02 10*3/mm3      Immature Grans, Absolute 0.04 10*3/mm3      nRBC 0.0 /100 WBC           XR Tibia Fibula 2 View Right    Result Date: 3/19/2023  CONCLUSION: Subcutaneous edema consistent with a history of cellulitis. [id number] Electronically signed by:  Misael Grady MD  3/19/2023 3:31 PM CDT Workstation: [id number]    XR Ankle 3+ View Right    Result Date: 3/19/2023  CONCLUSION: Subcutaneous edema consistent with a history of cellulitis. [id number] Electronically signed by:  Misael Grady MD  3/19/2023 3:30 PM CDT Workstation: [id number]    US Venous Doppler Lower Extremity Right (duplex)    Result Date: 3/18/2023  CONCLUSION: No ultrasound evidence of deep venous thrombosis of the right lower extremity. [id number] Electronically signed by:  Misael Grady MD  3/18/2023 5:22 PM CDT Workstation: [id number]    I reviewed the patient's new clinical results.    Assessment/Plan:     Active and Resolved Problems  Active Hospital Problems    Diagnosis  POA   • **Cellulitis of right foot [L03.115]  Yes   • Acute gout involving toe of right foot [M10.9]  Unknown   • History of rib fracture [Z87.81]  Not Applicable   • HTN (hypertension) [I10]  Yes   • History of lung cancer [Z85.118]  Not Applicable   • (HFpEF) heart failure with preserved ejection fraction (HCC) [I50.30]  Yes   • GERD (gastroesophageal reflux disease) [K21.9]  Yes   • Chronic obstructive pulmonary disease (HCC) [J44.9]  Yes      Resolved Hospital Problems   No resolved problems to display.     Cellulitis right foot  - WBC wnl on admission, remains wnl today  - remains afebrile  - CRP 11.8 on admission, repeat   - Lactate wnl on admission  - sed rate 62 on admission  - blood cultures pending  - Monitor closely for development of compartment syndrome  - IV Zosyn every 8 for 5 days  - If MRSA is suspected add vancomycin    Gout of Right toe  - uric acid  - xray foot  - continue allopurionol  - consider steroid vs indomethacin, will discuss with pharmacy due to  allergies     Hypertension/HFpEF  - Continue home Coreg 3.125 mg daily  - Continue spironolactone 25 mg daily  - Bumex 2 mg daily     Diabetes  - Low-dose SSI     COPD  - Continue azithromycin 500 3 times weekly.  - Continue Mucinex 1200 every 12  - Pulmicort nebulizer 0.5 mg twice daily     Neuropathy  - Continue gabapentin 100 mg daily    GERD  - protonix    DVT prophylaxis:  Medical DVT prophylaxis orders are present.     Code status is   Code Status and Medical Interventions:   Ordered at: 03/19/23 8654     Level Of Support Discussed With:    Patient     Code Status (Patient has no pulse and is not breathing):    CPR (Attempt to Resuscitate)     Medical Interventions (Patient has pulse or is breathing):    Full Support       Plan for disposition: Home in 2-3 days    Time: 30 minutes      This document has been electronically signed by Sinan Manley MD on March 20, 2023 11:42 CDT

## 2023-03-20 NOTE — PLAN OF CARE
Goal Outcome Evaluation:  Plan of Care Reviewed With: patient        Progress: no change  Outcome Evaluation: VSS, pt resting well, pain controlled

## 2023-03-20 NOTE — PLAN OF CARE
Goal Outcome Evaluation:  Plan of Care Reviewed With: patient           Outcome Evaluation: Initial PT evaluation complete.  Patient is alert, fairly cooperative. She reports she wants to stand but can't, reports 15/10 pain in R foot d/t suspected gout.  Patient requires min Ax1 with bed mobility, transfers and gait.  Patient able to stand with FWW and stand pivot to w/c (taken by transport for x-ray), poor weight bearing through R LE d/t c/o pain. Barriers to discharge: lives alone, ramp to enter home, currently poor ability to ambulate. Patient may need rehab prior to discharge home if she remains limited by pain.  Patient has own 4WW and FWW at home. Goals established, continue skilled I/P PT.

## 2023-03-21 ENCOUNTER — HOME CARE VISIT (OUTPATIENT)
Dept: HOME HEALTH SERVICES | Facility: CLINIC | Age: 83
End: 2023-03-21
Payer: MEDICARE

## 2023-03-21 ENCOUNTER — TELEPHONE (OUTPATIENT)
Dept: FAMILY MEDICINE CLINIC | Facility: CLINIC | Age: 83
End: 2023-03-21
Payer: MEDICARE

## 2023-03-21 ENCOUNTER — DOCUMENTATION (OUTPATIENT)
Dept: ADMINISTRATIVE | Facility: CLINIC | Age: 83
End: 2023-03-21
Payer: MEDICARE

## 2023-03-21 LAB
ALBUMIN SERPL-MCNC: 3.3 G/DL (ref 3.5–5.2)
ALBUMIN/GLOB SERPL: 1 G/DL
ALP SERPL-CCNC: 79 U/L (ref 39–117)
ALT SERPL W P-5'-P-CCNC: 11 U/L (ref 1–33)
ANION GAP SERPL CALCULATED.3IONS-SCNC: 11 MMOL/L (ref 5–15)
AST SERPL-CCNC: 16 U/L (ref 1–32)
BASOPHILS # BLD AUTO: 0.01 10*3/MM3 (ref 0–0.2)
BASOPHILS NFR BLD AUTO: 0.1 % (ref 0–1.5)
BILIRUB SERPL-MCNC: 0.3 MG/DL (ref 0–1.2)
BUN SERPL-MCNC: 27 MG/DL (ref 8–23)
BUN/CREAT SERPL: 25 (ref 7–25)
CALCIUM SPEC-SCNC: 9.2 MG/DL (ref 8.6–10.5)
CHLORIDE SERPL-SCNC: 100 MMOL/L (ref 98–107)
CO2 SERPL-SCNC: 26 MMOL/L (ref 22–29)
CREAT SERPL-MCNC: 1.08 MG/DL (ref 0.57–1)
CRP SERPL-MCNC: 14.42 MG/DL (ref 0–0.5)
DEPRECATED RDW RBC AUTO: 52.6 FL (ref 37–54)
EGFRCR SERPLBLD CKD-EPI 2021: 51.7 ML/MIN/1.73
EOSINOPHIL # BLD AUTO: 0 10*3/MM3 (ref 0–0.4)
EOSINOPHIL NFR BLD AUTO: 0 % (ref 0.3–6.2)
ERYTHROCYTE [DISTWIDTH] IN BLOOD BY AUTOMATED COUNT: 14.7 % (ref 12.3–15.4)
GLOBULIN UR ELPH-MCNC: 3.2 GM/DL
GLUCOSE BLDC GLUCOMTR-MCNC: 148 MG/DL (ref 70–130)
GLUCOSE BLDC GLUCOMTR-MCNC: 153 MG/DL (ref 70–130)
GLUCOSE BLDC GLUCOMTR-MCNC: 191 MG/DL (ref 70–130)
GLUCOSE BLDC GLUCOMTR-MCNC: 199 MG/DL (ref 70–130)
GLUCOSE SERPL-MCNC: 156 MG/DL (ref 65–99)
HCT VFR BLD AUTO: 30.9 % (ref 34–46.6)
HGB BLD-MCNC: 9.9 G/DL (ref 12–15.9)
IMM GRANULOCYTES # BLD AUTO: 0.03 10*3/MM3 (ref 0–0.05)
IMM GRANULOCYTES NFR BLD AUTO: 0.4 % (ref 0–0.5)
LYMPHOCYTES # BLD AUTO: 0.48 10*3/MM3 (ref 0.7–3.1)
LYMPHOCYTES NFR BLD AUTO: 6.3 % (ref 19.6–45.3)
MCH RBC QN AUTO: 31.1 PG (ref 26.6–33)
MCHC RBC AUTO-ENTMCNC: 32 G/DL (ref 31.5–35.7)
MCV RBC AUTO: 97.2 FL (ref 79–97)
MONOCYTES # BLD AUTO: 0.2 10*3/MM3 (ref 0.1–0.9)
MONOCYTES NFR BLD AUTO: 2.6 % (ref 5–12)
NEUTROPHILS NFR BLD AUTO: 6.91 10*3/MM3 (ref 1.7–7)
NEUTROPHILS NFR BLD AUTO: 90.6 % (ref 42.7–76)
NRBC BLD AUTO-RTO: 0 /100 WBC (ref 0–0.2)
PLATELET # BLD AUTO: 185 10*3/MM3 (ref 140–450)
PMV BLD AUTO: 12.6 FL (ref 6–12)
POTASSIUM SERPL-SCNC: 4.3 MMOL/L (ref 3.5–5.2)
PROT SERPL-MCNC: 6.5 G/DL (ref 6–8.5)
RBC # BLD AUTO: 3.18 10*6/MM3 (ref 3.77–5.28)
SODIUM SERPL-SCNC: 137 MMOL/L (ref 136–145)
WBC NRBC COR # BLD: 7.63 10*3/MM3 (ref 3.4–10.8)

## 2023-03-21 PROCEDURE — 96376 TX/PRO/DX INJ SAME DRUG ADON: CPT

## 2023-03-21 PROCEDURE — 94799 UNLISTED PULMONARY SVC/PX: CPT

## 2023-03-21 PROCEDURE — 80053 COMPREHEN METABOLIC PANEL: CPT | Performed by: CHIROPRACTOR

## 2023-03-21 PROCEDURE — G0378 HOSPITAL OBSERVATION PER HR: HCPCS

## 2023-03-21 PROCEDURE — 96372 THER/PROPH/DIAG INJ SC/IM: CPT

## 2023-03-21 PROCEDURE — 63710000001 INSULIN ASPART PER 5 UNITS: Performed by: CHIROPRACTOR

## 2023-03-21 PROCEDURE — 82962 GLUCOSE BLOOD TEST: CPT

## 2023-03-21 PROCEDURE — 97116 GAIT TRAINING THERAPY: CPT

## 2023-03-21 PROCEDURE — 97110 THERAPEUTIC EXERCISES: CPT

## 2023-03-21 PROCEDURE — 25010000002 METHYLPREDNISOLONE PER 40 MG: Performed by: STUDENT IN AN ORGANIZED HEALTH CARE EDUCATION/TRAINING PROGRAM

## 2023-03-21 PROCEDURE — 94664 DEMO&/EVAL PT USE INHALER: CPT

## 2023-03-21 PROCEDURE — 25010000002 PIPERACILLIN SOD-TAZOBACTAM PER 1 G: Performed by: CHIROPRACTOR

## 2023-03-21 PROCEDURE — 86140 C-REACTIVE PROTEIN: CPT | Performed by: STUDENT IN AN ORGANIZED HEALTH CARE EDUCATION/TRAINING PROGRAM

## 2023-03-21 PROCEDURE — 25010000002 ENOXAPARIN PER 10 MG: Performed by: CHIROPRACTOR

## 2023-03-21 PROCEDURE — 97530 THERAPEUTIC ACTIVITIES: CPT

## 2023-03-21 PROCEDURE — 85025 COMPLETE CBC W/AUTO DIFF WBC: CPT | Performed by: CHIROPRACTOR

## 2023-03-21 PROCEDURE — 94760 N-INVAS EAR/PLS OXIMETRY 1: CPT

## 2023-03-21 PROCEDURE — 96366 THER/PROPH/DIAG IV INF ADDON: CPT

## 2023-03-21 RX ORDER — METHYLPREDNISOLONE SODIUM SUCCINATE 40 MG/ML
20 INJECTION, POWDER, LYOPHILIZED, FOR SOLUTION INTRAMUSCULAR; INTRAVENOUS EVERY 12 HOURS
Status: DISCONTINUED | OUTPATIENT
Start: 2023-03-21 | End: 2023-03-22 | Stop reason: HOSPADM

## 2023-03-21 RX ADMIN — DOCUSATE SODIUM 50 MG AND SENNOSIDES 8.6 MG 2 TABLET: 8.6; 5 TABLET, FILM COATED ORAL at 08:16

## 2023-03-21 RX ADMIN — Medication 10 ML: at 08:21

## 2023-03-21 RX ADMIN — BUDESONIDE 0.5 MG: 0.5 SUSPENSION RESPIRATORY (INHALATION) at 09:58

## 2023-03-21 RX ADMIN — Medication 10 ML: at 20:54

## 2023-03-21 RX ADMIN — HYDROCODONE BITARTRATE AND ACETAMINOPHEN 1 TABLET: 5; 325 TABLET ORAL at 14:54

## 2023-03-21 RX ADMIN — METHYLPREDNISOLONE SODIUM SUCCINATE 20 MG: 40 INJECTION, POWDER, FOR SOLUTION INTRAMUSCULAR; INTRAVENOUS at 20:55

## 2023-03-21 RX ADMIN — CETIRIZINE HYDROCHLORIDE 10 MG: 10 TABLET, FILM COATED ORAL at 08:16

## 2023-03-21 RX ADMIN — NYSTATIN 1 APPLICATION: 100000 POWDER TOPICAL at 21:01

## 2023-03-21 RX ADMIN — HYDROCODONE BITARTRATE AND ACETAMINOPHEN 1 TABLET: 5; 325 TABLET ORAL at 10:11

## 2023-03-21 RX ADMIN — CARVEDILOL 3.12 MG: 3.12 TABLET, FILM COATED ORAL at 08:16

## 2023-03-21 RX ADMIN — PIPERACILLIN SODIUM AND TAZOBACTAM SODIUM 3.38 G: 3; .375 INJECTION, POWDER, LYOPHILIZED, FOR SOLUTION INTRAVENOUS at 08:14

## 2023-03-21 RX ADMIN — HYDROCODONE BITARTRATE AND ACETAMINOPHEN 1 TABLET: 5; 325 TABLET ORAL at 05:11

## 2023-03-21 RX ADMIN — GUAIFENESIN 1200 MG: 600 TABLET, EXTENDED RELEASE ORAL at 20:54

## 2023-03-21 RX ADMIN — METHYLPREDNISOLONE SODIUM SUCCINATE 20 MG: 40 INJECTION, POWDER, FOR SOLUTION INTRAMUSCULAR; INTRAVENOUS at 08:15

## 2023-03-21 RX ADMIN — FAMOTIDINE 40 MG: 40 TABLET, FILM COATED ORAL at 08:15

## 2023-03-21 RX ADMIN — ALLOPURINOL 100 MG: 100 TABLET ORAL at 20:54

## 2023-03-21 RX ADMIN — ALLOPURINOL 100 MG: 100 TABLET ORAL at 08:15

## 2023-03-21 RX ADMIN — NYSTATIN: 100000 POWDER TOPICAL at 08:24

## 2023-03-21 RX ADMIN — ENOXAPARIN SODIUM 40 MG: 40 INJECTION SUBCUTANEOUS at 20:55

## 2023-03-21 RX ADMIN — INSULIN ASPART 2 UNITS: 100 INJECTION, SOLUTION INTRAVENOUS; SUBCUTANEOUS at 17:19

## 2023-03-21 RX ADMIN — GABAPENTIN 100 MG: 100 CAPSULE ORAL at 20:54

## 2023-03-21 RX ADMIN — HYDROCODONE BITARTRATE AND ACETAMINOPHEN 1 TABLET: 5; 325 TABLET ORAL at 01:06

## 2023-03-21 RX ADMIN — CARVEDILOL 3.12 MG: 3.12 TABLET, FILM COATED ORAL at 17:19

## 2023-03-21 RX ADMIN — HYDROCODONE BITARTRATE AND ACETAMINOPHEN 1 TABLET: 5; 325 TABLET ORAL at 20:54

## 2023-03-21 RX ADMIN — DOCUSATE SODIUM 50 MG AND SENNOSIDES 8.6 MG 2 TABLET: 8.6; 5 TABLET, FILM COATED ORAL at 20:54

## 2023-03-21 RX ADMIN — Medication 12.5 MG: at 20:54

## 2023-03-21 RX ADMIN — INSULIN ASPART 2 UNITS: 100 INJECTION, SOLUTION INTRAVENOUS; SUBCUTANEOUS at 11:34

## 2023-03-21 RX ADMIN — BUDESONIDE 0.5 MG: 0.5 SUSPENSION RESPIRATORY (INHALATION) at 20:12

## 2023-03-21 RX ADMIN — PIPERACILLIN SODIUM AND TAZOBACTAM SODIUM 3.38 G: 3; .375 INJECTION, POWDER, LYOPHILIZED, FOR SOLUTION INTRAVENOUS at 01:06

## 2023-03-21 RX ADMIN — GUAIFENESIN 1200 MG: 600 TABLET, EXTENDED RELEASE ORAL at 08:16

## 2023-03-21 RX ADMIN — BUMETANIDE 2 MG: 1 TABLET ORAL at 20:54

## 2023-03-21 RX ADMIN — PIPERACILLIN SODIUM AND TAZOBACTAM SODIUM 3.38 G: 3; .375 INJECTION, POWDER, LYOPHILIZED, FOR SOLUTION INTRAVENOUS at 16:11

## 2023-03-21 NOTE — PLAN OF CARE
Goal Outcome Evaluation:  Plan of Care Reviewed With: patient         Patient instructed to call for pain medication prior to pain scale reaching 10, verbalized understanding, has followed instruction.

## 2023-03-21 NOTE — PROGRESS NOTES
Time of phone call: 14:43 CDT 3/21/2023       Alda Constantino is a 82 y.o. y.o. female  who I contacted in relation to their recent X-Ray results.  The patient was unavailable by phone and was able to reach by voicemail.    Results:    PROCEDURE: XR RIBS RIGHT W PA CHEST     VIEWS:Single     INDICATION: Right-sided rib fracture     COMPARISON: CT chest 2/24/2023, radiograph 2/19/2023     FINDINGS:        - lines/tubes: None    - cardiac: Size within normal limits.    - mediastinum: Contour within normal limits.     - lungs: Emphysema. No evidence of a focal air space process,  pulmonary interstitial edema. Patient's known right lower lobe  nodule can be only faintly visualized    - pleura: No evidence of  fluid.      - osseous: Old healed right proximal humeral fracture is  unchanged. Minimal irregularity of the right posterior rib,  probably the seventh rib appears similar to previous study.     IMPRESSION:  Persistent minimal deformity of the right posterior rib, probably  the seventh rib, similar to the previous study. A fracture line  is not identified on today's exam. No new osseous abnormalities  are identified.  No right lower lobe lung nodule can only be faintly visualized.       Signature  Shemar Jefferson MD  [facility]  [address] [place] [address]  Office: [phone]

## 2023-03-21 NOTE — PLAN OF CARE
Goal Outcome Evaluation:  Plan of Care Reviewed With: patient     Problem: Adult Inpatient Plan of Care  Goal: Plan of Care Review  Outcome: Ongoing, Progressing  Flowsheets (Taken 3/21/2023 124)  Progress: improving  Plan of Care Reviewed With: patient  Outcome Evaluation: pt responded well to PT w/ increased gait to 25 ft CGA with RW. gait limited by multiple lines. pt requires SBA for sup-sit-sup. CGA and VCs for scooting. pt participated in LE ther ex. VSS. no new goals met at this time. pt would continue to benefit from PT services.

## 2023-03-21 NOTE — PROGRESS NOTES
FAMILY MEDICINE RESIDENCY SERVICE  DAILY PROGRESS NOTE    NAME: Alda Constantino  : 1940  MRN: [id number]      LOS: 1 day     PROVIDER OF SERVICE: Sinan Manley MD    Chief Complaint: Cellulitis of right lower leg    Subjective:     Interval History:  History taken from: patient chart  Patient reports leg and foot are feeling better this am. Reports some increased wheezing today. Remains on 2L NC baseline. Xray foot pending.     Review of Systems:   Review of Systems   Constitutional: Negative for fever.   Respiratory: Positive for wheezing. Negative for shortness of breath.    Cardiovascular: Negative for chest pain and leg swelling.   Gastrointestinal: Negative for abdominal pain, constipation, diarrhea, nausea and vomiting.   Genitourinary: Negative for dysuria.   Musculoskeletal: Positive for joint swelling (improving).   Skin: Positive for rash (improving).       Objective:     Vital Signs  Temp:  [95.7 °F (35.4 °C)-98.2 °F (36.8 °C)] 95.7 °F (35.4 °C)  Heart Rate:  [73-98] 90  Resp:  [16-20] 16  BP: (102-142)/(46-80) 124/62  Flow (L/min):  [2] 2   Body mass index is 36.78 kg/m².    Physical Exam  Physical Exam  Constitutional:       Interventions: Nasal cannula in place.   HENT:      Head: Normocephalic and atraumatic.   Cardiovascular:      Rate and Rhythm: Normal rate and regular rhythm.      Pulses: Normal pulses.   Pulmonary:      Effort: Pulmonary effort is normal.      Breath sounds: Examination of the right-upper field reveals wheezing. Examination of the left-upper field reveals wheezing. Examination of the right-middle field reveals wheezing and rales. Examination of the left-middle field reveals wheezing. Examination of the right-lower field reveals wheezing. Examination of the left-lower field reveals wheezing. Wheezing and rales present. No rhonchi.   Abdominal:      Palpations: Abdomen is soft.      Tenderness: There is no abdominal tenderness.   Musculoskeletal:      Right  lower leg: No edema.      Left lower leg: No edema.      Comments: Slight erythema of right lower leg (improving)  Swelling with slight erythema right second toe (improving)   Neurological:      Mental Status: She is alert.         Scheduled Meds   allopurinol, 100 mg, Oral, BID  azithromycin, 250 mg, Oral, Once per day on Mon Wed Fri  budesonide, 0.5 mg, Nebulization, BID - RT  bumetanide, 2 mg, Oral, Nightly  carvedilol, 3.125 mg, Oral, BID With Meals  cetirizine, 10 mg, Oral, Daily  enoxaparin, 40 mg, Subcutaneous, Nightly  famotidine, 40 mg, Oral, Daily  gabapentin, 100 mg, Oral, Nightly  guaiFENesin, 1,200 mg, Oral, Q12H  Insulin Aspart, 0-7 Units, Subcutaneous, TID AC  methylPREDNISolone sodium succinate, 20 mg, Intravenous, Daily  nystatin, , Topical, Q12H  piperacillin-tazobactam, 3.375 g, Intravenous, Q8H  senna-docusate sodium, 2 tablet, Oral, BID  sodium chloride, 10 mL, Intravenous, Q12H  spironolactone, 12.5 mg, Oral, Nightly       PRN Meds   •  acetaminophen **OR** acetaminophen **OR** acetaminophen  •  senna-docusate sodium **AND** polyethylene glycol **AND** bisacodyl **AND** bisacodyl  •  dextrose  •  dextrose  •  glucagon (human recombinant)  •  HYDROcodone-acetaminophen  •  HYDROcodone-acetaminophen  •  hydrocortisone-bacitracin-zinc oxide-nystatin  •  ipratropium-albuterol  •  ondansetron **OR** ondansetron  •  [COMPLETED] Insert Peripheral IV **AND** sodium chloride  •  sodium chloride  •  sodium chloride      Diagnostic Data    Lab Results (last 24 hours)     Procedure Component Value Units Date/Time    POC Glucose Once [[id number]]  (Abnormal) Collected: 03/21/23 0656    Specimen: Blood Updated: 03/21/23 0731     Glucose 148 mg/dL      Comment: RN NotifiedOperator: [id number] DEIDRA HAYDEEMeter ID: [id number]       Comprehensive Metabolic Panel [[id number]]  (Abnormal) Collected: 03/21/23 0606    Specimen: Blood Updated: 03/21/23 0727     Glucose 156 mg/dL      BUN 27 mg/dL      Creatinine  1.08 mg/dL      Sodium 137 mmol/L      Potassium 4.3 mmol/L      Chloride 100 mmol/L      CO2 26.0 mmol/L      Calcium 9.2 mg/dL      Total Protein 6.5 g/dL      Albumin 3.3 g/dL      ALT (SGPT) 11 U/L      AST (SGOT) 16 U/L      Alkaline Phosphatase 79 U/L      Total Bilirubin 0.3 mg/dL      Globulin 3.2 gm/dL      A/G Ratio 1.0 g/dL      BUN/Creatinine Ratio 25.0     Anion Gap 11.0 mmol/L      eGFR 51.7 mL/min/1.73     Narrative:      GFR Normal >60  Chronic Kidney Disease <60  Kidney Failure <15    The GFR formula is only valid for adults with stable renal function between ages 18 and 70.    C-reactive Protein [[id number]]  (Abnormal) Collected: 03/21/23 0606    Specimen: Blood Updated: 03/21/23 0727     C-Reactive Protein 14.42 mg/dL     CBC & Differential [[id number]]  (Abnormal) Collected: 03/21/23 0606    Specimen: Blood Updated: 03/21/23 0706    Narrative:      The following orders were created for panel order CBC & Differential.  Procedure                               Abnormality         Status                     ---------                               -----------         ------                     CBC Auto Differential[[id number]]        Abnormal            Final result                 Please view results for these tests on the individual orders.    CBC Auto Differential [[id number]]  (Abnormal) Collected: 03/21/23 0606    Specimen: Blood Updated: 03/21/23 0706     WBC 7.63 10*3/mm3      RBC 3.18 10*6/mm3      Hemoglobin 9.9 g/dL      Hematocrit 30.9 %      MCV 97.2 fL      MCH 31.1 pg      MCHC 32.0 g/dL      RDW 14.7 %      RDW-SD 52.6 fl      MPV 12.6 fL      Platelets 185 10*3/mm3      Neutrophil % 90.6 %      Lymphocyte % 6.3 %      Monocyte % 2.6 %      Eosinophil % 0.0 %      Basophil % 0.1 %      Immature Grans % 0.4 %      Neutrophils, Absolute 6.91 10*3/mm3      Lymphocytes, Absolute 0.48 10*3/mm3      Monocytes, Absolute 0.20 10*3/mm3      Eosinophils, Absolute 0.00 10*3/mm3      Basophils,  Absolute 0.01 10*3/mm3      Immature Grans, Absolute 0.03 10*3/mm3      nRBC 0.0 /100 WBC     POC Glucose Once [[id number]]  (Abnormal) Collected: 03/20/23 1911    Specimen: Blood Updated: 03/20/23 1954     Glucose 160 mg/dL      Comment: RN NotifiedOperator: [id number] DEMARIO JENNIFERMeter ID: [id number]       POC Glucose Once [[id number]]  (Normal) Collected: 03/20/23 1628    Specimen: Blood Updated: 03/20/23 1645     Glucose 121 mg/dL      Comment: RN NotifiedOperator: [id number] MAYRAAMAYA ANGELITAMeter ID: [id number]       Blood Culture - Blood, Arm, Right [[id number]]  (Normal) Collected: 03/19/23 1501    Specimen: Blood from Arm, Right Updated: 03/20/23 1515     Blood Culture No growth at 24 hours    Blood Culture - Blood, Arm, Left [[id number]]  (Normal) Collected: 03/19/23 1500    Specimen: Blood from Arm, Left Updated: 03/20/23 1515     Blood Culture No growth at 24 hours    POC Glucose Once [[id number]]  (Abnormal) Collected: 03/20/23 1113    Specimen: Blood Updated: 03/20/23 1139     Glucose 132 mg/dL      Comment: Result Not ConfirmedOperator: [id number] DEIDRA ANGELITAMeter ID: [id number]       Uric Acid [[id number]]  (Normal) Collected: 03/20/23 0538    Specimen: Blood Updated: 03/20/23 1039     Uric Acid 5.7 mg/dL           XR Tibia Fibula 2 View Right    Result Date: 3/19/2023  CONCLUSION: Subcutaneous edema consistent with a history of cellulitis. [id number] Electronically signed by:  Misael Grady MD  3/19/2023 3:31 PM CDT Workstation: [id number]    XR Ankle 3+ View Right    Result Date: 3/19/2023  CONCLUSION: Subcutaneous edema consistent with a history of cellulitis. [id number] Electronically signed by:  Misael Grady MD  3/19/2023 3:30 PM CDT Workstation: [id number]        I reviewed the patient's new clinical results.    Assessment/Plan:     Active and Resolved Problems  Active Hospital Problems    Diagnosis  POA   • **Cellulitis of right lower leg [L03.115]  Unknown   • Acute gout involving toe of right foot  [M10.9]  Unknown   • History of rib fracture [Z87.81]  Not Applicable   • HTN (hypertension) [I10]  Yes   • History of lung cancer [Z85.118]  Not Applicable   • (HFpEF) heart failure with preserved ejection fraction (HCC) [I50.30]  Yes   • GERD (gastroesophageal reflux disease) [K21.9]  Yes   • Chronic obstructive pulmonary disease (HCC) [J44.9]  Yes      Resolved Hospital Problems   No resolved problems to display.     Cellulitis right foot  - WBC wnl on admission, remains wnl today  - remains afebrile  - CRP remains elevated  - Lactate wnl on admission  - blood cultures no growth  - Monitor closely for development of compartment syndrome  - IV Zosyn (3/20/23) x 5 days  - If MRSA is suspected add vancomycin     Gout of Right toe  - uric acid 5.7  - xray foot pending  - continue allopurionol  - IV methylpred     Hypertension/HFpEF  - Continue home Coreg 3.125 mg daily  - Continue spironolactone 25 mg daily  - Bumex 2 mg daily     Diabetes  - Low-dose SSI     COPD  - Continue azithromycin 500 3 times weekly.  - Continue Mucinex 1200 every 12  - Pulmicort nebulizer 0.5 mg twice daily  - on 2L NC at baseline     Neuropathy  - Continue gabapentin 100 mg daily     GERD  - protonix    DVT prophylaxis:  Medical DVT prophylaxis orders are present.     Code status is   Code Status and Medical Interventions:   Ordered at: 03/19/23 3267     Level Of Support Discussed With:    Patient     Code Status (Patient has no pulse and is not breathing):    CPR (Attempt to Resuscitate)     Medical Interventions (Patient has pulse or is breathing):    Full Support       Plan for disposition:Home w/ assist in 1-2 days    Time: 30 minutes      This document has been electronically signed by Sinan Manley MD on March 21, 2023 09:10 CDT

## 2023-03-21 NOTE — THERAPY TREATMENT NOTE
Acute Care - Physical Therapy Treatment Note  [facility]     Patient Name: Alda Constantino  : 1940  MRN: [id number]  Today's Date: 3/21/2023      Visit Dx:     ICD-10-CM ICD-9-CM   1. Cellulitis of right foot  L03.115 682.7   2. Cellulitis of right leg  L03.115 682.6   3. Impaired functional mobility, balance, gait, and endurance  Z74.09 V49.89     Patient Active Problem List   Diagnosis   • Acute febrile illness   • PVD (peripheral vascular disease) ([facility])   • Chronic obstructive pulmonary disease ([facility])   • Vomiting   • Leukocytosis   • Sepsis ([facility])   • CAP (community acquired pneumonia)   • (HFpEF) heart failure with preserved ejection fraction ([facility])   • Anemia due to multiple mechanisms   • Astigmatism of both eyes with presbyopia   • Candidal intertrigo   • Chronic diastolic heart failure ([facility])   • Chronic hypoxemic respiratory failure ([facility])   • Chronic midline thoracic back pain   • Cobalamin deficiency   • Dry eyes   • Encounter for general adult medical examination with abnormal findings   • Excess skin of eyelid   • Generalized anxiety disorder   • Generalized osteoarthritis of multiple sites   • GERD (gastroesophageal reflux disease)   • History of colonic polyps   • History of lung cancer   • Insomnia   • Iron deficiency anemia due to chronic blood loss   • Iron malabsorption   • Low back pain   • Visual disturbance   • Type 2 diabetes mellitus with neurologic complication, without long-term current use of insulin ([facility])   • Thrombocytopenia ([facility])   • Seborrheic dermatitis of scalp   • S/P lobectomy of lung   • Requires supplemental oxygen   • PVCs (premature ventricular contractions)   • Pseudophakia   • Posterior vitreous detachment   • Other screening mammogram   • Osteoporosis   • Nonischemic cardiomyopathy ([facility])   • Malignant neoplasm of upper lobe of left lung ([facility])   • COPD with exacerbation ([facility])   • HTN (hypertension)   • History of rib fracture   • Cellulitis of right lower leg    • Acute gout involving toe of right foot     Past Medical History:   Diagnosis Date   • Asthma    • Cancer (HCC)    • Congenital abnormalities     colon behind liver   • COPD (chronic obstructive pulmonary disease) (HCC)    • Hypertension      Past Surgical History:   Procedure Laterality Date   • ADENOIDECTOMY     • ADRENAL GLAND SURGERY Right    • BLADDER SURGERY     • BREAST BIOPSY     • COLONOSCOPY W/ BIOPSIES AND POLYPECTOMY     • EYE SURGERY     • HERNIA REPAIR     • HYSTERECTOMY     • LUNG LOBECTOMY Left    • RECTAL SURGERY      cyst   • SKIN CANCER EXCISION      legs   • TONSILLECTOMY     • VARICOSE VEIN SURGERY       PT Assessment (last 12 hours)     PT Evaluation and Treatment     Row Name 03/21/23 1100          Physical Therapy Time and Intention    Document Type therapy note (daily note)  -     Patient Effort good  -     Row Name 03/21/23 1100          General Information    Patient Profile Reviewed yes  -     Existing Precautions/Restrictions fall  -     Row Name 03/21/23 1100          Pain    Pretreatment Pain Rating 10/10  -     Posttreatment Pain Rating 10/10  -     Pain Location - Side/Orientation Right  -     Pain Location - foot  and R ribs  -     Row Name 03/21/23 1100          Cognition    Affect/Mental Status (Cognition) WFL  -     Orientation Status (Cognition) oriented x 4  -     Personal Safety Interventions fall prevention program maintained;gait belt;muscle strengthening facilitated;nonskid shoes/slippers when out of bed;supervised activity  -     Row Name 03/21/23 1100          Bed Mobility    Bed Mobility supine-sit;sit-supine;scooting/bridging  -     Scooting/Bridging [place] (Bed Mobility) contact guard;verbal cues  -     Supine-Sit [place] (Bed Mobility) standby assist  -     Sit-Supine [place] (Bed Mobility) standby assist  -     Assistive Device (Bed Mobility) bed rails;head of bed elevated  -     Row Name 03/21/23 1100           Transfers    Transfers sit-stand transfer;stand-sit transfer  -BARBARA     Row Name 03/21/23 1100          Sit-Stand Transfer    Sit-Stand [place] (Transfers) contact guard  -BARBARA     Assistive Device (Sit-Stand Transfers) walker, front-wheeled  -BARBARA     Row Name 03/21/23 1100          Stand-Sit Transfer    Stand-Sit [place] (Transfers) contact guard  -BARBARA     Assistive Device (Stand-Sit Transfers) walker, front-wheeled  -BARBARA     Row Name 03/21/23 1100          Gait/Stairs (Locomotion)    Gait/Stairs Locomotion gait/ambulation independence;gait/ambulation assistive device;distance ambulated;gait pattern;gait deviations;stairs negotiation;maintains weight-bearing status  -     [place] Level (Gait) contact guard  -BARBARA     Assistive Device (Gait) walker, front-wheeled  -     Distance in Feet (Gait) 25  -     Deviations/Abnormal Patterns (Gait) kae decreased;gait speed decreased;stride length decreased  -     Row Name 03/21/23 1100          Safety Issues, Functional Mobility    Impairments Affecting Function (Mobility) strength;endurance/activity tolerance;balance;pain  -     Row Name 03/21/23 1100          Motor Skills    Therapeutic Exercise --  ankle DF/PF, LAQ, hip flexion, hip Ab/Ad- 15x1 pamella AROM  -     Row Name 03/21/23 1100          Vital Signs    Pre Systolic BP Rehab 130  -BARBARA     Pre Treatment Diastolic BP 70  -BARBARA     Post Systolic BP Rehab 138  -BARBARA     Post Treatment Diastolic BP 71  -BARBARA     Pretreatment Heart Rate (beats/min) 71  -BARBARA     Intratreatment Heart Rate (beats/min) --  -BARBARA     Posttreatment Heart Rate (beats/min) 75  -BARBARA     Pre SpO2 (%) 98  -BARBARA     O2 Delivery Pre Treatment nasal cannula  -BARBARA     Intra SpO2 (%) --  -BARBARA     O2 Delivery Intra Treatment --  -BARBARA     Post SpO2 (%) 96  -BARBARA     O2 Delivery Post Treatment nasal cannula  -BARBARA     Pre Patient Position Supine  -BARBARA     Intra Patient Position Sitting  -ABRBARA     Post Patient Position Supine  -BARBARA     Recovery Time --  -BARBARA      Row Name 03/21/23 1100          Positioning and Restraints    Pre-Treatment Position in bed  -BARBARA     Post Treatment Position bed  -BARBARA     In Bed fowlers;call light within reach;encouraged to call for assist;exit alarm on  -     Row Name 03/21/23 1100          Therapy Assessment/Plan (PT)    Rehab Potential (PT) good, to achieve stated therapy goals  -     Criteria for Skilled Interventions Met (PT) yes;skilled treatment is necessary  -     Therapy Frequency (PT) 5 times/wk  -           User Key  (r) = Recorded By, (t) = Taken By, (c) = Cosigned By    Initials Name Provider Type    BARBARA Asif King PTA Physical Therapist Assistant                Physical Therapy Education     Title: PT OT SLP Therapies (In Progress)     Topic: Physical Therapy (In Progress)     Point: Mobility training (In Progress)     Learning Progress Summary           Patient Acceptance, E, NR by BARBARA at 3/21/2023 1249    Acceptance, E, NR by MADDISON at 3/20/2023 1131    Comment: PT POC, use of walker, hand placement with transfers.                   Point: Home exercise program (Not Started)     Learner Progress:  Not documented in this visit.          Point: Body mechanics (Not Started)     Learner Progress:  Not documented in this visit.          Point: Precautions (Not Started)     Learner Progress:  Not documented in this visit.                      User Key     Initials Effective Dates Name Provider Type Discipline    BARBARA 06/16/21 -  Asif King PTA Physical Therapist Assistant PT    CZ 09/18/22 -  Сергей Mon PT Physical Therapist PT              PT Recommendation and Plan  Anticipated Discharge Disposition (PT): home with assist  Therapy Frequency (PT): 5 times/wk  Plan of Care Reviewed With: patient  Progress: improving  Outcome Evaluation: pt responded well to PT w/ increased gait to 25 ft CGA with RW. gait limited by multiple lines. pt requires SBA for sup-sit-sup. CGA and VCs for scooting. pt participated in LE ther  ex. VSS. no new goals met at this time. pt would continue to benefit from PT services.   Outcome Measures     Row Name 03/21/23 1100             How much help from another person do you currently need...    Turning from your back to your side while in flat bed without using bedrails? 3  -BARBARA      Moving from lying on back to sitting on the side of a flat bed without bedrails? 3  -BARBARA      Moving to and from a bed to a chair (including a wheelchair)? 3  -BARBARA      Standing up from a chair using your arms (e.g., wheelchair, bedside chair)? 3  -BARBARA      Climbing 3-5 steps with a railing? 3  -BARBARA      To walk in hospital room? 3  -BARBARA      AM-PAC 6 Clicks Score (PT) 18  -BARBARA         Functional Assessment    Outcome Measure Options AM-PAC 6 Clicks Basic Mobility (PT)  -BARBARA            User Key  (r) = Recorded By, (t) = Taken By, (c) = Cosigned By    Initials Name Provider Type    BARBARA Asif King PTA Physical Therapist Assistant                 Time Calculation:    PT Charges     Row Name 03/21/23 1251             Time Calculation    Start Time 1100  -      Stop Time 1138  -      Time Calculation (min) 38 min  -         Time Calculation- PT    Total Timed Code Minutes- PT 38 minute(s)  -BARBARA         Timed Charges    71415 - PT Therapeutic Exercise Minutes 15  -      14290 - Gait Training Minutes  8  -      67190 - PT Therapeutic Activity Minutes 15  -BARBARA         Total Minutes    Timed Charges Total Minutes 38  -BARBARA       Total Minutes 38  -BARBARA            User Key  (r) = Recorded By, (t) = Taken By, (c) = Cosigned By    Initials Name Provider Type     Asif King PTA Physical Therapist Assistant              Therapy Charges for Today     Code Description Service Date Service Provider Modifiers Qty    07872833870 HC PT THER PROC EA 15 MIN 3/21/2023 Asif King, PTA GP 1    14625208717 HC GAIT TRAINING EA 15 MIN 3/21/2023 Asif King, PTA GP 1    55304737031 HC PT THERAPEUTIC ACT EA 15 MIN 3/21/2023 Fernando  Asif MUSA, PTA GP 1          PT G-Codes  Outcome Measure Options: AM-PAC 6 Clicks Basic Mobility (PT)  AM-PAC 6 Clicks Score (PT): 18    Asif King, PTA  3/21/2023

## 2023-03-22 ENCOUNTER — READMISSION MANAGEMENT (OUTPATIENT)
Dept: CALL CENTER | Facility: HOSPITAL | Age: 83
End: 2023-03-22
Payer: MEDICARE

## 2023-03-22 ENCOUNTER — HOME CARE VISIT (OUTPATIENT)
Dept: HOME HEALTH SERVICES | Facility: HOME HEALTHCARE | Age: 83
End: 2023-03-22
Payer: MEDICARE

## 2023-03-22 VITALS
TEMPERATURE: 97.1 F | OXYGEN SATURATION: 95 % | DIASTOLIC BLOOD PRESSURE: 84 MMHG | RESPIRATION RATE: 20 BRPM | WEIGHT: 242.5 LBS | BODY MASS INDEX: 36.75 KG/M2 | SYSTOLIC BLOOD PRESSURE: 148 MMHG | HEART RATE: 75 BPM | HEIGHT: 68 IN

## 2023-03-22 PROBLEM — M10.9 ACUTE GOUT INVOLVING TOE OF RIGHT FOOT: Status: RESOLVED | Noted: 2023-03-20 | Resolved: 2023-03-22

## 2023-03-22 PROBLEM — L03.115 CELLULITIS OF RIGHT LOWER LEG: Status: RESOLVED | Noted: 2023-03-19 | Resolved: 2023-03-22

## 2023-03-22 LAB
ALBUMIN SERPL-MCNC: 3.6 G/DL (ref 3.5–5.2)
ALBUMIN/GLOB SERPL: 1.1 G/DL
ALP SERPL-CCNC: 84 U/L (ref 39–117)
ALT SERPL W P-5'-P-CCNC: 20 U/L (ref 1–33)
ANION GAP SERPL CALCULATED.3IONS-SCNC: 9 MMOL/L (ref 5–15)
AST SERPL-CCNC: 24 U/L (ref 1–32)
BASOPHILS # BLD AUTO: 0.01 10*3/MM3 (ref 0–0.2)
BASOPHILS NFR BLD AUTO: 0.1 % (ref 0–1.5)
BILIRUB SERPL-MCNC: 0.3 MG/DL (ref 0–1.2)
BUN SERPL-MCNC: 34 MG/DL (ref 8–23)
BUN/CREAT SERPL: 31.2 (ref 7–25)
CALCIUM SPEC-SCNC: 9.5 MG/DL (ref 8.6–10.5)
CHLORIDE SERPL-SCNC: 100 MMOL/L (ref 98–107)
CO2 SERPL-SCNC: 30 MMOL/L (ref 22–29)
CREAT SERPL-MCNC: 1.09 MG/DL (ref 0.57–1)
CRP SERPL-MCNC: 7.11 MG/DL (ref 0–0.5)
DEPRECATED RDW RBC AUTO: 51.6 FL (ref 37–54)
EGFRCR SERPLBLD CKD-EPI 2021: 51.1 ML/MIN/1.73
EOSINOPHIL # BLD AUTO: 0 10*3/MM3 (ref 0–0.4)
EOSINOPHIL NFR BLD AUTO: 0 % (ref 0.3–6.2)
ERYTHROCYTE [DISTWIDTH] IN BLOOD BY AUTOMATED COUNT: 14.7 % (ref 12.3–15.4)
GLOBULIN UR ELPH-MCNC: 3.2 GM/DL
GLUCOSE BLDC GLUCOMTR-MCNC: 133 MG/DL (ref 70–130)
GLUCOSE BLDC GLUCOMTR-MCNC: 144 MG/DL (ref 70–130)
GLUCOSE SERPL-MCNC: 155 MG/DL (ref 65–99)
HCT VFR BLD AUTO: 30.4 % (ref 34–46.6)
HGB BLD-MCNC: 10.3 G/DL (ref 12–15.9)
IMM GRANULOCYTES # BLD AUTO: 0.06 10*3/MM3 (ref 0–0.05)
IMM GRANULOCYTES NFR BLD AUTO: 0.7 % (ref 0–0.5)
LYMPHOCYTES # BLD AUTO: 0.58 10*3/MM3 (ref 0.7–3.1)
LYMPHOCYTES NFR BLD AUTO: 6.8 % (ref 19.6–45.3)
MCH RBC QN AUTO: 32.6 PG (ref 26.6–33)
MCHC RBC AUTO-ENTMCNC: 33.9 G/DL (ref 31.5–35.7)
MCV RBC AUTO: 96.2 FL (ref 79–97)
MONOCYTES # BLD AUTO: 0.28 10*3/MM3 (ref 0.1–0.9)
MONOCYTES NFR BLD AUTO: 3.3 % (ref 5–12)
NEUTROPHILS NFR BLD AUTO: 7.54 10*3/MM3 (ref 1.7–7)
NEUTROPHILS NFR BLD AUTO: 89.1 % (ref 42.7–76)
NRBC BLD AUTO-RTO: 0 /100 WBC (ref 0–0.2)
PLATELET # BLD AUTO: 234 10*3/MM3 (ref 140–450)
PMV BLD AUTO: 12.3 FL (ref 6–12)
POTASSIUM SERPL-SCNC: 4.5 MMOL/L (ref 3.5–5.2)
PROT SERPL-MCNC: 6.8 G/DL (ref 6–8.5)
RBC # BLD AUTO: 3.16 10*6/MM3 (ref 3.77–5.28)
SODIUM SERPL-SCNC: 139 MMOL/L (ref 136–145)
WBC NRBC COR # BLD: 8.47 10*3/MM3 (ref 3.4–10.8)

## 2023-03-22 PROCEDURE — 94799 UNLISTED PULMONARY SVC/PX: CPT

## 2023-03-22 PROCEDURE — 82962 GLUCOSE BLOOD TEST: CPT

## 2023-03-22 PROCEDURE — 86140 C-REACTIVE PROTEIN: CPT | Performed by: STUDENT IN AN ORGANIZED HEALTH CARE EDUCATION/TRAINING PROGRAM

## 2023-03-22 PROCEDURE — 94664 DEMO&/EVAL PT USE INHALER: CPT

## 2023-03-22 PROCEDURE — 25010000002 PIPERACILLIN SOD-TAZOBACTAM PER 1 G: Performed by: CHIROPRACTOR

## 2023-03-22 PROCEDURE — 25010000002 METHYLPREDNISOLONE PER 40 MG: Performed by: STUDENT IN AN ORGANIZED HEALTH CARE EDUCATION/TRAINING PROGRAM

## 2023-03-22 PROCEDURE — 96376 TX/PRO/DX INJ SAME DRUG ADON: CPT

## 2023-03-22 PROCEDURE — G0378 HOSPITAL OBSERVATION PER HR: HCPCS

## 2023-03-22 PROCEDURE — 80053 COMPREHEN METABOLIC PANEL: CPT | Performed by: CHIROPRACTOR

## 2023-03-22 PROCEDURE — 94760 N-INVAS EAR/PLS OXIMETRY 1: CPT

## 2023-03-22 PROCEDURE — 85025 COMPLETE CBC W/AUTO DIFF WBC: CPT | Performed by: CHIROPRACTOR

## 2023-03-22 RX ORDER — PREDNISONE 20 MG/1
20 TABLET ORAL 2 TIMES DAILY
Qty: 10 TABLET | Refills: 0 | Status: SHIPPED | OUTPATIENT
Start: 2023-03-22 | End: 2023-03-27

## 2023-03-22 RX ORDER — ALLOPURINOL 100 MG/1
300 TABLET ORAL DAILY
Qty: 180 TABLET | Refills: 2
Start: 2023-03-22

## 2023-03-22 RX ORDER — PREDNISONE 20 MG/1
20 TABLET ORAL DAILY
Qty: 5 TABLET | Refills: 0 | Status: SHIPPED | OUTPATIENT
Start: 2023-03-22 | End: 2023-03-22 | Stop reason: SDUPTHER

## 2023-03-22 RX ORDER — SPIRONOLACTONE 25 MG/1
12.5 TABLET ORAL NIGHTLY
Qty: 30 TABLET
Start: 2023-03-22

## 2023-03-22 RX ADMIN — PIPERACILLIN SODIUM AND TAZOBACTAM SODIUM 3.38 G: 3; .375 INJECTION, POWDER, LYOPHILIZED, FOR SOLUTION INTRAVENOUS at 01:20

## 2023-03-22 RX ADMIN — CARVEDILOL 3.12 MG: 3.12 TABLET, FILM COATED ORAL at 08:06

## 2023-03-22 RX ADMIN — Medication 10 ML: at 08:08

## 2023-03-22 RX ADMIN — AZITHROMYCIN MONOHYDRATE 250 MG: 250 TABLET ORAL at 08:08

## 2023-03-22 RX ADMIN — HYDROCODONE BITARTRATE AND ACETAMINOPHEN 1 TABLET: 5; 325 TABLET ORAL at 01:20

## 2023-03-22 RX ADMIN — GUAIFENESIN 1200 MG: 600 TABLET, EXTENDED RELEASE ORAL at 08:07

## 2023-03-22 RX ADMIN — NYSTATIN: 100000 POWDER TOPICAL at 08:49

## 2023-03-22 RX ADMIN — METHYLPREDNISOLONE SODIUM SUCCINATE 20 MG: 40 INJECTION, POWDER, FOR SOLUTION INTRAMUSCULAR; INTRAVENOUS at 08:08

## 2023-03-22 RX ADMIN — HYDROCODONE BITARTRATE AND ACETAMINOPHEN 1 TABLET: 5; 325 TABLET ORAL at 11:46

## 2023-03-22 RX ADMIN — FAMOTIDINE 40 MG: 40 TABLET, FILM COATED ORAL at 08:06

## 2023-03-22 RX ADMIN — DOCUSATE SODIUM 50 MG AND SENNOSIDES 8.6 MG 2 TABLET: 8.6; 5 TABLET, FILM COATED ORAL at 08:06

## 2023-03-22 RX ADMIN — ALLOPURINOL 100 MG: 100 TABLET ORAL at 08:07

## 2023-03-22 RX ADMIN — HYDROCODONE BITARTRATE AND ACETAMINOPHEN 1 TABLET: 5; 325 TABLET ORAL at 06:53

## 2023-03-22 RX ADMIN — PIPERACILLIN SODIUM AND TAZOBACTAM SODIUM 3.38 G: 3; .375 INJECTION, POWDER, LYOPHILIZED, FOR SOLUTION INTRAVENOUS at 08:08

## 2023-03-22 RX ADMIN — BUDESONIDE 0.5 MG: 0.5 SUSPENSION RESPIRATORY (INHALATION) at 07:30

## 2023-03-22 NOTE — PLAN OF CARE
Problem: Adult Inpatient Plan of Care  Goal: Plan of Care Review  Recent Flowsheet Documentation  Taken 3/22/2023 0501 by Roseanna Raphael RN  Progress: improving  Plan of Care Reviewed With: patient  Goal: Absence of Hospital-Acquired Illness or Injury  Intervention: Identify and Manage Fall Risk  Description: Perform standard risk assessment on admission using a validated tool or comprehensive approach appropriate to the patient; reassess fall risk frequently, with change in status or transfer to another level of care.  Communicate fall injury risk to interprofessional healthcare team.  Determine need for increased observation, equipment and environmental modification, such as low bed, signage and supportive, nonskid footwear.  Adjust safety measures to individual developmental age, stage and identified risk factors.  Reinforce the importance of safety and physical activity with patient and family.  Perform regular intentional rounding to assess need for position change, pain assessment and personal needs, including assistance with toileting.  Recent Flowsheet Documentation  Taken 3/22/2023 0400 by Roseanna Raphael RN  Safety Promotion/Fall Prevention: safety round/check completed  Taken 3/22/2023 0200 by Roseanna Raphael RN  Safety Promotion/Fall Prevention: safety round/check completed  Taken 3/22/2023 0000 by Roseanna Raphael RN  Safety Promotion/Fall Prevention: safety round/check completed  Taken 3/21/2023 2200 by Roseanna Raphael RN  Safety Promotion/Fall Prevention: safety round/check completed  Taken 3/21/2023 2015 by Roseanna Raphael RN  Safety Promotion/Fall Prevention: safety round/check completed  Taken 3/21/2023 2000 by Roseanna Raphael RN  Safety Promotion/Fall Prevention: safety round/check completed  Intervention: Prevent Skin Injury  Description: Perform a screening for skin injury risk, such as pressure or moisture associated skin damage on admission  and at regular intervals throughout hospital stay.  Keep all areas of skin (especially folds) clean and dry.  Maintain adequate skin hydration.  Relieve and redistribute pressure and protect bony prominences; implement measures based on patient-specific risk factors.  Match turning and repositioning schedule to clinical condition.  Encourage weight shift frequently; assist with reposition if unable to complete independently.  Float heels off bed; avoid pressure on the Achilles tendon.  Keep skin free from extended contact with medical devices.  Encourage functional activity and mobility, as early as tolerated.  Use aids (e.g., slide boards, mechanical lift) during transfer.  Recent Flowsheet Documentation  Taken 3/22/2023 0400 by Roseanna Raphael RN  Body Position: position changed independently  Taken 3/22/2023 0200 by Roseanna Raphael RN  Body Position: position changed independently  Taken 3/22/2023 0000 by Roseanna Raphael RN  Body Position: position changed independently  Taken 3/21/2023 2200 by Roseanna Raphael RN  Body Position: position changed independently  Taken 3/21/2023 2015 by Roseanna Raphael RN  Body Position: position changed independently  Taken 3/21/2023 2000 by Roseanna Raphael RN  Body Position: position changed independently  Intervention: Prevent Infection  Description: Maintain skin and mucous membrane integrity; promote hand, oral and pulmonary hygiene.  Optimize fluid balance, nutrition, sleep and glycemic control to maximize infection resistance.  Identify potential sources of infection early to prevent or mitigate progression of infection (e.g., wound, lines, devices).  Evaluate ongoing need for invasive devices; remove promptly when no longer indicated.  Recent Flowsheet Documentation  Taken 3/22/2023 0400 by Roseanna Raphael RN  Infection Prevention: rest/sleep promoted  Taken 3/22/2023 0200 by Roseanna Raphael RN  Infection Prevention:  rest/sleep promoted  Taken 3/22/2023 0000 by Roseanna Raphael RN  Infection Prevention: rest/sleep promoted  Taken 3/21/2023 2200 by Roseanna Raphael RN  Infection Prevention: rest/sleep promoted  Taken 3/21/2023 2015 by Roseanna Raphael RN  Infection Prevention: rest/sleep promoted  Taken 3/21/2023 2000 by Roseanna Raphael RN  Infection Prevention: rest/sleep promoted  Goal: Optimal Comfort and Wellbeing  Intervention: Monitor Pain and Promote Comfort  Description: Assess pain level, treatment efficacy and patient response at regular intervals using a consistent pain scale.  Consider the presence and impact of preexisting chronic pain.  Encourage patient and caregiver involvement in pain assessment, interventions and safety measures.  Recent Flowsheet Documentation  Taken 3/21/2023 2054 by Roseanna Raphael RN  Pain Management Interventions: see MAR  Intervention: Provide Person-Centered Care  Description: Use a family-focused approach to care.  Develop trust and rapport by proactively providing information, encouraging questions, addressing concerns and offering reassurance.  Acknowledge emotional response to hospitalization.  Recognize and utilize personal coping strategies.  Honor spiritual and cultural preferences.  Recent Flowsheet Documentation  Taken 3/21/2023 2015 by Roseanna Raphael RN  Trust Relationship/Rapport:   care explained   choices provided   emotional support provided   Goal Outcome Evaluation:  Plan of Care Reviewed With: patient        Progress: improving

## 2023-03-22 NOTE — OUTREACH NOTE
Prep Survey    Flowsheet Row Responses   [facility] patient discharged from? [place]   Is LACE score < 7 ? No   Eligibility [facility]   Date of Admission 03/19/23   Date of Discharge 03/22/23   Discharge Disposition Home or Self Care   Discharge diagnosis Cellulitis of right lower leg   Does the patient have one of the following disease processes/diagnoses(primary or secondary)? Other   Does the patient have Home health ordered? No   Is there a DME ordered? No   Prep survey completed? Yes          Racquel HERNADEZ - Registered Nurse

## 2023-03-22 NOTE — DISCHARGE SUMMARY
"    DISCHARGE SUMMARY    PATIENT NAME: Alda Constantino   PHYSICIAN: Sinan Manley MD  : 1940  MRN: [id number]    ADMITTED: 3/19/2023     DISCHARGED: 3/22/23    ADMITTING DIAGNOSIS:  Cellulitis of right foot [L03.115]  Cellulitis of right leg [L03.115]    DISCHARGE, AND RESOLVED DIAGNOSES:  Active Hospital Problems    Diagnosis  POA   • History of rib fracture [Z87.81]  Not Applicable   • HTN (hypertension) [I10]  Yes   • History of lung cancer [Z85.118]  Not Applicable   • (HFpEF) heart failure with preserved ejection fraction (HCC) [I50.30]  Yes   • GERD (gastroesophageal reflux disease) [K21.9]  Yes   • Chronic obstructive pulmonary disease (HCC) [J44.9]  Yes      Resolved Hospital Problems    Diagnosis Date Resolved POA   • **Cellulitis of right lower leg [L03.115] 2023 Unknown   • Acute gout involving toe of right foot [M10.9] 2023 Unknown     SERVICE: Family [facility]  Attending: Dr. Clif Vaca  Resident: Sinan Manley MD    CONSULTS:   Consult Orders (all) (From admission, onward)    None        PROCEDURES:   Neuro    HISTORY OF PRESENT ILLNESS:   Per the H&P written by Dr. Posada, dated 3/19/23:  \"Alda Constantino is a 82 y.o. female with a CMH of COPD, cancer, HTN, gout, T2DM who presents complaining of 4 days of right lower leg pain.  She reports that Thursday when she was coming to the outpatient clinic for an appointment to see the doctor for her rib pain that she began experiencing increased pain in the right foot.  She had previously been diagnosed with gout in the toe of the right foot.  Friday the pain in her foot increased and she began noticing some swelling and redness.  Saturday she came to this ER where she was diagnosed with cellulitis of the right lower extremity and given doxycycline p.o.  She was also given some pain medication in the form of Norco to take.  Unfortunately, she was not able to  the Norco until this morning " "and today comes back into the ER with worsening right foot pain and swelling.  In the ER today she was diagnosed with worsening cellulitis and placed on IV antibiotics Zosyn and vancomycin.  Testing for DVT yesterday in the ER was reportedly negative.\"    DIAGNOSTIC DATA:   Lab Results (last 72 hours)     Procedure Component Value Units Date/Time    CBC & Differential [[id number]]  (Abnormal) Collected: 03/22/23 0632    Specimen: Blood Updated: 03/22/23 0653    Narrative:      The following orders were created for panel order CBC & Differential.  Procedure                               Abnormality         Status                     ---------                               -----------         ------                     CBC Auto Differential[[id number]]        Abnormal            Final result                 Please view results for these tests on the individual orders.    CBC Auto Differential [[id number]]  (Abnormal) Collected: 03/22/23 0632    Specimen: Blood Updated: 03/22/23 0653     WBC 8.47 10*3/mm3      RBC 3.16 10*6/mm3      Hemoglobin 10.3 g/dL      Hematocrit 30.4 %      MCV 96.2 fL      MCH 32.6 pg      MCHC 33.9 g/dL      RDW 14.7 %      RDW-SD 51.6 fl      MPV 12.3 fL      Platelets 234 10*3/mm3      Neutrophil % 89.1 %      Lymphocyte % 6.8 %      Monocyte % 3.3 %      Eosinophil % 0.0 %      Basophil % 0.1 %      Immature Grans % 0.7 %      Neutrophils, Absolute 7.54 10*3/mm3      Lymphocytes, Absolute 0.58 10*3/mm3      Monocytes, Absolute 0.28 10*3/mm3      Eosinophils, Absolute 0.00 10*3/mm3      Basophils, Absolute 0.01 10*3/mm3      Immature Grans, Absolute 0.06 10*3/mm3      nRBC 0.0 /100 WBC     Comprehensive Metabolic Panel [[id number]] Collected: 03/22/23 0632    Specimen: Blood Updated: 03/22/23 0648    C-reactive Protein [[id number]] Collected: 03/22/23 0632    Specimen: Blood Updated: 03/22/23 0648    POC Glucose Once [[id number]]  (Abnormal) Collected: 03/21/23 1934    Specimen: Blood Updated: " 03/21/23 2000     Glucose 199 mg/dL      Comment: RN NotifiedOperator: [id number] ALEJANDRO JUARESMeter ID: [id number]       POC Glucose Once [[id number]]  (Abnormal) Collected: 03/21/23 1618    Specimen: Blood Updated: 03/21/23 1909     Glucose 191 mg/dL      Comment: Result Not ConfirmedOperator: [id number] TIRAMAYA ANGELITAMeter ID: [id number]       Blood Culture - Blood, Arm, Right [[id number]]  (Normal) Collected: 03/19/23 1501    Specimen: Blood from Arm, Right Updated: 03/21/23 1515     Blood Culture No growth at 2 days    Blood Culture - Blood, Arm, Left [[id number]]  (Normal) Collected: 03/19/23 1500    Specimen: Blood from Arm, Left Updated: 03/21/23 1515     Blood Culture No growth at 2 days    POC Glucose Once [[id number]]  (Abnormal) Collected: 03/21/23 1119    Specimen: Blood Updated: 03/21/23 1132     Glucose 153 mg/dL      Comment: Result Not ConfirmedOperator: [id number] DEIDRA ANGELITAMeter ID: [id number]       POC Glucose Once [[id number]]  (Abnormal) Collected: 03/21/23 0656    Specimen: Blood Updated: 03/21/23 0731     Glucose 148 mg/dL      Comment: RN NotifiedOperator: [id number] DEIDRA ANGELITAMeter ID: [id number]       Comprehensive Metabolic Panel [[id number]]  (Abnormal) Collected: 03/21/23 0606    Specimen: Blood Updated: 03/21/23 0727     Glucose 156 mg/dL      BUN 27 mg/dL      Creatinine 1.08 mg/dL      Sodium 137 mmol/L      Potassium 4.3 mmol/L      Chloride 100 mmol/L      CO2 26.0 mmol/L      Calcium 9.2 mg/dL      Total Protein 6.5 g/dL      Albumin 3.3 g/dL      ALT (SGPT) 11 U/L      AST (SGOT) 16 U/L      Alkaline Phosphatase 79 U/L      Total Bilirubin 0.3 mg/dL      Globulin 3.2 gm/dL      A/G Ratio 1.0 g/dL      BUN/Creatinine Ratio 25.0     Anion Gap 11.0 mmol/L      eGFR 51.7 mL/min/1.73     Narrative:      GFR Normal >60  Chronic Kidney Disease <60  Kidney Failure <15    The GFR formula is only valid for adults with stable renal function between ages 18 and 70.    C-reactive  Protein [[id number]]  (Abnormal) Collected: 03/21/23 0606    Specimen: Blood Updated: 03/21/23 0727     C-Reactive Protein 14.42 mg/dL     CBC & Differential [[id number]]  (Abnormal) Collected: 03/21/23 0606    Specimen: Blood Updated: 03/21/23 0706    Narrative:      The following orders were created for panel order CBC & Differential.  Procedure                               Abnormality         Status                     ---------                               -----------         ------                     CBC Auto Differential[[id number]]        Abnormal            Final result                 Please view results for these tests on the individual orders.    CBC Auto Differential [[id number]]  (Abnormal) Collected: 03/21/23 0606    Specimen: Blood Updated: 03/21/23 0706     WBC 7.63 10*3/mm3      RBC 3.18 10*6/mm3      Hemoglobin 9.9 g/dL      Hematocrit 30.9 %      MCV 97.2 fL      MCH 31.1 pg      MCHC 32.0 g/dL      RDW 14.7 %      RDW-SD 52.6 fl      MPV 12.6 fL      Platelets 185 10*3/mm3      Neutrophil % 90.6 %      Lymphocyte % 6.3 %      Monocyte % 2.6 %      Eosinophil % 0.0 %      Basophil % 0.1 %      Immature Grans % 0.4 %      Neutrophils, Absolute 6.91 10*3/mm3      Lymphocytes, Absolute 0.48 10*3/mm3      Monocytes, Absolute 0.20 10*3/mm3      Eosinophils, Absolute 0.00 10*3/mm3      Basophils, Absolute 0.01 10*3/mm3      Immature Grans, Absolute 0.03 10*3/mm3      nRBC 0.0 /100 WBC     POC Glucose Once [[id number]]  (Abnormal) Collected: 03/20/23 1911    Specimen: Blood Updated: 03/20/23 1954     Glucose 160 mg/dL      Comment: RN NotifiedOperator: [id number] DEMARIO BRADSHAWNIFERMeter ID: [id number]       POC Glucose Once [[id number]]  (Normal) Collected: 03/20/23 1628    Specimen: Blood Updated: 03/20/23 1645     Glucose 121 mg/dL      Comment: RN NotifiedOperator: [id number] DEIDRA SIMONITAMeter ID: [id number]       POC Glucose Once [[id number]]  (Abnormal) Collected: 03/20/23 1113    Specimen: Blood  Updated: 03/20/23 1139     Glucose 132 mg/dL      Comment: Result Not ConfirmedOperator: [id number] DEIDRA ANGELITAMeter ID: [id number]       Uric Acid [[id number]]  (Normal) Collected: 03/20/23 0538    Specimen: Blood Updated: 03/20/23 1039     Uric Acid 5.7 mg/dL     POC Glucose Once [[id number]]  (Normal) Collected: 03/20/23 0726    Specimen: Blood Updated: 03/20/23 0755     Glucose 107 mg/dL      Comment: : [id number] TIRAMAYA ANGELITAMeter ID: [id number]       C-reactive Protein [[id number]]  (Abnormal) Collected: 03/20/23 0538    Specimen: Blood Updated: 03/20/23 0734     C-Reactive Protein 13.67 mg/dL     Comprehensive Metabolic Panel [[id number]]  (Abnormal) Collected: 03/20/23 0538    Specimen: Blood Updated: 03/20/23 0624     Glucose 129 mg/dL      BUN 24 mg/dL      Creatinine 1.17 mg/dL      Sodium 136 mmol/L      Potassium 3.7 mmol/L      Chloride 100 mmol/L      CO2 28.0 mmol/L      Calcium 8.0 mg/dL      Total Protein 6.0 g/dL      Albumin 3.2 g/dL      ALT (SGPT) 8 U/L      AST (SGOT) 10 U/L      Alkaline Phosphatase 71 U/L      Total Bilirubin 0.6 mg/dL      Globulin 2.8 gm/dL      A/G Ratio 1.1 g/dL      BUN/Creatinine Ratio 20.5     Anion Gap 8.0 mmol/L      eGFR 47.0 mL/min/1.73     Narrative:      GFR Normal >60  Chronic Kidney Disease <60  Kidney Failure <15    The GFR formula is only valid for adults with stable renal function between ages 18 and 70.    CBC & Differential [[id number]]  (Abnormal) Collected: 03/20/23 0538    Specimen: Blood Updated: 03/20/23 0611    Narrative:      The following orders were created for panel order CBC & Differential.  Procedure                               Abnormality         Status                     ---------                               -----------         ------                     CBC Auto Differential[[id number]]        Abnormal            Final result                 Please view results for these tests on the individual orders.    CBC Auto  Differential [[id number]]  (Abnormal) Collected: 03/20/23 0538    Specimen: Blood Updated: 03/20/23 0611     WBC 6.67 10*3/mm3      RBC 2.85 10*6/mm3      Hemoglobin 9.1 g/dL      Hematocrit 28.0 %      MCV 98.2 fL      MCH 31.9 pg      MCHC 32.5 g/dL      RDW 15.4 %      RDW-SD 55.1 fl      MPV 12.4 fL      Platelets 155 10*3/mm3      Neutrophil % 73.8 %      Lymphocyte % 13.6 %      Monocyte % 9.4 %      Eosinophil % 2.5 %      Basophil % 0.3 %      Immature Grans % 0.4 %      Neutrophils, Absolute 4.91 10*3/mm3      Lymphocytes, Absolute 0.91 10*3/mm3      Monocytes, Absolute 0.63 10*3/mm3      Eosinophils, Absolute 0.17 10*3/mm3      Basophils, Absolute 0.02 10*3/mm3      Immature Grans, Absolute 0.03 10*3/mm3      nRBC 0.0 /100 WBC     POC Glucose Once [[id number]]  (Abnormal) Collected: 03/19/23 2307    Specimen: Blood Updated: 03/19/23 2321     Glucose 142 mg/dL      Comment: : [id number] HORACE JAIMIEMeter ID: [id number]       Extra Tubes [[id number]] Collected: 03/19/23 1500    Specimen: Blood, Venous Line Updated: 03/19/23 1601    Narrative:      The following orders were created for panel order Extra Tubes.  Procedure                               Abnormality         Status                     ---------                               -----------         ------                     Gold Top - SST[[id number]]                                   Final result               Light Blue Top[[id number]]                                   Final result                 Please view results for these tests on the individual orders.    Gold Top - SST [[id number]] Collected: 03/19/23 1500    Specimen: Blood Updated: 03/19/23 1601     Extra Tube Hold for add-ons.     Comment: Auto resulted.       Light Blue Top [[id number]] Collected: 03/19/23 1500    Specimen: Blood from Arm, Left Updated: 03/19/23 1601     Extra Tube Hold for add-ons.     Comment: Auto resulted       Lactic Acid, Plasma [[id number]]  (Normal) Collected:  03/19/23 1500    Specimen: Blood Updated: 03/19/23 1541     Lactate 0.7 mmol/L     Comprehensive Metabolic Panel [[id number]]  (Abnormal) Collected: 03/19/23 1500    Specimen: Blood Updated: 03/19/23 1525     Glucose 124 mg/dL      BUN 26 mg/dL      Creatinine 1.20 mg/dL      Sodium 136 mmol/L      Potassium 3.5 mmol/L      Chloride 96 mmol/L      CO2 27.0 mmol/L      Calcium 8.4 mg/dL      Total Protein 6.8 g/dL      Albumin 3.7 g/dL      ALT (SGPT) 9 U/L      AST (SGOT) 12 U/L      Alkaline Phosphatase 85 U/L      Total Bilirubin 0.6 mg/dL      Globulin 3.1 gm/dL      A/G Ratio 1.2 g/dL      BUN/Creatinine Ratio 21.7     Anion Gap 13.0 mmol/L      eGFR 45.6 mL/min/1.73     Narrative:      GFR Normal >60  Chronic Kidney Disease <60  Kidney Failure <15    The GFR formula is only valid for adults with stable renal function between ages 18 and 70.    C-reactive Protein [[id number]]  (Abnormal) Collected: 03/19/23 1500    Specimen: Blood Updated: 03/19/23 1525     C-Reactive Protein 11.80 mg/dL     Sedimentation Rate [[id number]]  (Abnormal) Collected: 03/19/23 1500    Specimen: Blood Updated: 03/19/23 1511     Sed Rate 62 mm/hr     CBC & Differential [[id number]]  (Abnormal) Collected: 03/19/23 1500    Specimen: Blood Updated: 03/19/23 1503    Narrative:      The following orders were created for panel order CBC & Differential.  Procedure                               Abnormality         Status                     ---------                               -----------         ------                     CBC Auto Differential[[id number]]        Abnormal            Final result                 Please view results for these tests on the individual orders.    CBC Auto Differential [[id number]]  (Abnormal) Collected: 03/19/23 1500    Specimen: Blood Updated: 03/19/23 1503     WBC 8.77 10*3/mm3      RBC 3.31 10*6/mm3      Hemoglobin 10.6 g/dL      Hematocrit 32.4 %      MCV 97.9 fL      MCH 32.0 pg      MCHC 32.7 g/dL      RDW 15.3 %       RDW-SD 54.5 fl      MPV 11.8 fL      Platelets 174 10*3/mm3      Neutrophil % 79.9 %      Lymphocyte % 9.8 %      Monocyte % 8.6 %      Eosinophil % 1.0 %      Basophil % 0.2 %      Immature Grans % 0.5 %      Neutrophils, Absolute 7.01 10*3/mm3      Lymphocytes, Absolute 0.86 10*3/mm3      Monocytes, Absolute 0.75 10*3/mm3      Eosinophils, Absolute 0.09 10*3/mm3      Basophils, Absolute 0.02 10*3/mm3      Immature Grans, Absolute 0.04 10*3/mm3      nRBC 0.0 /100 WBC            XR Ribs Right With PA Chest    Result Date: 3/21/2023  Persistent minimal deformity of the right posterior rib, probably the seventh rib, similar to the previous study. A fracture line is not identified on today's exam. No new osseous abnormalities are identified. No right lower lobe lung nodule can only be faintly visualized. Electronically signed by:  Hayley Hannon MD  3/21/2023 11:36 AM CDT Workstation: [id number]    XR Tibia Fibula 2 View Right    Result Date: 3/19/2023  CONCLUSION: Subcutaneous edema consistent with a history of cellulitis. [id number] Electronically signed by:  Misael Grady MD  3/19/2023 3:31 PM CDT Workstation: [id number]    XR Ankle 3+ View Right    Result Date: 3/19/2023  CONCLUSION: Subcutaneous edema consistent with a history of cellulitis. [id number] Electronically signed by:  Misael Grady MD  3/19/2023 3:30 PM CDT Workstation: [id number]    XR Foot 2 View Right    Result Date: 3/21/2023  Mild soft tissue swelling but no acute osseous abnormality. No osseous destruction or soft tissue calcification is identified, given limitations above Note:  if pain or symptoms persist beyond reasonable expectations and follow-up imaging is anticipated,  cross sectional imaging  (CT and/or MRI) is suggested, as is deemed clinically appropriate. Electronically signed by:  Hayley Hannon MD  3/21/2023 4:11 PM CDT Workstation: [id number]    US Venous Doppler Lower Extremity Right (duplex)    Result Date: 3/18/2023  CONCLUSION: No ultrasound  evidence of deep venous thrombosis of the right lower extremity. [id number] Electronically signed by:  Misael Grady MD  3/18/2023 5:22 PM CDT Workstation: [id number]    Review of Systems   Constitutional: Negative for fever.   Cardiovascular: Negative for chest pain.   Respiratory: Negative for shortness of breath.    Musculoskeletal: Negative for gout, joint pain and joint swelling.   Gastrointestinal: Positive for constipation. Negative for abdominal pain, diarrhea, nausea and vomiting.     Physical Exam  Constitutional:       General: She is not in acute distress.     Interventions: Nasal cannula in place.   HENT:      Head: Normocephalic and atraumatic.   Cardiovascular:      Rate and Rhythm: Normal rate and regular rhythm.      Pulses: Normal pulses.   Pulmonary:      Effort: Pulmonary effort is normal. No respiratory distress.   Abdominal:      Palpations: Abdomen is soft.      Tenderness: There is no abdominal tenderness.   Musculoskeletal:      Right lower leg: No edema.      Left lower leg: No edema.   Skin:     General: Skin is warm.      Findings: No erythema.   Neurological:      Mental Status: She is alert. Mental status is at baseline.   Psychiatric:         Mood and Affect: Mood normal.         Behavior: Behavior normal.       HOSPITAL COURSE:  Cellulitis Right Lower Extremity  Patient admitted for cellulitis of left lower leg and completed course of IV Zosyn. Cellulitis had resolved and leg swelling improved on day of discharge.      Acute Gout Right Second Toe  Patient also believed to have gout flare up of right 2nd toe contributing to difficulty with walking and patient was given IV methylprednisolone 20mg BID x 2 days then switched to oral prednisone 20mg BID x5 days after discharge. Patient's allopurinol increased to 300mg daily to prevent future exacerbations. Can also consider adding probenacid at future time if increased allopurinol does not control gout exacerbations.      Patient to continue with  home health after discharge.     DISCHARGE CONDITION:   Stable    DISPOSITION:  Home or Self Care    DISCHARGE MEDICATIONS     Discharge Medications      New Medications      Instructions Start Date   predniSONE 20 MG tablet  Commonly known as: DELTASONE   Take 1 tablet by mouth Daily for 5 days **Take with food**         Changes to Medications      Instructions Start Date   allopurinol 100 MG tablet  Commonly known as: Zyloprim  What changed:   · how much to take  · when to take this   300 mg, Oral, Daily      HYDROcodone-acetaminophen 7.5-325 MG per tablet  Commonly known as: NORCO  What changed: Another medication with the same name was removed. Continue taking this medication, and follow the directions you see here.   1 tablet, Oral, Every 6 Hours PRN      spironolactone 25 MG tablet  Commonly known as: ALDACTONE  What changed:   · how much to take  · when to take this  · additional instructions   12.5 mg, Oral, Nightly         Continue These Medications      Instructions Start Date   acetaminophen 500 MG tablet  Commonly known as: TYLENOL   500 mg, Oral, Every 6 Hours PRN      acetaminophen 650 MG 8 hr tablet  Commonly known as: TYLENOL   1,300 mg, Oral, Every 8 Hours PRN      albuterol sulfate  (90 Base) MCG/ACT inhaler  Commonly known as: PROVENTIL HFA;VENTOLIN HFA;PROAIR HFA   2 puffs, Inhalation, Every 4 Hours PRN      arformoterol 15 MCG/2ML nebulizer solution  Commonly known as: BROVANA   No dose, route, or frequency recorded.      azithromycin 250 MG tablet  Commonly known as: ZITHROMAX   1 tablet, Oral, Daily, Monday Wednesday and Friday      budesonide 0.25 MG/2ML nebulizer solution  Commonly known as: PULMICORT   No dose, route, or frequency recorded.      bumetanide 2 MG tablet  Commonly known as: BUMEX   2 mg, Oral, Daily      carvedilol 3.125 MG tablet  Commonly known as: COREG   3.125 mg, Oral, 2 Times Daily With Meals      EPINEPHrine 0.3 MG/0.3ML solution auto-injector injection  Commonly  known as: EPIPEN   No dose, route, or frequency recorded.      gabapentin 100 MG capsule  Commonly known as: NEURONTIN   100 mg, Oral, Every Night at Bedtime      guaiFENesin 600 MG 12 hr tablet  Commonly known as: MUCINEX   1,200 mg, Oral, Every 12 Hours Scheduled      IPRATROPIUM BROMIDE IN   Inhalation      ipratropium-albuterol 0.5-2.5 mg/3 ml nebulizer  Commonly known as: DUO-NEB   1.5 mL, Nebulization, Every 4 Hours PRN      levocetirizine 5 MG tablet  Commonly known as: XYZAL   1 tablet, Oral, Every Evening      metFORMIN  MG 24 hr tablet  Commonly known as: GLUCOPHAGE-XR   TAKE 1 TABLET EVERY NIGHT AT BEDTIME      omeprazole 40 MG capsule  Commonly known as: priLOSEC   40 mg, Oral, Daily      PRECISION XTRA TEST VI   In Vitro         Stop These Medications    doxycycline 100 MG capsule  Commonly known as: MONODOX            INSTRUCTIONS:  Activity:   Activity Instructions     Activity as Tolerated          Diet:   Diet Instructions     Diet: Regular/House Diet, Diabetic Diets; Consistent Carbohydrate; Texture: Regular Texture (IDDSI 7); Fluid Consistency: Thin (IDDSI 0)      Discharge Diet:  Regular/House Diet  Diabetic Diets       Diabetic Diet: Consistent Carbohydrate    Texture: Regular Texture (IDDSI 7)    Fluid Consistency: Thin (IDDSI 0)          FOLLOW UP:   Additional Instructions for the Follow-ups that You Need to Schedule     Ambulatory Referral to Home Health   As directed      Face to Face Visit Date: 3/22/2023    Follow-up provider for Plan of Care?: I treated the patient in an acute care facility and will not continue treatment after discharge.    Follow-up provider: ABDELRAHMAN MAGALLON [[id number]]    Reason/Clinical Findings: ADL below baseline, mobility below baseline    Describe mobility limitations that make leaving home difficult: Difficulty with ambulation, daily activities    Nursing/Therapeutic Services Requested: Physical Therapy Skilled Nursing Occupational Therapy    Skilled nursing  orders: Medication education COPD management    Frequency: 1 Week 1            Follow-up Information     Gus Posada MD. Schedule an appointment as soon as possible for a visit in 1 week(s).    Specialty: Family Medicine  Contact information:  [address]   [place] [address] [phone]                         PENDING TEST RESULTS AT DISCHARGE  Pending Labs     Order Current Status    Blood Culture - Blood, Arm, Left Preliminary result    Blood Culture - Blood, Arm, Right Preliminary result          Time: >30 minutes were spent in discharge planning, medication reconciliation and coordination of care for this patient.    Dr. Clif Vaca is the attending at time of discharge, He is aware of the patient's status and agrees with the above discharge summary.      This document has been electronically signed by Sinan Manley MD on March 22, 2023 13:05 CDT

## 2023-03-23 ENCOUNTER — TRANSITIONAL CARE MANAGEMENT TELEPHONE ENCOUNTER (OUTPATIENT)
Dept: CALL CENTER | Facility: HOSPITAL | Age: 83
End: 2023-03-23
Payer: MEDICARE

## 2023-03-23 ENCOUNTER — HOME CARE VISIT (OUTPATIENT)
Dept: HOME HEALTH SERVICES | Facility: CLINIC | Age: 83
End: 2023-03-23
Payer: MEDICARE

## 2023-03-23 PROCEDURE — G0151 HHCP-SERV OF PT,EA 15 MIN: HCPCS

## 2023-03-23 NOTE — OUTREACH NOTE
Call Center TCM Note    Flowsheet Row Responses   [facility] patient discharged from? [facility]   Does the patient have one of the following disease processes/diagnoses(primary or secondary)? Other   TCM attempt successful? Yes  [Hemant-son and Mary-niece]   Call start time 0923   Call end time 0926   Discharge diagnosis Cellulitis of right lower leg   Person spoke with today (if not patient) and relationship mary-albertoece   Meds reviewed with patient/caregiver? Yes   Does the patient have all medications ordered at discharge? Yes   Is the patient taking all medications as directed (includes completed medication regime)? Yes   Comments Hospital d/c f/u appt is on 3/29/23 at 1:45 PM   Does the patient have an appointment with their PCP within 7 days of discharge? Yes   Psychosocial issues? No   Did the patient receive a copy of their discharge instructions? Yes   Nursing interventions Reviewed instructions with patient   What is the patient's perception of their health status since discharge? Improving   TCM call completed? Yes   Call end time 0926   Would this patient benefit from a Referral to Amb Social Work? No   Is the patient interested in additional calls from an ambulatory ?  NOTE:  applies to high risk patients requiring additional follow-up. No          Jen Castañeda, NEGRA    3/23/2023, 09:27 CDT

## 2023-03-24 VITALS
TEMPERATURE: 97.3 F | DIASTOLIC BLOOD PRESSURE: 80 MMHG | HEART RATE: 63 BPM | OXYGEN SATURATION: 96 % | SYSTOLIC BLOOD PRESSURE: 140 MMHG | RESPIRATION RATE: 18 BRPM

## 2023-03-24 LAB
BACTERIA SPEC AEROBE CULT: NORMAL
BACTERIA SPEC AEROBE CULT: NORMAL

## 2023-03-24 NOTE — HOME HEALTH
"\"So I shamar to the hospital on Sunday bc this right foot hurt and red, was cellulitis and got 3 diff antibiotics on IV there. The swelling got better, but my right foot hurts again today. I just started 10 days steroid.\"  3-24 car appt  3-28 1;15 foot appt   3-29 145 Dr Sanchez"

## 2023-03-27 ENCOUNTER — HOME CARE VISIT (OUTPATIENT)
Dept: HOME HEALTH SERVICES | Facility: CLINIC | Age: 83
End: 2023-03-27
Payer: MEDICARE

## 2023-03-27 VITALS
SYSTOLIC BLOOD PRESSURE: 145 MMHG | HEART RATE: 60 BPM | TEMPERATURE: 97.4 F | DIASTOLIC BLOOD PRESSURE: 75 MMHG | RESPIRATION RATE: 18 BRPM | OXYGEN SATURATION: 91 %

## 2023-03-27 PROCEDURE — G0151 HHCP-SERV OF PT,EA 15 MIN: HCPCS

## 2023-03-28 ENCOUNTER — HOME CARE VISIT (OUTPATIENT)
Dept: HOME HEALTH SERVICES | Facility: CLINIC | Age: 83
End: 2023-03-28
Payer: MEDICARE

## 2023-03-28 VITALS
DIASTOLIC BLOOD PRESSURE: 78 MMHG | TEMPERATURE: 98.6 F | HEART RATE: 69 BPM | RESPIRATION RATE: 20 BRPM | SYSTOLIC BLOOD PRESSURE: 122 MMHG | OXYGEN SATURATION: 100 %

## 2023-03-28 PROCEDURE — G0299 HHS/HOSPICE OF RN EA 15 MIN: HCPCS

## 2023-03-29 ENCOUNTER — LAB (OUTPATIENT)
Dept: LAB | Facility: HOSPITAL | Age: 83
End: 2023-03-29
Payer: MEDICARE

## 2023-03-29 ENCOUNTER — OFFICE VISIT (OUTPATIENT)
Dept: FAMILY MEDICINE CLINIC | Facility: CLINIC | Age: 83
End: 2023-03-29
Payer: MEDICARE

## 2023-03-29 VITALS
HEIGHT: 68 IN | DIASTOLIC BLOOD PRESSURE: 70 MMHG | HEART RATE: 61 BPM | WEIGHT: 242.3 LBS | OXYGEN SATURATION: 94 % | TEMPERATURE: 97.3 F | SYSTOLIC BLOOD PRESSURE: 120 MMHG | BODY MASS INDEX: 36.72 KG/M2

## 2023-03-29 DIAGNOSIS — R53.83 FATIGUE, UNSPECIFIED TYPE: ICD-10-CM

## 2023-03-29 DIAGNOSIS — E11.42 TYPE 2 DIABETES MELLITUS WITH DIABETIC POLYNEUROPATHY, WITHOUT LONG-TERM CURRENT USE OF INSULIN: ICD-10-CM

## 2023-03-29 DIAGNOSIS — K90.9 DIARRHEA DUE TO MALABSORPTION: ICD-10-CM

## 2023-03-29 DIAGNOSIS — R19.7 DIARRHEA DUE TO MALABSORPTION: ICD-10-CM

## 2023-03-29 DIAGNOSIS — M54.50 CHRONIC BILATERAL LOW BACK PAIN WITHOUT SCIATICA: ICD-10-CM

## 2023-03-29 DIAGNOSIS — G89.29 CHRONIC BILATERAL LOW BACK PAIN WITHOUT SCIATICA: ICD-10-CM

## 2023-03-29 DIAGNOSIS — K90.9 DIARRHEA DUE TO MALABSORPTION: Primary | ICD-10-CM

## 2023-03-29 DIAGNOSIS — R19.7 DIARRHEA DUE TO MALABSORPTION: Primary | ICD-10-CM

## 2023-03-29 PROBLEM — Z87.01 HISTORY OF PNEUMONIA: Status: ACTIVE | Noted: 2022-11-10

## 2023-03-29 PROBLEM — C34.31 PRIMARY CANCER OF RIGHT LOWER LOBE OF LUNG: Status: ACTIVE | Noted: 2023-02-06

## 2023-03-29 PROCEDURE — 85025 COMPLETE CBC W/AUTO DIFF WBC: CPT

## 2023-03-29 PROCEDURE — 82043 UR ALBUMIN QUANTITATIVE: CPT

## 2023-03-29 PROCEDURE — 83036 HEMOGLOBIN GLYCOSYLATED A1C: CPT

## 2023-03-29 PROCEDURE — 80053 COMPREHEN METABOLIC PANEL: CPT

## 2023-03-29 PROCEDURE — 36415 COLL VENOUS BLD VENIPUNCTURE: CPT

## 2023-03-29 RX ORDER — TIZANIDINE 2 MG/1
2 TABLET ORAL NIGHTLY PRN
Qty: 30 TABLET | Refills: 0 | Status: SHIPPED | OUTPATIENT
Start: 2023-03-29

## 2023-03-30 ENCOUNTER — OFFICE VISIT (OUTPATIENT)
Dept: FAMILY MEDICINE CLINIC | Facility: CLINIC | Age: 83
End: 2023-03-30
Payer: MEDICARE

## 2023-03-30 DIAGNOSIS — R19.7 DIARRHEA, UNSPECIFIED TYPE: Primary | ICD-10-CM

## 2023-03-30 DIAGNOSIS — R53.83 FATIGUE, UNSPECIFIED TYPE: ICD-10-CM

## 2023-03-30 DIAGNOSIS — E11.42 TYPE 2 DIABETES MELLITUS WITH DIABETIC POLYNEUROPATHY, WITHOUT LONG-TERM CURRENT USE OF INSULIN: ICD-10-CM

## 2023-03-30 LAB
ALBUMIN SERPL-MCNC: 3.8 G/DL (ref 3.5–5.2)
ALBUMIN UR-MCNC: <1.2 MG/DL
ALBUMIN/GLOB SERPL: 1.5 G/DL
ALP SERPL-CCNC: 78 U/L (ref 39–117)
ALT SERPL W P-5'-P-CCNC: 18 U/L (ref 1–33)
ANION GAP SERPL CALCULATED.3IONS-SCNC: 12.2 MMOL/L (ref 5–15)
AST SERPL-CCNC: 19 U/L (ref 1–32)
BASOPHILS # BLD AUTO: 0.03 10*3/MM3 (ref 0–0.2)
BASOPHILS NFR BLD AUTO: 0.3 % (ref 0–1.5)
BILIRUB SERPL-MCNC: 0.3 MG/DL (ref 0–1.2)
BUN SERPL-MCNC: 29 MG/DL (ref 8–23)
BUN/CREAT SERPL: 22.3 (ref 7–25)
CALCIUM SPEC-SCNC: 8.3 MG/DL (ref 8.6–10.5)
CHLORIDE SERPL-SCNC: 97 MMOL/L (ref 98–107)
CO2 SERPL-SCNC: 27.8 MMOL/L (ref 22–29)
CREAT SERPL-MCNC: 1.3 MG/DL (ref 0.57–1)
DEPRECATED RDW RBC AUTO: 51.2 FL (ref 37–54)
EGFRCR SERPLBLD CKD-EPI 2021: 41.4 ML/MIN/1.73
EOSINOPHIL # BLD AUTO: 0.13 10*3/MM3 (ref 0–0.4)
EOSINOPHIL NFR BLD AUTO: 1.1 % (ref 0.3–6.2)
ERYTHROCYTE [DISTWIDTH] IN BLOOD BY AUTOMATED COUNT: 14.5 % (ref 12.3–15.4)
GLOBULIN UR ELPH-MCNC: 2.5 GM/DL
GLUCOSE SERPL-MCNC: 100 MG/DL (ref 65–99)
HBA1C MFR BLD: 6.4 % (ref 4.8–5.6)
HCT VFR BLD AUTO: 35.8 % (ref 34–46.6)
HGB BLD-MCNC: 11.9 G/DL (ref 12–15.9)
IMM GRANULOCYTES # BLD AUTO: 0.18 10*3/MM3 (ref 0–0.05)
IMM GRANULOCYTES NFR BLD AUTO: 1.6 % (ref 0–0.5)
LYMPHOCYTES # BLD AUTO: 1.15 10*3/MM3 (ref 0.7–3.1)
LYMPHOCYTES NFR BLD AUTO: 10 % (ref 19.6–45.3)
MCH RBC QN AUTO: 32.2 PG (ref 26.6–33)
MCHC RBC AUTO-ENTMCNC: 33.2 G/DL (ref 31.5–35.7)
MCV RBC AUTO: 96.8 FL (ref 79–97)
MONOCYTES # BLD AUTO: 0.74 10*3/MM3 (ref 0.1–0.9)
MONOCYTES NFR BLD AUTO: 6.4 % (ref 5–12)
NEUTROPHILS NFR BLD AUTO: 80.6 % (ref 42.7–76)
NEUTROPHILS NFR BLD AUTO: 9.31 10*3/MM3 (ref 1.7–7)
NRBC BLD AUTO-RTO: 0.1 /100 WBC (ref 0–0.2)
PLATELET # BLD AUTO: 232 10*3/MM3 (ref 140–450)
PMV BLD AUTO: 12.5 FL (ref 6–12)
POTASSIUM SERPL-SCNC: 4.1 MMOL/L (ref 3.5–5.2)
PROT SERPL-MCNC: 6.3 G/DL (ref 6–8.5)
RBC # BLD AUTO: 3.7 10*6/MM3 (ref 3.77–5.28)
SODIUM SERPL-SCNC: 137 MMOL/L (ref 136–145)
WBC NRBC COR # BLD: 11.54 10*3/MM3 (ref 3.4–10.8)

## 2023-03-30 NOTE — PROGRESS NOTES
I have reviewed the notes, assessments, and/or procedures performed by Dr. Carlos Sanchez,we reviewed the visit information, I concur with his  documentation and assessment and plan for Alda Constantino        This document has been electronically signed by Sd Mackey MD on March 30, 2023 16:10 CDT

## 2023-03-30 NOTE — PROGRESS NOTES
Family Medicine Residency  Carlos Sanchez MD    Subjective:     Alda Constantino is a 82 y.o. female who presents for hospital follow up for gout and cellulitis.    Patient feels her legs are not swollen and do not hurt.  Her main complaints at this time are diarrhea and fatigue.  She is also if she can be prescribed something for MSK pain.    The following portions of the patient's history were reviewed and updated as appropriate: allergies, current medications, past family history, past medical history, past social history, past surgical history and problem list.    Past Medical Hx:  Past Medical History:   Diagnosis Date   • Asthma    • Cancer (HCC)    • Congenital abnormalities     colon behind liver   • COPD (chronic obstructive pulmonary disease) (HCC)    • Hypertension        Past Surgical Hx:  Past Surgical History:   Procedure Laterality Date   • ADENOIDECTOMY     • ADRENAL GLAND SURGERY Right    • BLADDER SURGERY     • BREAST BIOPSY     • COLONOSCOPY W/ BIOPSIES AND POLYPECTOMY     • EYE SURGERY     • HERNIA REPAIR     • HYSTERECTOMY     • LUNG LOBECTOMY Left    • RECTAL SURGERY      cyst   • SKIN CANCER EXCISION      legs   • TONSILLECTOMY     • VARICOSE VEIN SURGERY         Current Meds:    Current Outpatient Medications:   •  acetaminophen (TYLENOL) 500 MG tablet, Take 1 tablet by mouth Every 6 (Six) Hours As Needed for Mild Pain., Disp: , Rfl:   •  acetaminophen (TYLENOL) 650 MG 8 hr tablet, Take 2 tablets by mouth Every 8 (Eight) Hours As Needed. Indications: Pain, Disp: , Rfl:   •  albuterol (PROVENTIL HFA;VENTOLIN HFA) 108 (90 Base) MCG/ACT inhaler, Inhale 2 puffs Every 4 (Four) Hours As Needed for Wheezing., Disp: , Rfl:   •  allopurinol (Zyloprim) 100 MG tablet, Take 3 tablets by mouth Daily., Disp: 180 tablet, Rfl: 2  •  arformoterol (BROVANA) 15 MCG/2ML nebulizer solution, , Disp: , Rfl:   •  azithromycin (ZITHROMAX) 250 MG tablet, Take 1 tablet by mouth Daily. Monday Wednesday and Friday   Indications: Pneumonia, Disp: , Rfl:   •  budesonide (PULMICORT) 0.25 MG/2ML nebulizer solution, , Disp: , Rfl:   •  bumetanide (BUMEX) 2 MG tablet, Take 1 tablet by mouth Daily. Indications: Edema, Disp: , Rfl:   •  carvedilol (COREG) 3.125 MG tablet, Take 1 tablet by mouth 2 (Two) Times a Day With Meals., Disp: , Rfl:   •  EPINEPHrine (EPIPEN) 0.3 MG/0.3ML solution auto-injector injection, , Disp: , Rfl:   •  gabapentin (NEURONTIN) 100 MG capsule, Take 1 capsule by mouth every night at bedtime., Disp: , Rfl:   •  Glucose Blood (PRECISION XTRA TEST VI), by In Vitro route., Disp: , Rfl:   •  guaiFENesin (MUCINEX) 600 MG 12 hr tablet, Take 2 tablets by mouth Every 12 (Twelve) Hours., Disp: 60 tablet, Rfl: 0  •  IPRATROPIUM BROMIDE IN, Inhale., Disp: , Rfl:   •  ipratropium-albuterol (DUO-NEB) 0.5-2.5 mg/3 ml nebulizer, Take 1.5 mL by nebulization Every 4 (Four) Hours As Needed for Wheezing., Disp: , Rfl:   •  levocetirizine (XYZAL) 5 MG tablet, Take 1 tablet by mouth Every Evening. Indications: Perennial Allergic Rhinitis, Disp: , Rfl:   •  metFORMIN ER (GLUCOPHAGE-XR) 750 MG 24 hr tablet, TAKE 1 TABLET EVERY NIGHT AT BEDTIME, Disp: 30 tablet, Rfl: 11  •  omeprazole (priLOSEC) 40 MG capsule, Take 1 capsule by mouth Daily., Disp: 90 capsule, Rfl: 2  •  spironolactone (ALDACTONE) 25 MG tablet, Take 0.5 tablets by mouth Every Night. Indications: Edema, Disp: 30 tablet, Rfl:   •  glucose blood test strip, Use to test blood sugar 2x daily.  E11.65, Disp: 100 each, Rfl: 12  •  HYDROcodone-acetaminophen (NORCO) 5-325 MG per tablet, Take 1 tablet by mouth Every 6 (Six) Hours As Needed for Moderate Pain. (Patient not taking: Reported on 3/29/2023), Disp: , Rfl:   •  HYDROcodone-acetaminophen (NORCO) 7.5-325 MG per tablet, Take 1 tablet by mouth Every 6 (Six) Hours As Needed for Moderate Pain. (Patient not taking: Reported on 3/29/2023), Disp: 20 tablet, Rfl: 0  •  tiZANidine (ZANAFLEX) 2 MG tablet, Take 1 tablet by mouth At  Night As Needed for Muscle Spasms., Disp: 30 tablet, Rfl: 0    Allergies:  Allergies   Allergen Reactions   • Dilaudid [Hydromorphone Hcl] Anaphylaxis   • Diphenoxylate-Atropine Shortness Of Breath     Reaction: shortness of breath     • Iodinated Contrast Media Hives     Reaction: HIVES   • Keflex [Cephalexin] Shortness Of Breath   • Lomotil [Diphenoxylate] Shortness Of Breath   • Aspirin Hives   • Beef (Diagnostic) Nausea And Vomiting     All mammals  All mammals   • Beef-Derived Products GI Intolerance   • Ciprofloxacin Nausea And Vomiting   • Contrast Dye (Echo Or Unknown Ct/Mr) Hives   • Levaquin [Levofloxacin] GI Intolerance   • Milk-Related Compounds GI Intolerance     Noted tolerates small amount of milk in cooked foods. Likes almond milk.   • Nsaids Hives   • Pegademase Bovine Other (See Comments)     Other reaction(s): GI Intolerance   • Percocet [Oxycodone-Acetaminophen] Itching   • Pork-Derived Products GI Intolerance   • Tolmetin Hives       Family Hx:  Family History   Problem Relation Age of Onset   • Diabetes Mother    • Diabetes Father    • Heart disease Father    • Cancer Sister    • Heart disease Sister    • Thyroid disease Sister    • Diabetes Brother    • Heart disease Brother         Social History:  Social History     Socioeconomic History   • Marital status:    Tobacco Use   • Smoking status: Former     Types: Cigarettes     Quit date:      Years since quittin.2   • Smokeless tobacco: Never   Vaping Use   • Vaping Use: Never used   Substance and Sexual Activity   • Alcohol use: Never   • Drug use: Never   • Sexual activity: Defer       Review of Systems  Review of Systems   Constitutional: Positive for fatigue. Negative for chills and fever.   HENT: Negative for congestion and rhinorrhea.    Eyes: Negative for visual disturbance.   Respiratory: Negative for shortness of breath.    Cardiovascular: Negative for chest pain.   Gastrointestinal: Positive for diarrhea. Negative for  "abdominal pain, nausea and vomiting.   Endocrine: Negative for polyuria.   Genitourinary: Negative for difficulty urinating and dyspareunia.   Musculoskeletal: Negative for back pain and myalgias.   Skin: Negative for rash and wound.   Neurological: Negative for weakness, numbness and headaches.   Psychiatric/Behavioral: Negative for agitation, behavioral problems and confusion.       Objective:     /70   Pulse 61   Temp 97.3 °F (36.3 °C)   Ht 172.7 cm (68\")   Wt 110 kg (242 lb 4.8 oz)   SpO2 94%   BMI 36.84 kg/m²   Physical Exam  Constitutional:       General: She is not in acute distress.  HENT:      Head: Normocephalic and atraumatic.      Right Ear: External ear normal.      Left Ear: External ear normal.      Nose: Nose normal.   Cardiovascular:      Heart sounds: Normal heart sounds.   Pulmonary:      Effort: No respiratory distress.      Breath sounds: Normal breath sounds.   Abdominal:      Tenderness: There is no abdominal tenderness.      Comments: Hyperactive BS   Musculoskeletal:         General: No swelling, tenderness or deformity.      Cervical back: Normal range of motion.      Right lower leg: No edema.      Left lower leg: No edema.   Skin:     Coloration: Skin is not jaundiced.      Findings: No erythema.   Neurological:      Mental Status: She is alert.   Psychiatric:         Mood and Affect: Mood normal.         Behavior: Behavior normal.          Assessment/Plan:     Diagnoses and all orders for this visit:    1. 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  She does endorse she gets diarrhea at times, but usually doesn't have fatigue.  Electrolyte imbalance vs C diff.  -     Comprehensive metabolic panel; Future    2. Fatigue, unspecified type  As above, electrolytes vs infectious.  CBC will be helpful in determine etiology.  -     CBC w AUTO Differential; Future    3. Type 2 diabetes mellitus with " diabetic polyneuropathy, without long-term current use of insulin (HCC)  Patient due for diabetic care gaps.  -     Hemoglobin A1c; Future  -     MicroAlbumin, Urine, Random - Urine, Clean Catch; Future  -     Ambulatory Referral for Diabetic Eye Exam-Ophthalmology  -     Discontinue: glucose blood test strip; Use as instructed  Dispense: 400 each; Refill: 12    4. Chronic bilateral low back pain without sciatica  Will try and muscle relaxant for MSK pain.  -     tiZANidine (ZANAFLEX) 2 MG tablet; Take 1 tablet by mouth At Night As Needed for Muscle Spasms.  Dispense: 30 tablet; Refill: 0             Follow-up:     Will call tomorrow about diarrhea.    Preventative:  Health Maintenance   Topic Date Due   • ZOSTER VACCINE (1 of 2) Never done   • DIABETIC EYE EXAM  06/20/2018   • DXA SCAN  09/18/2019   • ANNUAL WELLNESS VISIT  07/24/2021   • HEMOGLOBIN A1C  09/29/2023   • URINE MICROALBUMIN  03/29/2024   • TDAP/TD VACCINES (3 - Td or Tdap) 02/19/2033   • COVID-19 Vaccine  Completed   • INFLUENZA VACCINE  Completed   • Pneumococcal Vaccine 65+  Completed       Alcohol use:  reports no history of alcohol use.  Nicotine status  reports that she quit smoking about 8 years ago. Her smoking use included cigarettes. She has never used smokeless tobacco.    RISK SCORE: 5      This document has been electronically signed by Carlos Sanchez MD on March 30, 2023 13:31 CDT

## 2023-03-30 NOTE — PROGRESS NOTES
Family Medicine Residency  Carlos Sanchez MD    Subjective:     Alda Constantino is a 82 y.o. female who presents for follow up for post antibiotic diarrhea and fatigue.    Patient states that her symptoms have resolved and that she does feel better today.  No acute issues otherwise.    The following portions of the patient's history were reviewed and updated as appropriate: allergies, current medications, past family history, past medical history, past social history, past surgical history and problem list.    Past Medical Hx:  Past Medical History:   Diagnosis Date   • Asthma    • Cancer (HCC)    • Congenital abnormalities     colon behind liver   • COPD (chronic obstructive pulmonary disease) (HCC)    • Hypertension        Past Surgical Hx:  Past Surgical History:   Procedure Laterality Date   • ADENOIDECTOMY     • ADRENAL GLAND SURGERY Right    • BLADDER SURGERY     • BREAST BIOPSY     • COLONOSCOPY W/ BIOPSIES AND POLYPECTOMY     • EYE SURGERY     • HERNIA REPAIR     • HYSTERECTOMY     • LUNG LOBECTOMY Left    • RECTAL SURGERY      cyst   • SKIN CANCER EXCISION      legs   • TONSILLECTOMY     • VARICOSE VEIN SURGERY         Current Meds:    Current Outpatient Medications:   •  acetaminophen (TYLENOL) 500 MG tablet, Take 1 tablet by mouth Every 6 (Six) Hours As Needed for Mild Pain., Disp: , Rfl:   •  acetaminophen (TYLENOL) 650 MG 8 hr tablet, Take 2 tablets by mouth Every 8 (Eight) Hours As Needed. Indications: Pain, Disp: , Rfl:   •  albuterol (PROVENTIL HFA;VENTOLIN HFA) 108 (90 Base) MCG/ACT inhaler, Inhale 2 puffs Every 4 (Four) Hours As Needed for Wheezing., Disp: , Rfl:   •  allopurinol (Zyloprim) 100 MG tablet, Take 3 tablets by mouth Daily., Disp: 180 tablet, Rfl: 2  •  arformoterol (BROVANA) 15 MCG/2ML nebulizer solution, , Disp: , Rfl:   •  azithromycin (ZITHROMAX) 250 MG tablet, Take 1 tablet by mouth Daily. Monday Wednesday and Friday  Indications: Pneumonia, Disp: , Rfl:   •  budesonide  (PULMICORT) 0.25 MG/2ML nebulizer solution, , Disp: , Rfl:   •  bumetanide (BUMEX) 2 MG tablet, Take 1 tablet by mouth Daily. Indications: Edema, Disp: , Rfl:   •  carvedilol (COREG) 3.125 MG tablet, Take 1 tablet by mouth 2 (Two) Times a Day With Meals., Disp: , Rfl:   •  EPINEPHrine (EPIPEN) 0.3 MG/0.3ML solution auto-injector injection, , Disp: , Rfl:   •  gabapentin (NEURONTIN) 100 MG capsule, Take 1 capsule by mouth every night at bedtime., Disp: , Rfl:   •  Glucose Blood (PRECISION XTRA TEST VI), by In Vitro route., Disp: , Rfl:   •  glucose blood test strip, Use to test blood sugar 2x daily.  E11.65, Disp: 100 each, Rfl: 12  •  guaiFENesin (MUCINEX) 600 MG 12 hr tablet, Take 2 tablets by mouth Every 12 (Twelve) Hours., Disp: 60 tablet, Rfl: 0  •  HYDROcodone-acetaminophen (NORCO) 5-325 MG per tablet, Take 1 tablet by mouth Every 6 (Six) Hours As Needed for Moderate Pain. (Patient not taking: Reported on 3/29/2023), Disp: , Rfl:   •  HYDROcodone-acetaminophen (NORCO) 7.5-325 MG per tablet, Take 1 tablet by mouth Every 6 (Six) Hours As Needed for Moderate Pain. (Patient not taking: Reported on 3/29/2023), Disp: 20 tablet, Rfl: 0  •  IPRATROPIUM BROMIDE IN, Inhale., Disp: , Rfl:   •  ipratropium-albuterol (DUO-NEB) 0.5-2.5 mg/3 ml nebulizer, Take 1.5 mL by nebulization Every 4 (Four) Hours As Needed for Wheezing., Disp: , Rfl:   •  levocetirizine (XYZAL) 5 MG tablet, Take 1 tablet by mouth Every Evening. Indications: Perennial Allergic Rhinitis, Disp: , Rfl:   •  metFORMIN ER (GLUCOPHAGE-XR) 750 MG 24 hr tablet, TAKE 1 TABLET EVERY NIGHT AT BEDTIME, Disp: 30 tablet, Rfl: 11  •  omeprazole (priLOSEC) 40 MG capsule, Take 1 capsule by mouth Daily., Disp: 90 capsule, Rfl: 2  •  spironolactone (ALDACTONE) 25 MG tablet, Take 0.5 tablets by mouth Every Night. Indications: Edema, Disp: 30 tablet, Rfl:   •  tiZANidine (ZANAFLEX) 2 MG tablet, Take 1 tablet by mouth At Night As Needed for Muscle Spasms., Disp: 30 tablet,  Rfl: 0    Allergies:  Allergies   Allergen Reactions   • Dilaudid [Hydromorphone Hcl] Anaphylaxis   • Diphenoxylate-Atropine Shortness Of Breath     Reaction: shortness of breath     • Iodinated Contrast Media Hives     Reaction: HIVES   • Keflex [Cephalexin] Shortness Of Breath   • Lomotil [Diphenoxylate] Shortness Of Breath   • Aspirin Hives   • Beef (Diagnostic) Nausea And Vomiting     All mammals  All mammals   • Beef-Derived Products GI Intolerance   • Ciprofloxacin Nausea And Vomiting   • Contrast Dye (Echo Or Unknown Ct/Mr) Hives   • Levaquin [Levofloxacin] GI Intolerance   • Milk-Related Compounds GI Intolerance     Noted tolerates small amount of milk in cooked foods. Likes almond milk.   • Nsaids Hives   • Pegademase Bovine Other (See Comments)     Other reaction(s): GI Intolerance   • Percocet [Oxycodone-Acetaminophen] Itching   • Pork-Derived Products GI Intolerance   • Tolmetin Hives       Family Hx:  Family History   Problem Relation Age of Onset   • Diabetes Mother    • Diabetes Father    • Heart disease Father    • Cancer Sister    • Heart disease Sister    • Thyroid disease Sister    • Diabetes Brother    • Heart disease Brother         Social History:  Social History     Socioeconomic History   • Marital status:    Tobacco Use   • Smoking status: Former     Types: Cigarettes     Quit date:      Years since quittin.2   • Smokeless tobacco: Never   Vaping Use   • Vaping Use: Never used   Substance and Sexual Activity   • Alcohol use: Never   • Drug use: Never   • Sexual activity: Defer       Review of Systems  Review of Systems   Constitutional: Negative for chills and fever.   HENT: Negative for congestion and rhinorrhea.    Eyes: Negative for visual disturbance.   Respiratory: Negative for shortness of breath.    Cardiovascular: Negative for chest pain.   Gastrointestinal: Negative for abdominal pain, diarrhea, nausea and vomiting.   Endocrine: Negative for polyuria.   Genitourinary:  Negative for difficulty urinating and dyspareunia.   Musculoskeletal: Negative for back pain and myalgias.   Skin: Negative for rash and wound.   Neurological: Negative for weakness, numbness and headaches.   Psychiatric/Behavioral: Negative for agitation, behavioral problems and confusion.       Objective:     There were no vitals taken for this visit.  Physical Exam  Pulmonary:      Effort: No respiratory distress.   Neurological:      Mental Status: She is alert.   Psychiatric:         Mood and Affect: Mood normal.         Behavior: Behavior normal.          Assessment/Plan:     Diagnoses and all orders for this visit:    1. Diarrhea, unspecified type (Primary)  Issue seems to have been resolved.  CMP only significant for a slight deviation in Cl and CBC showed a WBC of 11.  Patient did also have steroids recently which would explain the 11.  At this time, my suspicion of C. Diff is low and I do not feel any further intervention prior to her 4/3/23 appointment is needed.    2. Fatigue, unspecified type  Fatigue has improved.  As above.             Follow-up:     4/3/23 with PCP to complete annual    Preventative:  Health Maintenance   Topic Date Due   • ZOSTER VACCINE (1 of 2) Never done   • DIABETIC EYE EXAM  06/20/2018   • DXA SCAN  09/18/2019   • ANNUAL WELLNESS VISIT  07/24/2021   • HEMOGLOBIN A1C  09/29/2023   • URINE MICROALBUMIN  03/29/2024   • TDAP/TD VACCINES (3 - Td or Tdap) 02/19/2033   • COVID-19 Vaccine  Completed   • INFLUENZA VACCINE  Completed   • Pneumococcal Vaccine 65+  Completed       Alcohol use:  reports no history of alcohol use.  Nicotine status  reports that she quit smoking about 8 years ago. Her smoking use included cigarettes. She has never used smokeless tobacco.     This was a telephone visit that lasted 30 minutes that the patient consented to.  Patient was contacted at an undisclosed location from our clinic.    RISK SCORE: 5      This document has been electronically signed by  Carlos Sanchez MD on March 30, 2023 15:47 CDT

## 2023-04-03 ENCOUNTER — OFFICE VISIT (OUTPATIENT)
Dept: FAMILY MEDICINE CLINIC | Facility: CLINIC | Age: 83
End: 2023-04-03
Payer: MEDICARE

## 2023-04-03 VITALS
TEMPERATURE: 97.4 F | BODY MASS INDEX: 36.68 KG/M2 | HEIGHT: 68 IN | DIASTOLIC BLOOD PRESSURE: 72 MMHG | WEIGHT: 242 LBS | HEART RATE: 90 BPM | OXYGEN SATURATION: 95 % | SYSTOLIC BLOOD PRESSURE: 110 MMHG

## 2023-04-03 DIAGNOSIS — R53.83 OTHER FATIGUE: Primary | ICD-10-CM

## 2023-04-03 DIAGNOSIS — T14.8XXA ABRASION: ICD-10-CM

## 2023-04-03 DIAGNOSIS — B35.3 TINEA PEDIS OF LEFT FOOT: ICD-10-CM

## 2023-04-03 LAB
EXPIRATION DATE: NORMAL
FLUAV AG UPPER RESP QL IA.RAPID: NOT DETECTED
FLUBV AG UPPER RESP QL IA.RAPID: NOT DETECTED
INTERNAL CONTROL: NORMAL
Lab: NORMAL
SARS-COV-2 AG UPPER RESP QL IA.RAPID: NOT DETECTED

## 2023-04-03 RX ORDER — PRENATAL VIT 91/IRON/FOLIC/DHA 28-975-200
1 COMBINATION PACKAGE (EA) ORAL 2 TIMES DAILY
Qty: 15 G | Refills: 0 | Status: SHIPPED | OUTPATIENT
Start: 2023-04-03 | End: 2023-04-03 | Stop reason: SDUPTHER

## 2023-04-03 RX ORDER — PRENATAL VIT 91/IRON/FOLIC/DHA 28-975-200
1 COMBINATION PACKAGE (EA) ORAL 2 TIMES DAILY
Qty: 15 G | Refills: 0 | Status: SHIPPED | OUTPATIENT
Start: 2023-04-03

## 2023-04-03 NOTE — PROGRESS NOTES
Family Medicine Residency  Gus Posada MD    Subjective:     Alda Constantino is a 82 y.o. female who presents for flulike symptoms, a left foot lesion, a right foot wound.  This charming 82-year-old female endorses that for the last 3 days her whole body is aching.  She is experiencing some diarrhea and nausea as well.  She reports that she did have this years flu shot and has had her COVID shot +2 boosters.  She is complaining as well of an itchy spot on her left foot.  In the past she had ringworm and thinks she might have another round of this.  In addition the other day she was filing a callus off her right foot and created a bleeding lesion.  It is currently bandaged.      The following portions of the patient's history were reviewed and updated as appropriate: allergies, current medications, past family history, past medical history, past social history, past surgical history and problem list.    Past Medical Hx:  Past Medical History:   Diagnosis Date   • Asthma    • Cancer    • Congenital abnormalities     colon behind liver   • COPD (chronic obstructive pulmonary disease)    • Hypertension        Past Surgical Hx:  Past Surgical History:   Procedure Laterality Date   • ADENOIDECTOMY     • ADRENAL GLAND SURGERY Right    • BLADDER SURGERY     • BREAST BIOPSY     • COLONOSCOPY W/ BIOPSIES AND POLYPECTOMY     • EYE SURGERY     • HERNIA REPAIR     • HYSTERECTOMY     • LUNG LOBECTOMY Left    • RECTAL SURGERY      cyst   • SKIN CANCER EXCISION      legs   • TONSILLECTOMY     • VARICOSE VEIN SURGERY         Current Meds:    Current Outpatient Medications:   •  acetaminophen (TYLENOL) 500 MG tablet, Take 1 tablet by mouth Every 6 (Six) Hours As Needed for Mild Pain., Disp: , Rfl:   •  acetaminophen (TYLENOL) 650 MG 8 hr tablet, Take 2 tablets by mouth Every 8 (Eight) Hours As Needed. Indications: Pain, Disp: , Rfl:   •  albuterol (PROVENTIL HFA;VENTOLIN HFA) 108 (90 Base) MCG/ACT inhaler, Inhale 2 puffs  Every 4 (Four) Hours As Needed for Wheezing., Disp: , Rfl:   •  allopurinol (Zyloprim) 100 MG tablet, Take 3 tablets by mouth Daily., Disp: 180 tablet, Rfl: 2  •  arformoterol (BROVANA) 15 MCG/2ML nebulizer solution, , Disp: , Rfl:   •  azithromycin (ZITHROMAX) 250 MG tablet, Take 1 tablet by mouth Daily. Monday Wednesday and Friday  Indications: Pneumonia, Disp: , Rfl:   •  budesonide (PULMICORT) 0.25 MG/2ML nebulizer solution, , Disp: , Rfl:   •  bumetanide (BUMEX) 2 MG tablet, Take 1 tablet by mouth Daily. Indications: Edema, Disp: , Rfl:   •  carvedilol (COREG) 3.125 MG tablet, Take 1 tablet by mouth 2 (Two) Times a Day With Meals., Disp: , Rfl:   •  EPINEPHrine (EPIPEN) 0.3 MG/0.3ML solution auto-injector injection, , Disp: , Rfl:   •  gabapentin (NEURONTIN) 100 MG capsule, Take 1 capsule by mouth every night at bedtime., Disp: , Rfl:   •  Glucose Blood (PRECISION XTRA TEST VI), by In Vitro route., Disp: , Rfl:   •  glucose blood test strip, Use to test blood sugar 2x daily.  E11.65, Disp: 100 each, Rfl: 12  •  guaiFENesin (MUCINEX) 600 MG 12 hr tablet, Take 2 tablets by mouth Every 12 (Twelve) Hours., Disp: 60 tablet, Rfl: 0  •  HYDROcodone-acetaminophen (NORCO) 5-325 MG per tablet, Take 1 tablet by mouth Every 6 (Six) Hours As Needed for Moderate Pain., Disp: , Rfl:   •  HYDROcodone-acetaminophen (NORCO) 7.5-325 MG per tablet, Take 1 tablet by mouth Every 6 (Six) Hours As Needed for Moderate Pain. (Patient taking differently: Take 1 tablet by mouth Every 6 (Six) Hours As Needed for Moderate Pain.), Disp: 20 tablet, Rfl: 0  •  IPRATROPIUM BROMIDE IN, Inhale., Disp: , Rfl:   •  ipratropium-albuterol (DUO-NEB) 0.5-2.5 mg/3 ml nebulizer, Take 1.5 mL by nebulization Every 4 (Four) Hours As Needed for Wheezing., Disp: , Rfl:   •  levocetirizine (XYZAL) 5 MG tablet, Take 1 tablet by mouth Every Evening. Indications: Perennial Allergic Rhinitis, Disp: , Rfl:   •  metFORMIN ER (GLUCOPHAGE-XR) 750 MG 24 hr tablet, TAKE  1 TABLET EVERY NIGHT AT BEDTIME, Disp: 30 tablet, Rfl: 11  •  omeprazole (priLOSEC) 40 MG capsule, Take 1 capsule by mouth Daily., Disp: 90 capsule, Rfl: 2  •  spironolactone (ALDACTONE) 25 MG tablet, Take 0.5 tablets by mouth Every Night. Indications: Edema, Disp: 30 tablet, Rfl:   •  tiZANidine (ZANAFLEX) 2 MG tablet, Take 1 tablet by mouth At Night As Needed for Muscle Spasms., Disp: 30 tablet, Rfl: 0  •  terbinafine (LamISIL AT) 1 % cream, Apply 1 application topically to the appropriate area as directed 2 (Two) Times a Day., Disp: 15 g, Rfl: 0    Allergies:  Allergies   Allergen Reactions   • Dilaudid [Hydromorphone Hcl] Anaphylaxis   • Diphenoxylate-Atropine Shortness Of Breath     Reaction: shortness of breath     • Iodinated Contrast Media Hives     Reaction: HIVES   • Keflex [Cephalexin] Shortness Of Breath   • Lomotil [Diphenoxylate] Shortness Of Breath   • Aspirin Hives   • Beef (Diagnostic) Nausea And Vomiting     All mammals  All mammals   • Beef-Derived Products GI Intolerance   • Ciprofloxacin Nausea And Vomiting   • Contrast Dye (Echo Or Unknown Ct/Mr) Hives   • Levaquin [Levofloxacin] GI Intolerance   • Milk-Related Compounds GI Intolerance     Noted tolerates small amount of milk in cooked foods. Likes almond milk.   • Nsaids Hives   • Pegademase Bovine Other (See Comments)     Other reaction(s): GI Intolerance   • Percocet [Oxycodone-Acetaminophen] Itching   • Pork-Derived Products GI Intolerance   • Tolmetin Hives       Family Hx:  Family History   Problem Relation Age of Onset   • Diabetes Mother    • Diabetes Father    • Heart disease Father    • Cancer Sister    • Heart disease Sister    • Thyroid disease Sister    • Diabetes Brother    • Heart disease Brother         Social History:  Social History     Socioeconomic History   • Marital status:    Tobacco Use   • Smoking status: Former     Types: Cigarettes     Quit date:      Years since quittin.2   • Smokeless tobacco: Never  "  Vaping Use   • Vaping Use: Never used   Substance and Sexual Activity   • Alcohol use: Never   • Drug use: Never   • Sexual activity: Defer       Review of Systems  Review of Systems   Constitutional: Positive for chills and fatigue. Negative for diaphoresis and fever.   HENT: Positive for congestion, ear pain, rhinorrhea and tinnitus. Negative for sore throat and trouble swallowing.    Eyes: Negative for visual disturbance.   Respiratory: Negative for cough and shortness of breath.    Cardiovascular: Negative for chest pain and palpitations.   Gastrointestinal: Positive for diarrhea and nausea. Negative for abdominal pain and blood in stool.   Genitourinary: Negative for dysuria and hematuria.   Musculoskeletal: Positive for gait problem.   Skin: Positive for rash and wound. Negative for color change.   Neurological: Positive for dizziness. Negative for syncope and headaches.   Psychiatric/Behavioral: Negative for sleep disturbance.       Objective:     /72   Pulse 90   Temp 97.4 °F (36.3 °C)   Ht 172.7 cm (68\")   Wt 110 kg (242 lb)   SpO2 95%   BMI 36.80 kg/m²   Physical Exam  Constitutional:       General: She is not in acute distress.     Appearance: She is not diaphoretic.   HENT:      Head: Atraumatic.      Nose: No rhinorrhea.      Mouth/Throat:      Mouth: Mucous membranes are moist.      Pharynx: Oropharynx is clear.   Eyes:      General: No scleral icterus.        Right eye: No discharge.         Left eye: No discharge.   Neck:      Vascular: No carotid bruit.   Cardiovascular:      Rate and Rhythm: Normal rate and regular rhythm.      Pulses: Normal pulses.      Heart sounds: No murmur heard.  Pulmonary:      Effort: No respiratory distress.      Breath sounds: Wheezing present.      Comments: Decreased breath sounds left upper lobe.  Bilateral expiratory wheezing.  She is on 2 L of oxygen via a concentrator through nasal cannula  Abdominal:      General: Bowel sounds are normal.      " Palpations: There is no mass.      Tenderness: There is no abdominal tenderness.   Musculoskeletal:      Right lower leg: No edema.      Left lower leg: No edema.      Comments: During the course of my examination patient remained seated in the clinic provided wheelchair.  She arrived to the clinic with her wheeled walker which is in the room with her as well.   Skin:     Capillary Refill: Capillary refill takes less than 2 seconds.      Findings: Erythema, lesion and rash present.      Comments: On dorsum of left foot is a 1.5 cm circular lesion consistent with ringworm.  On plantar aspect of right foot underneath a bandage is a 4 mm mild abrasion that does not look infected.   Neurological:      Mental Status: She is alert. Mental status is at baseline.   Psychiatric:         Mood and Affect: Mood normal.         Thought Content: Thought content normal.          Clock draw:    Assessment/Plan:     Diagnoses and all orders for this visit:    1. Other fatigue (Primary)  Patient with sense of fatigue and overall body aches for the past 3 days.  Also endorses some nausea and diarrhea.  Rapid flu and COVID testing today negative.  Examination of the ears eyes nose and throat does not show any signs of current upper respiratory tract infection.  Advised patient was most likely a viral syndrome.  Will administer 80 mg of IM Solu-Medrol today.  On previous visit with Dr. Sanchez patient was prescribed tizanidine to help her sleep.  She did some reading with Dr. Rice and decided that given her allergies to Keflex and ciprofloxacin that the tizanidine would not be healthy for her.  I assured her that there was no reaction between tizanidine and those medications.  Advised her that we would have her take a tizanidine tablet here in the clinic and I would watch her closely for the next 30 minutes to see if there was an adverse reaction.  She did so and had no reaction.  Sent home after 30 minutes in her usual state of health.  -      POCT SARS-CoV-2 Antigen REILLY + Flu    2. Tinea pedis of left foot  Patient complaining of an itchy spot on her left foot that is consistent with a prior ringworm infection that she had.  Examination does show evidence of tinea pedis.  Patient had previously been treated with Lamisil cream and we will represcribe that for her.  -     Discontinue: terbinafine (LamISIL AT) 1 % cream; Apply 1 application topically to the appropriate area as directed 2 (Two) Times a Day.  Dispense: 15 g; Refill: 0    3. Abrasion  Patient was filing a callus off of her foot a few days ago and accidentally pierced the skin causing a bleeding abrasion.  On examination today tissue is healing appropriately no sign of infection.  Encouraged her to keep it bandaged.  Other orders  -     terbinafine (LamISIL AT) 1 % cream; Apply 1 application topically to the appropriate area as directed 2 (Two) Times a Day.  Dispense: 15 g; Refill: 0         Follow-up:     Return in about 4 weeks (around 5/1/2023).    Preventative:  Health Maintenance   Topic Date Due   • ZOSTER VACCINE (1 of 2) Never done   • DIABETIC EYE EXAM  06/20/2018   • DXA SCAN  09/18/2019   • ANNUAL WELLNESS VISIT  07/24/2021   • INFLUENZA VACCINE  08/01/2023   • HEMOGLOBIN A1C  09/29/2023   • URINE MICROALBUMIN  03/29/2024   • TDAP/TD VACCINES (3 - Td or Tdap) 02/19/2033   • COVID-19 Vaccine  Completed   • Pneumococcal Vaccine 65+  Completed       Alcohol use:  reports no history of alcohol use.  Nicotine status  reports that she quit smoking about 8 years ago. Her smoking use included cigarettes. She has never used smokeless tobacco.     Goals    None               This document has been electronically signed by Gus Posada MD on April 4, 2023 13:09 CDT

## 2023-04-04 RX ORDER — METHYLPREDNISOLONE SODIUM SUCCINATE 40 MG/ML
80 INJECTION, POWDER, LYOPHILIZED, FOR SOLUTION INTRAMUSCULAR; INTRAVENOUS ONCE
Status: SHIPPED | OUTPATIENT
Start: 2023-04-04

## 2023-04-05 ENCOUNTER — HOME CARE VISIT (OUTPATIENT)
Dept: HOME HEALTH SERVICES | Facility: CLINIC | Age: 83
End: 2023-04-05
Payer: MEDICARE

## 2023-04-05 ENCOUNTER — READMISSION MANAGEMENT (OUTPATIENT)
Dept: CALL CENTER | Facility: HOSPITAL | Age: 83
End: 2023-04-05
Payer: MEDICARE

## 2023-04-05 VITALS
HEART RATE: 78 BPM | TEMPERATURE: 98.7 F | RESPIRATION RATE: 18 BRPM | DIASTOLIC BLOOD PRESSURE: 68 MMHG | SYSTOLIC BLOOD PRESSURE: 110 MMHG | OXYGEN SATURATION: 98 %

## 2023-04-05 PROCEDURE — G0300 HHS/HOSPICE OF LPN EA 15 MIN: HCPCS

## 2023-04-05 NOTE — OUTREACH NOTE
Medical Week 2 Survey    Flowsheet Row Responses   [facility] patient discharged from? [place]   Does the patient have one of the following disease processes/diagnoses(primary or secondary)? Other   Week 2 attempt successful? No   Unsuccessful attempts Attempt 1  [Reached son, but he lives in [place]. Attempt patient another time. ]          NIKKIE BRANCH - Registered Nurse

## 2023-04-07 ENCOUNTER — HOME CARE VISIT (OUTPATIENT)
Dept: HOME HEALTH SERVICES | Facility: CLINIC | Age: 83
End: 2023-04-07
Payer: MEDICARE

## 2023-04-07 VITALS
RESPIRATION RATE: 18 BRPM | HEART RATE: 70 BPM | DIASTOLIC BLOOD PRESSURE: 70 MMHG | SYSTOLIC BLOOD PRESSURE: 115 MMHG | TEMPERATURE: 98 F | OXYGEN SATURATION: 98 %

## 2023-04-07 PROCEDURE — G0151 HHCP-SERV OF PT,EA 15 MIN: HCPCS

## 2023-04-07 NOTE — HOME HEALTH
"\"I was very bust yesterday to [place] for appt and I am tired today and I have to go to the bank. My niece will drive and I will stay in the car.\""

## 2023-04-07 NOTE — PROGRESS NOTES
I have reviewed the notes, assessments, and/or procedures performed by Dr. Gus Posada, I concur with his  documentation and assessment and plan for Alda Constantino        This document has been electronically signed by Sd Mackey MD on April 7, 2023 12:07 CDT

## 2023-04-11 ENCOUNTER — HOME CARE VISIT (OUTPATIENT)
Dept: HOME HEALTH SERVICES | Facility: CLINIC | Age: 83
End: 2023-04-11
Payer: MEDICARE

## 2023-04-11 ENCOUNTER — READMISSION MANAGEMENT (OUTPATIENT)
Dept: CALL CENTER | Facility: HOSPITAL | Age: 83
End: 2023-04-11
Payer: MEDICARE

## 2023-04-11 VITALS
OXYGEN SATURATION: 92 % | RESPIRATION RATE: 20 BRPM | TEMPERATURE: 98 F | DIASTOLIC BLOOD PRESSURE: 70 MMHG | SYSTOLIC BLOOD PRESSURE: 110 MMHG | HEART RATE: 77 BPM

## 2023-04-11 PROCEDURE — G0151 HHCP-SERV OF PT,EA 15 MIN: HCPCS

## 2023-04-11 NOTE — HOME HEALTH
"\"I feel pretty good, ribs don/t hurt, right ankle and foot feels fine. I got a bone density test yesterday. I take walker out or for long ways.\""

## 2023-04-11 NOTE — OUTREACH NOTE
Medical Week 3 Survey    Flowsheet Row Responses   [facility] patient discharged from? [place]   Does the patient have one of the following disease processes/diagnoses(primary or secondary)? Other   Week 3 attempt successful? Yes   Call start time 1819   Call end time 1821   Discharge diagnosis Cellulitis of right lower leg   Is the patient taking all medications as directed (includes completed medication regime)? Yes   Does the patient have a primary care provider?  Yes   Has the patient kept scheduled appointments due by today? Yes   What is the Home health agency?  [facility]   Has home health visited the patient within 72 hours of discharge? Yes   Psychosocial issues? No   What is the patient's perception of their health status since discharge? Improving   Is the patient/caregiver able to teach back the hierarchy of who to call/visit for symptoms/problems? PCP, Specialist, Home health nurse, Urgent Care, ED, 911 Yes   Additional teach back comments States she is doing well. Swelling has gone down in legs.    Week 3 Call Completed? Yes   Graduated Yes   Graduated/Revoked comments Denies questions or needs at this time.          Yuliet AMSON - Licensed Nurse

## 2023-04-11 NOTE — Clinical Note
PT discipline DC done   REASON FOR REFERRAL/WHAT WE SAW THEM FOR/WHY WE ARE DISCHARGING:Pt presented initally a pleasant 82 year old s/p hospital stay at [facility] 2-22 to 2-24 with pneumonia. Pt also had a fall in home and fx 3 ribs. Pt demonstrated decreased strength legs, gait deviations, dependence in trasnfers and ADLs. Pt was again in hosp for 3 days under observation for cellulitis. Pt has received 6 total home PT visits to address deficits and work toward safety and indep at home.   PRIOR VS CURRENT LEVEL OF FUNCTION: On eval pt needed assist transfers and amb approx 50 feet with walker, today indep all transfers includung car, and amb 150 feet indep with walker and indep up/down ramp to leave home. Leg strength improved with 5 sit to stands in 30 sec and 20 SLRs, and pt reports no pain ribs or right foot/leg.  DISCHARGE CONDITION: GOOD  DISCHARGE REASON: GOALS MET  DISCHARGE TO: SELF CARE

## 2023-04-12 ENCOUNTER — TELEPHONE (OUTPATIENT)
Dept: FAMILY MEDICINE CLINIC | Facility: CLINIC | Age: 83
End: 2023-04-12
Payer: MEDICARE

## 2023-04-12 NOTE — TELEPHONE ENCOUNTER
I spoke with patient about DEXA scan.  Patient noted she used to be on Forteo years ago for osteoporosis but she was taken off after 2 years of therapy.  Patient is currently on calcium and vitamin D.  She is not on any bisphosphonate or any other therapy for osteoporosis.  Patient reports that she does have multiple comorbid conditions including lung cancer.  Patient states she wants to discuss with her PCP about therapeutic plan for osteoporosis given her other comorbid condition.  I advised patient that I will forward this message to her PCP.      Marie Beltrán M.D. PGY 3  [facility] Medicine Residency  [address] [place] [address]  Office: [phone]  This document has been electronically signed by Rain Beltrán MD on April 12, 2023 16:30 CDT  Part of this note may be an electronic transcription/translation of spoken language to printed text, using a dragon dictation system.

## 2023-04-28 ENCOUNTER — HOME CARE VISIT (OUTPATIENT)
Dept: HOME HEALTH SERVICES | Facility: CLINIC | Age: 83
End: 2023-04-28
Payer: MEDICARE

## 2023-04-28 PROCEDURE — G0299 HHS/HOSPICE OF RN EA 15 MIN: HCPCS

## 2023-05-01 VITALS
TEMPERATURE: 97.9 F | HEART RATE: 81 BPM | DIASTOLIC BLOOD PRESSURE: 90 MMHG | OXYGEN SATURATION: 96 % | RESPIRATION RATE: 18 BRPM | SYSTOLIC BLOOD PRESSURE: 122 MMHG

## 2023-05-04 ENCOUNTER — OFFICE VISIT (OUTPATIENT)
Dept: FAMILY MEDICINE CLINIC | Facility: CLINIC | Age: 83
End: 2023-05-04
Payer: MEDICARE

## 2023-05-04 VITALS
DIASTOLIC BLOOD PRESSURE: 72 MMHG | BODY MASS INDEX: 35.37 KG/M2 | SYSTOLIC BLOOD PRESSURE: 102 MMHG | HEART RATE: 83 BPM | OXYGEN SATURATION: 91 % | HEIGHT: 68 IN | WEIGHT: 233.4 LBS | TEMPERATURE: 97.2 F

## 2023-05-04 DIAGNOSIS — B35.3 TINEA PEDIS OF LEFT FOOT: ICD-10-CM

## 2023-05-04 DIAGNOSIS — Z23 IMMUNIZATION DUE: Primary | ICD-10-CM

## 2023-08-01 ENCOUNTER — HOSPITAL ENCOUNTER (EMERGENCY)
Facility: HOSPITAL | Age: 83
Discharge: HOME | End: 2023-08-01
Attending: FAMILY MEDICINE | Admitting: FAMILY MEDICINE
Payer: MEDICARE

## 2023-08-01 VITALS
OXYGEN SATURATION: 98 % | SYSTOLIC BLOOD PRESSURE: 123 MMHG | DIASTOLIC BLOOD PRESSURE: 56 MMHG | TEMPERATURE: 98.1 F | HEIGHT: 68 IN | HEART RATE: 82 BPM | BODY MASS INDEX: 36.98 KG/M2 | WEIGHT: 244 LBS | RESPIRATION RATE: 22 BRPM

## 2023-08-01 DIAGNOSIS — L03.115 CELLULITIS OF RIGHT LOWER EXTREMITY: Primary | ICD-10-CM

## 2023-08-01 LAB
ALBUMIN SERPL-MCNC: 3.6 G/DL (ref 3.5–5.2)
ALBUMIN/GLOB SERPL: 1.1 G/DL
ALP SERPL-CCNC: 82 U/L (ref 39–117)
ALT SERPL W P-5'-P-CCNC: 8 U/L (ref 1–33)
ANION GAP SERPL CALCULATED.3IONS-SCNC: 14 MMOL/L (ref 5–15)
AST SERPL-CCNC: 14 U/L (ref 1–32)
BASOPHILS # BLD AUTO: 0.06 10*3/MM3 (ref 0–0.2)
BASOPHILS NFR BLD AUTO: 0.7 % (ref 0–1.5)
BILIRUB SERPL-MCNC: 0.2 MG/DL (ref 0–1.2)
BUN SERPL-MCNC: 32 MG/DL (ref 8–23)
BUN/CREAT SERPL: 22.2 (ref 7–25)
CALCIUM SPEC-SCNC: 8.3 MG/DL (ref 8.6–10.5)
CHLORIDE SERPL-SCNC: 100 MMOL/L (ref 98–107)
CO2 SERPL-SCNC: 24 MMOL/L (ref 22–29)
CREAT SERPL-MCNC: 1.44 MG/DL (ref 0.57–1)
DEPRECATED RDW RBC AUTO: 54 FL (ref 37–54)
EGFRCR SERPLBLD CKD-EPI 2021: 36.6 ML/MIN/1.73
EOSINOPHIL # BLD AUTO: 0.2 10*3/MM3 (ref 0–0.4)
EOSINOPHIL NFR BLD AUTO: 2.3 % (ref 0.3–6.2)
ERYTHROCYTE [DISTWIDTH] IN BLOOD BY AUTOMATED COUNT: 14.9 % (ref 12.3–15.4)
GLOBULIN UR ELPH-MCNC: 3.3 GM/DL
GLUCOSE SERPL-MCNC: 112 MG/DL (ref 65–99)
HCT VFR BLD AUTO: 32 % (ref 34–46.6)
HGB BLD-MCNC: 10.6 G/DL (ref 12–15.9)
HOLD SPECIMEN: NORMAL
IMM GRANULOCYTES # BLD AUTO: 0.03 10*3/MM3 (ref 0–0.05)
IMM GRANULOCYTES NFR BLD AUTO: 0.4 % (ref 0–0.5)
LYMPHOCYTES # BLD AUTO: 1.44 10*3/MM3 (ref 0.7–3.1)
LYMPHOCYTES NFR BLD AUTO: 16.9 % (ref 19.6–45.3)
MCH RBC QN AUTO: 32.6 PG (ref 26.6–33)
MCHC RBC AUTO-ENTMCNC: 33.1 G/DL (ref 31.5–35.7)
MCV RBC AUTO: 98.5 FL (ref 79–97)
MONOCYTES # BLD AUTO: 0.58 10*3/MM3 (ref 0.1–0.9)
MONOCYTES NFR BLD AUTO: 6.8 % (ref 5–12)
NEUTROPHILS NFR BLD AUTO: 6.21 10*3/MM3 (ref 1.7–7)
NEUTROPHILS NFR BLD AUTO: 72.9 % (ref 42.7–76)
NRBC BLD AUTO-RTO: 0 /100 WBC (ref 0–0.2)
PLATELET # BLD AUTO: 195 10*3/MM3 (ref 140–450)
PMV BLD AUTO: 11.9 FL (ref 6–12)
POTASSIUM SERPL-SCNC: 4.2 MMOL/L (ref 3.5–5.2)
PROT SERPL-MCNC: 6.9 G/DL (ref 6–8.5)
RBC # BLD AUTO: 3.25 10*6/MM3 (ref 3.77–5.28)
SODIUM SERPL-SCNC: 138 MMOL/L (ref 136–145)
WBC NRBC COR # BLD: 8.52 10*3/MM3 (ref 3.4–10.8)

## 2023-08-01 PROCEDURE — 99283 EMERGENCY DEPT VISIT LOW MDM: CPT

## 2023-08-01 PROCEDURE — 85025 COMPLETE CBC W/AUTO DIFF WBC: CPT | Performed by: FAMILY MEDICINE

## 2023-08-01 PROCEDURE — 80053 COMPREHEN METABOLIC PANEL: CPT | Performed by: FAMILY MEDICINE

## 2023-08-01 RX ORDER — SULFAMETHOXAZOLE AND TRIMETHOPRIM 800; 160 MG/1; MG/1
1 TABLET ORAL 2 TIMES DAILY
Qty: 20 TABLET | Refills: 0 | Status: SHIPPED | OUTPATIENT
Start: 2023-08-01

## 2023-08-01 RX ORDER — HYDROCODONE BITARTRATE AND ACETAMINOPHEN 5; 325 MG/1; MG/1
1 TABLET ORAL EVERY 6 HOURS PRN
Qty: 12 TABLET | Refills: 0 | Status: SHIPPED | OUTPATIENT
Start: 2023-08-01

## 2023-08-01 RX ORDER — METHYLPREDNISOLONE 4 MG/1
TABLET ORAL
Qty: 21 TABLET | Refills: 0 | Status: SHIPPED | OUTPATIENT
Start: 2023-08-01

## 2023-08-01 NOTE — ED PROVIDER NOTES
Subjective   History of Present Illness  Patient presents to the emergency department with progressive right foot pain x2 days.  She has a history of cellulitis that involved her left leg with IV antibiotics and hospitalization in the past.  She also has a history of gout and states that she is compliant with her gout medications.        Leg Pain  Location:  Foot and ankle  Injury: no    Ankle location:  R ankle  Foot location:  R foot  Pain details:     Quality:  Aching    Severity:  Moderate    Progression:  Worsening  Chronicity:  New  Foreign body present:  No foreign bodies  Prior injury to area:  No  Relieved by:  Nothing  Worsened by:  Activity  Associated symptoms: no fatigue, no fever and no neck pain    Leg Swelling  Associated symptoms: diarrhea (chronic)    Associated symptoms: no abdominal pain, no chest pain, no congestion, no cough, no ear pain, no fatigue, no fever, no headaches, no myalgias, no nausea, no rash, no rhinorrhea, no shortness of breath, no sore throat, no vomiting and no wheezing      Review of Systems   Constitutional:  Negative for appetite change, chills, diaphoresis, fatigue and fever.   HENT:  Negative for congestion, ear discharge, ear pain, nosebleeds, rhinorrhea, sinus pressure, sore throat and trouble swallowing.    Eyes:  Negative for discharge and redness.   Respiratory:  Negative for apnea, cough, chest tightness, shortness of breath and wheezing.    Cardiovascular:  Negative for chest pain.   Gastrointestinal:  Positive for diarrhea (chronic). Negative for abdominal pain, nausea and vomiting.   Endocrine: Negative for polyuria.   Genitourinary:  Negative for dysuria, frequency and urgency.   Musculoskeletal:  Positive for arthralgias and joint swelling. Negative for myalgias and neck pain.   Skin:  Negative for color change and rash.   Allergic/Immunologic: Negative for immunocompromised state.   Neurological:  Negative for dizziness, seizures, syncope, weakness,  light-headedness and headaches.   Hematological:  Negative for adenopathy. Does not bruise/bleed easily.   Psychiatric/Behavioral:  Negative for behavioral problems and confusion.    All other systems reviewed and are negative.    Past Medical History:   Diagnosis Date    Asthma     Cancer     Congenital abnormalities     colon behind liver    COPD (chronic obstructive pulmonary disease)     Hypertension        Allergies   Allergen Reactions    Dilaudid [Hydromorphone Hcl] Anaphylaxis    Diphenoxylate-Atropine Shortness Of Breath     Reaction: shortness of breath      Iodinated Contrast Media Hives     Reaction: HIVES    Keflex [Cephalexin] Shortness Of Breath    Lomotil [Diphenoxylate] Shortness Of Breath    Aspirin Hives    Beef (Diagnostic) Nausea And Vomiting     All mammals  All mammals    Beef-Derived Products GI Intolerance    Ciprofloxacin Nausea And Vomiting    Contrast Dye (Echo Or Unknown Ct/Mr) Hives    Levaquin [Levofloxacin] GI Intolerance    Milk-Related Compounds GI Intolerance     Noted tolerates small amount of milk in cooked foods. Likes almond milk.    Nsaids Hives    Pegademase Bovine Other (See Comments)     Other reaction(s): GI Intolerance    Percocet [Oxycodone-Acetaminophen] Itching    Pork-Derived Products GI Intolerance    Tolmetin Hives       Past Surgical History:   Procedure Laterality Date    ADENOIDECTOMY      ADRENAL GLAND SURGERY Right     BLADDER SURGERY      BREAST BIOPSY      COLONOSCOPY W/ BIOPSIES AND POLYPECTOMY      EYE SURGERY      HERNIA REPAIR      HYSTERECTOMY      LUNG LOBECTOMY Left     RECTAL SURGERY      cyst    SKIN CANCER EXCISION      legs    TONSILLECTOMY      VARICOSE VEIN SURGERY         Family History   Problem Relation Age of Onset    Diabetes Mother     Diabetes Father     Heart disease Father     Cancer Sister     Heart disease Sister     Thyroid disease Sister     Diabetes Brother     Heart disease Brother        Social History     Socioeconomic History     Marital status:    Tobacco Use    Smoking status: Former     Types: Cigarettes     Quit date:      Years since quittin.5    Smokeless tobacco: Never   Vaping Use    Vaping Use: Never used   Substance and Sexual Activity    Alcohol use: Never    Drug use: Never    Sexual activity: Defer           Objective   Physical Exam  Vitals and nursing note reviewed.   Constitutional:       Appearance: She is well-developed.   HENT:      Head: Normocephalic and atraumatic.      Nose: Nose normal.   Eyes:      General: No scleral icterus.        Right eye: No discharge.         Left eye: No discharge.      Conjunctiva/sclera: Conjunctivae normal.      Pupils: Pupils are equal, round, and reactive to light.   Neck:      Trachea: No tracheal deviation.   Cardiovascular:      Rate and Rhythm: Normal rate and regular rhythm.      Heart sounds: Normal heart sounds. No murmur heard.  Pulmonary:      Effort: Pulmonary effort is normal. No respiratory distress.      Breath sounds: Normal breath sounds. No stridor. No wheezing or rales.   Abdominal:      General: Bowel sounds are normal. There is no distension.      Palpations: Abdomen is soft. There is no mass.      Tenderness: There is no abdominal tenderness. There is no guarding or rebound.   Musculoskeletal:      Cervical back: Normal range of motion and neck supple.      Right foot: Swelling and tenderness present. Normal pulse.        Feet:    Skin:     General: Skin is warm and dry.      Findings: No erythema or rash.   Neurological:      Mental Status: She is alert and oriented to person, place, and time.      Coordination: Coordination normal.   Psychiatric:         Behavior: Behavior normal.         Thought Content: Thought content normal.       Procedures           ED Course                                           Medical Decision Making  Problems Addressed:  Cellulitis of right lower extremity: complicated acute illness or injury    Amount and/or Complexity  of Data Reviewed  Labs: ordered.    Risk  Prescription drug management.        Final diagnoses:   Cellulitis of right lower extremity       ED Disposition  ED Disposition       ED Disposition   Discharge    Condition   Stable    Comment   --               Gus Posada MD  [address]  [address]  [place] [id number] [phone]    In 3 days           Medication List        New Prescriptions      methylPREDNISolone 4 MG dose pack  Commonly known as: MEDROL  Take as directed on package instructions.     sulfamethoxazole-trimethoprim 800-160 MG per tablet  Commonly known as: BACTRIM DS,SEPTRA DS  Take 1 tablet by mouth 2 (Two) Times a Day.            Changed      * HYDROcodone-acetaminophen 7.5-325 MG per tablet  Commonly known as: NORCO  Take 1 tablet by mouth Every 6 (Six) Hours As Needed for Moderate Pain.  What changed: Another medication with the same name was added. Make sure you understand how and when to take each.     * HYDROcodone-acetaminophen 5-325 MG per tablet  Commonly known as: NORCO  What changed: Another medication with the same name was added. Make sure you understand how and when to take each.     * HYDROcodone-acetaminophen 5-325 MG per tablet  Commonly known as: NORCO  Take 1 tablet by mouth Every 6 (Six) Hours As Needed for Moderate Pain.  What changed: You were already taking a medication with the same name, and this prescription was added. Make sure you understand how and when to take each.           * This list has 3 medication(s) that are the same as other medications prescribed for you. Read the directions carefully, and ask your doctor or other care provider to review them with you.                   Where to Get Your Medications        These medications were sent to [id number] - [place], [place] - [address] - [phone]  - [phone]   [address] [address]      Phone: [phone]   HYDROcodone-acetaminophen  5-325 MG per tablet  methylPREDNISolone 4 MG dose pack  sulfamethoxazole-trimethoprim 800-160 MG per tablet            Pedro Padilla MD  08/02/23 0811

## 2023-08-17 DIAGNOSIS — E11.40 TYPE 2 DIABETES MELLITUS WITH DIABETIC NEUROPATHY, WITHOUT LONG-TERM CURRENT USE OF INSULIN: ICD-10-CM

## 2023-08-21 RX ORDER — METFORMIN HYDROCHLORIDE 750 MG/1
TABLET, EXTENDED RELEASE ORAL
Qty: 30 TABLET | Refills: 11 | OUTPATIENT
Start: 2023-08-21

## 2023-08-23 RX ORDER — GABAPENTIN 100 MG/1
CAPSULE ORAL
Qty: 90 CAPSULE | Refills: 1 | OUTPATIENT
Start: 2023-08-23

## 2023-09-06 ENCOUNTER — TELEPHONE (OUTPATIENT)
Dept: FAMILY MEDICINE CLINIC | Facility: CLINIC | Age: 83
End: 2023-09-06
Payer: COMMERCIAL

## 2023-09-06 NOTE — TELEPHONE ENCOUNTER
Called patient regarding refill request for metformin.  While I was on vacation refill request given but was not completed because the doctor could not figure out if she still needed to be on metformin.  I looked back in the chart and saw that her last refill was a year ago with 11 refills.  Last HbA1c done on 3/29/2023 was 6.4.  Left patient a message that her HbA1c was so well controlled that she probably did not need to be on metformin any further.  We will recheck her HbA1c at next visit.  She can stop metformin for now.      This document has been electronically signed by Gus Posada MD on September 6, 2023 09:28 CDT